# Patient Record
Sex: FEMALE | Race: WHITE | NOT HISPANIC OR LATINO | Employment: FULL TIME | URBAN - METROPOLITAN AREA
[De-identification: names, ages, dates, MRNs, and addresses within clinical notes are randomized per-mention and may not be internally consistent; named-entity substitution may affect disease eponyms.]

---

## 2019-04-26 ENCOUNTER — TELEPHONE (OUTPATIENT)
Dept: SCHEDULING | Facility: IMAGING CENTER | Age: 59
End: 2019-04-26

## 2019-05-15 ENCOUNTER — OFFICE VISIT (OUTPATIENT)
Dept: MEDICAL GROUP | Facility: PHYSICIAN GROUP | Age: 59
End: 2019-05-15
Payer: COMMERCIAL

## 2019-05-15 VITALS
OXYGEN SATURATION: 98 % | WEIGHT: 217 LBS | SYSTOLIC BLOOD PRESSURE: 130 MMHG | TEMPERATURE: 98.6 F | RESPIRATION RATE: 16 BRPM | DIASTOLIC BLOOD PRESSURE: 78 MMHG | HEART RATE: 78 BPM

## 2019-05-15 DIAGNOSIS — E55.9 VITAMIN D DEFICIENCY: ICD-10-CM

## 2019-05-15 DIAGNOSIS — I10 BENIGN ESSENTIAL HTN: ICD-10-CM

## 2019-05-15 DIAGNOSIS — R53.83 OTHER FATIGUE: ICD-10-CM

## 2019-05-15 DIAGNOSIS — L40.9 PSORIASIS: ICD-10-CM

## 2019-05-15 DIAGNOSIS — R23.2 HOT FLASHES: ICD-10-CM

## 2019-05-15 DIAGNOSIS — E89.0 HISTORY OF TOTAL THYROIDECTOMY: ICD-10-CM

## 2019-05-15 DIAGNOSIS — Z00.00 PREVENTATIVE HEALTH CARE: ICD-10-CM

## 2019-05-15 DIAGNOSIS — I25.2 HISTORY OF MI (MYOCARDIAL INFARCTION): ICD-10-CM

## 2019-05-15 DIAGNOSIS — E03.9 HYPOTHYROIDISM, UNSPECIFIED TYPE: ICD-10-CM

## 2019-05-15 DIAGNOSIS — Z12.31 SCREENING MAMMOGRAM, ENCOUNTER FOR: ICD-10-CM

## 2019-05-15 DIAGNOSIS — K21.9 GASTROESOPHAGEAL REFLUX DISEASE, ESOPHAGITIS PRESENCE NOT SPECIFIED: ICD-10-CM

## 2019-05-15 DIAGNOSIS — E78.00 HYPERCHOLESTEREMIA: ICD-10-CM

## 2019-05-15 PROBLEM — Z98.890 HISTORY OF TOTAL THYROIDECTOMY: Status: ACTIVE | Noted: 2019-05-15

## 2019-05-15 PROBLEM — Z90.89 HISTORY OF TOTAL THYROIDECTOMY: Status: ACTIVE | Noted: 2019-05-15

## 2019-05-15 PROCEDURE — 99204 OFFICE O/P NEW MOD 45 MIN: CPT | Performed by: FAMILY MEDICINE

## 2019-05-15 RX ORDER — LEVOTHYROXINE SODIUM 0.2 MG/1
200 TABLET ORAL
Qty: 90 TAB | Refills: 3 | Status: CANCELLED | OUTPATIENT
Start: 2019-05-15

## 2019-05-15 RX ORDER — AMLODIPINE BESYLATE 5 MG/1
5 TABLET ORAL DAILY
Qty: 90 TAB | Refills: 3 | Status: CANCELLED | OUTPATIENT
Start: 2019-05-15

## 2019-05-15 RX ORDER — PANTOPRAZOLE SODIUM 40 MG/1
40 TABLET, DELAYED RELEASE ORAL DAILY
COMMUNITY
End: 2019-05-16 | Stop reason: SDUPTHER

## 2019-05-15 RX ORDER — VITAMIN B COMPLEX
TABLET ORAL
COMMUNITY
End: 2020-06-30

## 2019-05-15 RX ORDER — PANTOPRAZOLE SODIUM 40 MG/1
40 TABLET, DELAYED RELEASE ORAL DAILY
Qty: 90 TAB | Refills: 3 | Status: CANCELLED | OUTPATIENT
Start: 2019-05-15

## 2019-05-15 RX ORDER — BIOTIN 1 MG
TABLET ORAL
COMMUNITY
End: 2020-06-30

## 2019-05-15 RX ORDER — ASPIRIN 81 MG/1
81 TABLET, CHEWABLE ORAL DAILY
COMMUNITY

## 2019-05-15 RX ORDER — ATORVASTATIN CALCIUM 80 MG/1
80 TABLET, FILM COATED ORAL EVERY EVENING
Qty: 90 TAB | Refills: 3 | Status: CANCELLED | OUTPATIENT
Start: 2019-05-15

## 2019-05-15 RX ORDER — FLUOXETINE 10 MG/1
10 CAPSULE ORAL DAILY
Qty: 90 CAP | Refills: 3 | Status: CANCELLED | OUTPATIENT
Start: 2019-05-15

## 2019-05-15 RX ORDER — AMLODIPINE BESYLATE 5 MG/1
5 TABLET ORAL DAILY
COMMUNITY
End: 2019-05-16 | Stop reason: SDUPTHER

## 2019-05-15 RX ORDER — CARVEDILOL 6.25 MG/1
6.25 TABLET ORAL 2 TIMES DAILY WITH MEALS
COMMUNITY
End: 2019-05-16 | Stop reason: SDUPTHER

## 2019-05-15 RX ORDER — ATORVASTATIN CALCIUM 80 MG/1
80 TABLET, FILM COATED ORAL NIGHTLY
COMMUNITY
End: 2019-05-16 | Stop reason: SDUPTHER

## 2019-05-15 RX ORDER — FLUOXETINE 10 MG/1
10 CAPSULE ORAL DAILY
COMMUNITY
End: 2019-05-16 | Stop reason: SDUPTHER

## 2019-05-15 RX ORDER — CARVEDILOL 6.25 MG/1
6.25 TABLET ORAL 2 TIMES DAILY WITH MEALS
Qty: 180 TAB | Refills: 3 | Status: CANCELLED | OUTPATIENT
Start: 2019-05-15

## 2019-05-15 RX ORDER — LEVOTHYROXINE SODIUM 0.2 MG/1
200 TABLET ORAL
COMMUNITY
End: 2019-05-16 | Stop reason: SDUPTHER

## 2019-05-15 ASSESSMENT — PATIENT HEALTH QUESTIONNAIRE - PHQ9: CLINICAL INTERPRETATION OF PHQ2 SCORE: 0

## 2019-05-15 NOTE — PROGRESS NOTES
"CC: Hypertension    HISTORY OF THE PRESENT ILLNESS: Patient is a 58 y.o. female. This pleasant patient is here today to establish care and discuss health problems below.    Hypertension: Chronic issue for the patient.  She takes carvedilol as well as amlodipine.  Typically her blood pressure is very well controlled in the 120 systolic.  She reports she is slightly high today.  No issues with chest pain, headache, blurry vision or shortness of breath.  She does need refills on the medication but would like to check what mail order pharmacy her insurance covers.    High cholesterol: Chronic issue for the patient.  She does take high intensity statin due to history of myocardial infarction as well.  No side effects from this medication.  She does need refills on the medication but would like to check what mail order pharmacy her insurance covers.    Hypothyroidism, history of thyroidectomy: Chronic issue for the patient.  She reports having a total thyroidectomy several years ago due to goiter.  She is never followed with endocrinology.  She is been maintained on 200 mcg daily of Synthroid since this time.  She does report fatigue and dry skin.    GERD: Patient takes pantoprazole 40 mg daily.  She has no current symptoms of GERD and this medication works well.    History of myocardial infarction.  Patient reports that starting in her 30s, she began to have heart attacks.  She reports having had 5 heart attacks but without any stent placement or surgery.  She states that these heart attacks were apparently due to \"stress\".  Her dad has a history of coronary artery disease, but this is later in life when he developed it.  She has no current cardiac symptoms.  She does not have a cardiologist here in [place].    Psoriasis: Chronic issue for the patient.  Her previous physician prescribed clobetasol cream and calcitriol cream.  Neither of these are working and her symptoms seem to be getting worse, particularly on the " extensor surfaces of her elbows and forearms as well as hands.  She is wondering if there is anything else that could be done.    Hot flashes: Chronic issue for the patient.  She takes the fluoxetine 10 mg daily for her hot flashes.  No side effects from the medication.  No desire to try to wean off the medication at this time.    Allergies: Patient has no allergy information on record.    Current Outpatient Prescriptions Ordered in [facility]   Medication Sig Dispense Refill   • FLUoxetine (PROZAC) 10 MG Cap Take 10 mg by mouth every day.     • pantoprazole (PROTONIX) 40 MG Tablet Delayed Response Take 40 mg by mouth every day.     • levothyroxine (SYNTHROID) 200 MCG Tab Take 200 mcg by mouth Every morning on an empty stomach.     • amLODIPine (NORVASC) 5 MG Tab Take 5 mg by mouth every day.     • carvedilol (COREG) 6.25 MG Tab Take 6.25 mg by mouth 2 times a day, with meals.     • atorvastatin (LIPITOR) 80 MG tablet Take 80 mg by mouth every evening.     • aspirin (ASA) 81 MG Chew Tab chewable tablet Take 81 mg by mouth every day.     • Cholecalciferol (VITAMIN D3) 5000 UNIT/0.5ML Liquid Take  by mouth.     • Biotin 1000 MCG Tab Take  by mouth.     • Coenzyme Q10 (COQ10) 100 MG Cap Take  by mouth.       No current Epic-ordered facility-administered medications on file.        Past Medical History:   Diagnosis Date   • GERD (gastroesophageal reflux disease)    • Heart attack (HCC)    • Hyperlipidemia    • Hypertension    • Thyroid disease        Past Surgical History:   Procedure Laterality Date   • CHOLECYSTECTOMY     • THYROIDECTOMY TOTAL         Social History   Substance Use Topics   • Smoking status: Never Smoker   • Smokeless tobacco: Never Used   • Alcohol use Yes      Comment: 1 drink weekly       Social History     Social History Narrative   • No narrative on file       Family History   Problem Relation Age of Onset   • Alzheimer's Disease Mother    • Heart Disease Father         CAD       ROS:     -  Constitutional: Positive for fatigue.  Negative for fever, chills, unexpected weight change, and generalized weakness.     - HEENT: Negative for headaches, vision changes, hearing changes, ear pain, ear discharge, rhinorrhea, sinus congestion, sore throat, and neck pain.      - Respiratory: Negative for cough, sputum production, chest congestion, dyspnea, wheezing, and crackles.      - Cardiovascular: Negative for chest pain, palpitations, orthopnea, PND, and bilateral lower extremity edema.     - Gastrointestinal: Negative for heartburn, nausea, vomiting, abdominal pain, hematochezia, melena, diarrhea, constipation, and greasy/foul-smelling stools.     - Genitourinary: Negative for dysuria, polyuria, hematuria, pyuria, urinary urgency, and urinary incontinence.     - Musculoskeletal: Negative for myalgias, back pain, and joint pain.     - Skin:See HPI.    - Neurological: Negative for dizziness, tingling, tremors, focal sensory deficit, focal weakness and headaches.     - Endo/Heme/Allergies: Does not bruise/bleed easily. No polyuria or polydipsia.    - Psychiatric/Behavioral: Negative for depression, suicidal/homicidal ideation and memory loss.      Exam: /78   Pulse 78   Temp 37 °C (98.6 °F)   Resp 16   Wt 98.4 kg (217 lb)   SpO2 98%  There is no height or weight on file to calculate BMI.    General: Well appearing, NAD  HEENT: Normocephalic. Conjunctiva clear, lids without ptosis, pupils equal and reactive to light accommodation, ears normal shape and contour,  oropharynx is without erythema, edema or exudates.   Neck: Supple without JVD. No thyromegaly or nodules  Pulmonary: Clear to ausculation.  Normal effort. No rales, ronchi, or wheezing.  Cardiovascular: Regular rate and rhythm without murmur, rubs or gallop.  No lower extremity edema.  Abdomen: Soft, nontender, nondistended. Normal bowel sounds. Liver and spleen are not palpable. No rebound or guarding  Neurologic: normal gait  Lymph: No  cervical, supraclavicular lymph nodes are palpable  Skin: Warm and dry.  Erythematous, scaly, silvery patches on extensor surface of elbows and forearms.  Musculoskeletal:  No extremity cyanosis, clubbing, or edema.  Psych: Normal mood and affect. Alert and oriented. Judgment and insight is normal.    Please note that this dictation was created using voice recognition software. I have made every reasonable attempt to correct obvious errors, but I expect that there are errors of grammar and possibly content that I did not discover before finalizing the note.      Assessment/Plan  Magda was seen today for establish care and medication refill.    Diagnoses and all orders for this visit:    Benign essential HTN  Chronic well-controlled problem for the patient, new problem to me.  Continue amlodipine and carvedilol.  Check CMP.  -     Comp Metabolic Panel; Future    Vitamin D deficiency  Chronic problem for the patient.  Unclear whether or not controlled.  Check vitamin D levels.  -     VITAMIN D,25 HYDROXY; Future    Hypercholesteremia  Chronic problem for the patient.  Unclear whether or not controlled.  Check lipid profile.  Continue high intensity statin, particularly given her history of 5 myocardial infarctions.  -     Lipid Profile; Future    History of total thyroidectomy  Hypothyroidism, unspecified type  Chronic problem, unclear whether or not controlled.  Check TSH.  Continue Synthroid at 200 mcg daily.  -     TSH WITH REFLEX TO FT4; Future    Gastroesophageal reflux disease, esophagitis presence not specified  Chronic problem for the patient.  Well-controlled on pantoprazole which she is okay to continue.    History of MI (myocardial infarction)  Patient reports history of 5 myocardial infarctions.  She does not currently have a cardiologist here in [place].  We will go ahead and refer.  -     REFERRAL TO CARDIOLOGY    Psoriasis  Chronic uncontrolled problem.  We will go ahead and refer to dermatology, possibly  for immunotherapy.  -     REFERRAL TO DERMATOLOGY    Hot flashes  Chronic well-controlled problem.  Continue fluoxetine 10 mg daily.    Other fatigue  Chronic issue noted on review of systems.  Checking all labs plan and will go ahead and add on CBC.  -     CBC WITH DIFFERENTIAL; Future    Screening mammogram, encounter for  -     MA-SCREENING MAMMO BILAT W/TOMOSYNTHESIS W/CAD; Future    Preventative health care  -     HEMOGLOBIN A1C; Future    Follow-up in about 4 months.    Jagruti Stewart DO  [facility]

## 2019-05-16 RX ORDER — LEVOTHYROXINE SODIUM 0.2 MG/1
200 TABLET ORAL
Qty: 90 TAB | Refills: 3 | Status: SHIPPED | OUTPATIENT
Start: 2019-05-16 | End: 2020-01-02 | Stop reason: SDUPTHER

## 2019-05-16 RX ORDER — ATORVASTATIN CALCIUM 80 MG/1
80 TABLET, FILM COATED ORAL EVERY EVENING
Qty: 90 TAB | Refills: 3 | Status: SHIPPED | OUTPATIENT
Start: 2019-05-16 | End: 2020-01-02 | Stop reason: SDUPTHER

## 2019-05-16 RX ORDER — PANTOPRAZOLE SODIUM 40 MG/1
40 TABLET, DELAYED RELEASE ORAL DAILY
Qty: 90 TAB | Refills: 3 | Status: SHIPPED | OUTPATIENT
Start: 2019-05-16 | End: 2019-09-25

## 2019-05-16 RX ORDER — AMLODIPINE BESYLATE 5 MG/1
5 TABLET ORAL DAILY
Qty: 90 TAB | Refills: 3 | Status: SHIPPED | OUTPATIENT
Start: 2019-05-16 | End: 2019-10-09

## 2019-05-16 RX ORDER — FLUOXETINE 10 MG/1
10 CAPSULE ORAL DAILY
Qty: 90 CAP | Refills: 3 | Status: SHIPPED | OUTPATIENT
Start: 2019-05-16 | End: 2020-01-02 | Stop reason: SDUPTHER

## 2019-05-16 RX ORDER — CARVEDILOL 6.25 MG/1
6.25 TABLET ORAL 2 TIMES DAILY WITH MEALS
Qty: 180 TAB | Refills: 3 | Status: SHIPPED | OUTPATIENT
Start: 2019-05-16 | End: 2020-01-02 | Stop reason: SDUPTHER

## 2019-05-17 NOTE — TELEPHONE ENCOUNTER
Dr Stewart- Insurance does not cover pantoprazole and is asking to switch to omeprazole. Please refill as you see fit.

## 2019-05-20 RX ORDER — OMEPRAZOLE 40 MG/1
40 CAPSULE, DELAYED RELEASE ORAL DAILY
Qty: 90 CAP | Refills: 3 | Status: SHIPPED | OUTPATIENT
Start: 2019-05-20 | End: 2020-01-02 | Stop reason: SDUPTHER

## 2019-05-23 ENCOUNTER — HOSPITAL ENCOUNTER (OUTPATIENT)
Dept: LAB | Facility: MEDICAL CENTER | Age: 59
End: 2019-05-23
Attending: FAMILY MEDICINE
Payer: COMMERCIAL

## 2019-05-23 DIAGNOSIS — R53.83 OTHER FATIGUE: ICD-10-CM

## 2019-05-23 DIAGNOSIS — E03.9 HYPOTHYROIDISM, UNSPECIFIED TYPE: ICD-10-CM

## 2019-05-23 DIAGNOSIS — E55.9 VITAMIN D DEFICIENCY: ICD-10-CM

## 2019-05-23 DIAGNOSIS — I10 BENIGN ESSENTIAL HTN: ICD-10-CM

## 2019-05-23 DIAGNOSIS — E78.00 HYPERCHOLESTEREMIA: ICD-10-CM

## 2019-05-23 DIAGNOSIS — Z00.00 PREVENTATIVE HEALTH CARE: ICD-10-CM

## 2019-05-23 LAB
25(OH)D3 SERPL-MCNC: 28 NG/ML (ref 30–100)
ALBUMIN SERPL BCP-MCNC: 4.3 G/DL (ref 3.2–4.9)
ALBUMIN/GLOB SERPL: 1.4 G/DL
ALP SERPL-CCNC: 134 U/L (ref 30–99)
ALT SERPL-CCNC: 32 U/L (ref 2–50)
ANION GAP SERPL CALC-SCNC: 8 MMOL/L (ref 0–11.9)
AST SERPL-CCNC: 24 U/L (ref 12–45)
BASOPHILS # BLD AUTO: 1.4 % (ref 0–1.8)
BASOPHILS # BLD: 0.12 K/UL (ref 0–0.12)
BILIRUB SERPL-MCNC: 0.6 MG/DL (ref 0.1–1.5)
BUN SERPL-MCNC: 13 MG/DL (ref 8–22)
CALCIUM SERPL-MCNC: 10.2 MG/DL (ref 8.5–10.5)
CHLORIDE SERPL-SCNC: 105 MMOL/L (ref 96–112)
CHOLEST SERPL-MCNC: 136 MG/DL (ref 100–199)
CO2 SERPL-SCNC: 27 MMOL/L (ref 20–33)
CREAT SERPL-MCNC: 0.94 MG/DL (ref 0.5–1.4)
EOSINOPHIL # BLD AUTO: 0.38 K/UL (ref 0–0.51)
EOSINOPHIL NFR BLD: 4.5 % (ref 0–6.9)
ERYTHROCYTE [DISTWIDTH] IN BLOOD BY AUTOMATED COUNT: 42.1 FL (ref 35.9–50)
EST. AVERAGE GLUCOSE BLD GHB EST-MCNC: 134 MG/DL
FASTING STATUS PATIENT QL REPORTED: NORMAL
GLOBULIN SER CALC-MCNC: 3 G/DL (ref 1.9–3.5)
GLUCOSE SERPL-MCNC: 106 MG/DL (ref 65–99)
HBA1C MFR BLD: 6.3 % (ref 0–5.6)
HCT VFR BLD AUTO: 47.2 % (ref 37–47)
HDLC SERPL-MCNC: 45 MG/DL
HGB BLD-MCNC: 15.4 G/DL (ref 12–16)
IMM GRANULOCYTES # BLD AUTO: 0.02 K/UL (ref 0–0.11)
IMM GRANULOCYTES NFR BLD AUTO: 0.2 % (ref 0–0.9)
LDLC SERPL CALC-MCNC: 56 MG/DL
LYMPHOCYTES # BLD AUTO: 3.33 K/UL (ref 1–4.8)
LYMPHOCYTES NFR BLD: 39.2 % (ref 22–41)
MCH RBC QN AUTO: 30.3 PG (ref 27–33)
MCHC RBC AUTO-ENTMCNC: 32.6 G/DL (ref 33.6–35)
MCV RBC AUTO: 92.7 FL (ref 81.4–97.8)
MONOCYTES # BLD AUTO: 0.74 K/UL (ref 0–0.85)
MONOCYTES NFR BLD AUTO: 8.7 % (ref 0–13.4)
NEUTROPHILS # BLD AUTO: 3.9 K/UL (ref 2–7.15)
NEUTROPHILS NFR BLD: 46 % (ref 44–72)
NRBC # BLD AUTO: 0.02 K/UL
NRBC BLD-RTO: 0.2 /100 WBC
PLATELET # BLD AUTO: 491 K/UL (ref 164–446)
PMV BLD AUTO: 9.8 FL (ref 9–12.9)
POTASSIUM SERPL-SCNC: 4 MMOL/L (ref 3.6–5.5)
PROT SERPL-MCNC: 7.3 G/DL (ref 6–8.2)
RBC # BLD AUTO: 5.09 M/UL (ref 4.2–5.4)
SODIUM SERPL-SCNC: 140 MMOL/L (ref 135–145)
TRIGL SERPL-MCNC: 176 MG/DL (ref 0–149)
TSH SERPL DL<=0.005 MIU/L-ACNC: 0.26 UIU/ML (ref 0.38–5.33)
WBC # BLD AUTO: 8.5 K/UL (ref 4.8–10.8)

## 2019-05-23 PROCEDURE — 80061 LIPID PANEL: CPT

## 2019-05-23 PROCEDURE — 83036 HEMOGLOBIN GLYCOSYLATED A1C: CPT

## 2019-05-23 PROCEDURE — 84439 ASSAY OF FREE THYROXINE: CPT

## 2019-05-23 PROCEDURE — 84443 ASSAY THYROID STIM HORMONE: CPT

## 2019-05-23 PROCEDURE — 36415 COLL VENOUS BLD VENIPUNCTURE: CPT

## 2019-05-23 PROCEDURE — 82306 VITAMIN D 25 HYDROXY: CPT

## 2019-05-23 PROCEDURE — 80053 COMPREHEN METABOLIC PANEL: CPT

## 2019-05-23 PROCEDURE — 85025 COMPLETE CBC W/AUTO DIFF WBC: CPT

## 2019-05-24 LAB — T4 FREE SERPL-MCNC: 1.29 NG/DL (ref 0.53–1.43)

## 2019-05-31 ENCOUNTER — HOSPITAL ENCOUNTER (OUTPATIENT)
Dept: RADIOLOGY | Facility: MEDICAL CENTER | Age: 59
End: 2019-05-31

## 2019-06-05 ENCOUNTER — HOSPITAL ENCOUNTER (OUTPATIENT)
Dept: RADIOLOGY | Facility: MEDICAL CENTER | Age: 59
End: 2019-06-05
Attending: FAMILY MEDICINE
Payer: COMMERCIAL

## 2019-06-05 DIAGNOSIS — Z12.31 SCREENING MAMMOGRAM, ENCOUNTER FOR: ICD-10-CM

## 2019-06-05 PROCEDURE — 77067 SCR MAMMO BI INCL CAD: CPT

## 2019-06-06 DIAGNOSIS — R92.8 ABNORMALITY OF LEFT BREAST ON SCREENING MAMMOGRAM: ICD-10-CM

## 2019-06-06 NOTE — PROGRESS NOTES
Placed ultrasound left breast for this patient who got an abnormal mammogram as requested for Dr. Stewart as I am covering for her at this time.  Please call the patient and let her know.  Thank you.  Vania Wilkerson M.D.

## 2019-06-07 ENCOUNTER — HOSPITAL ENCOUNTER (OUTPATIENT)
Dept: RADIOLOGY | Facility: MEDICAL CENTER | Age: 59
End: 2019-06-07
Attending: INTERNAL MEDICINE
Payer: COMMERCIAL

## 2019-06-07 ENCOUNTER — HOSPITAL ENCOUNTER (OUTPATIENT)
Dept: RADIOLOGY | Facility: MEDICAL CENTER | Age: 59
End: 2019-06-07
Attending: FAMILY MEDICINE
Payer: COMMERCIAL

## 2019-06-07 DIAGNOSIS — R92.8 ABNORMAL MAMMOGRAM: ICD-10-CM

## 2019-06-07 DIAGNOSIS — D24.2 FIBROADENOMA OF LEFT BREAST: ICD-10-CM

## 2019-06-07 PROCEDURE — 76642 ULTRASOUND BREAST LIMITED: CPT | Mod: LT

## 2019-06-07 PROCEDURE — G0279 TOMOSYNTHESIS, MAMMO: HCPCS | Mod: LT

## 2019-06-18 ENCOUNTER — OFFICE VISIT (OUTPATIENT)
Dept: MEDICAL GROUP | Facility: PHYSICIAN GROUP | Age: 59
End: 2019-06-18
Payer: COMMERCIAL

## 2019-06-18 VITALS
TEMPERATURE: 98.1 F | DIASTOLIC BLOOD PRESSURE: 78 MMHG | HEART RATE: 90 BPM | WEIGHT: 215 LBS | SYSTOLIC BLOOD PRESSURE: 126 MMHG | BODY MASS INDEX: 35.82 KG/M2 | HEIGHT: 65 IN | OXYGEN SATURATION: 95 %

## 2019-06-18 DIAGNOSIS — E03.9 HYPOTHYROIDISM, UNSPECIFIED TYPE: ICD-10-CM

## 2019-06-18 DIAGNOSIS — R74.8 ELEVATED ALKALINE PHOSPHATASE LEVEL: ICD-10-CM

## 2019-06-18 DIAGNOSIS — E55.9 VITAMIN D DEFICIENCY: ICD-10-CM

## 2019-06-18 DIAGNOSIS — H81.10 BENIGN PAROXYSMAL POSITIONAL VERTIGO, UNSPECIFIED LATERALITY: ICD-10-CM

## 2019-06-18 DIAGNOSIS — R73.03 PREDIABETES: ICD-10-CM

## 2019-06-18 PROCEDURE — 99214 OFFICE O/P EST MOD 30 MIN: CPT | Performed by: FAMILY MEDICINE

## 2019-06-18 NOTE — PATIENT INSTRUCTIONS
Epley Maneuver Self-Care  WHAT IS THE EPLEY MANEUVER?  The Epley maneuver is an exercise you can do to relieve symptoms of benign paroxysmal positional vertigo (BPPV). This condition is often just referred to as vertigo. BPPV is caused by the movement of tiny crystals (canaliths) inside your inner ear. The accumulation and movement of canaliths in your inner ear causes a sudden spinning sensation (vertigo) when you move your head to certain positions. Vertigo usually lasts about 30 seconds. BPPV usually occurs in just one ear. If you get vertigo when you lie on your left side, you probably have BPPV in your left ear. Your health care provider can tell you which ear is involved.   BPPV may be caused by a head injury. Many people older than 50 get BPPV for unknown reasons. If you have been diagnosed with BPPV, your health care provider may teach you how to do this maneuver. BPPV is not life threatening (benign) and usually goes away in time.   WHEN SHOULD I PERFORM THE EPLEY MANEUVER?  You can do this maneuver at home whenever you have symptoms of vertigo. You may do the Epley maneuver up to 3 times a day until your symptoms of vertigo go away.  HOW SHOULD I DO THE EPLEY MANEUVER?  1. Sit on the edge of a bed or table with your back straight. Your legs should be extended or hanging over the edge of the bed or table.    2. Turn your head correction toward the affected ear.    3. Lie backward quickly with your head turned until you are lying flat on your back. You may want to position a pillow under your shoulders.    4. Hold this position for 30 seconds. You may experience an attack of vertigo. This is normal. Hold this position until the vertigo stops.  5. Then turn your head to the opposite direction until your unaffected ear is facing the floor.    6. Hold this position for 30 seconds. You may experience an attack of vertigo. This is normal. Hold this position until the vertigo stops.  7. Now turn your whole body to  the same side as your head. Hold for another 30 seconds.    8. You can then sit back up.  ARE THERE RISKS TO THIS MANEUVER?  In some cases, you may have other symptoms (such as changes in your vision, weakness, or numbness). If you have these symptoms, stop doing the maneuver and call your health care provider. Even if doing these maneuvers relieves your vertigo, you may still have dizziness. Dizziness is the sensation of light-headedness but without the sensation of movement. Even though the Epley maneuver may relieve your vertigo, it is possible that your symptoms will return within 5 years.  WHAT SHOULD I DO AFTER THIS MANEUVER?  After doing the Epley maneuver, you can return to your normal activities. Ask your doctor if there is anything you should do at home to prevent vertigo. This may include:  · Sleeping with two or more pillows to keep your head elevated.  · Not sleeping on the side of your affected ear.  · Getting up slowly from bed.  · Avoiding sudden movements during the day.  · Avoiding extreme head movement, like looking up or bending over.  · Wearing a cervical collar to prevent sudden head movements.  WHAT SHOULD I DO IF MY SYMPTOMS GET WORSE?  Call your health care provider if your vertigo gets worse. Call your provider right way if you have other symptoms, including:   · Nausea.  · Vomiting.  · Headache.  · Weakness.  · Numbness.  · Vision changes.     This information is not intended to replace advice given to you by your health care provider. Make sure you discuss any questions you have with your health care provider.     Document Released: 12/23/2014 Document Reviewed: 12/23/2014  [employer] Interactive Patient Education ©2016 [employer]

## 2019-06-19 NOTE — PROGRESS NOTES
CC: Vertigo    HISTORY OF THE PRESENT ILLNESS: Patient is a 58 y.o. female. This pleasant patient is here today to discuss following health issues.    Vertigo: Patient has a history of BPPV.  Where she previously lived in [place], she was actually seen by an ear nose and throat specialist.  She underwent work-up and her vertigo was felt to be benign and positional nature.  She notes this to be her about her fourth episode of vertigo.  She states it started on Sunday after a car ride.  She noted significant nausea and vomiting on Sunday as well as severe spinning sensation, mostly with turning head to the right.  Since that time, her vertigo has improved somewhat.  She has leftover medications from previous PCP including promethazine as well as Ativan which were apparently prescribed her vertigo.  She does not have a prescription for meclizine but has tried it in the past.    Elevated alkaline phosphatase: Noted on recent labs.  Patient has no symptoms of liver disease at this time.  She does endorse eating a fairly unhealthy diet.    Vitamin D deficiency: Noted on recent labs.  She is on a vitamin D supplement and vitamin D level was 28, just below normal range.  She hopes to get out side more this summer and she will try to be more diligent about taking her vitamin D.    History of thyroidectomy, hypothyroidism: Chronic issues for the patient.  Thyroid labs were recently checked.  TSH was mildly low but T4 was normal.  Patient has been on her current dose of Synthroid for very long time.  She reports no current symptoms of hypo-or hyperthyroidism.    Prediabetes: New problem for the patient noted on recent labs.  Hemoglobin A1c 6.4.  Patient does endorse unhealthy diet.    Allergies: Inapsine [droperidol]    Current Outpatient Prescriptions Ordered in [facility]   Medication Sig Dispense Refill   • amLODIPine (NORVASC) 5 MG Tab Take 1 Tab by mouth every day. 90 Tab 3   • atorvastatin (LIPITOR) 80 MG tablet Take 1 Tab  by mouth every evening. 90 Tab 3   • carvedilol (COREG) 6.25 MG Tab Take 1 Tab by mouth 2 times a day, with meals. 180 Tab 3   • FLUoxetine (PROZAC) 10 MG Cap Take 1 Cap by mouth every day. 90 Cap 3   • levothyroxine (SYNTHROID) 200 MCG Tab Take 1 Tab by mouth Every morning on an empty stomach. 90 Tab 3   • aspirin (ASA) 81 MG Chew Tab chewable tablet Take 81 mg by mouth every day.     • Cholecalciferol (VITAMIN D3) 5000 UNIT/0.5ML Liquid Take  by mouth.     • Biotin 1000 MCG Tab Take  by mouth.     • Coenzyme Q10 (COQ10) 100 MG Cap Take  by mouth.     • omeprazole (PRILOSEC) 40 MG delayed-release capsule Take 1 Cap by mouth every day. 90 Cap 3   • pantoprazole (PROTONIX) 40 MG Tablet Delayed Response Take 1 Tab by mouth every day. 90 Tab 3     No current Epic-ordered facility-administered medications on file.        Past Medical History:   Diagnosis Date   • GERD (gastroesophageal reflux disease)    • Heart attack (HCC)    • Hyperlipidemia    • Hypertension    • Thyroid disease        Past Surgical History:   Procedure Laterality Date   • CHOLECYSTECTOMY     • THYROIDECTOMY TOTAL         Social History   Substance Use Topics   • Smoking status: Never Smoker   • Smokeless tobacco: Never Used   • Alcohol use Yes      Comment: 1 drink weekly       Social History     Social History Narrative   • No narrative on file       Family History   Problem Relation Age of Onset   • Alzheimer's Disease Mother    • Heart Disease Father         CAD       ROS:     - Constitutional: Negative for fever, chills, unexpected weight change, and fatigue/generalized weakness.     - Respiratory: Negative for cough, sputum production, chest congestion, dyspnea, wheezing, and crackles.      - Cardiovascular: Negative for chest pain, palpitations, orthopnea, PND, and bilateral lower extremity edema.       Labs: Labs reviewed from May 23, 2019 and questions answered with patient.    Exam: /78 (BP Location: Right arm, Patient Position:  "Sitting, BP Cuff Size: Large adult)   Pulse 90   Temp 36.7 °C (98.1 °F) (Temporal)   Ht 1.651 m (5' 5\")   Wt 97.5 kg (215 lb)   SpO2 95%  Body mass index is 35.78 kg/m².    General: Uncomfortable appearing, NAD  Pulmonary: Clear to ausculation.  Normal effort. No rales, ronchi, or wheezing.  Cardiovascular: Regular rate and rhythm without murmur, rubs or gallop.   Neurologic: Patient with significant sensation of vertigo upon Epley maneuver with head turning to the right.  No nystagmus noted.  Skin: Warm and dry.  No obvious lesions.  Musculoskeletal:  No extremity cyanosis, clubbing, or edema.  Psych: Normal mood and affect. Alert and oriented. Judgment and insight is normal.    Please note that this dictation was created using voice recognition software. I have made every reasonable attempt to correct obvious errors, but I expect that there are errors of grammar and possibly content that I did not discover before finalizing the note.      Assessment/Plan  Priscilla was seen today for vertigo and results.    Diagnoses and all orders for this visit:    Benign paroxysmal positional vertigo, unspecified laterality  Recurrent issue for the patient, with recent episode starting last Sunday.  Epley maneuver was performed twice with some improvement in symptoms.  I recommended that she continue promethazine and she can trial meclizine as well though not together.  I did discuss with her that Ativan is unlikely to help improve her symptoms of BPPV.  If continued issues, may need referral to physical therapy.  I did give her a handout for Epley maneuver to do at home for recurrence of her vertigo.  She has been worked up by ENT in the past and felt this vertigo to be peripheral in nature.    Elevated alkaline phosphatase level  Noted on recent labs and discussed with patient today.  We will recheck hepatic function panel in 6 to 8 weeks.  If continued elevation, will likely perform right upper quadrant ultrasound.  This " was discussed with patient today.  -     HEPATIC FUNCTION PANEL; Future    Vitamin D deficiency  Very mild noted on recent labs.  Patient plans to be more diligent about taking her vitamin D supplement.    Hypothyroidism, unspecified type  Chronic well-controlled problem for the patient.  Continue current dose of Synthroid.    Prediabetes  New problem noted on recent labs.  Lifestyle changes discussed with patient today.    Follow-up in about 3 months.    Jagruti Stewart DO  [facility]

## 2019-06-21 ENCOUNTER — OFFICE VISIT (OUTPATIENT)
Dept: MEDICAL GROUP | Facility: PHYSICIAN GROUP | Age: 59
End: 2019-06-21
Payer: COMMERCIAL

## 2019-06-21 ENCOUNTER — APPOINTMENT (OUTPATIENT)
Dept: RADIOLOGY | Facility: IMAGING CENTER | Age: 59
End: 2019-06-21
Attending: FAMILY MEDICINE
Payer: COMMERCIAL

## 2019-06-21 VITALS
OXYGEN SATURATION: 94 % | BODY MASS INDEX: 35.82 KG/M2 | DIASTOLIC BLOOD PRESSURE: 72 MMHG | WEIGHT: 215 LBS | HEIGHT: 65 IN | HEART RATE: 86 BPM | SYSTOLIC BLOOD PRESSURE: 120 MMHG | TEMPERATURE: 98.8 F

## 2019-06-21 DIAGNOSIS — M25.551 CHRONIC RIGHT HIP PAIN: ICD-10-CM

## 2019-06-21 DIAGNOSIS — G89.29 CHRONIC RIGHT HIP PAIN: ICD-10-CM

## 2019-06-21 PROCEDURE — 73501 X-RAY EXAM HIP UNI 1 VIEW: CPT | Mod: 26,RT | Performed by: FAMILY MEDICINE

## 2019-06-21 PROCEDURE — 99214 OFFICE O/P EST MOD 30 MIN: CPT | Performed by: FAMILY MEDICINE

## 2019-06-21 RX ORDER — NAPROXEN 500 MG/1
500 TABLET ORAL 2 TIMES DAILY WITH MEALS
Qty: 60 TAB | Refills: 0 | Status: SHIPPED | OUTPATIENT
Start: 2019-06-21 | End: 2019-09-25

## 2019-06-21 NOTE — NON-PROVIDER
CC:  Right hip pain    HISTORY OF THE PRESENT ILLNESS: Patient is pleasant a 58 year old female. This pleasant patient is here today 6/21/19 to discuss worsening hip pain that has been present for a year         Allergies: inapsine        PMH: HTN, vitamin D deficeincy, hypercholesteremia, hypothyroid, GERD, MI, psoriasis,     PSH: thyroidectomy       ROS:     - Constitutional: Negative for fever, chills, unexpected weight change, and fatigue/generalized weakness.     - HEENT: Negative for headaches, vision changes, hearing changes, ear pain, ear discharge, rhinorrhea, sinus congestion, sore throat, and neck pain.      - Respiratory:Negative for cough, sputum production, chest congestion, dyspnea, wheezing, and crackles.      - Cardiovascular: Negative for chest pain, palpitations, orthopnea, PND, and bilateral lower extremity edema.     - Gastrointestinal:Negative for heartburn, nausea, vomiting, abdominal pain, hematochezia, melena, diarrhea, constipation, and greasy/foul-smelling stools.     - Genitourinary:Negative for dysuria, polyuria, hematuria, pyuria, urinary urgency, and urinary incontinence.     - Musculoskeletal:  Pain present in right hip, otherwise negative for myalgias, back pain, and joint pain.     - Skin: Negative for rash, itching, cyanotic skin color change.     - Neurological: Negative for numbness, tingling, tremors, focal sensory deficit, focal weakness and headaches. She admits to experiencing recent dizziness secondary to a recent vertigo diagnosis; she states she feels 99% better since her office visit 3 days ago.     - Endo/Heme/Allergies:Does not bruise/bleed easily. No polyuria or polydipsia.    - Psychiatric/Behavioral:Negative for depression, suicidal/homicidal ideation and memory loss.        - NOTE: All other systems reviewed and are negative, except as in HPI.        Exam:    General: Well appearing, NAD.   Neck: Supple without JVD or bruit. No thyromegaly or nodules  Pulmonary:  Clear to ausculation.  Normal effort. No rales, ronchi, or wheezing.  Cardiovascular: Regular rate and rhythm without murmur, rubs or gallop. Carotid and radial pulses are intact and equal bilaterally.  Abdomen: Soft, nontender, nondistended. Normal bowel sounds. Liver and spleen are not palpable. No rebound or guarding  Skin: Warm and dry.  No obvious lesions.  Musculoskeletal:  No extremity cyanosis, clubbing, or edema. Limited ROM in right hip and pain with right leg adduction  Psych: Normal mood and affect. Alert and oriented x3. Judgment and insight is normal.        Assessment/Plan    Right hip pain  Order placed for x-ray of right hip. Additionally, we will consult orthopaedics for assistance with this patient's management and further work-up. Extra-strength Naproxen prescription provided for analgesia and inflammation control. Patient to continue supportive care, may continue to apply heat to area for pain relief. Differential diagnoses, reasons to seek additional medical attention including worsening or new onset of symptoms also discussed.

## 2019-06-21 NOTE — PROGRESS NOTES
CC: Right hip pain    HISTORY OF THE PRESENT ILLNESS: Patient is a 58 y.o. female. This pleasant patient is here today to discuss the following health issue.    Right hip pain: This is been an intermittent issue for the patient over the past year or so, coming and going.  Recently she had a long drive, and her hip pain flared up significantly.  She reports to have significant pain in the posterior portion of her hip around the piriformis area.  Pain is worse with knee flexion.  However, she has no sciatica or radicular symptoms.  She is having some trouble walking due to pain.  She is concerned with arthritis.  She is been trying very low doses of naproxen intermittently at home without any help.  Pain is never been this severe before current flare.    Allergies: Inapsine [droperidol]    Current Outpatient Prescriptions Ordered in [facility]   Medication Sig Dispense Refill   • naproxen (NAPROSYN) 500 MG Tab Take 1 Tab by mouth 2 times a day, with meals. 60 Tab 0   • omeprazole (PRILOSEC) 40 MG delayed-release capsule Take 1 Cap by mouth every day. 90 Cap 3   • amLODIPine (NORVASC) 5 MG Tab Take 1 Tab by mouth every day. 90 Tab 3   • atorvastatin (LIPITOR) 80 MG tablet Take 1 Tab by mouth every evening. 90 Tab 3   • carvedilol (COREG) 6.25 MG Tab Take 1 Tab by mouth 2 times a day, with meals. 180 Tab 3   • FLUoxetine (PROZAC) 10 MG Cap Take 1 Cap by mouth every day. 90 Cap 3   • levothyroxine (SYNTHROID) 200 MCG Tab Take 1 Tab by mouth Every morning on an empty stomach. 90 Tab 3   • pantoprazole (PROTONIX) 40 MG Tablet Delayed Response Take 1 Tab by mouth every day. 90 Tab 3   • aspirin (ASA) 81 MG Chew Tab chewable tablet Take 81 mg by mouth every day.     • Cholecalciferol (VITAMIN D3) 5000 UNIT/0.5ML Liquid Take  by mouth.     • Biotin 1000 MCG Tab Take  by mouth.     • Coenzyme Q10 (COQ10) 100 MG Cap Take  by mouth.       No current Epic-ordered facility-administered medications on file.        Past Medical History:  "  Diagnosis Date   • GERD (gastroesophageal reflux disease)    • Heart attack (HCC)    • Hyperlipidemia    • Hypertension    • Thyroid disease        Past Surgical History:   Procedure Laterality Date   • CHOLECYSTECTOMY     • THYROIDECTOMY TOTAL         Social History   Substance Use Topics   • Smoking status: Never Smoker   • Smokeless tobacco: Never Used   • Alcohol use Yes      Comment: 1 drink weekly       Social History     Social History Narrative   • No narrative on file       Family History   Problem Relation Age of Onset   • Alzheimer's Disease Mother    • Heart Disease Father         CAD       ROS:      - Constitutional: Negative for fever, chills, unexpected weight change, and fatigue/generalized weakness.     - HEENT positive for resolving vertigo.    - Respiratory: Negative for cough, sputum production, chest congestion, dyspnea, wheezing, and crackles.      - Cardiovascular: Negative for chest pain, palpitations, orthopnea, PND, and bilateral lower extremity edema.     Exam: /72   Pulse 86   Temp 37.1 °C (98.8 °F) (Temporal)   Ht 1.651 m (5' 5\")   Wt 97.5 kg (215 lb)   SpO2 94%  Body mass index is 35.78 kg/m².    General: Well appearing, NAD  Skin: Warm and dry.  No obvious lesions.  Musculoskeletal: Antalgic gait noted due to pain.  Patient has tenderness to palpation along the posterior buttock region when knee is in flexion.  No tenderness along this area when patient is lying prone.  She has significant pain when performing Cleo test.  Negative Fader.  Negative hip compression test.  No other tenderness to palpation along the lateral anterior aspect of the hip.  Psych: Normal mood and affect. Alert and oriented. Judgment and insight is normal.    Please note that this dictation was created using voice recognition software. I have made every reasonable attempt to correct obvious errors, but I expect that there are errors of grammar and possibly content that I did not discover before " finalizing the note.      Assessment/Plan  Priscilla was seen today for hip pain.    Diagnoses and all orders for this visit:    Chronic right hip pain  Acute on chronic uncontrolled issue for the patient.  Hip x-ray was unremarkable today.  Suspect possible piriformis syndrome, but she had fairly severe pain with external rotation of the hip as well.  We will go ahead and refer to orthopedics, as may need MRI or arthrogram.  Also will trial naproxen 500 mg twice daily for 2 weeks to see if this can calm down pain.  -     DX-HIP-UNILATERAL-WITH PELVIS-1 VIEW RIGHT; Future  -     REFERRAL TO ORTHOPEDICS  -     naproxen (NAPROSYN) 500 MG Tab; Take 1 Tab by mouth 2 times a day, with meals.    Follow-up in about 3 months or sooner if needed.    Jagruti Stewart,   [facility]

## 2019-07-01 DIAGNOSIS — R42 VERTIGO: ICD-10-CM

## 2019-07-26 DIAGNOSIS — H81.10 BENIGN PAROXYSMAL POSITIONAL VERTIGO, UNSPECIFIED LATERALITY: ICD-10-CM

## 2019-07-31 ENCOUNTER — OFFICE VISIT (OUTPATIENT)
Dept: CARDIOLOGY | Facility: MEDICAL CENTER | Age: 59
End: 2019-07-31
Payer: COMMERCIAL

## 2019-07-31 VITALS
WEIGHT: 214 LBS | HEART RATE: 84 BPM | OXYGEN SATURATION: 95 % | HEIGHT: 65 IN | SYSTOLIC BLOOD PRESSURE: 112 MMHG | DIASTOLIC BLOOD PRESSURE: 78 MMHG | BODY MASS INDEX: 35.65 KG/M2

## 2019-07-31 DIAGNOSIS — I25.10 CORONARY ARTERY DISEASE DUE TO CALCIFIED CORONARY LESION: ICD-10-CM

## 2019-07-31 DIAGNOSIS — I10 BENIGN ESSENTIAL HTN: ICD-10-CM

## 2019-07-31 DIAGNOSIS — I25.2 HISTORY OF MYOCARDIAL INFARCTION: ICD-10-CM

## 2019-07-31 DIAGNOSIS — E78.00 HYPERCHOLESTEREMIA: ICD-10-CM

## 2019-07-31 DIAGNOSIS — I25.84 CORONARY ARTERY DISEASE DUE TO CALCIFIED CORONARY LESION: ICD-10-CM

## 2019-07-31 DIAGNOSIS — R00.2 PALPITATIONS: ICD-10-CM

## 2019-07-31 PROBLEM — H81.11 BENIGN PAROXYSMAL POSITIONAL VERTIGO OF RIGHT EAR: Status: ACTIVE | Noted: 2019-07-31

## 2019-07-31 PROCEDURE — 99204 OFFICE O/P NEW MOD 45 MIN: CPT | Performed by: INTERNAL MEDICINE

## 2019-07-31 RX ORDER — ATORVASTATIN CALCIUM 80 MG/1
TABLET, FILM COATED ORAL
COMMUNITY
Start: 2017-09-22 | End: 2019-09-25

## 2019-07-31 RX ORDER — ASPIRIN 81 MG/1
TABLET, CHEWABLE ORAL
COMMUNITY
End: 2019-09-25

## 2019-07-31 RX ORDER — LEVOTHYROXINE SODIUM 0.2 MG/1
TABLET ORAL
COMMUNITY
Start: 2019-04-22 | End: 2019-09-25

## 2019-07-31 RX ORDER — CARVEDILOL 6.25 MG/1
TABLET ORAL
COMMUNITY
Start: 2018-08-13 | End: 2019-09-25

## 2019-07-31 RX ORDER — CLONAZEPAM 0.5 MG/1
0.5 TABLET ORAL PRN
COMMUNITY
Start: 2019-04-11 | End: 2020-11-23

## 2019-07-31 RX ORDER — FLUOXETINE 10 MG/1
CAPSULE ORAL
COMMUNITY
Start: 2019-03-20 | End: 2019-09-25

## 2019-07-31 RX ORDER — AMLODIPINE BESYLATE 5 MG/1
TABLET ORAL
COMMUNITY
Start: 2018-09-25 | End: 2019-09-25

## 2019-07-31 RX ORDER — PANTOPRAZOLE SODIUM 40 MG/1
TABLET, DELAYED RELEASE ORAL
COMMUNITY
Start: 2018-11-13 | End: 2019-09-25

## 2019-07-31 ASSESSMENT — ENCOUNTER SYMPTOMS
WHEEZING: 0
HEMOPTYSIS: 0
DIZZINESS: 1
BLURRED VISION: 0
CHILLS: 0
LOSS OF CONSCIOUSNESS: 0
DEPRESSION: 0
SPEECH CHANGE: 0
MYALGIAS: 0
NAUSEA: 0
TREMORS: 1
BRUISES/BLEEDS EASILY: 0
EYE PAIN: 0
FEVER: 0
EYE DISCHARGE: 0
COUGH: 1
ABDOMINAL PAIN: 0
NERVOUS/ANXIOUS: 0
PALPITATIONS: 1
VOMITING: 0

## 2019-07-31 NOTE — PROGRESS NOTES
Chief Complaint   Patient presents with   • Hyperlipidemia     NEW PATIENT       Subjective:   Priscilla Sky is a 58 y.o. female who presents today for new patient evaluation because of a history of multiple prior myocardial infarctions including non-STEMI's.  She has had multiple angiograms in the past.  Review of her records states that she did at least have a diagonal occlusion without other significant coronary disease.  However, she does have an apical wall motion abnormality by history and may have vasospasm.    She has not had any chest discomfort for several years.  She has dyspnea on exertion at about 2 flights of stairs.  On a flat walk she feels she can walk about a mile.  She has had no difficulty with PND orthopnea but is on CPAP therapy.  She rarely notes some dependent edema.    For many years she has had intermittent skipping of her heart.  This makes her cough.  It is not associated with any lightheadedness.  She feels is been getting somewhat worse over the last couple of months.    Past Medical History:   Diagnosis Date   • GERD (gastroesophageal reflux disease)    • Heart attack (HCC)    • Hyperlipidemia    • Hypertension    • Thyroid disease      Past Surgical History:   Procedure Laterality Date   • CHOLECYSTECTOMY     • THYROIDECTOMY TOTAL       Family History   Problem Relation Age of Onset   • Alzheimer's Disease Mother    • Heart Disease Father         CAD     Social History     Socioeconomic History   • Marital status:      Spouse name: Not on file   • Number of children: Not on file   • Years of education: Not on file   • Highest education level: Not on file   Occupational History   • Not on file   Social Needs   • Financial resource strain: Not on file   • Food insecurity:     Worry: Not on file     Inability: Not on file   • Transportation needs:     Medical: Not on file     Non-medical: Not on file   Tobacco Use   • Smoking status: Never Smoker   • Smokeless tobacco:  Never Used   Substance and Sexual Activity   • Alcohol use: Yes     Comment: 1 drink weekly   • Drug use: No   • Sexual activity: Not on file   Lifestyle   • Physical activity:     Days per week: Not on file     Minutes per session: Not on file   • Stress: Not on file   Relationships   • Social connections:     Talks on phone: Not on file     Gets together: Not on file     Attends Protestant service: Not on file     Active member of club or organization: Not on file     Attends meetings of clubs or organizations: Not on file     Relationship status: Not on file   • Intimate partner violence:     Fear of current or ex partner: Not on file     Emotionally abused: Not on file     Physically abused: Not on file     Forced sexual activity: Not on file   Other Topics Concern   • Not on file   Social History Narrative   • Not on file     Allergies   Allergen Reactions   • Inapsine [Droperidol]      Outpatient Encounter Medications as of 7/31/2019   Medication Sig Dispense Refill   • naproxen (NAPROSYN) 500 MG Tab Take 1 Tab by mouth 2 times a day, with meals. 60 Tab 0   • omeprazole (PRILOSEC) 40 MG delayed-release capsule Take 1 Cap by mouth every day. 90 Cap 3   • amLODIPine (NORVASC) 5 MG Tab Take 1 Tab by mouth every day. 90 Tab 3   • atorvastatin (LIPITOR) 80 MG tablet Take 1 Tab by mouth every evening. 90 Tab 3   • carvedilol (COREG) 6.25 MG Tab Take 1 Tab by mouth 2 times a day, with meals. 180 Tab 3   • FLUoxetine (PROZAC) 10 MG Cap Take 1 Cap by mouth every day. 90 Cap 3   • levothyroxine (SYNTHROID) 200 MCG Tab Take 1 Tab by mouth Every morning on an empty stomach. 90 Tab 3   • pantoprazole (PROTONIX) 40 MG Tablet Delayed Response Take 1 Tab by mouth every day. 90 Tab 3   • aspirin (ASA) 81 MG Chew Tab chewable tablet Take 81 mg by mouth every day.     • Cholecalciferol (VITAMIN D3) 5000 UNIT/0.5ML Liquid Take  by mouth.     • Biotin 1000 MCG Tab Take  by mouth.     • clonazePAM (KLONOPIN) 0.5 MG Tab Take  by  "mouth.     • aspirin (ASA) 81 MG Chew Tab chewable tablet Take  by mouth.     • FLUoxetine (PROZAC) 10 MG Cap Take 1 capsule by mouth daily     • atorvastatin (LIPITOR) 80 MG tablet Take  by mouth.     • carvedilol (COREG) 6.25 MG Tab Take 1 tablet by mouth 2 times a day     • amLODIPine (NORVASC) 5 MG Tab Take 1 tablet by mouth daily at bedtime     • levothyroxine (SYNTHROID) 200 MCG Tab Take 1 tablet by mouth daily     • pantoprazole (PROTONIX) 40 MG Tablet Delayed Response Take 1 tablet by mouth daily 30 minutes before breakfast     • Coenzyme Q10 (COQ10) 100 MG Cap Take  by mouth.       No facility-administered encounter medications on file as of 7/31/2019.      Review of Systems   Constitutional: Positive for malaise/fatigue. Negative for chills and fever.   HENT: Negative for congestion.    Eyes: Negative for blurred vision, pain and discharge.   Respiratory: Positive for cough. Negative for hemoptysis and wheezing.    Cardiovascular: Positive for palpitations. Negative for chest pain.   Gastrointestinal: Negative for abdominal pain, nausea and vomiting.   Musculoskeletal: Negative for joint pain and myalgias.   Skin: Positive for rash. Negative for itching.   Neurological: Positive for dizziness and tremors. Negative for speech change and loss of consciousness.   Endo/Heme/Allergies: Positive for environmental allergies. Does not bruise/bleed easily.   Psychiatric/Behavioral: Negative for depression. The patient is not nervous/anxious.    All other systems reviewed and are negative.       Objective:   /78 (BP Location: Left arm, Patient Position: Sitting, BP Cuff Size: Adult)   Pulse 84   Ht 1.651 m (5' 5\")   Wt 97.1 kg (214 lb)   SpO2 95%   BMI 35.61 kg/m²     Physical Exam   Constitutional: She is oriented to person, place, and time. She appears well-developed and well-nourished. No distress.   HENT:   Head: Normocephalic and atraumatic.   Mouth/Throat: Mucous membranes are normal.   Neck: No JVD " present. No thyromegaly present.   Cardiovascular: Normal rate, regular rhythm and intact distal pulses. Exam reveals no gallop.   No murmur heard.  Pulmonary/Chest: Effort normal and breath sounds normal. She has no rales.   Abdominal: Soft. There is no splenomegaly or hepatomegaly.   Musculoskeletal: Normal range of motion. She exhibits no edema.   Lymphadenopathy:     She has no cervical adenopathy.   Neurological: She is alert and oriented to person, place, and time. She has normal strength. She exhibits normal muscle tone.   Skin: Skin is warm and dry. No rash noted.   Psychiatric: She has a normal mood and affect. Her behavior is normal.     Lab Results   Component Value Date/Time    CHOLSTRLTOT 136 05/23/2019 07:24 AM    LDL 56 05/23/2019 07:24 AM    HDL 45 05/23/2019 07:24 AM    TRIGLYCERIDE 176 (H) 05/23/2019 07:24 AM       Lab Results   Component Value Date/Time    SODIUM 140 05/23/2019 07:24 AM    POTASSIUM 4.0 05/23/2019 07:24 AM    CHLORIDE 105 05/23/2019 07:24 AM    CO2 27 05/23/2019 07:24 AM    GLUCOSE 106 (H) 05/23/2019 07:24 AM    BUN 13 05/23/2019 07:24 AM    CREATININE 0.94 05/23/2019 07:24 AM     Lab Results   Component Value Date/Time    ALKPHOSPHAT 134 (H) 05/23/2019 07:24 AM    ASTSGOT 24 05/23/2019 07:24 AM    ALTSGPT 32 05/23/2019 07:24 AM    TBILIRUBIN 0.6 05/23/2019 07:24 AM      No results found for: BNPBTYPENAT       Assessment:     1. Coronary artery disease due to calcified coronary lesion: History of small diagonal occlusion without other significant disease.     2. History of myocardial infarction: On 5 occasions and with a segmental wall motion abnormality but possibly secondary to vasospasm     3. Hypercholesteremia     4. Benign essential HTN         Medical Decision Making:  Today's Assessment / Status / Plan:     Ms. Sky is clinically stable except for her increasing palpitations.  She will be reevaluated with an echocardiogram and will obtain a Holter monitor.  Her lipid  status and blood pressure appear to be under good control.  She will follow-up in a couple of months.    With respect to her prior myocardial infarctions, her first one was long enough ago that she could have had a stress-induced cardiomyopathy versus coronary artery spasm.  She does have evidence of some minimal coronary artery disease with a small diagonal occlusion reported on 1 of her cath reports.  However, the cardiology notes from [place] state that her vessels were otherwise free of obstructive disease.  She is doing well clinically on her current medical regime.

## 2019-08-16 ENCOUNTER — OFFICE VISIT (OUTPATIENT)
Dept: DERMATOLOGY | Facility: IMAGING CENTER | Age: 59
End: 2019-08-16
Payer: COMMERCIAL

## 2019-08-16 VITALS — WEIGHT: 210 LBS | BODY MASS INDEX: 34.99 KG/M2 | TEMPERATURE: 98.3 F | HEIGHT: 65 IN

## 2019-08-16 DIAGNOSIS — L40.9 PSORIASIS: ICD-10-CM

## 2019-08-16 DIAGNOSIS — L29.9 ITCHING: ICD-10-CM

## 2019-08-16 PROCEDURE — 99203 OFFICE O/P NEW LOW 30 MIN: CPT | Performed by: DERMATOLOGY

## 2019-08-16 RX ORDER — CLOBETASOL PROPIONATE 0.5 MG/G
1 OINTMENT TOPICAL 2 TIMES DAILY
Qty: 60 G | Refills: 1 | Status: SHIPPED | OUTPATIENT
Start: 2019-08-16 | End: 2021-04-26

## 2019-08-16 NOTE — PROGRESS NOTES
CC: Rash    Subjective: new patient here for psoriasis/eczema of hands.    HPI    Pt states she has had psoriasis most of her life - hands worse/elbows.  Had been knees in past - now resolved.  Last 8-9 years getting worse  Has never used biologics.  Currently using triamcinolone 0.1% cream, clobetasol 0.05% ointment, vectical.  Those meds given 2 years ago, do not help pt.  Sunlight on recent trip to [place], improved     ROS: no fevers/chills. +++ itch.  No joint pain - mild aches of joints  DermPMH: no skin cancer/melanoma  No problem-specific Assessment & Plan notes found for this encounter.    Relevant PMH:preDM, HTN, hypothyroidism, CAD  Social: never smoker    PE: Gen:WDWN female in NAD.  Skin: face/eyes/lips/neck - appearing without rashes  Hands/elbows with several joints showing overlying erythematous plaques and scale    A/P: PSO, mild, < 2% BSA   -reviewed dx/tx including topicals, orals, biologics, UV light  -reviewed comorbidities  -will continue on topical trx - to mix clobetasol oint + vectical 1:1 BID, can use occlusion therapy/wet wrap therapy reviewed  Consider outdoor sun exposure, avoid sunburns    Itching:   -discussed trx - otc antiallergy meds/benadryl at night; can double daytime dose  -sarna with menthol - cold  -benadryl cream OTC  -calamine / caladryl OCT    F/u 3 months    I have reviewed medications relevant to my specialty.

## 2019-09-03 ENCOUNTER — NON-PROVIDER VISIT (OUTPATIENT)
Dept: CARDIOLOGY | Facility: MEDICAL CENTER | Age: 59
End: 2019-09-03
Payer: COMMERCIAL

## 2019-09-03 DIAGNOSIS — I49.1 PREMATURE ATRIAL COMPLEXES: ICD-10-CM

## 2019-09-03 DIAGNOSIS — R00.2 PALPITATIONS: ICD-10-CM

## 2019-09-03 PROCEDURE — 93224 XTRNL ECG REC UP TO 48 HRS: CPT | Performed by: INTERNAL MEDICINE

## 2019-09-06 DIAGNOSIS — R00.2 PALPITATIONS: ICD-10-CM

## 2019-09-06 DIAGNOSIS — I10 BENIGN ESSENTIAL HTN: ICD-10-CM

## 2019-09-06 DIAGNOSIS — I25.2 HISTORY OF MYOCARDIAL INFARCTION: ICD-10-CM

## 2019-09-06 DIAGNOSIS — I25.84 CORONARY ARTERY DISEASE DUE TO CALCIFIED CORONARY LESION: ICD-10-CM

## 2019-09-06 DIAGNOSIS — I25.10 CORONARY ARTERY DISEASE DUE TO CALCIFIED CORONARY LESION: ICD-10-CM

## 2019-09-13 LAB — EKG IMPRESSION: NORMAL

## 2019-09-17 ENCOUNTER — HOSPITAL ENCOUNTER (OUTPATIENT)
Dept: CARDIOLOGY | Facility: MEDICAL CENTER | Age: 59
End: 2019-09-17
Attending: INTERNAL MEDICINE
Payer: COMMERCIAL

## 2019-09-17 DIAGNOSIS — I25.2 HISTORY OF MYOCARDIAL INFARCTION: ICD-10-CM

## 2019-09-17 DIAGNOSIS — I25.84 CORONARY ARTERY DISEASE DUE TO CALCIFIED CORONARY LESION: ICD-10-CM

## 2019-09-17 DIAGNOSIS — I10 BENIGN ESSENTIAL HTN: ICD-10-CM

## 2019-09-17 DIAGNOSIS — I25.10 CORONARY ARTERY DISEASE DUE TO CALCIFIED CORONARY LESION: ICD-10-CM

## 2019-09-17 PROCEDURE — 93308 TTE F-UP OR LMTD: CPT

## 2019-09-18 LAB
LV EJECT FRACT  99904: 45
LV EJECT FRACT MOD 2C 99903: 51.21
LV EJECT FRACT MOD 4C 99902: 46.01
LV EJECT FRACT MOD BP 99901: 49.83

## 2019-09-18 PROCEDURE — 93308 TTE F-UP OR LMTD: CPT | Mod: 26 | Performed by: INTERNAL MEDICINE

## 2019-09-25 ENCOUNTER — OFFICE VISIT (OUTPATIENT)
Dept: MEDICAL GROUP | Facility: PHYSICIAN GROUP | Age: 59
End: 2019-09-25
Payer: COMMERCIAL

## 2019-09-25 ENCOUNTER — APPOINTMENT (OUTPATIENT)
Dept: RADIOLOGY | Facility: IMAGING CENTER | Age: 59
End: 2019-09-25
Attending: FAMILY MEDICINE
Payer: COMMERCIAL

## 2019-09-25 VITALS
TEMPERATURE: 98.8 F | SYSTOLIC BLOOD PRESSURE: 122 MMHG | BODY MASS INDEX: 35.45 KG/M2 | DIASTOLIC BLOOD PRESSURE: 76 MMHG | OXYGEN SATURATION: 94 % | WEIGHT: 212.8 LBS | HEART RATE: 90 BPM | HEIGHT: 65 IN

## 2019-09-25 DIAGNOSIS — M25.572 ACUTE LEFT ANKLE PAIN: ICD-10-CM

## 2019-09-25 DIAGNOSIS — G47.33 OSA (OBSTRUCTIVE SLEEP APNEA): ICD-10-CM

## 2019-09-25 PROCEDURE — 73610 X-RAY EXAM OF ANKLE: CPT | Mod: TC,LT | Performed by: FAMILY MEDICINE

## 2019-09-25 PROCEDURE — 99214 OFFICE O/P EST MOD 30 MIN: CPT | Performed by: FAMILY MEDICINE

## 2019-09-25 NOTE — PROGRESS NOTES
CC: Ankle pain    HISTORY OF THE PRESENT ILLNESS: Patient is a 58 y.o. female. This pleasant patient is here today to discuss the following health issues.    Patient's primary concern today is ankle pain.  She reports that about a week ago she inverted her ankle on the left side and has since had pain posterior to the lateral malleolus.  She did initially have a little bit of trouble bearing weight.  She does note some mild swelling but no bruising.  She has been wearing a brace which does not really seem to be helping.    She is also wondering if she can get a referral for sleep studies today.  She has a CPAP machine for her obstructive sleep apnea which is no longer working.  It has been at least 6 years since her last sleep study.    Allergies: Inapsine [droperidol]    Current Outpatient Medications Ordered in Epic   Medication Sig Dispense Refill   • clobetasol (TEMOVATE) 0.05 % Ointment Apply 1 Application to affected area(s) 2 times a day. 60 g 1   • clonazePAM (KLONOPIN) 0.5 MG Tab Take  by mouth.     • omeprazole (PRILOSEC) 40 MG delayed-release capsule Take 1 Cap by mouth every day. 90 Cap 3   • amLODIPine (NORVASC) 5 MG Tab Take 1 Tab by mouth every day. 90 Tab 3   • atorvastatin (LIPITOR) 80 MG tablet Take 1 Tab by mouth every evening. 90 Tab 3   • carvedilol (COREG) 6.25 MG Tab Take 1 Tab by mouth 2 times a day, with meals. 180 Tab 3   • FLUoxetine (PROZAC) 10 MG Cap Take 1 Cap by mouth every day. 90 Cap 3   • levothyroxine (SYNTHROID) 200 MCG Tab Take 1 Tab by mouth Every morning on an empty stomach. 90 Tab 3   • aspirin (ASA) 81 MG Chew Tab chewable tablet Take 81 mg by mouth every day.     • Cholecalciferol (VITAMIN D3) 5000 UNIT/0.5ML Liquid Take  by mouth.     • Biotin 1000 MCG Tab Take  by mouth.     • Coenzyme Q10 (COQ10) 100 MG Cap Take  by mouth.       No current Epic-ordered facility-administered medications on file.        Past Medical History:   Diagnosis Date   • GERD (gastroesophageal  "reflux disease)    • Heart attack (HCC)    • History of myocardial infarction    • Hyperlipidemia    • Hypertension    • Thyroid disease        Past Surgical History:   Procedure Laterality Date   • CHOLECYSTECTOMY     • THYROIDECTOMY TOTAL         Social History     Tobacco Use   • Smoking status: Never Smoker   • Smokeless tobacco: Never Used   Substance Use Topics   • Alcohol use: Yes     Alcohol/week: 1.2 oz     Types: 2 Standard drinks or equivalent per week     Comment: 1 drink weekly   • Drug use: No       Social History     Social History Narrative   • Not on file       Family History   Problem Relation Age of Onset   • Alzheimer's Disease Mother    • Heart Disease Father 70        CAD       ROS:     - Constitutional: Negative for fever, chills, unexpected weight change, and fatigue/generalized weakness.     - Respiratory: Negative for cough, sputum production, chest congestion, dyspnea, wheezing, and crackles.      Exam: /76 (BP Location: Left arm, Patient Position: Sitting, BP Cuff Size: Large adult)   Pulse 90   Temp 37.1 °C (98.8 °F) (Temporal)   Ht 1.651 m (5' 5\")   Wt 96.5 kg (212 lb 12.8 oz)   SpO2 94%  Body mass index is 35.41 kg/m².    General: Well appearing, NAD  Musculoskeletal: Mild edema noted on left lateral ankle without ecchymosis. Mild tenderness to palpation in the area.  There is some tenderness to palpation on the posterior aspect of the lateral malleoli at the fibular head.  Psych: Normal mood and affect. Alert and oriented. Judgment and insight is normal.    Please note that this dictation was created using voice recognition software. I have made every reasonable attempt to correct obvious errors, but I expect that there are errors of grammar and possibly content that I did not discover before finalizing the note.      Assessment/Plan  Priscilla was seen today for ankle pain.    Diagnoses and all orders for this visit:    Acute left ankle pain  New uncontrolled problem for the " patient.  X-ray was negative today for fracture.  Discussed conservative therapy for ankle sprain including early mobilization) she was given a handout for ankle exercises), rest, ice, NSAIDs and elevation.  Discussed timeframe of 4 to 6 weeks for healing.  -     DX-ANKLE 3+ VIEWS LEFT; Future    LIDYA (obstructive sleep apnea)  Chronic issue for patient.  Referred to sleep studies for new evaluation.  -     REFERRAL TO SLEEP STUDIES    Follow-up in 6 months or sooner if needed.    Jagruti Stewart DO  [facility]

## 2019-10-09 ENCOUNTER — OFFICE VISIT (OUTPATIENT)
Dept: CARDIOLOGY | Facility: MEDICAL CENTER | Age: 59
End: 2019-10-09
Payer: COMMERCIAL

## 2019-10-09 VITALS
DIASTOLIC BLOOD PRESSURE: 66 MMHG | SYSTOLIC BLOOD PRESSURE: 108 MMHG | HEART RATE: 82 BPM | OXYGEN SATURATION: 95 % | HEIGHT: 65 IN | WEIGHT: 215 LBS | BODY MASS INDEX: 35.82 KG/M2

## 2019-10-09 DIAGNOSIS — I25.2 HISTORY OF MYOCARDIAL INFARCTION: ICD-10-CM

## 2019-10-09 DIAGNOSIS — E78.00 HYPERCHOLESTEREMIA: ICD-10-CM

## 2019-10-09 DIAGNOSIS — I25.10 CORONARY ARTERY DISEASE DUE TO CALCIFIED CORONARY LESION: ICD-10-CM

## 2019-10-09 DIAGNOSIS — I25.84 CORONARY ARTERY DISEASE DUE TO CALCIFIED CORONARY LESION: ICD-10-CM

## 2019-10-09 DIAGNOSIS — R00.2 PALPITATIONS: ICD-10-CM

## 2019-10-09 DIAGNOSIS — I10 BENIGN ESSENTIAL HTN: ICD-10-CM

## 2019-10-09 PROCEDURE — 99214 OFFICE O/P EST MOD 30 MIN: CPT | Performed by: INTERNAL MEDICINE

## 2019-10-09 RX ORDER — DILTIAZEM HYDROCHLORIDE 180 MG/1
180 CAPSULE, EXTENDED RELEASE ORAL DAILY
Qty: 90 CAP | Refills: 3 | Status: SHIPPED | OUTPATIENT
Start: 2019-10-09 | End: 2020-06-30 | Stop reason: SDUPTHER

## 2019-10-09 RX ORDER — NITROGLYCERIN 0.3 MG/1
0.3 TABLET SUBLINGUAL PRN
Qty: 25 TAB | Refills: 3 | Status: SHIPPED | OUTPATIENT
Start: 2019-10-09 | End: 2024-03-07

## 2019-10-09 NOTE — PROGRESS NOTES
Chief Complaint   Patient presents with   • Coronary Artery Disease     F/v: 3 MO       Subjective:   Priscilla Childress is a 58 y.o. female who presents today because of a history of multiple prior myocardial infarctions including non-STEMI's.  She has had multiple angiograms in the past.  Review of her records states that she did at least have a diagonal occlusion without other significant coronary disease.  However, she does have an apical wall motion abnormality by history and may have vasospasm.    At the time of her last visit, she was having increasing palpitations.  However these seem to have improved and she notes symptomatic ectopics a couple times a week.  She has undergone evaluation with an echocardiogram and event monitor.    The last few days she has noted some increasing chest discomfort.  This is a substernal aching or tightness which lasts 1-2 minutes.  She rates this discomfort as 6-7 over 10 she has had it several times a day.  It does not radiate.  It is associated with some shortness of breath but no nausea, vomiting or diaphoresis.  She notes no aggravating or alleviating factors.  It is similar to her prior chest discomforts though not as severe.    She can walk up to about a mile but is dyspneic at about a half a mile.  She has been starting to walk more.  She really did not walk much over the summer and just started again in September.  She does note some intermittent palpitations which are occasionally are rapid.  These episodes last less than a minute.  She has had no significant lightheadedness.  She denies any PND, orthopnea or edema.        Past Medical History:   Diagnosis Date   • GERD (gastroesophageal reflux disease)    • Heart attack (HCC)    • History of myocardial infarction    • Hyperlipidemia    • Hypertension    • Thyroid disease      Past Surgical History:   Procedure Laterality Date   • CHOLECYSTECTOMY     • THYROIDECTOMY TOTAL       Family History   Problem Relation Age of  Onset   • Alzheimer's Disease Mother    • Heart Disease Father 70        CAD     Social History     Socioeconomic History   • Marital status:      Spouse name: Not on file   • Number of children: Not on file   • Years of education: Not on file   • Highest education level: Not on file   Occupational History   • Not on file   Social Needs   • Financial resource strain: Not on file   • Food insecurity:     Worry: Not on file     Inability: Not on file   • Transportation needs:     Medical: Not on file     Non-medical: Not on file   Tobacco Use   • Smoking status: Never Smoker   • Smokeless tobacco: Never Used   Substance and Sexual Activity   • Alcohol use: Yes     Alcohol/week: 1.2 oz     Types: 2 Standard drinks or equivalent per week     Comment: 1 drink weekly   • Drug use: No   • Sexual activity: Not on file   Lifestyle   • Physical activity:     Days per week: Not on file     Minutes per session: Not on file   • Stress: Not on file   Relationships   • Social connections:     Talks on phone: Not on file     Gets together: Not on file     Attends [employer] service: Not on file     Active member of club or organization: Not on file     Attends meetings of clubs or organizations: Not on file     Relationship status: Not on file   • Intimate partner violence:     Fear of current or ex partner: Not on file     Emotionally abused: Not on file     Physically abused: Not on file     Forced sexual activity: Not on file   Other Topics Concern   • Not on file   Social History Narrative   • Not on file     Allergies   Allergen Reactions   • Inapsine [Droperidol]      Outpatient Encounter Medications as of 10/9/2019   Medication Sig Dispense Refill   • clobetasol (TEMOVATE) 0.05 % Ointment Apply 1 Application to affected area(s) 2 times a day. 60 g 1   • clonazePAM (KLONOPIN) 0.5 MG Tab Take 0.5 mg by mouth as needed.     • omeprazole (PRILOSEC) 40 MG delayed-release capsule Take 1 Cap by mouth every day. 90 Cap 3   •  "amLODIPine (NORVASC) 5 MG Tab Take 1 Tab by mouth every day. 90 Tab 3   • atorvastatin (LIPITOR) 80 MG tablet Take 1 Tab by mouth every evening. 90 Tab 3   • carvedilol (COREG) 6.25 MG Tab Take 1 Tab by mouth 2 times a day, with meals. 180 Tab 3   • FLUoxetine (PROZAC) 10 MG Cap Take 1 Cap by mouth every day. 90 Cap 3   • levothyroxine (SYNTHROID) 200 MCG Tab Take 1 Tab by mouth Every morning on an empty stomach. 90 Tab 3   • aspirin (ASA) 81 MG Chew Tab chewable tablet Take 81 mg by mouth every day.     • Biotin 1000 MCG Tab Take  by mouth.     • Cholecalciferol (VITAMIN D3) 5000 UNIT/0.5ML Liquid Take  by mouth.     • Coenzyme Q10 (COQ10) 100 MG Cap Take  by mouth.       No facility-administered encounter medications on file as of 10/9/2019.      ROS     Objective:   /66 (BP Location: Left arm, Patient Position: Sitting, BP Cuff Size: Adult)   Pulse 82   Ht 1.651 m (5' 5\")   Wt 97.5 kg (215 lb)   SpO2 95%   BMI 35.78 kg/m²     Physical Exam   Constitutional: She appears well-developed and well-nourished.   Neck: No JVD present.   Cardiovascular: Normal rate and regular rhythm.   No murmur heard.  Pulmonary/Chest: Effort normal and breath sounds normal. She has no rales.   Abdominal: Soft. There is no tenderness.   Musculoskeletal: She exhibits no edema.     Lab Results   Component Value Date/Time    CHOLSTRLTOT 136 05/23/2019 07:24 AM    LDL 56 05/23/2019 07:24 AM    HDL 45 05/23/2019 07:24 AM    TRIGLYCERIDE 176 (H) 05/23/2019 07:24 AM       Lab Results   Component Value Date/Time    SODIUM 140 05/23/2019 07:24 AM    POTASSIUM 4.0 05/23/2019 07:24 AM    CHLORIDE 105 05/23/2019 07:24 AM    CO2 27 05/23/2019 07:24 AM    GLUCOSE 106 (H) 05/23/2019 07:24 AM    BUN 13 05/23/2019 07:24 AM    CREATININE 0.94 05/23/2019 07:24 AM     Lab Results   Component Value Date/Time    ALKPHOSPHAT 134 (H) 05/23/2019 07:24 AM    ASTSGOT 24 05/23/2019 07:24 AM    ALTSGPT 32 05/23/2019 07:24 AM    TBILIRUBIN 0.6 05/23/2019 " 07:24 AM      No results found for: BNPBTYPENAT     Echocardiography Laboratory    CONCLUSIONS  No prior study is available for comparison.   Left ventricular ejection fraction is visually estimated to be 45%.  Apical and anterio-lateral  hypokinesis.    EDYTA BOWEN  Exam Date:         09/17/2019     Holter monitor report from September 2019:    *   Monitoring started at 11:25 AM and continued for 48 hours. Overall rhythm   was Sinus. The average heart rate was 82 BPM.   The minimum heart rate was 56 BPM, occurring at 5:24:22 AM D1. The maximum   heart rate was 125 BPM, occurring at 6:50:34   AM D2. The longest R-R interval was 1.4 seconds occurring at 5:47:52 AM D1.   *   Ventricular ectopic activity consisted of 14 couplets, 2005 single   multifocal PVCs, 132 single endiastolic beats, and 4 in   trigeminy.   *   The patient's rhythm included 21 hours 51 minutes 33 seconds of   bradycardia. The slowest single episode of bradycardia   occurred at 5:20:53 AM D1, lasting 24 minutes 29 seconds, with minimum heart   rate of 56 BPM.   *   Supraventricular ectopic activity consisted of 4 runs, 3 atrial couplets,   and 31 single PACs. The longest and fastest   supraventricular run occurred at 1:17:21 PM D2 consisting of 4 beats, with   maximum heart rate of 152 BPM.   *   Diary Entries- No symptoms listed in diary.       Assessment:     1. History of myocardial infarction: On 5 occasions and with a segmental wall motion abnormality but possibly secondary to vasospasm     2. Coronary artery disease due to calcified coronary lesion: History of small diagonal occlusion without other significant disease.     3. Benign essential HTN     4. Palpitations     5. Hypercholesteremia         Medical Decision Making:  Today's Assessment / Status / Plan:     Ms. Bowen is having some difficulty with increasing chest discomfort.  In addition, she has symptomatic PVCs and some brief PSVT.  At this time, we will start her on  diltiazem  mg daily and have her discontinue the amlodipine.  We will also give her sublingual nitroglycerin as needed.  Her lipid status is been under good control.  We will have her follow-up in about a month.

## 2019-11-14 ENCOUNTER — PATIENT MESSAGE (OUTPATIENT)
Dept: MEDICAL GROUP | Facility: PHYSICIAN GROUP | Age: 59
End: 2019-11-14

## 2019-11-14 DIAGNOSIS — G47.33 OSA (OBSTRUCTIVE SLEEP APNEA): ICD-10-CM

## 2019-11-14 DIAGNOSIS — I25.10 CORONARY ARTERY DISEASE INVOLVING NATIVE HEART WITHOUT ANGINA PECTORIS, UNSPECIFIED VESSEL OR LESION TYPE: ICD-10-CM

## 2019-11-21 ENCOUNTER — OFFICE VISIT (OUTPATIENT)
Dept: DERMATOLOGY | Facility: IMAGING CENTER | Age: 59
End: 2019-11-21
Payer: COMMERCIAL

## 2019-11-21 DIAGNOSIS — L40.9 PSORIASIS: ICD-10-CM

## 2019-11-21 DIAGNOSIS — L29.9 ITCHING: ICD-10-CM

## 2019-11-21 PROCEDURE — 99213 OFFICE O/P EST LOW 20 MIN: CPT | Performed by: DERMATOLOGY

## 2019-11-21 NOTE — PROGRESS NOTES
"CC: spot check and fv for psoriasis     Subjective: RETURN patient here for psoriasis/eczema of hands.    Reports sites on hands still very itchy and thick.  Does not like red coloration.  Is uses small amount of clobetasol at sites with improvement but no resolution.     Takes Claritin daily    New spot on right upper arm red and couple brown spots, itching.  HPI/location: rt forearm   Time present: 5 mths   Painful lesion: No  Itching lesion: Yes  Enlarging lesion: No  Anything make it better or worse?    From prior note:    \"Pt states she has had psoriasis most of her life - hands worse/elbows.  Had been knees in past - now resolved.  Last 8-9 years getting worse  Has never used biologics.  Currently using triamcinolone 0.1% cream, clobetasol 0.05% ointment, vectical.  Those meds given 2 years ago, do not help pt.  Sunlight on recent trip to [place], improved \"    ROS: no fevers/chills. +++ itch.  Denies fatigue, early satiety, abd pain, changes in stool.    DermPMH: no skin cancer/melanoma  No problem-specific Assessment & Plan notes found for this encounter.    Relevant PMH:preDM, HTN, hypothyroidism, CAD  Social: never smoker    PE: Gen:WDWN female in NAD.  Skin: face/eyes/lips/neck - appearing without rashes  Hands/elbows with several joints showing overlying erythematous plaques and scale    A/P: PSO, mild, < 1% BSA   -reviewed dx/tx including topicals, orals, biologics, UV light. Does not want to use aggressive / risky trx.  -reviewed comorbidities  -will continue on topical trx - clobetasol oint BID - up to 4X/day, can use occlusion therapy/wet wrap therapy reviewed  Consider outdoor sun exposure, avoid sunburns    Itching:   -discussed trx - otc antiallergy meds/benadryl at night; can double daytime dose = claritin BID  Pre reviewed alt trx:  -sarna with menthol - cold  -benadryl cream OTC  -calamine / caladryl OCT    F/u PRN worsening/changes/new sx    I have reviewed medications relevant to my " specialty.

## 2020-01-02 DIAGNOSIS — E03.9 HYPOTHYROIDISM, UNSPECIFIED TYPE: ICD-10-CM

## 2020-01-02 DIAGNOSIS — E78.00 HYPERCHOLESTEREMIA: ICD-10-CM

## 2020-01-10 RX ORDER — LEVOTHYROXINE SODIUM 0.2 MG/1
200 TABLET ORAL
Qty: 90 TAB | Refills: 0 | Status: SHIPPED | OUTPATIENT
Start: 2020-01-10 | End: 2020-01-24 | Stop reason: SDUPTHER

## 2020-01-10 RX ORDER — OMEPRAZOLE 40 MG/1
40 CAPSULE, DELAYED RELEASE ORAL DAILY
Qty: 90 CAP | Refills: 1 | Status: SHIPPED | OUTPATIENT
Start: 2020-01-10 | End: 2020-06-30 | Stop reason: SDUPTHER

## 2020-01-10 RX ORDER — CARVEDILOL 6.25 MG/1
6.25 TABLET ORAL 2 TIMES DAILY WITH MEALS
Qty: 180 TAB | Refills: 1 | Status: SHIPPED | OUTPATIENT
Start: 2020-01-10 | End: 2020-06-30 | Stop reason: SDUPTHER

## 2020-01-10 RX ORDER — ATORVASTATIN CALCIUM 80 MG/1
80 TABLET, FILM COATED ORAL EVERY EVENING
Qty: 90 TAB | Refills: 1 | Status: SHIPPED | OUTPATIENT
Start: 2020-01-10 | End: 2020-06-30 | Stop reason: SDUPTHER

## 2020-01-10 RX ORDER — FLUOXETINE 10 MG/1
10 CAPSULE ORAL DAILY
Qty: 90 CAP | Refills: 0 | Status: SHIPPED | OUTPATIENT
Start: 2020-01-10 | End: 2020-06-30 | Stop reason: SDUPTHER

## 2020-01-10 NOTE — TELEPHONE ENCOUNTER
Was the patient seen in the last year in this department? Yes    Does patient have an active prescription for medications requested? No     Received Request Via: Pharmacy      Pt met protocol?: Yes    OV 9/19     Lab Results   Component Value Date/Time    CHOLSTRLTOT 136 05/23/2019 0724    TRIGLYCERIDE 176 (H) 05/23/2019 0724    HDL 45 05/23/2019 0724    LDL 56 05/23/2019 0724     TSH 5/19

## 2020-01-16 ENCOUNTER — OFFICE VISIT (OUTPATIENT)
Dept: MEDICAL GROUP | Facility: PHYSICIAN GROUP | Age: 60
End: 2020-01-16
Payer: COMMERCIAL

## 2020-01-16 ENCOUNTER — APPOINTMENT (OUTPATIENT)
Dept: RADIOLOGY | Facility: IMAGING CENTER | Age: 60
End: 2020-01-16
Attending: NURSE PRACTITIONER
Payer: COMMERCIAL

## 2020-01-16 VITALS
WEIGHT: 215 LBS | DIASTOLIC BLOOD PRESSURE: 76 MMHG | BODY MASS INDEX: 35.82 KG/M2 | SYSTOLIC BLOOD PRESSURE: 110 MMHG | OXYGEN SATURATION: 96 % | RESPIRATION RATE: 16 BRPM | HEIGHT: 65 IN | TEMPERATURE: 98.6 F | HEART RATE: 96 BPM

## 2020-01-16 DIAGNOSIS — B96.89 BACTERIAL SINUSITIS: ICD-10-CM

## 2020-01-16 DIAGNOSIS — J32.9 BACTERIAL SINUSITIS: ICD-10-CM

## 2020-01-16 DIAGNOSIS — R07.9 CHEST PAIN, UNSPECIFIED TYPE: ICD-10-CM

## 2020-01-16 DIAGNOSIS — R06.02 SOB (SHORTNESS OF BREATH): ICD-10-CM

## 2020-01-16 DIAGNOSIS — J01.10 ACUTE FRONTAL SINUSITIS, RECURRENCE NOT SPECIFIED: ICD-10-CM

## 2020-01-16 PROCEDURE — 99214 OFFICE O/P EST MOD 30 MIN: CPT | Performed by: NURSE PRACTITIONER

## 2020-01-16 PROCEDURE — 71046 X-RAY EXAM CHEST 2 VIEWS: CPT | Mod: TC | Performed by: NURSE PRACTITIONER

## 2020-01-16 PROCEDURE — 93000 ELECTROCARDIOGRAM COMPLETE: CPT | Performed by: NURSE PRACTITIONER

## 2020-01-16 RX ORDER — AMOXICILLIN AND CLAVULANATE POTASSIUM 875; 125 MG/1; MG/1
1 TABLET, FILM COATED ORAL 2 TIMES DAILY
Qty: 14 TAB | Refills: 0 | Status: SHIPPED | OUTPATIENT
Start: 2020-01-16 | End: 2020-06-30

## 2020-01-16 RX ORDER — CODEINE PHOSPHATE/GUAIFENESIN 10-100MG/5
5 LIQUID (ML) ORAL 3 TIMES DAILY PRN
Qty: 120 ML | Refills: 0 | Status: SHIPPED | OUTPATIENT
Start: 2020-01-16 | End: 2020-01-23

## 2020-01-16 NOTE — PROGRESS NOTES
Chief Complaint   Patient presents with   • Cough     chest congestion, tightness, X 2 weeks        HPI: Patient is a 59 y.o. female complaining of *** days of illness including: {URIsx:70967}.   Mucus is: {Mucous/ sputum desc:62095}.  Similarly ill exposures: {yes no:263339}.  Treatments tried: ***  She  reports that she has never smoked. She has never used smokeless tobacco..     ROS:  No fever, cough, nausea, changes in bowel movements or skin rash. ***     I reviewed the patient's medications, allergies and medical history:  Current Outpatient Medications   Medication Sig Dispense Refill   • guaifenesin-codeine (TUSSI-ORGANIDIN NR) 100-10 MG/5ML syrup Take 5 mL by mouth 3 times a day as needed for Cough for up to 7 days. 120 mL 0   • amoxicillin-clavulanate (AUGMENTIN) 875-125 MG Tab Take 1 Tab by mouth 2 times a day. 14 Tab 0   • atorvastatin (LIPITOR) 80 MG tablet Take 1 Tab by mouth every evening. 90 Tab 1   • carvedilol (COREG) 6.25 MG Tab Take 1 Tab by mouth 2 times a day, with meals. 180 Tab 1   • FLUoxetine (PROZAC) 10 MG Cap Take 1 Cap by mouth every day. 90 Cap 0   • levothyroxine (SYNTHROID) 200 MCG Tab Take 1 Tab by mouth Every morning on an empty stomach. 90 Tab 0   • omeprazole (PRILOSEC) 40 MG delayed-release capsule Take 1 Cap by mouth every day. 90 Cap 1   • diltiazem (DILACOR XR) 180 MG XR capsule Take 1 Cap by mouth every day. 90 Cap 3   • nitroGLYCERIN (NITROSTAT) 0.3 MG SL tablet Place 1 Tab under tongue as needed for Chest Pain. 25 Tab 3   • clobetasol (TEMOVATE) 0.05 % Ointment Apply 1 Application to affected area(s) 2 times a day. 60 g 1   • clonazePAM (KLONOPIN) 0.5 MG Tab Take 0.5 mg by mouth as needed.     • aspirin (ASA) 81 MG Chew Tab chewable tablet Take 81 mg by mouth every day.     • Cholecalciferol (VITAMIN D3) 5000 UNIT/0.5ML Liquid Take  by mouth.     • Biotin 1000 MCG Tab Take  by mouth.     • Coenzyme Q10 (COQ10) 100 MG Cap Take  by mouth.       No current  "facility-administered medications for this visit.      Inapsine [droperidol]  Past Medical History:   Diagnosis Date   • GERD (gastroesophageal reflux disease)    • Heart attack (HCC)    • History of myocardial infarction    • Hyperlipidemia    • Hypertension    • Thyroid disease         EXAM:  /76   Pulse 96   Temp 37 °C (98.6 °F)   Resp 16   Ht 1.651 m (5' 5\")   Wt 97.5 kg (215 lb)   SpO2 96%   General: Alert, no conversational dyspnea or audible wheeze, non-toxic appearance.  Eyes: PERRL, conjunctiva slightly injected, no eye discharge.  Ears: Normal pinnae,TM's { TMS:26460} bilaterally.  Nares: Patent with {Mucous/ sputum desc:47244} mucus.  Sinuses: {TENDER/NONTENDER:66243} over maxillary / frontal sinuses.  Throat: Erythematous injection without exudate.   Neck: Supple, with {LYMPH NODES 0-18 YEARS:19578}.  Lungs: Clear to auscultation bilaterally, no wheeze, crackles or rhonchi.   Heart: Regular rate without murmur.  Skin: Warm and dry without rash.     ASSESSMENT:   1. SOB (shortness of breath)    2. Chest pain, unspecified type    3. Bacterial sinusitis    4. Acute frontal sinusitis, recurrence not specified     ***     PLAN:  1. Educated patient that majority of upper respiratory infections are viral and do not need antibiotics. ***As symptoms have been worsening over the last week, will treat with antibiotics.  2. Twice daily use of nasal saline rinse or Neti-Pot.  3. OTC anti-pyretics and decongestants as needed.  4. Follow-up in office or urgent care for worsening symptoms, difficulty breathing, lack of expected recovery, or should new symptoms or problems arise.  "

## 2020-01-17 NOTE — PROGRESS NOTES
Chief Complaint   Patient presents with   • Cough     chest congestion, tightness, X 2 weeks         Subjective:     Priscilla Childress is a 59 y.o. female presenting with upper respiratory congestion.    Patient has had a progressively worsening upper respiratory infection over the past 2 weeks.  Experiencing headache, facial pressure, nasal congestion with green discharge, wet cough without sputum production, chest pain, shortness of breath with coughing fits.  She has tried over-the-counter Benadryl and Sudafed to mild relief.  She has been resting and hydrating, however the cold has progressed regardless.    Does have a history of multiple heart attacks, has been concerned for her heart health as she is experience chest pain and shortness of breath.  Shortness of breath resolved with rest.  Chest pain is primarily associated with coughing fits, however it is very painful and patient is worried.      Review of systems:      Denies difficulty swallowing, dizziness, altered cognition, changes in bowel or bladder habits, decreased sensation, decreased strength, numbness or tingling, intolerable depression or anxiety, rash or skin concerns, changes in vision,  painful or swollen lymph nodes.       Current Outpatient Medications:   •  guaifenesin-codeine (TUSSI-ORGANIDIN NR) 100-10 MG/5ML syrup, Take 5 mL by mouth 3 times a day as needed for Cough for up to 7 days., Disp: 120 mL, Rfl: 0  •  amoxicillin-clavulanate (AUGMENTIN) 875-125 MG Tab, Take 1 Tab by mouth 2 times a day., Disp: 14 Tab, Rfl: 0  •  atorvastatin (LIPITOR) 80 MG tablet, Take 1 Tab by mouth every evening., Disp: 90 Tab, Rfl: 1  •  carvedilol (COREG) 6.25 MG Tab, Take 1 Tab by mouth 2 times a day, with meals., Disp: 180 Tab, Rfl: 1  •  FLUoxetine (PROZAC) 10 MG Cap, Take 1 Cap by mouth every day., Disp: 90 Cap, Rfl: 0  •  levothyroxine (SYNTHROID) 200 MCG Tab, Take 1 Tab by mouth Every morning on an empty stomach., Disp: 90 Tab, Rfl: 0  •  omeprazole  "(PRILOSEC) 40 MG delayed-release capsule, Take 1 Cap by mouth every day., Disp: 90 Cap, Rfl: 1  •  diltiazem (DILACOR XR) 180 MG XR capsule, Take 1 Cap by mouth every day., Disp: 90 Cap, Rfl: 3  •  nitroGLYCERIN (NITROSTAT) 0.3 MG SL tablet, Place 1 Tab under tongue as needed for Chest Pain., Disp: 25 Tab, Rfl: 3  •  clobetasol (TEMOVATE) 0.05 % Ointment, Apply 1 Application to affected area(s) 2 times a day., Disp: 60 g, Rfl: 1  •  clonazePAM (KLONOPIN) 0.5 MG Tab, Take 0.5 mg by mouth as needed., Disp: , Rfl:   •  aspirin (ASA) 81 MG Chew Tab chewable tablet, Take 81 mg by mouth every day., Disp: , Rfl:   •  Cholecalciferol (VITAMIN D3) 5000 UNIT/0.5ML Liquid, Take  by mouth., Disp: , Rfl:   •  Biotin 1000 MCG Tab, Take  by mouth., Disp: , Rfl:   •  Coenzyme Q10 (COQ10) 100 MG Cap, Take  by mouth., Disp: , Rfl:     Allergies, past medical history, past surgical history, family history, social history reviewed and updated    Objective:     Vitals: /76   Pulse 96   Temp 37 °C (98.6 °F)   Resp 16   Ht 1.651 m (5' 5\")   Wt 97.5 kg (215 lb)   SpO2 96%   BMI 35.78 kg/m²   General: Alert, cooperative, dressed appropriately for weather / situation  Eyes:Normocephalic.  EOMI, no icterus or pallor.  Conjunctivae clear without erythema / irritation.  ENT:  External ears developed; Bilat TMs visualized; appear pearly without bulging, effusion, or erythema; crisp light reflex.  Bilateral nasal turbinates notably erythematous and edematous..  Oropharynx non-erythematous, mucous membranes moist; Tonsils grade 2.  Left frontal sinus tender to palpation and percussion.  Lymph: Neck supple, absent of cervical or supraclavicular lymphadenopathy.  Thyroid palpated, free of masses or goiter.   Heart: Regular rate and rhythm.  S1 and S2 normal.  No murmurs auscultated; no murmurs / bruits heard over bilateral carotids.  Bilateral radial pulses strong and equal.  Bilateral posterior tibial pulses strong and " equal.  Respiratory: Mildly prolonged expiratory phase.  Expiratory wheezing auscultated throughout posterior lung fields.  Improves with cough.   AP ratio 1:2   Abdomen: Non-distended;   Skin: Visible skin intact, dry, without rash.  Musculoskeletal: Gait is normal.    Neuro:  AAOx3 Visual tracking intact, no nystagmus;   Psych:  Affect/mood is normal, judgement is good, memory is intact, grooming is appropriate.    Assessment/Plan:     Priscilla was seen today for cough.    Diagnoses and all orders for this visit:    SOB (shortness of breath): EKG and chest x-ray done in clinic and personally interpreted.  EKG is in sinus rhythm, evidence of prior infarcts present, but consistent with medical history and past EKGs.  No evidence of current myocardial ischemia.  Chest x-ray revealed no abnormalities including pulmonary infiltrates.    Shortness of breath, cough appears to be associated with the gathering of sinus drainage in her bronchioles.  We did discuss that at times inhalers are used to open up the airways, however with her cardiac history is not indicated.  Advised to hydrate drink roughly 2 L plus of water per day to thin secretions and encouraged to expectorate sputum when able.    Rest indicated.  Cough syrup with codeine provided.  Opioid dependency risk very low, patient does have prescription for clonazepam but has not used this in over a month, uses it very infrequently.  Discussed the importance of withholding this medication and any other sedating medications when using sedating cough syrup.  As the patient is very narcotic naïve, low-dose recommended.  Advised patient to avoid driving or doing any activities that require focus on on this medication  -     DX-CHEST-2 VIEWS; Future  -     EKG  -     guaifenesin-codeine (TUSSI-ORGANIDIN NR) 100-10 MG/5ML syrup; Take 5 mL by mouth 3 times a day as needed for Cough for up to 7 days.    Opioid Risk Score: 0      Chest pain, unspecified type  -     DX-CHEST-2  VIEWS; Future  -     EKG    Bacterial sinusitis: Discussed etiology of viral upper respiratory infections that can transform into bacterial.  As the patient is notably fatigued, has sharp unilateral sinus pain, and has thick purulent nasal discharge, antibiotics are indicated at this time.  She has tried conservative treatment for 2 weeks with no improvement.  Advised patient to take antibiotics with food to reduce risk of stomach upset.  Complete antibiotics as ordered.  -     amoxicillin-clavulanate (AUGMENTIN) 875-125 MG Tab; Take 1 Tab by mouth 2 times a day.          Return if symptoms worsen or fail to improve.    Patient verbalized understanding and agreed to plan of care.  Encouraged to contact me with needs via [employer] or by phone if needed.      I have placed the above orders and discussed them with an approved delegating provider.  The MA is performing the below orders under the direction of Dr Julien    Please note that this dictation was created using voice recognition software. I have made every reasonable attempt to correct obvious errors, but I expect that there are errors of grammar and possibly content that I did not discover before finalizing the note.

## 2020-01-23 ENCOUNTER — PATIENT MESSAGE (OUTPATIENT)
Dept: MEDICAL GROUP | Facility: PHYSICIAN GROUP | Age: 60
End: 2020-01-23

## 2020-01-24 RX ORDER — LEVOTHYROXINE SODIUM 0.2 MG/1
200 TABLET ORAL
Qty: 30 TAB | Refills: 0 | Status: SHIPPED | OUTPATIENT
Start: 2020-01-24 | End: 2020-08-07

## 2020-02-06 ENCOUNTER — OFFICE VISIT (OUTPATIENT)
Dept: URGENT CARE | Facility: PHYSICIAN GROUP | Age: 60
End: 2020-02-06
Payer: COMMERCIAL

## 2020-02-06 VITALS
SYSTOLIC BLOOD PRESSURE: 130 MMHG | TEMPERATURE: 99.1 F | BODY MASS INDEX: 35.32 KG/M2 | HEIGHT: 65 IN | OXYGEN SATURATION: 92 % | DIASTOLIC BLOOD PRESSURE: 80 MMHG | HEART RATE: 80 BPM | WEIGHT: 212 LBS | RESPIRATION RATE: 16 BRPM

## 2020-02-06 DIAGNOSIS — R06.02 SHORTNESS OF BREATH: ICD-10-CM

## 2020-02-06 DIAGNOSIS — J40 BRONCHITIS: ICD-10-CM

## 2020-02-06 DIAGNOSIS — J06.9 UPPER RESPIRATORY TRACT INFECTION, UNSPECIFIED TYPE: ICD-10-CM

## 2020-02-06 PROCEDURE — 99214 OFFICE O/P EST MOD 30 MIN: CPT | Mod: 25 | Performed by: NURSE PRACTITIONER

## 2020-02-06 PROCEDURE — 94640 AIRWAY INHALATION TREATMENT: CPT | Performed by: NURSE PRACTITIONER

## 2020-02-06 PROCEDURE — 94760 N-INVAS EAR/PLS OXIMETRY 1: CPT | Performed by: NURSE PRACTITIONER

## 2020-02-06 RX ORDER — ALBUTEROL SULFATE 90 UG/1
2 AEROSOL, METERED RESPIRATORY (INHALATION) EVERY 4 HOURS PRN
Qty: 1 INHALER | Refills: 0 | Status: SHIPPED | OUTPATIENT
Start: 2020-02-06 | End: 2020-02-20

## 2020-02-06 RX ORDER — PREDNISONE 20 MG/1
40 TABLET ORAL DAILY
Qty: 10 TAB | Refills: 0 | Status: SHIPPED | OUTPATIENT
Start: 2020-02-06 | End: 2020-02-11

## 2020-02-06 RX ORDER — DEXTROMETHORPHAN HYDROBROMIDE AND PROMETHAZINE HYDROCHLORIDE 15; 6.25 MG/5ML; MG/5ML
5 SYRUP ORAL EVERY 4 HOURS PRN
Qty: 100 ML | Refills: 0 | Status: SHIPPED | OUTPATIENT
Start: 2020-02-06 | End: 2020-02-13

## 2020-02-06 RX ORDER — DOXYCYCLINE HYCLATE 100 MG
100 TABLET ORAL 2 TIMES DAILY
Qty: 14 TAB | Refills: 0 | Status: SHIPPED | OUTPATIENT
Start: 2020-02-06 | End: 2020-02-13

## 2020-02-06 RX ORDER — IPRATROPIUM BROMIDE AND ALBUTEROL SULFATE 2.5; .5 MG/3ML; MG/3ML
3 SOLUTION RESPIRATORY (INHALATION) ONCE
Status: COMPLETED | OUTPATIENT
Start: 2020-02-06 | End: 2020-02-06

## 2020-02-06 RX ADMIN — IPRATROPIUM BROMIDE AND ALBUTEROL SULFATE 3 ML: 2.5; .5 SOLUTION RESPIRATORY (INHALATION) at 15:57

## 2020-02-06 NOTE — PROGRESS NOTES
Subjective:     Priscilla Childress is a 59 y.o. female who presents for Cough (pt went to doctor on 1/16 and was given an antibiotic but stilll not feeling well, cough x 1 month, nasal and chest congestion, headache, middle back pain, sinus pressure)      HPI  Pt presents for evaluation of a new problem. She was treated with antibiotics (Augmentin) on 1/24 and states she is still feeling ill. Her symptoms are worsening and states she was not better even when on the antibiotics. She reports SOB, wheezing, non productive cough, sinus pain and congestion and headache. She denies fever, N/V, or body aches.     Review of Systems   Constitutional: Positive for malaise/fatigue. Negative for fever.   HENT: Positive for congestion, sinus pain and sore throat. Negative for ear pain.    Eyes: Negative for blurred vision and double vision.   Respiratory: Positive for cough, shortness of breath and wheezing. Negative for hemoptysis and sputum production.    Cardiovascular: Negative for chest pain and palpitations.   Gastrointestinal: Negative for abdominal pain, diarrhea, nausea and vomiting.   Genitourinary: Negative for dysuria and urgency.   Musculoskeletal: Positive for back pain. Negative for myalgias and neck pain.   Neurological: Positive for headaches.   All other systems reviewed and are negative.      PMH:   Past Medical History:   Diagnosis Date   • GERD (gastroesophageal reflux disease)    • Heart attack (HCC)    • History of myocardial infarction    • Hyperlipidemia    • Hypertension    • Thyroid disease      ALLERGIES:   Allergies   Allergen Reactions   • Inapsine [Droperidol]      SURGHX:   Past Surgical History:   Procedure Laterality Date   • CHOLECYSTECTOMY     • THYROIDECTOMY TOTAL       SOCHX:   Social History     Socioeconomic History   • Marital status:      Spouse name: Not on file   • Number of children: Not on file   • Years of education: Not on file   • Highest education level: Not on file  "  Occupational History   • Not on file   Social Needs   • Financial resource strain: Not on file   • Food insecurity:     Worry: Not on file     Inability: Not on file   • Transportation needs:     Medical: Not on file     Non-medical: Not on file   Tobacco Use   • Smoking status: Never Smoker   • Smokeless tobacco: Never Used   Substance and Sexual Activity   • Alcohol use: Yes     Alcohol/week: 1.2 oz     Types: 2 Standard drinks or equivalent per week     Comment: 1 drink weekly   • Drug use: No   • Sexual activity: Not on file   Lifestyle   • Physical activity:     Days per week: Not on file     Minutes per session: Not on file   • Stress: Not on file   Relationships   • Social connections:     Talks on phone: Not on file     Gets together: Not on file     Attends Gnosticist service: Not on file     Active member of club or organization: Not on file     Attends meetings of clubs or organizations: Not on file     Relationship status: Not on file   • Intimate partner violence:     Fear of current or ex partner: Not on file     Emotionally abused: Not on file     Physically abused: Not on file     Forced sexual activity: Not on file   Other Topics Concern   • Not on file   Social History Narrative   • Not on file     FH:   Family History   Problem Relation Age of Onset   • Alzheimer's Disease Mother    • Heart Disease Father 70        CAD         Objective:   /80 (BP Location: Right arm, Patient Position: Sitting, BP Cuff Size: Adult)   Pulse 80   Temp 37.3 °C (99.1 °F) (Temporal)   Resp 16   Ht 1.651 m (5' 5\")   Wt 96.2 kg (212 lb)   SpO2 92%   BMI 35.28 kg/m²     Physical Exam  Vitals signs and nursing note reviewed.   Constitutional:       General: She is not in acute distress.     Appearance: She is ill-appearing.   HENT:      Head: Normocephalic and atraumatic.      Right Ear: Tympanic membrane normal.      Left Ear: Tympanic membrane normal.      Nose: Congestion and rhinorrhea present.      " Mouth/Throat:      Mouth: Mucous membranes are moist.      Pharynx: Posterior oropharyngeal erythema present. No oropharyngeal exudate.   Eyes:      Extraocular Movements: Extraocular movements intact.      Conjunctiva/sclera: Conjunctivae normal.      Pupils: Pupils are equal, round, and reactive to light.   Neck:      Musculoskeletal: No neck rigidity or muscular tenderness.   Cardiovascular:      Rate and Rhythm: Normal rate and regular rhythm.      Heart sounds: Normal heart sounds.   Pulmonary:      Breath sounds: Decreased air movement present. Examination of the right-upper field reveals wheezing and rhonchi. Examination of the left-upper field reveals wheezing and rhonchi. Examination of the right-middle field reveals wheezing and rhonchi. Examination of the left-middle field reveals wheezing and rhonchi. Examination of the right-lower field reveals wheezing and rhonchi. Examination of the left-lower field reveals wheezing and rhonchi. Wheezing and rhonchi present.      Comments: Lungs clear following duoneb in office. O2 sat increased to 97% RA.   Chest:      Chest wall: Tenderness present.   Abdominal:      General: Abdomen is flat. There is no distension.      Palpations: Abdomen is soft.      Tenderness: There is no tenderness.   Musculoskeletal: Normal range of motion.   Lymphadenopathy:      Cervical: Cervical adenopathy present.   Skin:     General: Skin is warm and dry.      Capillary Refill: Capillary refill takes less than 2 seconds.   Neurological:      General: No focal deficit present.      Mental Status: She is alert and oriented to person, place, and time. Mental status is at baseline.   Psychiatric:         Mood and Affect: Mood normal.         Behavior: Behavior normal.         Judgment: Judgment normal.         Assessment/Plan:   Assessment    1. Shortness of breath  ipratropium-albuterol (DUONEB) nebulizer solution    predniSONE (DELTASONE) 20 MG Tab    albuterol 108 (90 Base) MCG/ACT Aero  Soln inhalation aerosol    albuterol 108 (90 Base) MCG/ACT Aero Soln inhalation aerosol   2. Bronchitis  predniSONE (DELTASONE) 20 MG Tab    promethazine-dextromethorphan (PROMETHAZINE-DM) 6.25-15 MG/5ML syrup   3. Upper respiratory tract infection, unspecified type  doxycycline (VIBRAMYCIN) 100 MG Tab       She states breathing better after Duoneb. We discussed supportive measures including humidifier, warm salt water gargles, over-the-counter Cepacol throat lozenges, rest  and increased fluids.  She was encouraged to seek treatment back in the ER or urgent care for worsening symptoms,  fever greater than 100.5, wheezes or shortness of breath.    AVS handout given and reviewed with patient. Pt educated on red flags and when to seek treatment back in ER or UC.

## 2020-02-07 NOTE — PATIENT INSTRUCTIONS
Bronchitis  Bronchitis is a problem of the air tubes leading to your lungs. This problem makes it hard for air to get in and out of the lungs. You may cough a lot because your air tubes are narrow. Going without care can cause lasting (chronic) bronchitis.  HOME CARE   · Drink enough fluids to keep your pee (urine) clear or pale yellow.  · Use a cool mist humidifier.  · Quit smoking if you smoke. If you keep smoking, the bronchitis might not get better.  · Only take medicine as told by your doctor.  GET HELP RIGHT AWAY IF:   · Coughing keeps you awake.  · You start to wheeze.  · You become more sick or weak.  · You have a hard time breathing or get short of breath.  · You cough up blood.  · Coughing lasts more than 2 weeks.  · You have a fever.  · Your baby is older than 3 months with a rectal temperature of 102° F (38.9° C) or higher.  · Your baby is 3 months old or younger with a rectal temperature of 100.4° F (38° C) or higher.  MAKE SURE YOU:  · Understand these instructions.  · Will watch your condition.  · Will get help right away if you are not doing well or get worse.  Document Released: 06/05/2009 Document Revised: 03/11/2013 Document Reviewed: 11/19/2010  [employer]® Patient Information ©2014 [employer].

## 2020-02-08 ASSESSMENT — ENCOUNTER SYMPTOMS
NAUSEA: 0
PALPITATIONS: 0
VOMITING: 0
HEADACHES: 1
COUGH: 1
DIARRHEA: 0
WHEEZING: 1
ABDOMINAL PAIN: 0
MYALGIAS: 0
BACK PAIN: 1
DOUBLE VISION: 0
SPUTUM PRODUCTION: 0
SORE THROAT: 1
SHORTNESS OF BREATH: 1
BLURRED VISION: 0
NECK PAIN: 0
HEMOPTYSIS: 0
SINUS PAIN: 1
FEVER: 0

## 2020-06-30 ENCOUNTER — OFFICE VISIT (OUTPATIENT)
Dept: MEDICAL GROUP | Facility: PHYSICIAN GROUP | Age: 60
End: 2020-06-30
Payer: COMMERCIAL

## 2020-06-30 VITALS
OXYGEN SATURATION: 94 % | HEART RATE: 84 BPM | SYSTOLIC BLOOD PRESSURE: 122 MMHG | DIASTOLIC BLOOD PRESSURE: 68 MMHG | TEMPERATURE: 98.7 F | BODY MASS INDEX: 34.99 KG/M2 | WEIGHT: 210 LBS | HEIGHT: 65 IN

## 2020-06-30 DIAGNOSIS — K21.9 GASTROESOPHAGEAL REFLUX DISEASE, ESOPHAGITIS PRESENCE NOT SPECIFIED: ICD-10-CM

## 2020-06-30 DIAGNOSIS — E03.9 HYPOTHYROIDISM, UNSPECIFIED TYPE: ICD-10-CM

## 2020-06-30 DIAGNOSIS — Z11.59 NEED FOR HEPATITIS C SCREENING TEST: ICD-10-CM

## 2020-06-30 DIAGNOSIS — Z00.00 PREVENTATIVE HEALTH CARE: ICD-10-CM

## 2020-06-30 DIAGNOSIS — E78.00 HYPERCHOLESTEREMIA: ICD-10-CM

## 2020-06-30 DIAGNOSIS — Z12.31 ENCOUNTER FOR SCREENING MAMMOGRAM FOR BREAST CANCER: ICD-10-CM

## 2020-06-30 DIAGNOSIS — E55.9 VITAMIN D DEFICIENCY: ICD-10-CM

## 2020-06-30 DIAGNOSIS — Z87.09 HISTORY OF RESPIRATORY TRACT INFECTION: ICD-10-CM

## 2020-06-30 DIAGNOSIS — R73.03 PREDIABETES: ICD-10-CM

## 2020-06-30 DIAGNOSIS — R23.2 HOT FLASHES: ICD-10-CM

## 2020-06-30 DIAGNOSIS — I25.2 HISTORY OF MYOCARDIAL INFARCTION: ICD-10-CM

## 2020-06-30 DIAGNOSIS — Z23 NEED FOR VACCINATION: ICD-10-CM

## 2020-06-30 DIAGNOSIS — R74.8 ELEVATED ALKALINE PHOSPHATASE LEVEL: ICD-10-CM

## 2020-06-30 PROCEDURE — 90471 IMMUNIZATION ADMIN: CPT | Performed by: FAMILY MEDICINE

## 2020-06-30 PROCEDURE — 99214 OFFICE O/P EST MOD 30 MIN: CPT | Mod: 25 | Performed by: FAMILY MEDICINE

## 2020-06-30 PROCEDURE — 90750 HZV VACC RECOMBINANT IM: CPT | Performed by: FAMILY MEDICINE

## 2020-06-30 RX ORDER — OMEPRAZOLE 40 MG/1
40 CAPSULE, DELAYED RELEASE ORAL DAILY
Qty: 90 CAP | Refills: 3 | Status: ON HOLD | OUTPATIENT
Start: 2020-06-30 | End: 2020-11-30

## 2020-06-30 RX ORDER — CARVEDILOL 6.25 MG/1
6.25 TABLET ORAL 2 TIMES DAILY WITH MEALS
Qty: 180 TAB | Refills: 3 | Status: SHIPPED | OUTPATIENT
Start: 2020-06-30 | End: 2020-07-30 | Stop reason: SDUPTHER

## 2020-06-30 RX ORDER — DILTIAZEM HYDROCHLORIDE 180 MG/1
180 CAPSULE, EXTENDED RELEASE ORAL DAILY
Qty: 90 CAP | Refills: 3 | Status: SHIPPED | OUTPATIENT
Start: 2020-06-30 | End: 2020-07-30 | Stop reason: SDUPTHER

## 2020-06-30 RX ORDER — ATORVASTATIN CALCIUM 80 MG/1
80 TABLET, FILM COATED ORAL EVERY EVENING
Qty: 90 TAB | Refills: 3 | Status: SHIPPED | OUTPATIENT
Start: 2020-06-30 | End: 2020-07-30 | Stop reason: SDUPTHER

## 2020-06-30 RX ORDER — FLUOXETINE 10 MG/1
10 CAPSULE ORAL DAILY
Qty: 90 CAP | Refills: 3 | Status: SHIPPED | OUTPATIENT
Start: 2020-06-30 | End: 2021-09-07

## 2020-06-30 ASSESSMENT — FIBROSIS 4 INDEX: FIB4 SCORE: 0.5

## 2020-06-30 ASSESSMENT — PATIENT HEALTH QUESTIONNAIRE - PHQ9: CLINICAL INTERPRETATION OF PHQ2 SCORE: 0

## 2020-06-30 NOTE — PROGRESS NOTES
CC: COVID antibody testing    HISTORY OF THE PRESENT ILLNESS: Patient is a 59 y.o. female. This pleasant patient is here today to discuss the following issues.    Patient is primarily here to request COVID-19 antibody testing.  She developed a fairly severe and prolonged respiratory infection in early February which lasted at least a month after she came contact with relatives from [place] who were sick.  She would like to get antibody testing for COVID-19.    She is also due for lab work.  She reports doing well on all of her medications at this time but does need refills.  Denies symptoms of hypothyroidism, depression, hot flashes.  She denies chest pain or shortness of breath.  She does note that her cardiologist recently retired and she would like to get established with a new one.  She is due for her mammogram.    Allergies: Inapsine [droperidol]    Current Outpatient Medications Ordered in Epic   Medication Sig Dispense Refill   • carvedilol (COREG) 6.25 MG Tab Take 1 Tab by mouth 2 times a day, with meals. 180 Tab 3   • atorvastatin (LIPITOR) 80 MG tablet Take 1 Tab by mouth every evening. 90 Tab 3   • FLUoxetine (PROZAC) 10 MG Cap Take 1 Cap by mouth every day. 90 Cap 3   • omeprazole (PRILOSEC) 40 MG delayed-release capsule Take 1 Cap by mouth every day. 90 Cap 3   • diltiazem (DILACOR XR) 180 MG XR capsule Take 1 Cap by mouth every day. 90 Cap 3   • levothyroxine (SYNTHROID) 200 MCG Tab Take 1 Tab by mouth Every morning on an empty stomach. 30 Tab 0   • nitroGLYCERIN (NITROSTAT) 0.3 MG SL tablet Place 1 Tab under tongue as needed for Chest Pain. 25 Tab 3   • clobetasol (TEMOVATE) 0.05 % Ointment Apply 1 Application to affected area(s) 2 times a day. 60 g 1   • clonazePAM (KLONOPIN) 0.5 MG Tab Take 0.5 mg by mouth as needed.     • aspirin (ASA) 81 MG Chew Tab chewable tablet Take 81 mg by mouth every day.       No current [facility]-ordered facility-administered medications on file.        Past Medical  "History:   Diagnosis Date   • GERD (gastroesophageal reflux disease)    • Heart attack (HCC)    • History of myocardial infarction    • Hyperlipidemia    • Hypertension    • Thyroid disease        Past Surgical History:   Procedure Laterality Date   • CHOLECYSTECTOMY     • THYROIDECTOMY TOTAL         Social History     Tobacco Use   • Smoking status: Never Smoker   • Smokeless tobacco: Never Used   Substance Use Topics   • Alcohol use: Yes     Alcohol/week: 1.2 oz     Types: 2 Standard drinks or equivalent per week     Comment: 1 drink weekly   • Drug use: No       Social History     Social History Narrative   • Not on file       Family History   Problem Relation Age of Onset   • Alzheimer's Disease Mother    • Heart Disease Father 70        CAD       ROS:     - Constitutional: Negative for fever, chills, unexpected weight change, and fatigue/generalized weakness.     - Respiratory: Negative for cough, sputum production, chest congestion, dyspnea, wheezing, and crackles.      - Cardiovascular: Negative for chest pain, palpitations, orthopnea, PND, and bilateral lower extremity edema.     Exam: /68 (BP Location: Left arm, Patient Position: Sitting, BP Cuff Size: Large adult)   Pulse 84   Temp 37.1 °C (98.7 °F) (Temporal)   Ht 1.651 m (5' 5\")   Wt 95.3 kg (210 lb)   SpO2 94%  Body mass index is 34.95 kg/m².    General: Well appearing, NAD  HEENT: Normocephalic. Conjunctiva clear, lids without ptosis, pupils equal and reactive to light accommodation, ears normal shape and contour, canals are clear bilaterally, tympanic membranes without bulging or erythema and normal cone of light, oropharynx is without erythema, edema or exudates.  Given Rosmery  Neck: Supple without JVD. No thyromegaly or nodules  Pulmonary: Clear to ausculation.  Normal effort. No rales, ronchi, or wheezing.  Cardiovascular: Regular rate and rhythm without murmur, rubs or gallop.   Abdomen: Soft, nontender, nondistended. Normal bowel " sounds. Liver and spleen are not palpable. No rebound or guarding  Neurologic: normal gait  Lymph: No cervical, supraclavicular lymph nodes are palpable  Skin: Warm and dry.  No obvious lesions.  Musculoskeletal:  No extremity cyanosis, clubbing, or edema.  Psych: Normal mood and affect. Alert and oriented. Judgment and insight is normal.    Please note that this dictation was created using voice recognition software. I have made every reasonable attempt to correct obvious errors, but I expect that there are errors of grammar and possibly content that I did not discover before finalizing the note.      Assessment/Plan  Priscilla was seen today for medication refill and requesting labs.    Diagnoses and all orders for this visit:    History of respiratory tract infection  Given significant respiratory infection in February with contacts from [place].  COVID-19 antibody testing ordered.  -     COVID-19 IgG, Qual; Future    Need for hepatitis C screening test  -     HCV Scrn ( 4961-2030 1xLife); Future    Prediabetes  Chronic issue, due for A1c.  -     HEMOGLOBIN A1C; Future    Elevated alkaline phosphatase level  Noted on last lab work.  Checking CMP.  May recommend liver ultrasound.    Hypothyroidism, unspecified type  Chronic, well controlled on levothyroxine.  Due for lab work.  -     TSH+FREE T4    Hypercholesteremia  Chronic, well controlled on atorvastatin.  Due for lipid profile.  -     atorvastatin (LIPITOR) 80 MG tablet; Take 1 Tab by mouth every evening.    Vitamin D deficiency  Chronic issue, checking vitamin D levels as below.    Encounter for screening mammogram for breast cancer  -     MA-SCREENING MAMMO BILAT W/TOMOSYNTHESIS W/CAD; Future    Need for vaccination  -     Shingles Vaccine (SHINGRIX)    History of myocardial infarction: On 5 occasions and with a segmental wall motion abnormality but possibly secondary to vasospasm  Previous issue, patient has no current concerns or cardiac complaints.   Medications refilled today.  I have also given her referral to cardiology so that she can reestablish his previous cardiologist retired.  -     carvedilol (COREG) 6.25 MG Tab; Take 1 Tab by mouth 2 times a day, with meals.  -     diltiazem (DILACOR XR) 180 MG XR capsule; Take 1 Cap by mouth every day.  -     REFERRAL TO CARDIOLOGY    Preventative health care  -     CBC WITH DIFFERENTIAL; Future  -     Lipid Profile; Future  -     VITAMIN D,25 HYDROXY; Future    Gastroesophageal reflux disease, esophagitis presence not specified  Chronic, well controlled.  Omeprazole refilled.  -     omeprazole (PRILOSEC) 40 MG delayed-release capsule; Take 1 Cap by mouth every day.    Hot flashes  Chronic, well controlled.  Fluoxetine refilled.  -     FLUoxetine (PROZAC) 10 MG Cap; Take 1 Cap by mouth every day.    Follow-up in 6 months or sooner if needed.    Jagruti Stewart,   [facility]

## 2020-07-01 ENCOUNTER — HOSPITAL ENCOUNTER (OUTPATIENT)
Dept: LAB | Facility: MEDICAL CENTER | Age: 60
End: 2020-07-01
Attending: FAMILY MEDICINE
Payer: COMMERCIAL

## 2020-07-01 DIAGNOSIS — R73.03 PREDIABETES: ICD-10-CM

## 2020-07-01 DIAGNOSIS — Z87.09 HISTORY OF RESPIRATORY TRACT INFECTION: ICD-10-CM

## 2020-07-01 DIAGNOSIS — Z00.00 PREVENTATIVE HEALTH CARE: ICD-10-CM

## 2020-07-01 DIAGNOSIS — Z11.59 NEED FOR HEPATITIS C SCREENING TEST: ICD-10-CM

## 2020-07-01 LAB
25(OH)D3 SERPL-MCNC: 40 NG/ML (ref 30–100)
ALBUMIN SERPL BCP-MCNC: 4.2 G/DL (ref 3.2–4.9)
ALBUMIN/GLOB SERPL: 1.4 G/DL
ALP SERPL-CCNC: 156 U/L (ref 30–99)
ALT SERPL-CCNC: 19 U/L (ref 2–50)
ANION GAP SERPL CALC-SCNC: 10 MMOL/L (ref 7–16)
AST SERPL-CCNC: 14 U/L (ref 12–45)
BASOPHILS # BLD AUTO: 0.9 % (ref 0–1.8)
BASOPHILS # BLD: 0.09 K/UL (ref 0–0.12)
BILIRUB SERPL-MCNC: 0.4 MG/DL (ref 0.1–1.5)
BUN SERPL-MCNC: 15 MG/DL (ref 8–22)
CALCIUM SERPL-MCNC: 9.5 MG/DL (ref 8.5–10.5)
CHLORIDE SERPL-SCNC: 104 MMOL/L (ref 96–112)
CHOLEST SERPL-MCNC: 129 MG/DL (ref 100–199)
CO2 SERPL-SCNC: 27 MMOL/L (ref 20–33)
CREAT SERPL-MCNC: 0.99 MG/DL (ref 0.5–1.4)
EOSINOPHIL # BLD AUTO: 0.57 K/UL (ref 0–0.51)
EOSINOPHIL NFR BLD: 5.6 % (ref 0–6.9)
ERYTHROCYTE [DISTWIDTH] IN BLOOD BY AUTOMATED COUNT: 42.5 FL (ref 35.9–50)
EST. AVERAGE GLUCOSE BLD GHB EST-MCNC: 134 MG/DL
FASTING STATUS PATIENT QL REPORTED: NORMAL
GLOBULIN SER CALC-MCNC: 3.1 G/DL (ref 1.9–3.5)
GLUCOSE SERPL-MCNC: 112 MG/DL (ref 65–99)
HBA1C MFR BLD: 6.3 % (ref 0–5.6)
HCT VFR BLD AUTO: 48.4 % (ref 37–47)
HCV AB SER QL: NORMAL
HDLC SERPL-MCNC: 39 MG/DL
HGB BLD-MCNC: 15.5 G/DL (ref 12–16)
IMM GRANULOCYTES # BLD AUTO: 0.03 K/UL (ref 0–0.11)
IMM GRANULOCYTES NFR BLD AUTO: 0.3 % (ref 0–0.9)
LDLC SERPL CALC-MCNC: 57 MG/DL
LYMPHOCYTES # BLD AUTO: 3.02 K/UL (ref 1–4.8)
LYMPHOCYTES NFR BLD: 29.8 % (ref 22–41)
MCH RBC QN AUTO: 30.5 PG (ref 27–33)
MCHC RBC AUTO-ENTMCNC: 32 G/DL (ref 33.6–35)
MCV RBC AUTO: 95.3 FL (ref 81.4–97.8)
MONOCYTES # BLD AUTO: 0.84 K/UL (ref 0–0.85)
MONOCYTES NFR BLD AUTO: 8.3 % (ref 0–13.4)
NEUTROPHILS # BLD AUTO: 5.57 K/UL (ref 2–7.15)
NEUTROPHILS NFR BLD: 55.1 % (ref 44–72)
NRBC # BLD AUTO: 0 K/UL
NRBC BLD-RTO: 0 /100 WBC
PLATELET # BLD AUTO: 432 K/UL (ref 164–446)
PMV BLD AUTO: 10.1 FL (ref 9–12.9)
POTASSIUM SERPL-SCNC: 4.2 MMOL/L (ref 3.6–5.5)
PROT SERPL-MCNC: 7.3 G/DL (ref 6–8.2)
RBC # BLD AUTO: 5.08 M/UL (ref 4.2–5.4)
SARS-COV-2 AB SERPL QL IA: NORMAL
SODIUM SERPL-SCNC: 141 MMOL/L (ref 135–145)
T4 FREE SERPL-MCNC: 1.32 NG/DL (ref 0.93–1.7)
TRIGL SERPL-MCNC: 167 MG/DL (ref 0–149)
TSH SERPL DL<=0.005 MIU/L-ACNC: 0.68 UIU/ML (ref 0.38–5.33)
WBC # BLD AUTO: 10.1 K/UL (ref 4.8–10.8)

## 2020-07-01 PROCEDURE — 36415 COLL VENOUS BLD VENIPUNCTURE: CPT

## 2020-07-01 PROCEDURE — 85025 COMPLETE CBC W/AUTO DIFF WBC: CPT

## 2020-07-01 PROCEDURE — G0472 HEP C SCREEN HIGH RISK/OTHER: HCPCS

## 2020-07-01 PROCEDURE — 83036 HEMOGLOBIN GLYCOSYLATED A1C: CPT

## 2020-07-01 PROCEDURE — 80061 LIPID PANEL: CPT

## 2020-07-01 PROCEDURE — 86769 SARS-COV-2 COVID-19 ANTIBODY: CPT

## 2020-07-01 PROCEDURE — 80053 COMPREHEN METABOLIC PANEL: CPT

## 2020-07-01 PROCEDURE — 84443 ASSAY THYROID STIM HORMONE: CPT

## 2020-07-01 PROCEDURE — 84439 ASSAY OF FREE THYROXINE: CPT

## 2020-07-01 PROCEDURE — 82306 VITAMIN D 25 HYDROXY: CPT

## 2020-07-02 DIAGNOSIS — R74.8 ELEVATED ALKALINE PHOSPHATASE LEVEL: ICD-10-CM

## 2020-07-21 ENCOUNTER — HOSPITAL ENCOUNTER (OUTPATIENT)
Dept: RADIOLOGY | Facility: MEDICAL CENTER | Age: 60
End: 2020-07-21
Attending: FAMILY MEDICINE
Payer: COMMERCIAL

## 2020-07-21 DIAGNOSIS — R92.8 ABNORMAL MAMMOGRAM: ICD-10-CM

## 2020-07-21 PROCEDURE — G0279 TOMOSYNTHESIS, MAMMO: HCPCS

## 2020-07-21 PROCEDURE — 76642 ULTRASOUND BREAST LIMITED: CPT | Mod: LT

## 2020-07-23 ENCOUNTER — HOSPITAL ENCOUNTER (OUTPATIENT)
Dept: RADIOLOGY | Facility: MEDICAL CENTER | Age: 60
End: 2020-07-23
Attending: FAMILY MEDICINE
Payer: COMMERCIAL

## 2020-07-23 DIAGNOSIS — R74.8 ELEVATED ALKALINE PHOSPHATASE LEVEL: ICD-10-CM

## 2020-07-23 PROCEDURE — 76705 ECHO EXAM OF ABDOMEN: CPT

## 2020-07-30 ENCOUNTER — OFFICE VISIT (OUTPATIENT)
Dept: CARDIOLOGY | Facility: MEDICAL CENTER | Age: 60
End: 2020-07-30
Payer: COMMERCIAL

## 2020-07-30 VITALS
WEIGHT: 217 LBS | DIASTOLIC BLOOD PRESSURE: 66 MMHG | SYSTOLIC BLOOD PRESSURE: 118 MMHG | OXYGEN SATURATION: 96 % | BODY MASS INDEX: 37.05 KG/M2 | HEART RATE: 80 BPM | HEIGHT: 64 IN

## 2020-07-30 DIAGNOSIS — I25.2 HISTORY OF MYOCARDIAL INFARCTION: ICD-10-CM

## 2020-07-30 DIAGNOSIS — E78.00 HYPERCHOLESTEREMIA: ICD-10-CM

## 2020-07-30 DIAGNOSIS — R00.2 PALPITATIONS: ICD-10-CM

## 2020-07-30 DIAGNOSIS — I49.1 PREMATURE ATRIAL COMPLEXES: ICD-10-CM

## 2020-07-30 PROCEDURE — 99214 OFFICE O/P EST MOD 30 MIN: CPT | Performed by: INTERNAL MEDICINE

## 2020-07-30 RX ORDER — CARVEDILOL 6.25 MG/1
6.25 TABLET ORAL 2 TIMES DAILY WITH MEALS
Qty: 180 TAB | Refills: 3 | Status: SHIPPED | OUTPATIENT
Start: 2020-07-30 | End: 2021-08-20

## 2020-07-30 RX ORDER — DILTIAZEM HYDROCHLORIDE 180 MG/1
180 CAPSULE, EXTENDED RELEASE ORAL DAILY
Qty: 90 CAP | Refills: 3 | Status: SHIPPED | OUTPATIENT
Start: 2020-07-30 | End: 2021-08-20

## 2020-07-30 RX ORDER — ATORVASTATIN CALCIUM 80 MG/1
80 TABLET, FILM COATED ORAL EVERY EVENING
Qty: 90 TAB | Refills: 3 | Status: SHIPPED | OUTPATIENT
Start: 2020-07-30 | End: 2021-08-20

## 2020-07-30 ASSESSMENT — ENCOUNTER SYMPTOMS
CHILLS: 0
WEIGHT LOSS: 0
DIZZINESS: 0
EYE DISCHARGE: 0
SHORTNESS OF BREATH: 0
ORTHOPNEA: 0
FALLS: 0
COUGH: 0
CLAUDICATION: 0
MYALGIAS: 0
PND: 0
HALLUCINATIONS: 0
BLURRED VISION: 0
ABDOMINAL PAIN: 0
SPEECH CHANGE: 0
PALPITATIONS: 0
LOSS OF CONSCIOUSNESS: 0
BRUISES/BLEEDS EASILY: 0
FEVER: 0
BLOOD IN STOOL: 0
EYE PAIN: 0
DEPRESSION: 0
SENSORY CHANGE: 0
DOUBLE VISION: 0
NAUSEA: 0
HEADACHES: 0
VOMITING: 0

## 2020-07-30 ASSESSMENT — FIBROSIS 4 INDEX: FIB4 SCORE: 0.43

## 2020-07-30 NOTE — PROGRESS NOTES
Chief Complaint   Patient presents with   • Coronary Artery Disease     follow up       Subjective:   Magda Childress is a 59 y.o. female who presents today for cardiac care and management for prior history of stress induced cardiomyopathy and vasospasm. She has had multiple angiograms in the past but no PCI or stent placement. Last angiogram was done about 5 years in [place]. She was working for [employer] department.    I have independently interpreted and reviewed blood tests results with patient in clinic which shows normal LDL level, renal function.    In the interim, patient has been doing well without having any symptoms. Patient denies having chest pain, dyspnea, palpitation, presyncope, syncope episodes. Able to climb up at least 2 flights of stairs.      Past Medical History:   Diagnosis Date   • GERD (gastroesophageal reflux disease)    • Heart attack (HCC)    • History of myocardial infarction    • Hyperlipidemia    • Hypertension    • Thyroid disease      Past Surgical History:   Procedure Laterality Date   • CHOLECYSTECTOMY     • THYROIDECTOMY TOTAL       Family History   Problem Relation Age of Onset   • Alzheimer's Disease Mother    • Heart Disease Father 70        CAD     Social History     Socioeconomic History   • Marital status:      Spouse name: Not on file   • Number of children: Not on file   • Years of education: Not on file   • Highest education level: Not on file   Occupational History   • Not on file   Social Needs   • Financial resource strain: Not on file   • Food insecurity     Worry: Not on file     Inability: Not on file   • Transportation needs     Medical: Not on file     Non-medical: Not on file   Tobacco Use   • Smoking status: Never Smoker   • Smokeless tobacco: Never Used   Substance and Sexual Activity   • Alcohol use: Yes     Alcohol/week: 1.2 oz     Types: 2 Standard drinks or equivalent per week     Comment: 1 drink weekly   • Drug use: No   • Sexual activity:  Not on file   Lifestyle   • Physical activity     Days per week: Not on file     Minutes per session: Not on file   • Stress: Not on file   Relationships   • Social connections     Talks on phone: Not on file     Gets together: Not on file     Attends Mormon service: Not on file     Active member of club or organization: Not on file     Attends meetings of clubs or organizations: Not on file     Relationship status: Not on file   • Intimate partner violence     Fear of current or ex partner: Not on file     Emotionally abused: Not on file     Physically abused: Not on file     Forced sexual activity: Not on file   Other Topics Concern   • Not on file   Social History Narrative   • Not on file     Allergies   Allergen Reactions   • Inapsine [Droperidol]      Outpatient Encounter Medications as of 7/30/2020   Medication Sig Dispense Refill   • diltiazem (DILACOR XR) 180 MG XR capsule Take 1 Cap by mouth every day. 90 Cap 3   • carvedilol (COREG) 6.25 MG Tab Take 1 Tab by mouth 2 times a day, with meals. 180 Tab 3   • atorvastatin (LIPITOR) 80 MG tablet Take 1 Tab by mouth every evening. 90 Tab 3   • FLUoxetine (PROZAC) 10 MG Cap Take 1 Cap by mouth every day. 90 Cap 3   • omeprazole (PRILOSEC) 40 MG delayed-release capsule Take 1 Cap by mouth every day. 90 Cap 3   • levothyroxine (SYNTHROID) 200 MCG Tab Take 1 Tab by mouth Every morning on an empty stomach. 30 Tab 0   • nitroGLYCERIN (NITROSTAT) 0.3 MG SL tablet Place 1 Tab under tongue as needed for Chest Pain. 25 Tab 3   • clobetasol (TEMOVATE) 0.05 % Ointment Apply 1 Application to affected area(s) 2 times a day. 60 g 1   • clonazePAM (KLONOPIN) 0.5 MG Tab Take 0.5 mg by mouth as needed.     • aspirin (ASA) 81 MG Chew Tab chewable tablet Take 81 mg by mouth every day.     • [DISCONTINUED] carvedilol (COREG) 6.25 MG Tab Take 1 Tab by mouth 2 times a day, with meals. 180 Tab 3   • [DISCONTINUED] atorvastatin (LIPITOR) 80 MG tablet Take 1 Tab by mouth every  "evening. 90 Tab 3   • [DISCONTINUED] diltiazem (DILACOR XR) 180 MG XR capsule Take 1 Cap by mouth every day. 90 Cap 3     No facility-administered encounter medications on file as of 7/30/2020.      Review of Systems   Constitutional: Negative for chills, fever, malaise/fatigue and weight loss.   HENT: Negative for ear discharge, ear pain, hearing loss and nosebleeds.    Eyes: Negative for blurred vision, double vision, pain and discharge.   Respiratory: Negative for cough and shortness of breath.    Cardiovascular: Negative for chest pain, palpitations, orthopnea, claudication, leg swelling and PND.   Gastrointestinal: Negative for abdominal pain, blood in stool, melena, nausea and vomiting.   Genitourinary: Negative for dysuria and hematuria.   Musculoskeletal: Negative for falls, joint pain and myalgias.   Skin: Negative for itching and rash.   Neurological: Negative for dizziness, sensory change, speech change, loss of consciousness and headaches.   Endo/Heme/Allergies: Negative for environmental allergies. Does not bruise/bleed easily.   Psychiatric/Behavioral: Negative for depression, hallucinations and suicidal ideas.        Objective:   /66 (BP Location: Left arm, Patient Position: Sitting)   Pulse 80   Ht 1.626 m (5' 4\")   Wt 98.4 kg (217 lb)   SpO2 96%   BMI 37.25 kg/m²     Physical Exam   Constitutional: She is oriented to person, place, and time. No distress.   HENT:   Head: Normocephalic and atraumatic.   Right Ear: External ear normal.   Left Ear: External ear normal.   Eyes: Right eye exhibits no discharge. Left eye exhibits no discharge.   Neck: No JVD present. No thyromegaly present.   Cardiovascular: Normal rate, regular rhythm, normal heart sounds and intact distal pulses. Exam reveals no gallop and no friction rub.   No murmur heard.  Pulmonary/Chest: Breath sounds normal. No respiratory distress.   Abdominal: Bowel sounds are normal. She exhibits no distension. There is no abdominal " tenderness.   Musculoskeletal:         General: No tenderness or edema.   Neurological: She is alert and oriented to person, place, and time. No cranial nerve deficit.   Skin: Skin is warm and dry. She is not diaphoretic.   Psychiatric: She has a normal mood and affect. Her behavior is normal.   Nursing note and vitals reviewed.      Assessment:     1. History of myocardial infarction: On 5 occasions and with a segmental wall motion abnormality but possibly secondary to vasospasm  diltiazem (DILACOR XR) 180 MG XR capsule    carvedilol (COREG) 6.25 MG Tab   2. Hypercholesteremia  atorvastatin (LIPITOR) 80 MG tablet   3. Palpitations     4. Premature atrial complexes         Medical Decision Making:  Today's Assessment / Status / Plan:   Today, based on physical examination findings, patient is euvolemic. No JVD, lungs are clear to auscultation, no pitting edema in bilateral lower extremities, no ascites.    Dry weight is 217 lbs.    At this time patient is clinically stable in terms of her cardiac standpoint.    Blood pressure is well controlled.    Continue current medications at current dose as above. Refills were prescribed today and patient was instructed with plan of care.

## 2020-08-07 RX ORDER — LEVOTHYROXINE SODIUM 200 MCG
TABLET ORAL
Qty: 90 TAB | Refills: 3 | Status: SHIPPED | OUTPATIENT
Start: 2020-08-07 | End: 2021-09-07

## 2020-08-27 ENCOUNTER — APPOINTMENT (OUTPATIENT)
Dept: RADIOLOGY | Facility: IMAGING CENTER | Age: 60
End: 2020-08-27
Attending: INTERNAL MEDICINE
Payer: COMMERCIAL

## 2020-08-27 ENCOUNTER — OFFICE VISIT (OUTPATIENT)
Dept: MEDICAL GROUP | Facility: PHYSICIAN GROUP | Age: 60
End: 2020-08-27
Payer: COMMERCIAL

## 2020-08-27 VITALS
BODY MASS INDEX: 36.69 KG/M2 | SYSTOLIC BLOOD PRESSURE: 118 MMHG | HEART RATE: 82 BPM | TEMPERATURE: 98.3 F | DIASTOLIC BLOOD PRESSURE: 72 MMHG | WEIGHT: 214.9 LBS | HEIGHT: 64 IN | OXYGEN SATURATION: 94 %

## 2020-08-27 DIAGNOSIS — J32.9 CHRONIC SINUSITIS, UNSPECIFIED LOCATION: ICD-10-CM

## 2020-08-27 DIAGNOSIS — M77.01 MEDIAL EPICONDYLITIS OF ELBOW, RIGHT: ICD-10-CM

## 2020-08-27 PROCEDURE — 73080 X-RAY EXAM OF ELBOW: CPT | Mod: TC,RT | Performed by: INTERNAL MEDICINE

## 2020-08-27 PROCEDURE — 99214 OFFICE O/P EST MOD 30 MIN: CPT | Performed by: INTERNAL MEDICINE

## 2020-08-27 RX ORDER — TRIAMCINOLONE ACETONIDE 40 MG/ML
40 INJECTION, SUSPENSION INTRA-ARTICULAR; INTRAMUSCULAR ONCE
Status: COMPLETED | OUTPATIENT
Start: 2020-08-27 | End: 2020-08-27

## 2020-08-27 RX ADMIN — TRIAMCINOLONE ACETONIDE 40 MG: 40 INJECTION, SUSPENSION INTRA-ARTICULAR; INTRAMUSCULAR at 17:57

## 2020-08-27 ASSESSMENT — FIBROSIS 4 INDEX: FIB4 SCORE: 0.43

## 2020-08-28 NOTE — PROGRESS NOTES
Established Patient    Chief Complaint   Patient presents with   • Follow-Up     right elbow pain        Subjective:     HPI:   Priscilla presents today with the following.    1. 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 Medial epicondylitis of elbow, right  -Over a month, worsening, especially with activities that are related to resistant twisting to the elbows especially internal rotation including turning on the car, holding the baby up, other activities related to using right upper extremity  -Associated with tenderness of the medial epicondyles, as well as tingling and numbness of the thenar aspect of the right palm especially at night and when she wakes up, mention she had a history of carpal tunnel syndrome 10 years ago when she was pregnant  - denied having trauma or injury, mention associated with    Patient Active Problem List    Diagnosis Date Noted   • Chronic rhinosinusitis 08/27/2020   • Medial epicondylitis of elbow, right 08/27/2020   • LIDYA (obstructive sleep apnea) 09/25/2019   • Coronary artery disease due to calcified coronary lesion: History of small diagonal occlusion without other significant disease. 07/31/2019   • Benign paroxysmal positional vertigo of right ear 07/31/2019   • Elevated alkaline phosphatase level 06/18/2019   • Prediabetes 06/18/2019   • Benign essential HTN 05/15/2019   • Vitamin D deficiency 05/15/2019   • Hypercholesteremia 05/15/2019   • Hypothyroid 05/15/2019   • GERD (gastroesophageal reflux disease) 05/15/2019   • History of myocardial infarction: On 5 occasions and with a segmental wall motion abnormality but possibly secondary to vasospasm  "05/15/2019   • Psoriasis 05/15/2019   • Hot flashes 05/15/2019   • History of total thyroidectomy 05/15/2019       Current Outpatient Medications on File Prior to Visit   Medication Sig Dispense Refill   • SYNTHROID 200 MCG Tab TAKE 1 TABLET EVERY MORNINGON AN EMPTY STOMACH 90 Tab 3   • diltiazem (DILACOR XR) 180 MG XR capsule Take 1 Cap by mouth every day. 90 Cap 3   • carvedilol (COREG) 6.25 MG Tab Take 1 Tab by mouth 2 times a day, with meals. 180 Tab 3   • atorvastatin (LIPITOR) 80 MG tablet Take 1 Tab by mouth every evening. 90 Tab 3   • FLUoxetine (PROZAC) 10 MG Cap Take 1 Cap by mouth every day. 90 Cap 3   • omeprazole (PRILOSEC) 40 MG delayed-release capsule Take 1 Cap by mouth every day. 90 Cap 3   • nitroGLYCERIN (NITROSTAT) 0.3 MG SL tablet Place 1 Tab under tongue as needed for Chest Pain. 25 Tab 3   • clobetasol (TEMOVATE) 0.05 % Ointment Apply 1 Application to affected area(s) 2 times a day. 60 g 1   • clonazePAM (KLONOPIN) 0.5 MG Tab Take 0.5 mg by mouth as needed.     • aspirin (ASA) 81 MG Chew Tab chewable tablet Take 81 mg by mouth every day.       No current facility-administered medications on file prior to visit.        Allergies, past medical history, past surgical history, family history, social history reviewed and updated    ROS:  All other systems reviewed and are negative except as stated in the HPI     Physical Exam:     /72 (BP Location: Left arm, Patient Position: Sitting, BP Cuff Size: Adult)   Pulse 82   Temp 36.8 °C (98.3 °F) (Temporal)   Ht 1.626 m (5' 4\")   Wt 97.5 kg (214 lb 14.4 oz)   SpO2 94%   BMI 36.89 kg/m²   General: Normal appearing. No distress.  ENT: oropharynx without exudates.    Eyes: conjunctiva clear lids without ptosis  Pulmonary: Clear to ausculation.  Normal effort.   Cardiovascular: Regular rate and rhythm  Abdomen: Soft, nontender,  Lymph: No cervical or supraclavicular palpable lymph nodes  Psych: Normal mood and affect.   Extremities: Left upper " extremity with no tenderness or limitation of range of movement  - Right elbow, medial epicondyles aspect, tenderness to palpation, tenderness to resistant movement especially internal rotation    I have reviewed pertinent labs and diagnostic tests associated with this visit with specific comments listed under the assessment and plan below      Assessment and Plan:     59 y.o. female with the following issues.    1. Chronic sinusitis, unspecified location  - triamcinolone acetonide (KENALOG-40) injection 40 mg    2. Medial epicondylitis of elbow, right  - REFERRAL TO PHYSICAL THERAPY Reason for Therapy: Eval/Treat/Report  - DX-ELBOW-COMPLETE 3+ RIGHT; Future>> follow-up with any bony or pathological lesion/pressure on the ulnar nerve at the level of the elbow joint  - Advised other conservative management including application of cold packs, massage, wrist brace use for potential carpal tunnel  -Advised to follow-up if worsening of symptoms    Follow Up:      Return in about 3 months (around 11/27/2020) for follow up.  With Dr. Stewart    Please note that this dictation was created using voice recognition software. I have made every reasonable attempt to correct obvious errors, but I expect that there are errors of grammar and possibly content that I did not discover before finalizing the note.    Signed by: Shonna Kilpatrick M.D.

## 2020-09-03 ENCOUNTER — HOSPITAL ENCOUNTER (OUTPATIENT)
Dept: LAB | Facility: MEDICAL CENTER | Age: 60
End: 2020-09-03
Attending: INTERNAL MEDICINE
Payer: COMMERCIAL

## 2020-09-03 DIAGNOSIS — E78.00 HYPERCHOLESTEREMIA: ICD-10-CM

## 2020-09-03 LAB
ALBUMIN SERPL BCP-MCNC: 4.5 G/DL (ref 3.2–4.9)
ALBUMIN/GLOB SERPL: 1.6 G/DL
ALP SERPL-CCNC: 160 U/L (ref 30–99)
ALT SERPL-CCNC: 18 U/L (ref 2–50)
ANION GAP SERPL CALC-SCNC: 12 MMOL/L (ref 7–16)
AST SERPL-CCNC: 13 U/L (ref 12–45)
BILIRUB SERPL-MCNC: 0.7 MG/DL (ref 0.1–1.5)
BUN SERPL-MCNC: 17 MG/DL (ref 8–22)
CALCIUM SERPL-MCNC: 9.5 MG/DL (ref 8.5–10.5)
CHLORIDE SERPL-SCNC: 103 MMOL/L (ref 96–112)
CHOLEST SERPL-MCNC: 140 MG/DL (ref 100–199)
CO2 SERPL-SCNC: 27 MMOL/L (ref 20–33)
CREAT SERPL-MCNC: 1.02 MG/DL (ref 0.5–1.4)
FASTING STATUS PATIENT QL REPORTED: NORMAL
GLOBULIN SER CALC-MCNC: 2.9 G/DL (ref 1.9–3.5)
GLUCOSE SERPL-MCNC: 90 MG/DL (ref 65–99)
HDLC SERPL-MCNC: 45 MG/DL
LDLC SERPL CALC-MCNC: 72 MG/DL
POTASSIUM SERPL-SCNC: 4.1 MMOL/L (ref 3.6–5.5)
PROT SERPL-MCNC: 7.4 G/DL (ref 6–8.2)
SODIUM SERPL-SCNC: 142 MMOL/L (ref 135–145)
TRIGL SERPL-MCNC: 115 MG/DL (ref 0–149)

## 2020-09-03 PROCEDURE — 36415 COLL VENOUS BLD VENIPUNCTURE: CPT

## 2020-09-03 PROCEDURE — 80061 LIPID PANEL: CPT

## 2020-09-03 PROCEDURE — 80053 COMPREHEN METABOLIC PANEL: CPT

## 2020-09-10 ENCOUNTER — APPOINTMENT (OUTPATIENT)
Dept: PHYSICAL THERAPY | Facility: REHABILITATION | Age: 60
End: 2020-09-10
Attending: INTERNAL MEDICINE
Payer: COMMERCIAL

## 2020-10-14 ENCOUNTER — TELEPHONE (OUTPATIENT)
Dept: MEDICAL GROUP | Facility: PHYSICIAN GROUP | Age: 60
End: 2020-10-14

## 2020-10-14 DIAGNOSIS — Z20.822 EXPOSURE TO COVID-19 VIRUS: ICD-10-CM

## 2020-10-14 NOTE — TELEPHONE ENCOUNTER
Patient may have been exposed to covid during a dental appointment. She had an appointment with you Friday but I rescheduled her to Tuesday. Can you order a covid test for her to get before coming in? Thank you.

## 2020-10-15 ENCOUNTER — HOSPITAL ENCOUNTER (OUTPATIENT)
Dept: LAB | Facility: MEDICAL CENTER | Age: 60
End: 2020-10-15
Attending: FAMILY MEDICINE
Payer: COMMERCIAL

## 2020-10-15 DIAGNOSIS — Z20.822 EXPOSURE TO COVID-19 VIRUS: ICD-10-CM

## 2020-10-15 LAB — COVID ORDER STATUS COVID19: NORMAL

## 2020-10-15 PROCEDURE — C9803 HOPD COVID-19 SPEC COLLECT: HCPCS

## 2020-10-15 PROCEDURE — U0003 INFECTIOUS AGENT DETECTION BY NUCLEIC ACID (DNA OR RNA); SEVERE ACUTE RESPIRATORY SYNDROME CORONAVIRUS 2 (SARS-COV-2) (CORONAVIRUS DISEASE [COVID-19]), AMPLIFIED PROBE TECHNIQUE, MAKING USE OF HIGH THROUGHPUT TECHNOLOGIES AS DESCRIBED BY CMS-2020-01-R: HCPCS

## 2020-10-16 LAB
SARS-COV-2 RNA RESP QL NAA+PROBE: NOTDETECTED
SPECIMEN SOURCE: NORMAL

## 2020-10-20 ENCOUNTER — OFFICE VISIT (OUTPATIENT)
Dept: MEDICAL GROUP | Facility: PHYSICIAN GROUP | Age: 60
End: 2020-10-20
Payer: COMMERCIAL

## 2020-10-20 VITALS
HEART RATE: 69 BPM | BODY MASS INDEX: 35.68 KG/M2 | TEMPERATURE: 98.9 F | SYSTOLIC BLOOD PRESSURE: 122 MMHG | DIASTOLIC BLOOD PRESSURE: 60 MMHG | HEIGHT: 64 IN | OXYGEN SATURATION: 96 % | WEIGHT: 209 LBS

## 2020-10-20 DIAGNOSIS — K76.0 FATTY LIVER DISEASE, NONALCOHOLIC: ICD-10-CM

## 2020-10-20 DIAGNOSIS — Z23 NEED FOR VACCINATION: ICD-10-CM

## 2020-10-20 DIAGNOSIS — R74.8 ELEVATED ALKALINE PHOSPHATASE LEVEL: ICD-10-CM

## 2020-10-20 PROCEDURE — 90750 HZV VACC RECOMBINANT IM: CPT | Performed by: FAMILY MEDICINE

## 2020-10-20 PROCEDURE — 90686 IIV4 VACC NO PRSV 0.5 ML IM: CPT | Performed by: FAMILY MEDICINE

## 2020-10-20 PROCEDURE — 90471 IMMUNIZATION ADMIN: CPT | Performed by: FAMILY MEDICINE

## 2020-10-20 PROCEDURE — 90472 IMMUNIZATION ADMIN EACH ADD: CPT | Performed by: FAMILY MEDICINE

## 2020-10-20 PROCEDURE — 99214 OFFICE O/P EST MOD 30 MIN: CPT | Mod: 25 | Performed by: FAMILY MEDICINE

## 2020-10-20 ASSESSMENT — FIBROSIS 4 INDEX: FIB4 SCORE: 0.41

## 2020-10-20 NOTE — PATIENT INSTRUCTIONS
Nonalcoholic Fatty Liver Disease Diet, Adult  Nonalcoholic fatty liver disease is a condition that causes fat to build up in and around the liver. The disease makes it harder for the liver to work the way that it should. Following a healthy diet can help to keep nonalcoholic fatty liver disease under control. It can also help to prevent or improve conditions that are associated with the disease, such as heart disease, diabetes, high blood pressure, and abnormal cholesterol levels.  Along with regular exercise, this diet:  · Promotes weight loss.  · Helps to control blood sugar levels.  · Helps to improve the way that the body uses insulin.  What are tips for following this plan?  Reading food labels  Always check food labels for:  · The amount of saturated fat in a food. You should limit your intake of saturated fat. Saturated fat is found in foods that come from animals, including meat and dairy products such as butter, cheese, and whole milk.  · The amount of fiber in a food. You should choose high-fiber foods such as fruits, vegetables, and whole grains. Try to get 25-30 grams (g) of fiber a day.    Cooking  · When cooking, use heart-healthy oils that are high in monounsaturated fats. These include olive oil, canola oil, and avocado oil.  · Limit frying or deep-frying foods. Cook foods using healthy methods such as baking, boiling, steaming, and grilling instead.  Meal planning  · You may want to keep track of how many calories you take in. Eating the right amount of calories will help you achieve a healthy weight. Meeting with a registered dietitian can help you get started.  · Limit how often you eat takeout and fast food. These foods are usually very high in fat, salt, and sugar.  · Use the glycemic index (GI) to plan your meals. The index tells you how quickly a food will raise your blood sugar. Choose low-GI foods (GI less than 55). These foods take a longer time to raise blood sugar. A registered dietitian  can help you identify foods lower on the GI scale.  Lifestyle  · You may want to follow a Mediterranean diet. This diet includes a lot of vegetables, lean meats or fish, whole grains, fruits, and healthy oils and fats.  What foods can I eat?    Fruits  Bananas. Apples. Oranges. Grapes. Papaya. [place]. Pomegranate. Kiwi. Grapefruit. Cherries.  Vegetables  Lettuce. Spinach. Peas. Beets. Cauliflower. Cabbage. Broccoli. Carrots. Tomatoes. Squash. Eggplant. Herbs. Peppers. Onions. Cucumbers. [place] sprouts. Yams and sweet potatoes. Beans. Lentils.  Grains  Whole wheat or whole-grain foods, including breads, crackers, cereals, and pasta. Stone-ground whole wheat. Unsweetened oatmeal. Bulgur. Barley. Quinoa. Brown or wild rice. Corn or whole wheat flour tortillas.  Meats and other proteins  Lean meats. Poultry. Tofu. Seafood and shellfish.  Dairy  Low-fat or fat-free dairy products, such as yogurt, cottage cheese, or cheese.  Beverages  Water. Sugar-free drinks. Tea. Coffee. Low-fat or skim milk. Milk alternatives, such as soy or almond milk. Real fruit juice.  Fats and oils  Avocado. Canola or olive oil. Nuts and nut butters. Seeds.  Seasonings and condiments  Mustard. Relish. Low-fat, low-sugar ketchup and barbecue sauce. Low-fat or fat-free mayonnaise.  Sweets and desserts  Sugar-free sweets.  The items listed above may not be a complete list of foods and beverages you can eat. Contact a dietitian for more information.  What foods should I limit or avoid?  Meats and other proteins  Limit red meat to 1-2 times a week.  Dairy  Full-fat dairy.  Fats and oils  Palm oil and coconut oil. Fried foods.  Other foods  Processed foods. Foods that contain a lot of salt or sodium.  Sweets and desserts  Sweets that contain sugar.  Beverages  Sweetened drinks, such as sweet tea, milkshakes, iced sweet drinks, and sodas. Alcohol.  The items listed above may not be a complete list of foods and beverages you should avoid. Contact a  dietitian for more information.  Where to find more information  The National [place] of Diabetes and Digestive and Kidney Diseases: niddk.nih.gov  Summary  · Nonalcoholic fatty liver disease is a condition that causes fat to build up in and around the liver.  · Following a healthy diet can help to keep nonalcoholic fatty liver disease under control. Your diet should be rich in fruits, vegetables, whole grains, and lean proteins.  · Limit your intake of saturated fat. Saturated fat is found in foods that come from animals, including meat and dairy products such as butter, cheese, and whole milk.  · This diet promotes weight loss, helps to control blood sugar levels, and helps to improve the way that the body uses insulin.  This information is not intended to replace advice given to you by your health care provider. Make sure you discuss any questions you have with your health care provider.  Document Released: 05/03/2016 Document Revised: 04/10/2020 Document Reviewed: 01/09/2020  Elsevier Patient Education © 2020 [employer]

## 2020-10-20 NOTE — PROGRESS NOTES
CC: Fatty liver disease    HISTORY OF THE PRESENT ILLNESS: Patient is a 59 y.o. female. This pleasant patient is here today to discuss the following issue.    Patient is here today to discuss her elevated alkaline phosphatase and recent diagnosis of fatty liver disease per ultrasound.  Patient would like to discuss this diagnosis, she is unsure what this means and what the next steps are.    Patient has had steadily rising alkaline phosphatase over the past several years.  Liver ultrasound this past summer in July consistent with fatty liver disease.  Patient is a nondrinker.  She does endorse very low amounts of physical activity and diet high in sugar and saturated fats.  She is certainly open to changing her diet.  She denies any issues such as right upper quadrant pain, nausea, jaundice.    Allergies: Inapsine [droperidol]    Current Outpatient Medications Ordered in Epic   Medication Sig Dispense Refill   • SYNTHROID 200 MCG Tab TAKE 1 TABLET EVERY MORNINGON AN EMPTY STOMACH 90 Tab 3   • diltiazem (DILACOR XR) 180 MG XR capsule Take 1 Cap by mouth every day. 90 Cap 3   • carvedilol (COREG) 6.25 MG Tab Take 1 Tab by mouth 2 times a day, with meals. 180 Tab 3   • atorvastatin (LIPITOR) 80 MG tablet Take 1 Tab by mouth every evening. 90 Tab 3   • FLUoxetine (PROZAC) 10 MG Cap Take 1 Cap by mouth every day. 90 Cap 3   • omeprazole (PRILOSEC) 40 MG delayed-release capsule Take 1 Cap by mouth every day. 90 Cap 3   • nitroGLYCERIN (NITROSTAT) 0.3 MG SL tablet Place 1 Tab under tongue as needed for Chest Pain. 25 Tab 3   • clobetasol (TEMOVATE) 0.05 % Ointment Apply 1 Application to affected area(s) 2 times a day. 60 g 1   • clonazePAM (KLONOPIN) 0.5 MG Tab Take 0.5 mg by mouth as needed.     • aspirin (ASA) 81 MG Chew Tab chewable tablet Take 81 mg by mouth every day.       No current [facility]-ordered facility-administered medications on file.        Past Medical History:   Diagnosis Date   • GERD (gastroesophageal reflux  "disease)    • Heart attack (HCC)    • History of myocardial infarction    • Hyperlipidemia    • Hypertension    • Thyroid disease        Past Surgical History:   Procedure Laterality Date   • CHOLECYSTECTOMY     • THYROIDECTOMY TOTAL         Social History     Tobacco Use   • Smoking status: Never Smoker   • Smokeless tobacco: Never Used   Substance Use Topics   • Alcohol use: Yes     Alcohol/week: 1.2 oz     Types: 2 Standard drinks or equivalent per week     Comment: 1 drink weekly   • Drug use: No       Social History     Social History Narrative   • Not on file       Family History   Problem Relation Age of Onset   • Alzheimer's Disease Mother    • Heart Disease Father 70        CAD       ROS:     - Constitutional: Negative for fever, chills, unexpected weight change, and fatigue/generalized weakness.     - Respiratory: Negative for cough, sputum production, chest congestion, dyspnea, wheezing, and crackles.      - Cardiovascular: Negative for chest pain, palpitations, orthopnea, PND, and bilateral lower extremity edema.       Labs: Labs reviewed from September 3, 2020.    Exam: /60 (BP Location: Left arm, Patient Position: Sitting, BP Cuff Size: Large adult)   Pulse 69   Temp 37.2 °C (98.9 °F) (Temporal)   Ht 1.626 m (5' 4\")   Wt 94.8 kg (209 lb)   SpO2 96%  Body mass index is 35.87 kg/m².    General: Well appearing, NAD  Pulmonary: Clear to ausculation.  Normal effort. No rales, ronchi, or wheezing.  Cardiovascular: Regular rate and rhythm without murmur, rubs or gallop.   Skin: Warm and dry.  No obvious lesions.  Musculoskeletal:  No extremity cyanosis, clubbing, or edema.  Psych: Normal mood and affect. Alert and oriented. Judgment and insight is normal.    Please note that this dictation was created using voice recognition software. I have made every reasonable attempt to correct obvious errors, but I expect that there are errors of grammar and possibly content that I did not discover before " finalizing the note.      Assessment/Plan  Priscilla was seen today for lab results.    Diagnoses and all orders for this visit:    Need for vaccination  -     Influenza Vaccine Quad Injection (PF)  -     Shingles Vaccine (SHINGRIX)    Elevated alkaline phosphatase level  Fatty liver disease, nonalcoholic  These are newly diagnosed  problems for the patient.  We had a very lengthy discussion today about appropriate lifestyle changes for fatty liver disease. We will continue to monitor her liver enzymes.    Follow-up in about 6 months or sooner if needed.    Jagruti Stewart DO  [facility]

## 2020-11-23 ENCOUNTER — APPOINTMENT (OUTPATIENT)
Dept: RADIOLOGY | Facility: MEDICAL CENTER | Age: 60
End: 2020-11-23
Attending: EMERGENCY MEDICINE
Payer: COMMERCIAL

## 2020-11-23 ENCOUNTER — HOSPITAL ENCOUNTER (EMERGENCY)
Facility: MEDICAL CENTER | Age: 60
End: 2020-11-23
Attending: EMERGENCY MEDICINE
Payer: COMMERCIAL

## 2020-11-23 ENCOUNTER — OFFICE VISIT (OUTPATIENT)
Dept: URGENT CARE | Facility: PHYSICIAN GROUP | Age: 60
End: 2020-11-23
Payer: COMMERCIAL

## 2020-11-23 VITALS
HEART RATE: 123 BPM | WEIGHT: 210 LBS | OXYGEN SATURATION: 92 % | DIASTOLIC BLOOD PRESSURE: 82 MMHG | HEIGHT: 65 IN | TEMPERATURE: 100.5 F | BODY MASS INDEX: 34.99 KG/M2 | SYSTOLIC BLOOD PRESSURE: 124 MMHG

## 2020-11-23 VITALS
HEIGHT: 65 IN | RESPIRATION RATE: 24 BRPM | BODY MASS INDEX: 34.99 KG/M2 | SYSTOLIC BLOOD PRESSURE: 118 MMHG | OXYGEN SATURATION: 97 % | HEART RATE: 93 BPM | WEIGHT: 210 LBS | DIASTOLIC BLOOD PRESSURE: 57 MMHG | TEMPERATURE: 99.7 F

## 2020-11-23 DIAGNOSIS — J02.0 STREP PHARYNGITIS: ICD-10-CM

## 2020-11-23 DIAGNOSIS — B34.2 CORONAVIRUS INFECTION: ICD-10-CM

## 2020-11-23 DIAGNOSIS — R09.02 HYPOXIA: ICD-10-CM

## 2020-11-23 DIAGNOSIS — R53.83 MALAISE AND FATIGUE: ICD-10-CM

## 2020-11-23 DIAGNOSIS — R56.9 WITNESSED SEIZURE-LIKE ACTIVITY (HCC): Primary | ICD-10-CM

## 2020-11-23 DIAGNOSIS — R05.9 COUGH: ICD-10-CM

## 2020-11-23 DIAGNOSIS — Z20.822 SUSPECTED COVID-19 VIRUS INFECTION: ICD-10-CM

## 2020-11-23 DIAGNOSIS — R52 GENERALIZED BODY ACHES: ICD-10-CM

## 2020-11-23 DIAGNOSIS — R53.81 MALAISE AND FATIGUE: ICD-10-CM

## 2020-11-23 DIAGNOSIS — R55 SYNCOPE, UNSPECIFIED SYNCOPE TYPE: ICD-10-CM

## 2020-11-23 LAB
ALBUMIN SERPL BCP-MCNC: 3.8 G/DL (ref 3.2–4.9)
ALBUMIN/GLOB SERPL: 1.1 G/DL
ALP SERPL-CCNC: 139 U/L (ref 30–99)
ALT SERPL-CCNC: 20 U/L (ref 2–50)
ANION GAP SERPL CALC-SCNC: 12 MMOL/L (ref 7–16)
AST SERPL-CCNC: 17 U/L (ref 12–45)
BASOPHILS # BLD AUTO: 0.4 % (ref 0–1.8)
BASOPHILS # BLD: 0.04 K/UL (ref 0–0.12)
BILIRUB SERPL-MCNC: 0.4 MG/DL (ref 0.1–1.5)
BUN SERPL-MCNC: 17 MG/DL (ref 8–22)
CALCIUM SERPL-MCNC: 9 MG/DL (ref 8.5–10.5)
CHLORIDE SERPL-SCNC: 100 MMOL/L (ref 96–112)
CO2 SERPL-SCNC: 21 MMOL/L (ref 20–33)
COVID ORDER STATUS COVID19: NORMAL
CREAT SERPL-MCNC: 1.03 MG/DL (ref 0.5–1.4)
EKG IMPRESSION: NORMAL
EOSINOPHIL # BLD AUTO: 0 K/UL (ref 0–0.51)
EOSINOPHIL NFR BLD: 0 % (ref 0–6.9)
ERYTHROCYTE [DISTWIDTH] IN BLOOD BY AUTOMATED COUNT: 43.4 FL (ref 35.9–50)
FLUAV RNA SPEC QL NAA+PROBE: NEGATIVE
FLUBV RNA SPEC QL NAA+PROBE: NEGATIVE
GLOBULIN SER CALC-MCNC: 3.4 G/DL (ref 1.9–3.5)
GLUCOSE SERPL-MCNC: 118 MG/DL (ref 65–99)
HCT VFR BLD AUTO: 47.4 % (ref 37–47)
HGB BLD-MCNC: 15.5 G/DL (ref 12–16)
IMM GRANULOCYTES # BLD AUTO: 0.04 K/UL (ref 0–0.11)
IMM GRANULOCYTES NFR BLD AUTO: 0.4 % (ref 0–0.9)
INT CON NEG: NEGATIVE
INT CON POS: POSITIVE
LIPASE SERPL-CCNC: 48 U/L (ref 11–82)
LYMPHOCYTES # BLD AUTO: 2.75 K/UL (ref 1–4.8)
LYMPHOCYTES NFR BLD: 26.2 % (ref 22–41)
MCH RBC QN AUTO: 30.5 PG (ref 27–33)
MCHC RBC AUTO-ENTMCNC: 32.7 G/DL (ref 33.6–35)
MCV RBC AUTO: 93.1 FL (ref 81.4–97.8)
MONOCYTES # BLD AUTO: 0.81 K/UL (ref 0–0.85)
MONOCYTES NFR BLD AUTO: 7.7 % (ref 0–13.4)
NEUTROPHILS # BLD AUTO: 6.84 K/UL (ref 2–7.15)
NEUTROPHILS NFR BLD: 65.3 % (ref 44–72)
NRBC # BLD AUTO: 0 K/UL
NRBC BLD-RTO: 0 /100 WBC
PLATELET # BLD AUTO: 293 K/UL (ref 164–446)
PMV BLD AUTO: 10.2 FL (ref 9–12.9)
POTASSIUM SERPL-SCNC: 3.6 MMOL/L (ref 3.6–5.5)
PROT SERPL-MCNC: 7.2 G/DL (ref 6–8.2)
RBC # BLD AUTO: 5.09 M/UL (ref 4.2–5.4)
RSV RNA SPEC QL NAA+PROBE: NEGATIVE
S PYO AG THROAT QL: POSITIVE
SARS-COV-2 RNA RESP QL NAA+PROBE: DETECTED
SODIUM SERPL-SCNC: 133 MMOL/L (ref 135–145)
SPECIMEN SOURCE: ABNORMAL
TROPONIN T SERPL-MCNC: 7 NG/L (ref 6–19)
WBC # BLD AUTO: 10.5 K/UL (ref 4.8–10.8)

## 2020-11-23 PROCEDURE — 84484 ASSAY OF TROPONIN QUANT: CPT

## 2020-11-23 PROCEDURE — 71045 X-RAY EXAM CHEST 1 VIEW: CPT

## 2020-11-23 PROCEDURE — 99285 EMERGENCY DEPT VISIT HI MDM: CPT

## 2020-11-23 PROCEDURE — 80053 COMPREHEN METABOLIC PANEL: CPT

## 2020-11-23 PROCEDURE — 70450 CT HEAD/BRAIN W/O DYE: CPT

## 2020-11-23 PROCEDURE — 93005 ELECTROCARDIOGRAM TRACING: CPT | Performed by: EMERGENCY MEDICINE

## 2020-11-23 PROCEDURE — 85025 COMPLETE CBC W/AUTO DIFF WBC: CPT

## 2020-11-23 PROCEDURE — 0241U HCHG SARS-COV-2 COVID-19 NFCT DS RESP RNA 4 TRGT MIC: CPT

## 2020-11-23 PROCEDURE — 99214 OFFICE O/P EST MOD 30 MIN: CPT | Mod: CS | Performed by: NURSE PRACTITIONER

## 2020-11-23 PROCEDURE — 83690 ASSAY OF LIPASE: CPT

## 2020-11-23 PROCEDURE — 87880 STREP A ASSAY W/OPTIC: CPT | Performed by: NURSE PRACTITIONER

## 2020-11-23 RX ORDER — PANTOPRAZOLE SODIUM 40 MG/1
40 TABLET, DELAYED RELEASE ORAL EVERY MORNING
COMMUNITY
End: 2021-04-26

## 2020-11-23 RX ORDER — AMLODIPINE BESYLATE 5 MG/1
5 TABLET ORAL
Status: ON HOLD | COMMUNITY
End: 2020-11-30

## 2020-11-23 ASSESSMENT — ENCOUNTER SYMPTOMS
SENSORY CHANGE: 0
CLAUDICATION: 0
PALPITATIONS: 0
BACK PAIN: 0
HEADACHES: 0
EYE PAIN: 0
NAUSEA: 0
NERVOUS/ANXIOUS: 0
CONSTIPATION: 0
FEVER: 1
SHORTNESS OF BREATH: 1
COUGH: 1
DIZZINESS: 0
ABDOMINAL PAIN: 0
FOCAL WEAKNESS: 0
WHEEZING: 0
DEPRESSION: 0
VOMITING: 0
NECK PAIN: 0
DIAPHORESIS: 0
CHILLS: 1
MYALGIAS: 1
DIARRHEA: 0
SORE THROAT: 1
HEMOPTYSIS: 0

## 2020-11-23 ASSESSMENT — FIBROSIS 4 INDEX
FIB4 SCORE: 0.41
FIB4 SCORE: 0.41

## 2020-11-24 NOTE — ED TRIAGE NOTES
Pt comes in via ems from urgent care. Pt was being tested for flu/covid/strep when she had a sudden onset of nausea. Per ems, health care providers stated she had a witnessed seizure. Pt does not recall what had happened. Pt denies hx of seizures.

## 2020-11-24 NOTE — PROGRESS NOTES
"Subjective:      Magda Childress is a 59 y.o. female who presents with Cough (sx started happening friday night, body aches, cough, fatigue, lost of appetite, SOB,  headache  )        Patient came to  with complaint of sore throat, body aches, SOB, headaches x 3 days. Upon initial examination patient quickly became disoriented and had tonic-clonic seizure like activity witnessed by myself. +loss of urine. + emesis. Episode lasted approximately 1 minute. Oxygen dropped to 91 %.  Patient placed on side and 2 liters o2 provided. Patient appeared diaphoretic and pale. Patient came too and denies hx of seizures. She was easily reoriented after episode but reporting abdominal pain/cramping.         Review of Systems   Constitutional: Positive for chills, fever and malaise/fatigue. Negative for diaphoresis.   HENT: Positive for sore throat. Negative for congestion and ear pain.    Eyes: Negative for pain.   Respiratory: Positive for cough and shortness of breath. Negative for hemoptysis and wheezing.    Cardiovascular: Negative for chest pain, palpitations, claudication and leg swelling.   Gastrointestinal: Negative for abdominal pain, constipation, diarrhea, nausea and vomiting.   Genitourinary: Negative for dysuria.   Musculoskeletal: Positive for myalgias. Negative for back pain, joint pain and neck pain.   Skin: Negative for rash.   Neurological: Negative for dizziness, sensory change, focal weakness and headaches.   Psychiatric/Behavioral: Negative for depression. The patient is not nervous/anxious.           Objective:     /82 (BP Location: Left arm, Patient Position: Sitting, BP Cuff Size: Adult)   Pulse (!) 123   Temp (!) 38.1 °C (100.5 °F) (Temporal)   Ht 1.651 m (5' 5\")   Wt 95.3 kg (210 lb)   SpO2 92%   BMI 34.95 kg/m²      Physical Exam  Constitutional:       General: She is not in acute distress.     Appearance: She is ill-appearing, toxic-appearing and diaphoretic.      Comments: Patient is " not speaking in full sentences   HENT:      Mouth/Throat:      Pharynx: Posterior oropharyngeal erythema present.   Eyes:      Extraocular Movements: Extraocular movements intact.      Conjunctiva/sclera: Conjunctivae normal.      Pupils: Pupils are equal, round, and reactive to light.   Neck:      Musculoskeletal: No neck rigidity or muscular tenderness.   Cardiovascular:      Rate and Rhythm: Tachycardia present.   Pulmonary:      Breath sounds: Normal breath sounds. No wheezing, rhonchi or rales.      Comments: Increased WOB  Abdominal:      Tenderness: There is abdominal tenderness.   Lymphadenopathy:      Cervical: No cervical adenopathy.   Skin:     Capillary Refill: Capillary refill takes less than 2 seconds.      Coloration: Skin is pale.                 Assessment/Plan:          1. Witnessed seizure-like activity (HCC)     2. Cough  CANCELED: COVID/SARS COV-2 PCR   3. Generalized body aches  CANCELED: COVID/SARS COV-2 PCR   4. Malaise and fatigue  CANCELED: COVID/SARS COV-2 PCR   5. Suspected COVID-19 virus infection  CANCELED: COVID/SARS COV-2 PCR   6. Strep pharyngitis  POCT Rapid Strep A    CANCELED: COVID/SARS COV-2 PCR   7. Hypoxia         Positive Rapid strep obtained prior to provider examination.       At this time, I feel the patient requires a higher level of care including closer monitoring, stat lab work and/or imaging for further evaluation for provider witnessed seizure like activity with no prior hx of seizures and hypoxia with high suspicion for Covid 19. Patient was in agreement with this plan.  Patient was placed on 2l oxygen and transported via [facility] to ED in stable condition.

## 2020-11-24 NOTE — ED NOTES
Pt provided with written and verbal discharge instructions. Instructed to quarantine and closely monitor symptoms. Pt endorses improvement. VSS. Resps even and unlabored. Ambulated with a steady gait and exited the ED without incident.

## 2020-11-24 NOTE — ED PROVIDER NOTES
ED Provider  Scribed for Vipul Neff D.O. by Marylu Fierro. 11/23/2020  6:32 PM    Means of arrival:EMS  History obtained from:Patient  History limited by: None    CHIEF COMPLAINT  Chief Complaint   Patient presents with   • Seizure       HPI  Priscilla Childress is a 59 y.o. female who presents via EMS following a syncopal episode prior to arrival. Patient was at Urgent Care and receiving a COVID test before the syncopal event. Patient reports nausea, vomiting, chest tightness. She reports losing control of her bladder secondary to vomiting. Patient denies any headache, weakness, tingling, fever, cough, sore throat, or biting her tongue. She has a history of prior MI.     REVIEW OF SYSTEMS  See HPI for further details. All other systems are negative.     PAST MEDICAL HISTORY   has a past medical history of GERD (gastroesophageal reflux disease), Heart attack (HCC), History of myocardial infarction, Hyperlipidemia, Hypertension, and Thyroid disease.    SOCIAL HISTORY  Social History     Tobacco Use   • Smoking status: Never Smoker   • Smokeless tobacco: Never Used   Substance and Sexual Activity   • Alcohol use: Not Currently     Alcohol/week: 1.2 oz     Types: 2 Standard drinks or equivalent per week     Comment: 1 drink weekly   • Drug use: No   • Sexual activity: Not reported       SURGICAL HISTORY   has a past surgical history that includes thyroidectomy total and cholecystectomy.    CURRENT MEDICATIONS  Current Outpatient Medications:   •  SYNTHROID 200 MCG Tab, TAKE 1 TABLET EVERY MORNINGON AN EMPTY STOMACH, Disp: 90 Tab, Rfl: 3  •  diltiazem (DILACOR XR) 180 MG XR capsule, Take 1 Cap by mouth every day., Disp: 90 Cap, Rfl: 3  •  carvedilol (COREG) 6.25 MG Tab, Take 1 Tab by mouth 2 times a day, with meals., Disp: 180 Tab, Rfl: 3  •  atorvastatin (LIPITOR) 80 MG tablet, Take 1 Tab by mouth every evening., Disp: 90 Tab, Rfl: 3  •  FLUoxetine (PROZAC) 10 MG Cap, Take 1 Cap by mouth every day., Disp: 90 Cap,  "Rfl: 3  •  omeprazole (PRILOSEC) 40 MG delayed-release capsule, Take 1 Cap by mouth every day., Disp: 90 Cap, Rfl: 3  •  nitroGLYCERIN (NITROSTAT) 0.3 MG SL tablet, Place 1 Tab under tongue as needed for Chest Pain., Disp: 25 Tab, Rfl: 3  •  clobetasol (TEMOVATE) 0.05 % Ointment, Apply 1 Application to affected area(s) 2 times a day., Disp: 60 g, Rfl: 1  •  clonazePAM (KLONOPIN) 0.5 MG Tab, Take 0.5 mg by mouth as needed., Disp: , Rfl:   •  aspirin (ASA) 81 MG Chew Tab chewable tablet, Take 81 mg by mouth every day., Disp: , Rfl:      ALLERGIES  Allergies   Allergen Reactions   • Inapsine [Droperidol] Anaphylaxis       PHYSICAL EXAM  VITAL SIGNS: /67   Pulse (!) 101   Temp 37.1 °C (98.7 °F) (Temporal)   Resp 18   Ht 1.651 m (5' 5\")   Wt 95.3 kg (210 lb)   SpO2 93%   BMI 34.95 kg/m²   Constitutional: Alert in no apparent distress.  HENT: No signs of trauma, mucous membranes are moist  Eyes: Conjunctiva normal, Non-icteric.   Neck: Normal range of motion, No tenderness, Supple.  Lymphatic: No lymphadenopathy noted.   Cardiovascular: Regular rate and rhythm, no murmurs.   Thorax & Lungs: Normal breath sounds, No respiratory distress, No wheezing, No chest tenderness.   Abdomen: Bowel sounds normal, Soft, No tenderness, No masses, No pulsatile masses. No peritoneal signs.  Skin: Warm, Dry, normal color.   Back: No bony tenderness, No CVA tenderness.   Extremities: No edema, No tenderness, No cyanosis  Musculoskeletal: Good range of motion in all major joints. No tenderness to palpation or major deformities noted.   Neurologic: Alert and oriented x4, Normal motor function, Normal sensory function, No focal deficits noted.   Psychiatric: Affect normal, Judgment normal, Mood normal.         DIAGNOSTIC STUDIES / PROCEDURES    EKG  12 Lead EKG interpreted by me shown below.      LABS  Results for orders placed or performed during the hospital encounter of 11/23/20   CBC w/ Differential   Result Value Ref Range "    WBC 10.5 4.8 - 10.8 K/uL    RBC 5.09 4.20 - 5.40 M/uL    Hemoglobin 15.5 12.0 - 16.0 g/dL    Hematocrit 47.4 (H) 37.0 - 47.0 %    MCV 93.1 81.4 - 97.8 fL    MCH 30.5 27.0 - 33.0 pg    MCHC 32.7 (L) 33.6 - 35.0 g/dL    RDW 43.4 35.9 - 50.0 fL    Platelet Count 293 164 - 446 K/uL    MPV 10.2 9.0 - 12.9 fL    Neutrophils-Polys 65.30 44.00 - 72.00 %    Lymphocytes 26.20 22.00 - 41.00 %    Monocytes 7.70 0.00 - 13.40 %    Eosinophils 0.00 0.00 - 6.90 %    Basophils 0.40 0.00 - 1.80 %    Immature Granulocytes 0.40 0.00 - 0.90 %    Nucleated RBC 0.00 /100 WBC    Neutrophils (Absolute) 6.84 2.00 - 7.15 K/uL    Lymphs (Absolute) 2.75 1.00 - 4.80 K/uL    Monos (Absolute) 0.81 0.00 - 0.85 K/uL    Eos (Absolute) 0.00 0.00 - 0.51 K/uL    Baso (Absolute) 0.04 0.00 - 0.12 K/uL    Immature Granulocytes (abs) 0.04 0.00 - 0.11 K/uL    NRBC (Absolute) 0.00 K/uL   Complete Metabolic Panel (CMP)   Result Value Ref Range    Sodium 133 (L) 135 - 145 mmol/L    Potassium 3.6 3.6 - 5.5 mmol/L    Chloride 100 96 - 112 mmol/L    Co2 21 20 - 33 mmol/L    Anion Gap 12.0 7.0 - 16.0    Glucose 118 (H) 65 - 99 mg/dL    Bun 17 8 - 22 mg/dL    Creatinine 1.03 0.50 - 1.40 mg/dL    Calcium 9.0 8.5 - 10.5 mg/dL    AST(SGOT) 17 12 - 45 U/L    ALT(SGPT) 20 2 - 50 U/L    Alkaline Phosphatase 139 (H) 30 - 99 U/L    Total Bilirubin 0.4 0.1 - 1.5 mg/dL    Albumin 3.8 3.2 - 4.9 g/dL    Total Protein 7.2 6.0 - 8.2 g/dL    Globulin 3.4 1.9 - 3.5 g/dL    A-G Ratio 1.1 g/dL   Troponin STAT   Result Value Ref Range    Troponin T 7 6 - 19 ng/L   Lipase   Result Value Ref Range    Lipase 48 11 - 82 U/L   COVID/SARS CoV-2 PCR    Specimen: Nasopharyngeal; Respirate   Result Value Ref Range    COVID Order Status Received    CoV-2, Flu A/B, And RSV by PCR   Result Value Ref Range    Influenza virus A RNA Negative Negative    Influenza virus B, PCR Negative Negative    RSV, PCR Negative Negative    SARS-CoV-2 by PCR DETECTED (AA)     SARS-CoV-2 Source NP Swab     ESTIMATED GFR   Result Value Ref Range    GFR If African American >60 >60 mL/min/1.73 m 2    GFR If Non African American 55 (A) >60 mL/min/1.73 m 2      All labs reviewed by me.    RADIOLOGY  CT-HEAD W/O   Final Result      No acute intracranial abnormality is identified.      6 mm dense lesion at the level of the foramen of Avilez likely represents a colloid cyst.      Paranasal sinus disease bilaterally.         DX-CHEST-PORTABLE (1 VIEW)   Final Result      Nodular opacity projecting over the left lung base could be infectious/inflammatory but a mass is not excluded. Further evaluation with CT chest is recommended.        The radiologist's interpretations of all radiological studies have been reviewed by me.    Films have been independently by me      COURSE  Pertinent Labs & Imaging studies reviewed. (See chart for details)      6:32 PM - Patient seen and examined at bedside. Discussed plan of care. Ordered for DX-chest, CT-head, influenza A/B, COVID, CMP, troponin, and lipase to evaluate her symptoms.    9:09 PM - Patient reevaluated at bedside was explained coronavirus results and plan for discharge     MEDICAL DECISION MAKING  This is a 59 y.o. female who presents with complaints of syncopal episode.  She was at the urgent care for URI symptoms, when they did the swab in the nose it caused her to vomit and vomiting she went unconscious.  Questionable seizure activity, she did not have urinary incontinence, or biting of the tongue.    This is a she had a gag reflex stimulation the cause of vomiting which are caused a vagal reaction and syncope.  There was no reported postictal period and she has no history of seizures.  CT of the head shows no bleed and no tumor.  Her coronavirus test is positive at this time she is experiencing hypoxemia, she is not working hard to breathe her lungs are clear to auscultation she is stable for outpatient follow-up.      The patient will return for new or worsening symptoms  and is stable at the time of discharge.    The patient is referred to a primary physician for blood pressure management, diabetic screening, and for all other preventative health concerns.      DISPOSITION:  Patient will be discharged home in stable condition.    FOLLOW UP:  Jagruti Stewart D.O.  [address]  [facility] [id number] [phone]    In 1 week        FINAL IMPRESSION  1. Syncope, unspecified syncope type    2. Coronavirus infection         IMarylu (Vincent), am scribing for, and in the presence of, Vipul Neff D.O..    Electronically signed by: Marylu Fierro (Vincent), 11/23/2020    IVipul D.O. personally performed the services described in this documentation, as scribed by Marylu Fierro in my presence, and it is both accurate and complete. C    The note accurately reflects work and decisions made by me.  Vipul Neff D.O.  11/23/2020  10:21 PM

## 2020-11-24 NOTE — ED NOTES
Med rec completed per Pt at bedside and med list provided by Pt. Med list reviewed and returned.  Allergies reviewed with Pt.  No oral antibiotics in last 14 days.    Pt takes ASPIRIN.

## 2020-11-24 NOTE — DISCHARGE INSTRUCTIONS
You are positive for Covid.  You need to self isolate until all your symptoms are completely resolved.    Use Motrin and Tylenol for aches pains or fevers, use over-the-counter decongestants for any other cough or symptoms.    Return to the emergency room if you develop difficulty breathing

## 2020-11-27 ENCOUNTER — APPOINTMENT (OUTPATIENT)
Dept: RADIOLOGY | Facility: MEDICAL CENTER | Age: 60
DRG: 177 | End: 2020-11-27
Payer: COMMERCIAL

## 2020-11-27 ENCOUNTER — HOSPITAL ENCOUNTER (INPATIENT)
Facility: MEDICAL CENTER | Age: 60
LOS: 2 days | DRG: 177 | End: 2020-11-30
Attending: EMERGENCY MEDICINE | Admitting: INTERNAL MEDICINE
Payer: COMMERCIAL

## 2020-11-27 DIAGNOSIS — J96.01 ACUTE HYPOXEMIC RESPIRATORY FAILURE DUE TO COVID-19 (HCC): ICD-10-CM

## 2020-11-27 DIAGNOSIS — U07.1 ACUTE HYPOXEMIC RESPIRATORY FAILURE DUE TO COVID-19 (HCC): ICD-10-CM

## 2020-11-27 PROBLEM — J12.82 PNEUMONIA DUE TO COVID-19 VIRUS: Status: ACTIVE | Noted: 2020-11-27

## 2020-11-27 PROBLEM — K52.9 GASTROENTERITIS: Status: ACTIVE | Noted: 2020-11-27

## 2020-11-27 PROBLEM — R07.9 PAIN IN THE CHEST: Status: ACTIVE | Noted: 2020-11-27

## 2020-11-27 LAB
ALBUMIN SERPL BCP-MCNC: 3.7 G/DL (ref 3.2–4.9)
ALBUMIN/GLOB SERPL: 1.1 G/DL
ALP SERPL-CCNC: 123 U/L (ref 30–99)
ALT SERPL-CCNC: 20 U/L (ref 2–50)
ANION GAP SERPL CALC-SCNC: 8 MMOL/L (ref 7–16)
APPEARANCE UR: ABNORMAL
AST SERPL-CCNC: 22 U/L (ref 12–45)
BACTERIA #/AREA URNS HPF: ABNORMAL /HPF
BASOPHILS # BLD AUTO: 0.2 % (ref 0–1.8)
BASOPHILS # BLD: 0.02 K/UL (ref 0–0.12)
BILIRUB SERPL-MCNC: 0.3 MG/DL (ref 0.1–1.5)
BILIRUB UR QL STRIP.AUTO: NEGATIVE
BUN SERPL-MCNC: 16 MG/DL (ref 8–22)
CALCIUM SERPL-MCNC: 8.9 MG/DL (ref 8.5–10.5)
CHLORIDE SERPL-SCNC: 101 MMOL/L (ref 96–112)
CK SERPL-CCNC: 125 U/L (ref 0–154)
CO2 SERPL-SCNC: 25 MMOL/L (ref 20–33)
COLOR UR: ABNORMAL
CREAT SERPL-MCNC: 0.97 MG/DL (ref 0.5–1.4)
CRP SERPL HS-MCNC: 6.72 MG/DL (ref 0–0.75)
EKG IMPRESSION: NORMAL
EOSINOPHIL # BLD AUTO: 0 K/UL (ref 0–0.51)
EOSINOPHIL NFR BLD: 0 % (ref 0–6.9)
EPI CELLS #/AREA URNS HPF: ABNORMAL /HPF
ERYTHROCYTE [DISTWIDTH] IN BLOOD BY AUTOMATED COUNT: 44.8 FL (ref 35.9–50)
GLOBULIN SER CALC-MCNC: 3.4 G/DL (ref 1.9–3.5)
GLUCOSE SERPL-MCNC: 135 MG/DL (ref 65–99)
GLUCOSE UR STRIP.AUTO-MCNC: NEGATIVE MG/DL
HCT VFR BLD AUTO: 45.7 % (ref 37–47)
HGB BLD-MCNC: 15.1 G/DL (ref 12–16)
HYALINE CASTS #/AREA URNS LPF: ABNORMAL /LPF
IMM GRANULOCYTES # BLD AUTO: 0.04 K/UL (ref 0–0.11)
IMM GRANULOCYTES NFR BLD AUTO: 0.4 % (ref 0–0.9)
KETONES UR STRIP.AUTO-MCNC: 15 MG/DL
LACTATE BLD-SCNC: 1.4 MMOL/L (ref 0.5–2)
LEUKOCYTE ESTERASE UR QL STRIP.AUTO: NEGATIVE
LYMPHOCYTES # BLD AUTO: 1.84 K/UL (ref 1–4.8)
LYMPHOCYTES NFR BLD: 18.1 % (ref 22–41)
MCH RBC QN AUTO: 31.3 PG (ref 27–33)
MCHC RBC AUTO-ENTMCNC: 33 G/DL (ref 33.6–35)
MCV RBC AUTO: 94.6 FL (ref 81.4–97.8)
MICRO URNS: ABNORMAL
MONOCYTES # BLD AUTO: 0.45 K/UL (ref 0–0.85)
MONOCYTES NFR BLD AUTO: 4.4 % (ref 0–13.4)
NEUTROPHILS # BLD AUTO: 7.84 K/UL (ref 2–7.15)
NEUTROPHILS NFR BLD: 76.9 % (ref 44–72)
NITRITE UR QL STRIP.AUTO: NEGATIVE
NRBC # BLD AUTO: 0 K/UL
NRBC BLD-RTO: 0 /100 WBC
NT-PROBNP SERPL IA-MCNC: 216 PG/ML (ref 0–125)
PH UR STRIP.AUTO: 5 [PH] (ref 5–8)
PLATELET # BLD AUTO: 342 K/UL (ref 164–446)
PMV BLD AUTO: 9.7 FL (ref 9–12.9)
POTASSIUM SERPL-SCNC: 3.9 MMOL/L (ref 3.6–5.5)
PROCALCITONIN SERPL-MCNC: <0.05 NG/ML
PROT SERPL-MCNC: 7.1 G/DL (ref 6–8.2)
PROT UR QL STRIP: 100 MG/DL
RBC # BLD AUTO: 4.83 M/UL (ref 4.2–5.4)
RBC # URNS HPF: ABNORMAL /HPF
RBC UR QL AUTO: ABNORMAL
SODIUM SERPL-SCNC: 134 MMOL/L (ref 135–145)
SP GR UR STRIP.AUTO: 1.03
TROPONIN T SERPL-MCNC: 17 NG/L (ref 6–19)
UROBILINOGEN UR STRIP.AUTO-MCNC: 0.2 MG/DL
WBC # BLD AUTO: 10.2 K/UL (ref 4.8–10.8)
WBC #/AREA URNS HPF: ABNORMAL /HPF

## 2020-11-27 PROCEDURE — 71045 X-RAY EXAM CHEST 1 VIEW: CPT

## 2020-11-27 PROCEDURE — 87086 URINE CULTURE/COLONY COUNT: CPT

## 2020-11-27 PROCEDURE — 85025 COMPLETE CBC W/AUTO DIFF WBC: CPT

## 2020-11-27 PROCEDURE — 84145 PROCALCITONIN (PCT): CPT

## 2020-11-27 PROCEDURE — 700105 HCHG RX REV CODE 258: Performed by: INTERNAL MEDICINE

## 2020-11-27 PROCEDURE — 83605 ASSAY OF LACTIC ACID: CPT

## 2020-11-27 PROCEDURE — 93005 ELECTROCARDIOGRAM TRACING: CPT

## 2020-11-27 PROCEDURE — 83880 ASSAY OF NATRIURETIC PEPTIDE: CPT

## 2020-11-27 PROCEDURE — A9270 NON-COVERED ITEM OR SERVICE: HCPCS | Performed by: INTERNAL MEDICINE

## 2020-11-27 PROCEDURE — G0378 HOSPITAL OBSERVATION PER HR: HCPCS

## 2020-11-27 PROCEDURE — 99220 PR INITIAL OBSERVATION CARE,LEVL III: CPT | Performed by: INTERNAL MEDICINE

## 2020-11-27 PROCEDURE — 36415 COLL VENOUS BLD VENIPUNCTURE: CPT

## 2020-11-27 PROCEDURE — 82550 ASSAY OF CK (CPK): CPT

## 2020-11-27 PROCEDURE — 80053 COMPREHEN METABOLIC PANEL: CPT

## 2020-11-27 PROCEDURE — 93005 ELECTROCARDIOGRAM TRACING: CPT | Performed by: INTERNAL MEDICINE

## 2020-11-27 PROCEDURE — 86140 C-REACTIVE PROTEIN: CPT

## 2020-11-27 PROCEDURE — 81001 URINALYSIS AUTO W/SCOPE: CPT

## 2020-11-27 PROCEDURE — 84484 ASSAY OF TROPONIN QUANT: CPT

## 2020-11-27 PROCEDURE — 93005 ELECTROCARDIOGRAM TRACING: CPT | Performed by: EMERGENCY MEDICINE

## 2020-11-27 PROCEDURE — 94760 N-INVAS EAR/PLS OXIMETRY 1: CPT

## 2020-11-27 PROCEDURE — 700102 HCHG RX REV CODE 250 W/ 637 OVERRIDE(OP): Performed by: INTERNAL MEDICINE

## 2020-11-27 PROCEDURE — 87040 BLOOD CULTURE FOR BACTERIA: CPT

## 2020-11-27 PROCEDURE — 99285 EMERGENCY DEPT VISIT HI MDM: CPT

## 2020-11-27 RX ORDER — ASPIRIN 300 MG/1
300 SUPPOSITORY RECTAL DAILY
Status: DISCONTINUED | OUTPATIENT
Start: 2020-11-28 | End: 2020-11-28

## 2020-11-27 RX ORDER — SODIUM CHLORIDE 9 MG/ML
INJECTION, SOLUTION INTRAVENOUS CONTINUOUS
Status: DISCONTINUED | OUTPATIENT
Start: 2020-11-27 | End: 2020-11-28

## 2020-11-27 RX ORDER — ASPIRIN 81 MG/1
324 TABLET, CHEWABLE ORAL DAILY
Status: DISCONTINUED | OUTPATIENT
Start: 2020-11-28 | End: 2020-11-27

## 2020-11-27 RX ORDER — ASPIRIN 325 MG
325 TABLET ORAL DAILY
Status: DISCONTINUED | OUTPATIENT
Start: 2020-11-28 | End: 2020-11-27

## 2020-11-27 RX ORDER — OXYCODONE HYDROCHLORIDE 10 MG/1
10 TABLET ORAL
Status: DISCONTINUED | OUTPATIENT
Start: 2020-11-27 | End: 2020-11-28

## 2020-11-27 RX ORDER — ONDANSETRON 2 MG/ML
4 INJECTION INTRAMUSCULAR; INTRAVENOUS EVERY 4 HOURS PRN
Status: DISCONTINUED | OUTPATIENT
Start: 2020-11-27 | End: 2020-11-30 | Stop reason: HOSPADM

## 2020-11-27 RX ORDER — LEVOTHYROXINE SODIUM 0.2 MG/1
200 TABLET ORAL
Status: DISCONTINUED | OUTPATIENT
Start: 2020-11-28 | End: 2020-11-30 | Stop reason: HOSPADM

## 2020-11-27 RX ORDER — ACETAMINOPHEN 325 MG/1
650 TABLET ORAL EVERY 6 HOURS PRN
Status: DISCONTINUED | OUTPATIENT
Start: 2020-11-27 | End: 2020-11-30 | Stop reason: HOSPADM

## 2020-11-27 RX ORDER — DEXAMETHASONE SODIUM PHOSPHATE 4 MG/ML
6 INJECTION, SOLUTION INTRA-ARTICULAR; INTRALESIONAL; INTRAMUSCULAR; INTRAVENOUS; SOFT TISSUE DAILY
Status: DISCONTINUED | OUTPATIENT
Start: 2020-11-28 | End: 2020-11-29

## 2020-11-27 RX ORDER — ONDANSETRON 4 MG/1
4 TABLET, ORALLY DISINTEGRATING ORAL EVERY 4 HOURS PRN
Status: DISCONTINUED | OUTPATIENT
Start: 2020-11-27 | End: 2020-11-30 | Stop reason: HOSPADM

## 2020-11-27 RX ORDER — PROMETHAZINE HYDROCHLORIDE 25 MG/1
12.5-25 SUPPOSITORY RECTAL EVERY 4 HOURS PRN
Status: DISCONTINUED | OUTPATIENT
Start: 2020-11-27 | End: 2020-11-28

## 2020-11-27 RX ORDER — ACETAMINOPHEN 500 MG
1000 TABLET ORAL EVERY 6 HOURS PRN
COMMUNITY
End: 2021-04-26

## 2020-11-27 RX ORDER — POLYETHYLENE GLYCOL 3350 17 G/17G
1 POWDER, FOR SOLUTION ORAL
Status: DISCONTINUED | OUTPATIENT
Start: 2020-11-27 | End: 2020-11-30

## 2020-11-27 RX ORDER — ATORVASTATIN CALCIUM 80 MG/1
80 TABLET, FILM COATED ORAL EVERY EVENING
Status: DISCONTINUED | OUTPATIENT
Start: 2020-11-27 | End: 2020-11-28

## 2020-11-27 RX ORDER — SUCRALFATE 1 G/1
1 TABLET ORAL EVERY 6 HOURS
Status: COMPLETED | OUTPATIENT
Start: 2020-11-27 | End: 2020-11-28

## 2020-11-27 RX ORDER — PROMETHAZINE HYDROCHLORIDE 25 MG/1
12.5-25 TABLET ORAL EVERY 4 HOURS PRN
Status: DISCONTINUED | OUTPATIENT
Start: 2020-11-27 | End: 2020-11-28

## 2020-11-27 RX ORDER — PROCHLORPERAZINE EDISYLATE 5 MG/ML
5-10 INJECTION INTRAMUSCULAR; INTRAVENOUS EVERY 4 HOURS PRN
Status: DISCONTINUED | OUTPATIENT
Start: 2020-11-27 | End: 2020-11-28

## 2020-11-27 RX ORDER — CARVEDILOL 6.25 MG/1
6.25 TABLET ORAL 2 TIMES DAILY WITH MEALS
Status: DISCONTINUED | OUTPATIENT
Start: 2020-11-27 | End: 2020-11-30 | Stop reason: HOSPADM

## 2020-11-27 RX ORDER — BISACODYL 10 MG
10 SUPPOSITORY, RECTAL RECTAL
Status: DISCONTINUED | OUTPATIENT
Start: 2020-11-27 | End: 2020-11-28

## 2020-11-27 RX ORDER — ASPIRIN 81 MG/1
81 TABLET, CHEWABLE ORAL EVERY MORNING
Status: DISCONTINUED | OUTPATIENT
Start: 2020-11-28 | End: 2020-11-27

## 2020-11-27 RX ORDER — AMOXICILLIN 250 MG
2 CAPSULE ORAL 2 TIMES DAILY
Status: DISCONTINUED | OUTPATIENT
Start: 2020-11-28 | End: 2020-11-28

## 2020-11-27 RX ORDER — MORPHINE SULFATE 4 MG/ML
4 INJECTION, SOLUTION INTRAMUSCULAR; INTRAVENOUS
Status: DISCONTINUED | OUTPATIENT
Start: 2020-11-27 | End: 2020-11-28

## 2020-11-27 RX ORDER — IBUPROFEN 200 MG
400 TABLET ORAL EVERY 6 HOURS PRN
COMMUNITY
End: 2021-04-26

## 2020-11-27 RX ORDER — OXYCODONE HYDROCHLORIDE 5 MG/1
5 TABLET ORAL
Status: DISCONTINUED | OUTPATIENT
Start: 2020-11-27 | End: 2020-11-30 | Stop reason: HOSPADM

## 2020-11-27 RX ADMIN — SUCRALFATE 1 G: 1 TABLET ORAL at 20:47

## 2020-11-27 RX ADMIN — CARVEDILOL 6.25 MG: 6.25 TABLET, FILM COATED ORAL at 21:42

## 2020-11-27 RX ADMIN — NITROGLYCERIN 0.5 INCH: 20 OINTMENT TOPICAL at 21:43

## 2020-11-27 RX ADMIN — SODIUM CHLORIDE: 9 INJECTION, SOLUTION INTRAVENOUS at 20:47

## 2020-11-27 RX ADMIN — ACETAMINOPHEN 650 MG: 325 TABLET, FILM COATED ORAL at 20:47

## 2020-11-27 RX ADMIN — ATORVASTATIN CALCIUM 80 MG: 80 TABLET, FILM COATED ORAL at 20:47

## 2020-11-27 RX ADMIN — OXYCODONE HYDROCHLORIDE 5 MG: 5 TABLET ORAL at 21:42

## 2020-11-27 ASSESSMENT — ENCOUNTER SYMPTOMS
SHORTNESS OF BREATH: 1
WEIGHT LOSS: 0
PALPITATIONS: 0
BACK PAIN: 1
BLOOD IN STOOL: 0
FLANK PAIN: 0
TREMORS: 0
HALLUCINATIONS: 0
NAUSEA: 1
FEVER: 0
FOCAL WEAKNESS: 0
ORTHOPNEA: 0
PHOTOPHOBIA: 0
POLYDIPSIA: 0
NERVOUS/ANXIOUS: 0
NECK PAIN: 0
HEARTBURN: 0
COUGH: 1
HEMOPTYSIS: 0
DIARRHEA: 1
SPEECH CHANGE: 0
VOMITING: 1
BRUISES/BLEEDS EASILY: 0
BLURRED VISION: 0
CHILLS: 1
MYALGIAS: 1
DOUBLE VISION: 0
HEADACHES: 0
SPUTUM PRODUCTION: 1

## 2020-11-27 ASSESSMENT — LIFESTYLE VARIABLES: SUBSTANCE_ABUSE: 0

## 2020-11-27 ASSESSMENT — FIBROSIS 4 INDEX
FIB4 SCORE: 0.75
FIB4 SCORE: 0.83

## 2020-11-27 ASSESSMENT — PAIN DESCRIPTION - PAIN TYPE: TYPE: ACUTE PAIN

## 2020-11-28 LAB
ALBUMIN SERPL BCP-MCNC: 3.1 G/DL (ref 3.2–4.9)
ALBUMIN/GLOB SERPL: 1 G/DL
ALP SERPL-CCNC: 105 U/L (ref 30–99)
ALT SERPL-CCNC: 14 U/L (ref 2–50)
ANION GAP SERPL CALC-SCNC: 8 MMOL/L (ref 7–16)
AST SERPL-CCNC: 15 U/L (ref 12–45)
BASOPHILS # BLD AUTO: 0.3 % (ref 0–1.8)
BASOPHILS # BLD: 0.03 K/UL (ref 0–0.12)
BILIRUB SERPL-MCNC: 0.4 MG/DL (ref 0.1–1.5)
BUN SERPL-MCNC: 18 MG/DL (ref 8–22)
CALCIUM SERPL-MCNC: 8.4 MG/DL (ref 8.5–10.5)
CHLORIDE SERPL-SCNC: 102 MMOL/L (ref 96–112)
CO2 SERPL-SCNC: 24 MMOL/L (ref 20–33)
CREAT SERPL-MCNC: 0.78 MG/DL (ref 0.5–1.4)
EKG IMPRESSION: NORMAL
EOSINOPHIL # BLD AUTO: 0.01 K/UL (ref 0–0.51)
EOSINOPHIL NFR BLD: 0.1 % (ref 0–6.9)
ERYTHROCYTE [DISTWIDTH] IN BLOOD BY AUTOMATED COUNT: 45.1 FL (ref 35.9–50)
GLOBULIN SER CALC-MCNC: 3 G/DL (ref 1.9–3.5)
GLUCOSE SERPL-MCNC: 101 MG/DL (ref 65–99)
HCT VFR BLD AUTO: 40.9 % (ref 37–47)
HGB BLD-MCNC: 12.9 G/DL (ref 12–16)
IMM GRANULOCYTES # BLD AUTO: 0.05 K/UL (ref 0–0.11)
IMM GRANULOCYTES NFR BLD AUTO: 0.5 % (ref 0–0.9)
LYMPHOCYTES # BLD AUTO: 2.23 K/UL (ref 1–4.8)
LYMPHOCYTES NFR BLD: 21.6 % (ref 22–41)
MCH RBC QN AUTO: 29.9 PG (ref 27–33)
MCHC RBC AUTO-ENTMCNC: 31.5 G/DL (ref 33.6–35)
MCV RBC AUTO: 94.9 FL (ref 81.4–97.8)
MONOCYTES # BLD AUTO: 0.53 K/UL (ref 0–0.85)
MONOCYTES NFR BLD AUTO: 5.1 % (ref 0–13.4)
NEUTROPHILS # BLD AUTO: 7.47 K/UL (ref 2–7.15)
NEUTROPHILS NFR BLD: 72.4 % (ref 44–72)
NRBC # BLD AUTO: 0 K/UL
NRBC BLD-RTO: 0 /100 WBC
PLATELET # BLD AUTO: 351 K/UL (ref 164–446)
PMV BLD AUTO: 10.1 FL (ref 9–12.9)
POTASSIUM SERPL-SCNC: 4.1 MMOL/L (ref 3.6–5.5)
PROT SERPL-MCNC: 6.1 G/DL (ref 6–8.2)
RBC # BLD AUTO: 4.31 M/UL (ref 4.2–5.4)
SODIUM SERPL-SCNC: 134 MMOL/L (ref 135–145)
TROPONIN T SERPL-MCNC: 9 NG/L (ref 6–19)
WBC # BLD AUTO: 10.3 K/UL (ref 4.8–10.8)

## 2020-11-28 PROCEDURE — 700102 HCHG RX REV CODE 250 W/ 637 OVERRIDE(OP): Performed by: HOSPITALIST

## 2020-11-28 PROCEDURE — 93010 ELECTROCARDIOGRAM REPORT: CPT | Performed by: INTERNAL MEDICINE

## 2020-11-28 PROCEDURE — 700105 HCHG RX REV CODE 258: Performed by: INTERNAL MEDICINE

## 2020-11-28 PROCEDURE — A9270 NON-COVERED ITEM OR SERVICE: HCPCS | Performed by: HOSPITALIST

## 2020-11-28 PROCEDURE — A9270 NON-COVERED ITEM OR SERVICE: HCPCS | Performed by: INTERNAL MEDICINE

## 2020-11-28 PROCEDURE — 80053 COMPREHEN METABOLIC PANEL: CPT

## 2020-11-28 PROCEDURE — 700102 HCHG RX REV CODE 250 W/ 637 OVERRIDE(OP): Performed by: INTERNAL MEDICINE

## 2020-11-28 PROCEDURE — 85025 COMPLETE CBC W/AUTO DIFF WBC: CPT

## 2020-11-28 PROCEDURE — 99233 SBSQ HOSP IP/OBS HIGH 50: CPT | Performed by: INTERNAL MEDICINE

## 2020-11-28 PROCEDURE — 36415 COLL VENOUS BLD VENIPUNCTURE: CPT

## 2020-11-28 PROCEDURE — 96374 THER/PROPH/DIAG INJ IV PUSH: CPT

## 2020-11-28 PROCEDURE — 96375 TX/PRO/DX INJ NEW DRUG ADDON: CPT

## 2020-11-28 PROCEDURE — 770014 HCHG ROOM/CARE - WARD (150)

## 2020-11-28 PROCEDURE — 96372 THER/PROPH/DIAG INJ SC/IM: CPT

## 2020-11-28 PROCEDURE — 700111 HCHG RX REV CODE 636 W/ 250 OVERRIDE (IP): Performed by: INTERNAL MEDICINE

## 2020-11-28 RX ORDER — ASPIRIN 81 MG/1
81 TABLET, CHEWABLE ORAL DAILY
Status: DISCONTINUED | OUTPATIENT
Start: 2020-11-28 | End: 2020-11-30 | Stop reason: HOSPADM

## 2020-11-28 RX ADMIN — SUCRALFATE 1 G: 1 TABLET ORAL at 05:39

## 2020-11-28 RX ADMIN — ONDANSETRON 4 MG: 2 INJECTION INTRAMUSCULAR; INTRAVENOUS at 00:46

## 2020-11-28 RX ADMIN — CARVEDILOL 6.25 MG: 6.25 TABLET, FILM COATED ORAL at 08:32

## 2020-11-28 RX ADMIN — DEXAMETHASONE SODIUM PHOSPHATE 6 MG: 4 INJECTION, SOLUTION INTRA-ARTICULAR; INTRALESIONAL; INTRAMUSCULAR; INTRAVENOUS; SOFT TISSUE at 05:40

## 2020-11-28 RX ADMIN — SODIUM CHLORIDE: 9 INJECTION, SOLUTION INTRAVENOUS at 10:00

## 2020-11-28 RX ADMIN — LEVOTHYROXINE SODIUM 200 MCG: 0.2 TABLET ORAL at 05:39

## 2020-11-28 RX ADMIN — FAMOTIDINE 20 MG: 10 INJECTION, SOLUTION INTRAVENOUS at 17:48

## 2020-11-28 RX ADMIN — ASPIRIN 81 MG CHEWABLE TABLET 81 MG: 81 TABLET CHEWABLE at 05:39

## 2020-11-28 RX ADMIN — SUCRALFATE 1 G: 1 TABLET ORAL at 17:49

## 2020-11-28 RX ADMIN — ATORVASTATIN CALCIUM 80 MG: 80 TABLET, FILM COATED ORAL at 17:48

## 2020-11-28 RX ADMIN — SUCRALFATE 1 G: 1 TABLET ORAL at 11:55

## 2020-11-28 RX ADMIN — CARVEDILOL 6.25 MG: 6.25 TABLET, FILM COATED ORAL at 17:49

## 2020-11-28 RX ADMIN — NITROGLYCERIN 0.5 INCH: 20 OINTMENT TOPICAL at 05:39

## 2020-11-28 RX ADMIN — FAMOTIDINE 20 MG: 10 INJECTION, SOLUTION INTRAVENOUS at 05:43

## 2020-11-28 RX ADMIN — ENOXAPARIN SODIUM 40 MG: 40 INJECTION SUBCUTANEOUS at 05:50

## 2020-11-28 RX ADMIN — ACETAMINOPHEN 650 MG: 325 TABLET, FILM COATED ORAL at 13:29

## 2020-11-28 RX ADMIN — OXYCODONE HYDROCHLORIDE 5 MG: 5 TABLET ORAL at 05:50

## 2020-11-28 ASSESSMENT — ENCOUNTER SYMPTOMS
PSYCHIATRIC NEGATIVE: 1
RESPIRATORY NEGATIVE: 1
MUSCULOSKELETAL NEGATIVE: 1
NEUROLOGICAL NEGATIVE: 1
GASTROINTESTINAL NEGATIVE: 1

## 2020-11-28 ASSESSMENT — PAIN DESCRIPTION - PAIN TYPE
TYPE: ACUTE PAIN
TYPE: ACUTE PAIN

## 2020-11-28 ASSESSMENT — COPD QUESTIONNAIRES
HAVE YOU SMOKED AT LEAST 100 CIGARETTES IN YOUR ENTIRE LIFE: NO/DON'T KNOW
DURING THE PAST 4 WEEKS HOW MUCH DID YOU FEEL SHORT OF BREATH: SOME OF THE TIME
DO YOU EVER COUGH UP ANY MUCUS OR PHLEGM?: NO/ONLY WITH OCCASIONAL COLDS OR INFECTIONS
COPD SCREENING SCORE: 3

## 2020-11-28 ASSESSMENT — FIBROSIS 4 INDEX: FIB4 SCORE: 0.66

## 2020-11-28 NOTE — ASSESSMENT & PLAN NOTE
Chest x-ray showed findings consistent with COVID-19 pneumonia.  Procalcitonin was not elevated.  Complete 10-day course of Decadron.  On contact/droplet/eye isolation.  Continue supportive care

## 2020-11-28 NOTE — ED TRIAGE NOTES
Chief Complaint   Patient presents with   • Shortness of Breath     x2 days   • Chest Pain     x2 days   • Cough     x8 days; pt tested POSITIVE FOR COVID on 11/23   • Nausea/Vomiting/Diarrhea     x3 days     Pt ambulatory to triage for above complaint. Pt SpO2 on room air was 88%, now up to 95% on 2L O2 via nasal cannula. Pt appears very uncomfortable and lethargic in triage.      Pt is alert/oriented and follows commands. Pt speaking in full sentences and responds appropriately to questions. No acute distress noted in triage and respirations are even and unlabored.     Pt placed in Senior Lounge and educated on triage process. Pt encouraged to alert staff for any changes in condition.

## 2020-11-28 NOTE — PROGRESS NOTES
Report received from Kade Álvarez and Juan Manuel. Assumed pt care. Pt is on 4 L 02 sat 97%. Pt A&O x 4. Fall precautions in place, call light and belongings within reach, bed in lowest position. No signs of distress.

## 2020-11-28 NOTE — H&P
[facility] Medicine History & Physical Note    Date of Service  11/27/2020    Primary Care Physician  Jagruti Stewart D.O.    Consultants  none    Code Status  Full Code    Chief Complaint  Chief Complaint   Patient presents with   • Shortness of Breath     x2 days   • Chest Pain     x2 days   • Cough     x8 days; pt tested POSITIVE FOR COVID on 11/23   • Nausea/Vomiting/Diarrhea     x3 days       History of Presenting Illness  59 y.o. female with history of vasospastic angina, CAD, myocardial infarction, dyslipidemia, hypertension, thyroid disease, who presented 11/27/2020 with multiple complaints, including shortness of breath, cough with clear sputum, nausea vomiting abdominal pain and diarrhea and finally, chest pain.  Patient states her symptoms started about 8 days ago.  She reports her  [place] recently and apparently brought COVID-19 infection to her.  She presented to the emergency department on 11/23 with complaints as above, and additional syncopal episode, when she was tested positive for COVID-19 infection.  After she left the emergency department, symptoms have not been getting worse, therefore she came back..  She desaturated on room air to 86% and was placed on 2 L of oxygen nasal cannula with SPO2 95%.  She is slightly tachycardic with heart rate 122 and looks dehydrated.  Her procalcitonin is less than 0.05.  Her chest pain is pressure-like, in the middle of the chest, intermittent, without alleviating aggravating factors.  She feels the pain is similar to the one when she had her heart attack.  EKG showed age indeterminant inferior and lateral MI, sinus tachycardia.  Troponin is currently pending.  She complains of generalized body aches, not relieved by Tylenol.  Review of Systems  Review of Systems   Constitutional: Positive for chills and malaise/fatigue. Negative for fever and weight loss.   HENT: Positive for congestion and ear pain. Negative for hearing loss and  tinnitus.    Eyes: Negative for blurred vision, double vision and photophobia.   Respiratory: Positive for cough, sputum production and shortness of breath. Negative for hemoptysis.    Cardiovascular: Positive for chest pain. Negative for palpitations, orthopnea and leg swelling.   Gastrointestinal: Positive for diarrhea, nausea and vomiting. Negative for blood in stool, heartburn and melena.   Genitourinary: Negative for dysuria, flank pain, frequency and hematuria.   Musculoskeletal: Positive for back pain, joint pain and myalgias. Negative for neck pain.   Skin: Negative for itching and rash.   Neurological: Negative for tremors, speech change, focal weakness and headaches.   Endo/Heme/Allergies: Negative for environmental allergies and polydipsia. Does not bruise/bleed easily.   Psychiatric/Behavioral: Negative for hallucinations and substance abuse. The patient is not nervous/anxious.        Past Medical History   has a past medical history of History of myocardial infarction, Hyperlipidemia, Hypertension, and Thyroid disease.    Surgical History   has a past surgical history that includes thyroidectomy total and cholecystectomy.     Family History  family history includes Alzheimer's Disease in her mother; Heart Disease (age of onset: 70) in her father.     Social History   reports that she has never smoked. She has never used smokeless tobacco. She reports previous alcohol use. She reports that she does not use drugs.    Allergies  Allergies   Allergen Reactions   • Inapsine [Droperidol] Anaphylaxis       Medications  Prior to Admission Medications   Prescriptions Last Dose Informant Patient Reported? Taking?   FLUoxetine (PROZAC) 10 MG Cap 11/27/2020 at 0900 Patient No No   Sig: Take 1 Cap by mouth every day.   Patient taking differently: Take 10 mg by mouth every morning.   SYNTHROID 200 MCG Tab 11/27/2020 at 0900 Patient No No   Sig: TAKE 1 TABLET EVERY MORNINGON AN EMPTY STOMACH   Patient taking  differently: Take 200 mcg by mouth Every morning on an empty stomach.   acetaminophen (TYLENOL) 500 MG Tab 11/27/2020 at 0300 Patient Yes Yes   Sig: Take 1,000 mg by mouth every 6 hours as needed for Mild Pain or Moderate Pain.   amLODIPine (NORVASC) 5 MG Tab 11/25/2020 at PM Patient Yes No   Sig: Take 5 mg by mouth every bedtime.   aspirin (ASA) 81 MG Chew Tab chewable tablet 11/27/2020 at 0900 Patient Yes No   Sig: Chew 81 mg every morning.   atorvastatin (LIPITOR) 80 MG tablet 11/25/2020 at PM Patient No No   Sig: Take 1 Tab by mouth every evening.   carvedilol (COREG) 6.25 MG Tab 11/27/2020 at 0900 Patient No No   Sig: Take 1 Tab by mouth 2 times a day, with meals.   clobetasol (TEMOVATE) 0.05 % Ointment Not Taking at Not Taking Patient No No   Sig: Apply 1 Application to affected area(s) 2 times a day.   Patient not taking: Reported on 11/27/2020   diltiazem (DILACOR XR) 180 MG XR capsule Pt unsure at Unknown time Patient No No   Sig: Take 1 Cap by mouth every day.   ibuprofen (MOTRIN) 200 MG Tab 11/25/2020 at unknown Patient Yes Yes   Sig: Take 400 mg by mouth every 6 hours as needed for Mild Pain.   multivitamin (THERAGRAN) Tab 11/27/2020 at 0900 Patient Yes No   Sig: Take 1 Tab by mouth every morning.   nitroGLYCERIN (NITROSTAT) 0.3 MG SL tablet PRN at PRN Patient No No   Sig: Place 1 Tab under tongue as needed for Chest Pain.   omeprazole (PRILOSEC) 40 MG delayed-release capsule Not Taking at Not Taking Patient No No   Sig: Take 1 Cap by mouth every day.   Patient not taking: Reported on 11/27/2020   pantoprazole (PROTONIX) 40 MG Tablet Delayed Response 11/27/2020 at 0900 Patient Yes No   Sig: Take 40 mg by mouth every morning.      Facility-Administered Medications: None       Physical Exam  Temp:  [36.8 °C (98.2 °F)] 36.8 °C (98.2 °F)  Pulse:  [] 101  Resp:  [16-18] 18  BP: (117-130)/(57-91) 117/59  SpO2:  [86 %-96 %] 96 %    Physical Exam  Vitals signs and nursing note reviewed.   Constitutional:        General: She is not in acute distress.     Appearance: She is ill-appearing.   HENT:      Head: Normocephalic and atraumatic.      Nose: Nose normal.      Mouth/Throat:      Mouth: Mucous membranes are dry.   Eyes:      Extraocular Movements: Extraocular movements intact.      Pupils: Pupils are equal, round, and reactive to light.   Neck:      Musculoskeletal: Normal range of motion and neck supple.   Cardiovascular:      Rate and Rhythm: Regular rhythm. Tachycardia present.   Pulmonary:      Effort: Pulmonary effort is normal.      Breath sounds: Rhonchi present.   Abdominal:      General: Abdomen is flat. There is no distension.      Tenderness: There is no abdominal tenderness.   Musculoskeletal: Normal range of motion.         General: No swelling or deformity.   Skin:     General: Skin is warm and dry.   Neurological:      General: No focal deficit present.      Mental Status: She is alert and oriented to person, place, and time.   Psychiatric:         Mood and Affect: Mood normal.         Behavior: Behavior normal.         Laboratory:  Recent Labs     11/27/20  1707   WBC 10.2   RBC 4.83   HEMOGLOBIN 15.1   HEMATOCRIT 45.7   MCV 94.6   MCH 31.3   MCHC 33.0*   RDW 44.8   PLATELETCT 342   MPV 9.7     Recent Labs     11/27/20  1707   SODIUM 134*   POTASSIUM 3.9   CHLORIDE 101   CO2 25   GLUCOSE 135*   BUN 16   CREATININE 0.97   CALCIUM 8.9     Recent Labs     11/27/20  1707   ALTSGPT 20   ASTSGOT 22   ALKPHOSPHAT 123*   TBILIRUBIN 0.3   GLUCOSE 135*         No results for input(s): NTPROBNP in the last 72 hours.      No results for input(s): TROPONINT in the last 72 hours.    Imaging:  DX-CHEST-PORTABLE (1 VIEW)   Final Result         Diffuse interstitial prominence and patchy opacities throughout both lungs, in keeping with Covid 19 pneumonia.            Assessment/Plan:  I anticipate this patient is appropriate for observation status at this time.    Pneumonia due to COVID-19 virus  Assessment &  Plan  CXR: Diffuse interstitial prominence and patchy opacities throughout both lungs, in keeping with Covid 19 pneumonia.  Procalcitonin is not elevated, therefore antibacterial medications not indicated  We will start on IV Decadron ,10 days course  Pulse oximetry, supplemental oxygen  Incentive spirometry  Contact, droplet, eye protection    Pain in the chest  Assessment & Plan  EKG showed prior inferior and lateral wall MI, no definite acute MI  Troponin is pending  Aspirin, metoprolol  Nitroglycerin  Monitor on telemetry  Repeat EKG and troponin    Gastroenteritis  Assessment & Plan  Probably part of COVID-19 infection  Gentle IV hydration and symptomatic supportive care will be ordered  Pepcid, sucralfate, Zofran as needed    Hypothyroid- (present on admission)  Assessment & Plan  Continue levothyroxine

## 2020-11-28 NOTE — ED PROVIDER NOTES
ED Provider Note    CHIEF COMPLAINT  Chief Complaint   Patient presents with   • Shortness of Breath     x2 days   • Chest Pain     x2 days   • Cough     x8 days; pt tested POSITIVE FOR COVID on 11/23   • Nausea/Vomiting/Diarrhea     x3 days       HPI  Priscilla Childress is a 59 y.o. female who presents diffuse body aches, chest pain, shortness of breath, dry cough, nausea, vomiting, diarrhea.  She states that she first had symptoms similar to this on Friday.  Her boyfriend has similar symptoms and is also sick at home.  She states that she is unable to rest due to cough and discomfort.  Denies sore throat but does have a slight headache.  She was tested for Covid earlier this week on the 23rd and was positive.  Since then she has had worsening symptoms each day.  She has been taking acetaminophen and ibuprofen at home without improvement.  She states that she cannot get any rest due to cough and body aches.    REVIEW OF SYSTEMS  See HPI for further details. All other systems are negative.     PAST MEDICAL HISTORY   has a past medical history of History of myocardial infarction, Hyperlipidemia, Hypertension, and Thyroid disease.    SOCIAL HISTORY  Social History     Tobacco Use   • Smoking status: Never Smoker   • Smokeless tobacco: Never Used   Substance and Sexual Activity   • Alcohol use: Not Currently   • Drug use: No   • Sexual activity: Not on file       SURGICAL HISTORY   has a past surgical history that includes thyroidectomy total and cholecystectomy.    CURRENT MEDICATIONS  Home Medications     Reviewed by Yosef Baltazar (Pharmacy Tech) on 11/27/20 at 1841  Med List Status: Complete   Medication Last Dose Status   acetaminophen (TYLENOL) 500 MG Tab 11/27/2020 Active   amLODIPine (NORVASC) 5 MG Tab 11/25/2020 Active   aspirin (ASA) 81 MG Chew Tab chewable tablet 11/27/2020 Active   atorvastatin (LIPITOR) 80 MG tablet 11/25/2020 Active   carvedilol (COREG) 6.25 MG Tab 11/27/2020 Active   clobetasol  "(TEMOVATE) 0.05 % Ointment Not Taking Active   diltiazem (DILACOR XR) 180 MG XR capsule unsure Active   FLUoxetine (PROZAC) 10 MG Cap 11/27/2020 Active   ibuprofen (MOTRIN) 200 MG Tab 11/25/2020 Active   multivitamin (THERAGRAN) Tab 11/27/2020 Active   nitroGLYCERIN (NITROSTAT) 0.3 MG SL tablet unknown Active   omeprazole (PRILOSEC) 40 MG delayed-release capsule Not Taking Active   pantoprazole (PROTONIX) 40 MG Tablet Delayed Response 11/27/2020 Active   SYNTHROID 200 MCG Tab 11/27/2020 Active                ALLERGIES  Allergies   Allergen Reactions   • Inapsine [Droperidol] Anaphylaxis       PHYSICAL EXAM  VITAL SIGNS: /57   Pulse (!) 102   Temp 36.8 °C (98.2 °F) (Temporal)   Resp 17   Ht 1.651 m (5' 5\")   Wt 93 kg (205 lb)   SpO2 96%   BMI 34.11 kg/m²   Pulse ox interpretation: I interpret this pulse ox as normal on nasal cannula oxygen however hypoxic on room air to 86%.  Constitutional: Alert, ill in appearance  HENT: No signs of trauma, Bilateral external ears normal, Nose normal.   Eyes: Pupils are equal and reactive, Conjunctiva normal, Non-icteric.   Neck: Normal range of motion, No tenderness, Supple, No stridor.   Cardiovascular: Regular rate and rhythm.   Thorax & Lungs: Normal breath sounds, No respiratory distress, No wheezing, No chest tenderness.   Abdomen: Bowel sounds normal, Soft, No tenderness, No masses, No pulsatile masses. No peritoneal signs.  Skin: Warm, Dry, No erythema, No rash.   Back: No bony tenderness, No CVA tenderness.   Extremities: Intact distal pulses, No edema, No tenderness, No cyanosis  Neurologic: Alert, No focal deficits noted.       DIAGNOSTIC STUDIES / PROCEDURES    EKG - Physician interpretation  Results for orders placed or performed during the hospital encounter of 11/27/20   EKG (NOW)   Result Value Ref Range    Report       [facility]    Test Date:  2020-11-27  Pt Name:    EDYTA BOWEN               Department: ER  MRN: " "       [id number]                      Room:  Gender:     Female                       Technician: [id number]  :        1960                   Requested By:ER TRIAGE PROTOCOL  Order #:    [id number]                    Reading MD: KADEN GREGORY MD    Measurements  Intervals                                Axis  Rate:       109                          P:          89  IL:         156                          QRS:        -8  QRSD:       82                           T:          89  QT:         316  QTc:        426    Interpretive Statements  SINUS TACHYCARDIA  PROBABLE LEFT ATRIAL ABNORMALITY  INFERIOR INFARCT, AGE INDETERMINATE  LATERAL INFARCT, AGE INDETERMINATE  CONSIDER ANTERIOR INFARCT  Compared to ECG 2020 18:44:08  Sinus rhythm no longer present  Myocardial infarct finding still present  Electronically Signed On 2020 18:08:59 PST by KADEN GREGORY MD           LABS  Labs Reviewed   CBC WITH DIFFERENTIAL - Abnormal; Notable for the following components:       Result Value    MCHC 33.0 (*)     Neutrophils-Polys 76.90 (*)     Lymphocytes 18.10 (*)     Neutrophils (Absolute) 7.84 (*)     All other components within normal limits   COMP METABOLIC PANEL - Abnormal; Notable for the following components:    Sodium 134 (*)     Glucose 135 (*)     Alkaline Phosphatase 123 (*)     All other components within normal limits   ESTIMATED GFR - Abnormal; Notable for the following components:    GFR If Non  59 (*)     All other components within normal limits   LACTIC ACID   BLOOD CULTURE    Narrative:     Per Hospital Policy: Only change Specimen Src: to \"Line\" if  specified by physician order.   LACTIC ACID   LACTIC ACID   URINALYSIS   URINE CULTURE(NEW)   BLOOD CULTURE   TROPONIN   PROBRAIN NATRIURETIC PEPTIDE, NT   PROCALCITONIN         RADIOLOGY  DX-CHEST-PORTABLE (1 VIEW)   Final Result         Diffuse interstitial prominence and patchy opacities throughout both lungs, in keeping with Covid 19 " pneumonia.            COURSE & MEDICAL DECISION MAKING    Medications   senna-docusate (PERICOLACE or SENOKOT S) 8.6-50 MG per tablet 2 Tab (0 Tabs Oral Held 11/28/20 0600)     And   polyethylene glycol/lytes (MIRALAX) PACKET 1 Packet (has no administration in time range)     And   magnesium hydroxide (MILK OF MAGNESIA) suspension 30 mL (has no administration in time range)     And   bisacodyl (DULCOLAX) suppository 10 mg (has no administration in time range)   Respiratory Therapy Consult (has no administration in time range)   NS infusion ( Intravenous New Bag 11/27/20 2047)   enoxaparin (LOVENOX) inj 40 mg (40 mg Subcutaneous Given 11/28/20 0550)   acetaminophen (Tylenol) tablet 650 mg (650 mg Oral Given 11/27/20 2047)   Pharmacy Consult Request ...Pain Management Review 1 Each (has no administration in time range)     And   oxyCODONE immediate-release (ROXICODONE) tablet 5 mg (5 mg Oral Given 11/28/20 0550)     And   oxyCODONE immediate-release (ROXICODONE) tablet 10 mg (has no administration in time range)     And   morphine (pf) 4 mg/mL injection 4 mg (has no administration in time range)   ondansetron (ZOFRAN) syringe/vial injection 4 mg (4 mg Intravenous Given 11/28/20 0046)   ondansetron (ZOFRAN ODT) dispertab 4 mg (has no administration in time range)   promethazine (PHENERGAN) tablet 12.5-25 mg (has no administration in time range)   promethazine (PHENERGAN) suppository 12.5-25 mg (has no administration in time range)   prochlorperazine (COMPAZINE) injection 5-10 mg (has no administration in time range)   dexamethasone (DECADRON) injection 6 mg (6 mg Intravenous Given 11/28/20 0540)   famotidine (PEPCID) injection 20 mg (20 mg Intravenous Given 11/28/20 0543)   sucralfate (CARAFATE) tablet 1 g (1 g Oral Given 11/28/20 0539)   atorvastatin (LIPITOR) tablet 80 mg (80 mg Oral Given 11/27/20 2047)   carvedilol (COREG) tablet 6.25 mg (6.25 mg Oral Given 11/28/20 5187)   levothyroxine (SYNTHROID) tablet 200 mcg  "(200 mcg Oral Given 11/28/20 0539)   nitroglycerin (NITRO-BID) 2 % ointment 0.5 Inch (0.5 Inches Topical Given 11/28/20 0539)   aspirin (ASA) chewable tab 81 mg (81 mg Oral Given 11/28/20 0539)       Pertinent Labs & Imaging studies reviewed. (See chart for details)  59 y.o. female with known history of COVID-19 diagnosis presenting with worsening symptoms over the past week since she was first diagnosed.  She notes that her boyfriend also was diagnosed with COVID-19 though he is still at home.  She notes that the chief concern is diffuse body aches and cough that are keeping her from sleeping.  Also notes some vomiting diarrhea.  Has a benign abdominal examination.  Laboratory studies are largely unremarkable.  Chest x-ray is consistent with Covid pneumonia.  She is requiring supplemental nasal cannula oxygen.  Oxygen saturation of 86% on room air.  Recommending hospitalization for further management.  Suspect that the patient's symptoms are a result of COVID-19 infection.  Will hospitalize for supplemental oxygen and continued management of coronavirus.  Spoke with the hospitalist who is agreeable to the hospitalization.    No obvious signs of bacterial infection requiring antibiotics at this time.  The suspicion is that the patient is suffering primarily from a viral illness, COVID-19.      /67   Pulse 72   Temp 36.4 °C (97.5 °F) (Temporal)   Resp 18   Ht 1.651 m (5' 5\")   Wt 95.2 kg (209 lb 14.1 oz)   SpO2 95%   BMI 34.93 kg/m²       FINAL IMPRESSION  COVID-19 pneumonia      Electronically signed by: Cory Newsome M.D., 11/27/2020 6:09 PM    "

## 2020-11-28 NOTE — ED NOTES
Med rec completed per Pt at bedside and historical from 11/23/2020. Pt reports no new medications. Pt reports not taking PM medications since Wednesday 11/25/2020 but took her morning medications this AM 11/27/2020. Pt unsure whether she takes diltiazem in AM or PM.  Allergies reviewed with Pt.  No oral antibiotics in last 14 days.    Pt takes ASPIRIN.

## 2020-11-28 NOTE — ED NOTES
Updated pt daughter per patient request. Pt given purse with cell phone. Pt denies other needs at this time. Call light in reach.

## 2020-11-28 NOTE — PROGRESS NOTES
[facility] Medicine Daily Progress Note    Date of Service  11/28/2020    Chief Complaint  59 y.o. female admitted 11/27/2020 with multiple complaints including shortness of breath, cough, nausea, vomiting, abdominal pain, diarrhea and chest pain.  Symptoms reportedly started 8 days prior to admission.  She was seen in the ER on 11/23/20 with the same complaints, and was tested positive for COVID-19.    She has a history of MI, vasospastic angina, coronary artery disease, dyslipidemia, essential hypertension, hypothyroidism.    Hospital Course  On admission she was saturating 86% on room air.  Heart rate was 122.  Procalcitonin was less than 0.05.  Troponin was negative x2.  EKG showed evidence of prior MI, with no clear signs of acute ischemia.  Chest x-ray showed diffuse interstitial prominence and patchy opacities in both lungs consistent with COVID-19 pneumonia.  She was started on Decadron.    Interval Problem Update  Saturating 97% on 4 LPM nasal cannula. HR and BP are stable.  GI symptoms have stabilized.    Consultants/Specialty  None    Code Status  Full Code    Disposition  Consider transfer to [facility]    Review of Systems  Review of Systems   Constitutional: Positive for malaise/fatigue.   HENT: Negative.    Respiratory: Negative.         Improved shortness of breath    Cardiovascular:        Improved chest discomfort   Gastrointestinal: Negative.    Genitourinary: Negative.    Musculoskeletal: Negative.    Skin: Negative.    Neurological: Negative.    Endo/Heme/Allergies: Negative.    Psychiatric/Behavioral: Negative.         Physical Exam  Temp:  [36.4 °C (97.5 °F)-37.3 °C (99.2 °F)] 36.4 °C (97.5 °F)  Pulse:  [] 72  Resp:  [16-18] 18  BP: (102-130)/(57-91) 106/67  SpO2:  [86 %-97 %] 95 %    Physical Exam  Constitutional:       Appearance: She is obese.   HENT:      Head: Normocephalic.      Nose: Nose normal.      Mouth/Throat:      Mouth: Mucous membranes are moist.   Eyes:      Pupils: Pupils are  equal, round, and reactive to light.   Neck:      Musculoskeletal: Normal range of motion.   Cardiovascular:      Rate and Rhythm: Normal rate.   Pulmonary:      Effort: Pulmonary effort is normal.   Abdominal:      Palpations: Abdomen is soft.   Musculoskeletal:      Right lower leg: No edema.      Left lower leg: No edema.   Skin:     General: Skin is warm.   Neurological:      General: No focal deficit present.      Mental Status: She is alert.   Psychiatric:         Mood and Affect: Mood normal.         Behavior: Behavior normal.         Fluids    Intake/Output Summary (Last 24 hours) at 11/28/2020 1117  Last data filed at 11/27/2020 2200  Gross per 24 hour   Intake --   Output 200 ml   Net -200 ml       Laboratory  Recent Labs     11/27/20  1707 11/28/20  0516   WBC 10.2 10.3   RBC 4.83 4.31   HEMOGLOBIN 15.1 12.9   HEMATOCRIT 45.7 40.9   MCV 94.6 94.9   MCH 31.3 29.9   MCHC 33.0* 31.5*   RDW 44.8 45.1   PLATELETCT 342 351   MPV 9.7 10.1     Recent Labs     11/27/20  1707 11/28/20  0516   SODIUM 134* 134*   POTASSIUM 3.9 4.1   CHLORIDE 101 102   CO2 25 24   GLUCOSE 135* 101*   BUN 16 18   CREATININE 0.97 0.78   CALCIUM 8.9 8.4*                   Imaging  DX-CHEST-PORTABLE (1 VIEW)   Final Result         Diffuse interstitial prominence and patchy opacities throughout both lungs, in keeping with Covid 19 pneumonia.           Assessment/Plan  Pneumonia due to COVID-19 virus  Assessment & Plan  Chest x-ray showed findings consistent with COVID-19 pneumonia.  Procalcitonin was not elevated.  Complete 10-day course of Decadron.  On contact/droplet/eye isolation.  Continue supportive care    Pain in the chest  Assessment & Plan  EKG showed evidence of previous inferior and lateral wall MI.  There were no definite signs of acute MI.  Troponin was negative x2.  Continue aspirin and metoprolol.    Gastroenteritis  Assessment & Plan  Suspected to be due to COVID-19.  Continue Pepcid, Zofran, and Sucralfate as  needed.    Hypothyroid- (present on admission)  Assessment & Plan  Continue home levothyroxine       VTE prophylaxis: Lovenox

## 2020-11-28 NOTE — ASSESSMENT & PLAN NOTE
EKG showed evidence of previous inferior and lateral wall MI.  There were no definite signs of acute MI.  Troponin was negative x2.  Continue aspirin and metoprolol.

## 2020-11-28 NOTE — ED NOTES
Pt brought to room by wheelchair from triage. Pt in gown & on monitor. Chart up for ERP. Call light in reach.

## 2020-11-28 NOTE — PROGRESS NOTES
Bedside report received, pt care assumed, tele box on.  Pt is asleep in bed, does not appear to be in distress, breathing is even and unlabored. Bed in lowest position, bed alarm on, and call light within reach.    Pt stated chest pain has relieved but headache is still present.

## 2020-11-29 PROBLEM — E66.811 OBESITY (BMI 30.0-34.9): Status: ACTIVE | Noted: 2020-11-29

## 2020-11-29 PROBLEM — J96.01 ACUTE HYPOXEMIC RESPIRATORY FAILURE DUE TO COVID-19 (HCC): Status: ACTIVE | Noted: 2020-11-27

## 2020-11-29 PROBLEM — E66.9 OBESITY (BMI 30.0-34.9): Status: ACTIVE | Noted: 2020-11-29

## 2020-11-29 PROBLEM — D72.829 LEUKOCYTOSIS: Status: ACTIVE | Noted: 2020-11-29

## 2020-11-29 PROBLEM — R07.9 PAIN IN THE CHEST: Status: RESOLVED | Noted: 2020-11-27 | Resolved: 2020-11-29

## 2020-11-29 LAB
ANION GAP SERPL CALC-SCNC: 7 MMOL/L (ref 7–16)
BACTERIA UR CULT: NORMAL
BASOPHILS # BLD AUTO: 0.1 % (ref 0–1.8)
BASOPHILS # BLD: 0.02 K/UL (ref 0–0.12)
BUN SERPL-MCNC: 14 MG/DL (ref 8–22)
CALCIUM SERPL-MCNC: 9 MG/DL (ref 8.5–10.5)
CHLORIDE SERPL-SCNC: 104 MMOL/L (ref 96–112)
CO2 SERPL-SCNC: 26 MMOL/L (ref 20–33)
CREAT SERPL-MCNC: 0.79 MG/DL (ref 0.5–1.4)
EOSINOPHIL # BLD AUTO: 0 K/UL (ref 0–0.51)
EOSINOPHIL NFR BLD: 0 % (ref 0–6.9)
ERYTHROCYTE [DISTWIDTH] IN BLOOD BY AUTOMATED COUNT: 45.8 FL (ref 35.9–50)
GLUCOSE SERPL-MCNC: 143 MG/DL (ref 65–99)
HCT VFR BLD AUTO: 41.5 % (ref 37–47)
HGB BLD-MCNC: 13.5 G/DL (ref 12–16)
IMM GRANULOCYTES # BLD AUTO: 0.12 K/UL (ref 0–0.11)
IMM GRANULOCYTES NFR BLD AUTO: 0.6 % (ref 0–0.9)
LYMPHOCYTES # BLD AUTO: 1.57 K/UL (ref 1–4.8)
LYMPHOCYTES NFR BLD: 8.4 % (ref 22–41)
MCH RBC QN AUTO: 31.2 PG (ref 27–33)
MCHC RBC AUTO-ENTMCNC: 32.5 G/DL (ref 33.6–35)
MCV RBC AUTO: 95.8 FL (ref 81.4–97.8)
MONOCYTES # BLD AUTO: 0.56 K/UL (ref 0–0.85)
MONOCYTES NFR BLD AUTO: 3 % (ref 0–13.4)
NEUTROPHILS # BLD AUTO: 16.53 K/UL (ref 2–7.15)
NEUTROPHILS NFR BLD: 87.9 % (ref 44–72)
NRBC # BLD AUTO: 0 K/UL
NRBC BLD-RTO: 0 /100 WBC
PLATELET # BLD AUTO: 426 K/UL (ref 164–446)
PMV BLD AUTO: 10.3 FL (ref 9–12.9)
POTASSIUM SERPL-SCNC: 4 MMOL/L (ref 3.6–5.5)
PROCALCITONIN SERPL-MCNC: 0.33 NG/ML
RBC # BLD AUTO: 4.33 M/UL (ref 4.2–5.4)
SIGNIFICANT IND 70042: NORMAL
SITE SITE: NORMAL
SODIUM SERPL-SCNC: 137 MMOL/L (ref 135–145)
SOURCE SOURCE: NORMAL
WBC # BLD AUTO: 18.8 K/UL (ref 4.8–10.8)

## 2020-11-29 PROCEDURE — 770014 HCHG ROOM/CARE - WARD (150)

## 2020-11-29 PROCEDURE — 700102 HCHG RX REV CODE 250 W/ 637 OVERRIDE(OP): Performed by: INTERNAL MEDICINE

## 2020-11-29 PROCEDURE — 36415 COLL VENOUS BLD VENIPUNCTURE: CPT

## 2020-11-29 PROCEDURE — 84145 PROCALCITONIN (PCT): CPT

## 2020-11-29 PROCEDURE — 700102 HCHG RX REV CODE 250 W/ 637 OVERRIDE(OP): Performed by: HOSPITALIST

## 2020-11-29 PROCEDURE — A9270 NON-COVERED ITEM OR SERVICE: HCPCS | Performed by: INTERNAL MEDICINE

## 2020-11-29 PROCEDURE — 85025 COMPLETE CBC W/AUTO DIFF WBC: CPT

## 2020-11-29 PROCEDURE — A9270 NON-COVERED ITEM OR SERVICE: HCPCS | Performed by: HOSPITALIST

## 2020-11-29 PROCEDURE — 99232 SBSQ HOSP IP/OBS MODERATE 35: CPT | Performed by: INTERNAL MEDICINE

## 2020-11-29 PROCEDURE — 700111 HCHG RX REV CODE 636 W/ 250 OVERRIDE (IP): Performed by: INTERNAL MEDICINE

## 2020-11-29 PROCEDURE — 80048 BASIC METABOLIC PNL TOTAL CA: CPT

## 2020-11-29 RX ORDER — DEXAMETHASONE 4 MG/1
6 TABLET ORAL DAILY
Status: DISCONTINUED | OUTPATIENT
Start: 2020-11-30 | End: 2020-11-30 | Stop reason: HOSPADM

## 2020-11-29 RX ORDER — FAMOTIDINE 20 MG/1
20 TABLET, FILM COATED ORAL 2 TIMES DAILY
Status: DISCONTINUED | OUTPATIENT
Start: 2020-11-29 | End: 2020-11-29

## 2020-11-29 RX ADMIN — ENOXAPARIN SODIUM 40 MG: 40 INJECTION SUBCUTANEOUS at 04:39

## 2020-11-29 RX ADMIN — ASPIRIN 81 MG CHEWABLE TABLET 81 MG: 81 TABLET CHEWABLE at 08:31

## 2020-11-29 RX ADMIN — DEXAMETHASONE SODIUM PHOSPHATE 6 MG: 4 INJECTION, SOLUTION INTRA-ARTICULAR; INTRALESIONAL; INTRAMUSCULAR; INTRAVENOUS; SOFT TISSUE at 04:39

## 2020-11-29 RX ADMIN — FAMOTIDINE 20 MG: 10 INJECTION, SOLUTION INTRAVENOUS at 04:39

## 2020-11-29 RX ADMIN — ACETAMINOPHEN 650 MG: 325 TABLET, FILM COATED ORAL at 04:43

## 2020-11-29 RX ADMIN — CARVEDILOL 6.25 MG: 6.25 TABLET, FILM COATED ORAL at 17:07

## 2020-11-29 RX ADMIN — OXYCODONE HYDROCHLORIDE 5 MG: 5 TABLET ORAL at 14:52

## 2020-11-29 RX ADMIN — ACETAMINOPHEN 650 MG: 325 TABLET, FILM COATED ORAL at 23:07

## 2020-11-29 RX ADMIN — LEVOTHYROXINE SODIUM 200 MCG: 0.2 TABLET ORAL at 08:31

## 2020-11-29 RX ADMIN — CARVEDILOL 6.25 MG: 6.25 TABLET, FILM COATED ORAL at 08:31

## 2020-11-29 ASSESSMENT — PAIN DESCRIPTION - PAIN TYPE
TYPE: ACUTE PAIN
TYPE: ACUTE PAIN

## 2020-11-29 ASSESSMENT — ENCOUNTER SYMPTOMS
NEUROLOGICAL NEGATIVE: 1
DOUBLE VISION: 0
FOCAL WEAKNESS: 0
VOMITING: 0
PSYCHIATRIC NEGATIVE: 1
CHILLS: 0
DIZZINESS: 0
NAUSEA: 0
HEADACHES: 0
BLURRED VISION: 0
MUSCULOSKELETAL NEGATIVE: 1
SHORTNESS OF BREATH: 1
DEPRESSION: 0
GASTROINTESTINAL NEGATIVE: 1
ABDOMINAL PAIN: 0
PHOTOPHOBIA: 0
COUGH: 1
HEMOPTYSIS: 0
FEVER: 0
WEAKNESS: 0
SPUTUM PRODUCTION: 0
PALPITATIONS: 0
HEARTBURN: 0

## 2020-11-29 ASSESSMENT — LIFESTYLE VARIABLES: SUBSTANCE_ABUSE: 0

## 2020-11-29 NOTE — ASSESSMENT & PLAN NOTE
As per chart review the patient has a hx of CAD  Continue home meds of coreg and aspirin  The patient was initially admitted due to chest pain, however she is not complaining of any chest pain at the moment and ekg an troponins were negative for acute MI

## 2020-11-29 NOTE — ASSESSMENT & PLAN NOTE
This is most likely due to decadron  There are no other signs of bacterial infection, there is no need to start antibiotic treatment at this time.  We have ordered procalcitonin, we are awaiting results

## 2020-11-29 NOTE — PROGRESS NOTES
Patient is a very agreeable 59-year-old female was transferred from telemetry floor.  Patient symptoms started on 11/19/2020.  Patient patient be noted does have a significant history of recurrent coronary vasospasm she follows closely with cardiology.  She is alert and oriented x4 no apparent distress on supplemental oxygen incentive spirometer at bedside.  1.  Decadron for total of 10 days  2.  Incentive spirometer use  3.  Self proning  4.  Up out of bed to chair for every meal  5.  Ambulation as tolerated.

## 2020-11-29 NOTE — PROGRESS NOTES
[facility] Medicine Daily Progress Note    Date of Service  11/29/2020    Chief Complaint  59 y.o. female admitted 11/27/2020 with multiple complaints including shortness of breath, cough, nausea, vomiting, abdominal pain, diarrhea and chest pain.  Symptoms reportedly started 8 days prior to admission.  She was seen in the ER on 11/23/20 with the same complaints, and was tested positive for COVID-19.    She has a history of MI, vasospastic angina, coronary artery disease, dyslipidemia, essential hypertension, hypothyroidism.    Hospital Course  On admission she was saturating 86% on room air.  Heart rate was 122.  Procalcitonin was less than 0.05.  Troponin was negative x2.  EKG showed evidence of prior MI, with no clear signs of acute ischemia.  Chest x-ray showed diffuse interstitial prominence and patchy opacities in both lungs consistent with COVID-19 pneumonia.  She was started on Decadron.    The patient was transferred from telemetry to the ACS yesterday.    Interval Problem Update  11/29: The patient was seen at bedside this morning, afebrile not complaining of any symptom. The patient mentions she feels better. She is now using 2L of NC, if she continues with <4 L NC, we could probably discharge within the next 24-48 hours.    Consultants/Specialty  None    Code Status  Full Code    Disposition  Continue monitoring in the ACS, most likely discharge within the next 24-48 hours. It is unlikely this patient will need home health at discharge.    Review of Systems  Review of Systems   Constitutional: Positive for malaise/fatigue. Negative for chills and fever.   HENT: Negative.  Negative for ear pain, hearing loss and tinnitus.    Eyes: Negative for blurred vision, double vision and photophobia.   Respiratory: Positive for cough and shortness of breath. Negative for hemoptysis and sputum production.         Improved shortness of breath    Cardiovascular: Negative for chest pain, palpitations and leg swelling.         Improved chest discomfort   Gastrointestinal: Negative.  Negative for abdominal pain, heartburn, nausea and vomiting.   Genitourinary: Negative.  Negative for dysuria, frequency and urgency.   Musculoskeletal: Negative.    Skin: Negative.    Neurological: Negative.  Negative for dizziness, focal weakness, weakness and headaches.   Endo/Heme/Allergies: Negative.    Psychiatric/Behavioral: Negative.  Negative for depression, substance abuse and suicidal ideas.        Physical Exam  Temp:  [36.1 °C (96.9 °F)-36.7 °C (98 °F)] 36.2 °C (97.2 °F)  Pulse:  [56-93] 59  Resp:  [18-20] 18  BP: ()/(56-65) 112/63  SpO2:  [91 %-94 %] 94 %    Physical Exam  Constitutional:       Appearance: She is obese.   HENT:      Head: Normocephalic.      Nose: Nose normal.      Mouth/Throat:      Mouth: Mucous membranes are moist.   Eyes:      Pupils: Pupils are equal, round, and reactive to light.   Neck:      Musculoskeletal: Normal range of motion.   Cardiovascular:      Rate and Rhythm: Normal rate.   Pulmonary:      Effort: Pulmonary effort is normal.   Abdominal:      Palpations: Abdomen is soft.   Musculoskeletal:      Right lower leg: No edema.      Left lower leg: No edema.   Skin:     General: Skin is warm.   Neurological:      General: No focal deficit present.      Mental Status: She is alert.   Psychiatric:         Mood and Affect: Mood normal.         Behavior: Behavior normal.         Fluids    Intake/Output Summary (Last 24 hours) at 11/29/2020 1200  Last data filed at 11/28/2020 1700  Gross per 24 hour   Intake 480 ml   Output --   Net 480 ml       Laboratory  Recent Labs     11/27/20  1707 11/28/20  0516 11/29/20  0827   WBC 10.2 10.3 18.8*   RBC 4.83 4.31 4.33   HEMOGLOBIN 15.1 12.9 13.5   HEMATOCRIT 45.7 40.9 41.5   MCV 94.6 94.9 95.8   MCH 31.3 29.9 31.2   MCHC 33.0* 31.5* 32.5*   RDW 44.8 45.1 45.8   PLATELETCT 342 351 426   MPV 9.7 10.1 10.3     Recent Labs     11/27/20  1707 11/28/20  0516 11/29/20  0827    SODIUM 134* 134* 137   POTASSIUM 3.9 4.1 4.0   CHLORIDE 101 102 104   CO2 25 24 26   GLUCOSE 135* 101* 143*   BUN 16 18 14   CREATININE 0.97 0.78 0.79   CALCIUM 8.9 8.4* 9.0                   Imaging  DX-CHEST-PORTABLE (1 VIEW)   Final Result         Diffuse interstitial prominence and patchy opacities throughout both lungs, in keeping with Covid 19 pneumonia.           Assessment/Plan  Acute hypoxemic respiratory failure due to COVID-19 (HCC)  Assessment & Plan  Chest x-ray showed findings consistent with COVID-19 pneumonia.  Procalcitonin was not elevated.  Complete 10-day course of Decadron.  On contact/droplet/eye isolation.  Continue supportive care    Leukocytosis  Assessment & Plan  This is most likely due to decadron  There are no other signs of bacterial infection, there is no need to start antibiotic treatment at this time.  We have ordered procalcitonin, we are awaiting results    Obesity (BMI 30.0-34.9)  Assessment & Plan  Patient will need close follow up as outpatient.  Patient was counseled on the importance of diet and exercise.    Gastroenteritis  Assessment & Plan  Suspected to be due to COVID-19.  Continue Pepcid, Zofran, and Sucralfate as needed.    Coronary artery disease due to calcified coronary lesion: History of small diagonal occlusion without other significant disease.- (present on admission)  Assessment & Plan  As per chart review the patient has a hx of CAD  Continue home meds of coreg and aspirin  The patient was initially admitted due to chest pain, however she is not complaining of any chest pain at the moment and ekg an troponins were negative for acute MI    Hypothyroid- (present on admission)  Assessment & Plan  Continue home levothyroxine       VTE prophylaxis: Lovenox

## 2020-11-29 NOTE — ASSESSMENT & PLAN NOTE
Patient will need close follow up as outpatient.  Patient was counseled on the importance of diet and exercise.

## 2020-11-29 NOTE — PROGRESS NOTES
Patient transferred to the unit from the 7th floor. Patient A&O x 4, in NAD, VS stable. Patient educated about the unit, use of IS, ambulation and self proning, patient verbalized understanding. Will continue to monitor.

## 2020-11-30 ENCOUNTER — APPOINTMENT (OUTPATIENT)
Dept: RADIOLOGY | Facility: MEDICAL CENTER | Age: 60
DRG: 177 | End: 2020-11-30
Attending: INTERNAL MEDICINE
Payer: COMMERCIAL

## 2020-11-30 VITALS
DIASTOLIC BLOOD PRESSURE: 51 MMHG | HEART RATE: 67 BPM | TEMPERATURE: 97.5 F | RESPIRATION RATE: 18 BRPM | SYSTOLIC BLOOD PRESSURE: 102 MMHG | HEIGHT: 65 IN | WEIGHT: 209.6 LBS | BODY MASS INDEX: 34.92 KG/M2 | OXYGEN SATURATION: 89 %

## 2020-11-30 LAB
ALBUMIN SERPL BCP-MCNC: 3.3 G/DL (ref 3.2–4.9)
ALBUMIN/GLOB SERPL: 1 G/DL
ALP SERPL-CCNC: 118 U/L (ref 30–99)
ALT SERPL-CCNC: 16 U/L (ref 2–50)
ANION GAP SERPL CALC-SCNC: 7 MMOL/L (ref 7–16)
AST SERPL-CCNC: 11 U/L (ref 12–45)
BASOPHILS # BLD AUTO: 0.1 % (ref 0–1.8)
BASOPHILS # BLD: 0.01 K/UL (ref 0–0.12)
BILIRUB SERPL-MCNC: 0.3 MG/DL (ref 0.1–1.5)
BUN SERPL-MCNC: 18 MG/DL (ref 8–22)
CALCIUM SERPL-MCNC: 9.3 MG/DL (ref 8.5–10.5)
CHLORIDE SERPL-SCNC: 104 MMOL/L (ref 96–112)
CO2 SERPL-SCNC: 29 MMOL/L (ref 20–33)
CREAT SERPL-MCNC: 0.77 MG/DL (ref 0.5–1.4)
EOSINOPHIL # BLD AUTO: 0 K/UL (ref 0–0.51)
EOSINOPHIL NFR BLD: 0 % (ref 0–6.9)
ERYTHROCYTE [DISTWIDTH] IN BLOOD BY AUTOMATED COUNT: 46.9 FL (ref 35.9–50)
GLOBULIN SER CALC-MCNC: 3.2 G/DL (ref 1.9–3.5)
GLUCOSE SERPL-MCNC: 124 MG/DL (ref 65–99)
HCT VFR BLD AUTO: 43.7 % (ref 37–47)
HGB BLD-MCNC: 14.1 G/DL (ref 12–16)
IMM GRANULOCYTES # BLD AUTO: 0.15 K/UL (ref 0–0.11)
IMM GRANULOCYTES NFR BLD AUTO: 1.1 % (ref 0–0.9)
LYMPHOCYTES # BLD AUTO: 1.34 K/UL (ref 1–4.8)
LYMPHOCYTES NFR BLD: 9.6 % (ref 22–41)
MCH RBC QN AUTO: 30.8 PG (ref 27–33)
MCHC RBC AUTO-ENTMCNC: 32.3 G/DL (ref 33.6–35)
MCV RBC AUTO: 95.4 FL (ref 81.4–97.8)
MONOCYTES # BLD AUTO: 0.53 K/UL (ref 0–0.85)
MONOCYTES NFR BLD AUTO: 3.8 % (ref 0–13.4)
NEUTROPHILS # BLD AUTO: 11.98 K/UL (ref 2–7.15)
NEUTROPHILS NFR BLD: 85.4 % (ref 44–72)
NRBC # BLD AUTO: 0 K/UL
NRBC BLD-RTO: 0 /100 WBC
PLATELET # BLD AUTO: 502 K/UL (ref 164–446)
PMV BLD AUTO: 10.2 FL (ref 9–12.9)
POTASSIUM SERPL-SCNC: 4.2 MMOL/L (ref 3.6–5.5)
PROT SERPL-MCNC: 6.5 G/DL (ref 6–8.2)
RBC # BLD AUTO: 4.58 M/UL (ref 4.2–5.4)
SODIUM SERPL-SCNC: 140 MMOL/L (ref 135–145)
WBC # BLD AUTO: 14 K/UL (ref 4.8–10.8)

## 2020-11-30 PROCEDURE — A9270 NON-COVERED ITEM OR SERVICE: HCPCS | Performed by: INTERNAL MEDICINE

## 2020-11-30 PROCEDURE — 700102 HCHG RX REV CODE 250 W/ 637 OVERRIDE(OP): Performed by: HOSPITALIST

## 2020-11-30 PROCEDURE — 700111 HCHG RX REV CODE 636 W/ 250 OVERRIDE (IP): Performed by: INTERNAL MEDICINE

## 2020-11-30 PROCEDURE — A9270 NON-COVERED ITEM OR SERVICE: HCPCS | Performed by: HOSPITALIST

## 2020-11-30 PROCEDURE — 36415 COLL VENOUS BLD VENIPUNCTURE: CPT

## 2020-11-30 PROCEDURE — 80053 COMPREHEN METABOLIC PANEL: CPT

## 2020-11-30 PROCEDURE — 85025 COMPLETE CBC W/AUTO DIFF WBC: CPT

## 2020-11-30 PROCEDURE — 700102 HCHG RX REV CODE 250 W/ 637 OVERRIDE(OP): Performed by: INTERNAL MEDICINE

## 2020-11-30 PROCEDURE — 94760 N-INVAS EAR/PLS OXIMETRY 1: CPT

## 2020-11-30 PROCEDURE — 99239 HOSP IP/OBS DSCHRG MGMT >30: CPT | Performed by: INTERNAL MEDICINE

## 2020-11-30 PROCEDURE — 71045 X-RAY EXAM CHEST 1 VIEW: CPT

## 2020-11-30 RX ORDER — DEXAMETHASONE 6 MG/1
6 TABLET ORAL DAILY
Qty: 6 TAB | Refills: 0 | Status: SHIPPED | OUTPATIENT
Start: 2020-12-01 | End: 2020-12-07

## 2020-11-30 RX ADMIN — CARVEDILOL 6.25 MG: 6.25 TABLET, FILM COATED ORAL at 05:46

## 2020-11-30 RX ADMIN — ASPIRIN 81 MG CHEWABLE TABLET 81 MG: 81 TABLET CHEWABLE at 05:45

## 2020-11-30 RX ADMIN — ENOXAPARIN SODIUM 40 MG: 40 INJECTION SUBCUTANEOUS at 05:46

## 2020-11-30 RX ADMIN — DEXAMETHASONE 6 MG: 4 TABLET ORAL at 05:46

## 2020-11-30 RX ADMIN — LEVOTHYROXINE SODIUM 200 MCG: 0.2 TABLET ORAL at 05:47

## 2020-11-30 NOTE — DISCHARGE SUMMARY
Discharge Summary    CHIEF COMPLAINT ON ADMISSION  Chief Complaint   Patient presents with   • Shortness of Breath     x2 days   • Chest Pain     x2 days   • Cough     x8 days; pt tested POSITIVE FOR COVID on 11/23   • Nausea/Vomiting/Diarrhea     x3 days       Reason for Admission  Cough; SOB; Chest Pain      Admission Date  11/27/2020    CODE STATUS  Full Code    HPI & HOSPITAL COURSE  59 y.o. female admitted 11/27/2020 with multiple complaints including shortness of breath, cough, nausea, vomiting, abdominal pain, diarrhea and chest pain.  Symptoms reportedly started 8 days prior to admission.  She was seen in the ER on 11/23/20 with the same complaints, and was tested positive for COVID-19.     She has a history of MI, vasospastic angina, coronary artery disease, dyslipidemia, essential hypertension, hypothyroidism.    On admission she was saturating 86% on room air.  Heart rate was 122.  Procalcitonin was less than 0.05.  Troponin was negative x2.  EKG showed evidence of prior MI, with no clear signs of acute ischemia.  Chest x-ray showed diffuse interstitial prominence and patchy opacities in both lungs consistent with COVID-19 pneumonia.  She was started on Decadron.    Patient was initially admitted to telemetry however after negative troponins and chest pain resolution, patient was transferred to ACS for further monitoring. The patient was started on decadron to complete a 10 day course of treatment. The patient will be discharged today with home oxygen as she has consistently needed less than 4L on NC. The patient will also be discharged with home monitoring. There is leukocytosis trending down, most likely due to decadron treatment. There was a minimally elevated procalcitonin, we ordered Chest X-ray today before discharge and it actually showed improvement from prior exam.She will follow up with PCP in 2 weeks.       Therefore, she is discharged in fair and stable condition to home with close outpatient  follow-up.    The patient met 2-midnight criteria for an inpatient stay at the time of discharge.    Discharge Date  11/30/2020    FOLLOW UP ITEMS POST DISCHARGE  The patient will be discharged today with home oxygen and home monitoring. She will finish a 10 day course of decadron and follow up PCP in 2 weeks.    DISCHARGE DIAGNOSES  Active Problems:    Acute hypoxemic respiratory failure due to COVID-19 (HCC) POA: Unknown    Hypothyroid POA: Yes    Coronary artery disease due to calcified coronary lesion: History of small diagonal occlusion without other significant disease. POA: Yes    Gastroenteritis POA: Unknown    Obesity (BMI 30.0-34.9) POA: Unknown    Leukocytosis POA: Unknown  Resolved Problems:    Pain in the chest POA: Unknown      FOLLOW UP  Future Appointments   Date Time Provider Department Center   12/1/2020 11:30 AM [facility]   12/2/2020 11:30 AM [facility]   12/3/2020 11:30 AM [facility]   12/4/2020 11:30 AM [facility]   12/5/2020 11:30 AM [facility]   12/6/2020 11:30 AM [facility]   12/7/2020 11:30 AM [facility]   12/8/2020 11:30 AM [facility]   12/9/2020 11:30 AM [facility]   12/10/2020 11:30 AM [facility]     Jagruti Stewart D.O.  [facility] [address] [address]  [phone]    In 2 weeks        MEDICATIONS ON DISCHARGE     Medication List      START taking these medications      Instructions   dexamethasone 6 MG Tabs  Start taking on: December 1, 2020  Commonly known as: DECADRON   Take 1 Tab by mouth every day for 6 days.  Dose: 6 mg        CHANGE how you take these medications      Instructions   FLUoxetine 10 MG Caps  What changed: when to take this  Commonly known as: PROZAC   Take 1 Cap by mouth every day.  Dose: 10 mg     Synthroid 200 MCG Tabs  What changed: See the new  instructions.  Generic drug: levothyroxine   TAKE 1 TABLET EVERY MORNINGON AN EMPTY STOMACH        CONTINUE taking these medications      Instructions   acetaminophen 500 MG Tabs  Commonly known as: TYLENOL   Take 1,000 mg by mouth every 6 hours as needed for Mild Pain or Moderate Pain.  Dose: 1,000 mg     aspirin 81 MG Chew chewable tablet  Commonly known as: ASA   Chew 81 mg every morning.  Dose: 81 mg     atorvastatin 80 MG tablet  Commonly known as: LIPITOR   Take 1 Tab by mouth every evening.  Dose: 80 mg     carvedilol 6.25 MG Tabs  Commonly known as: COREG   Take 1 Tab by mouth 2 times a day, with meals.  Dose: 6.25 mg     clobetasol 0.05 % Oint  Commonly known as: TEMOVATE   Apply 1 Application to affected area(s) 2 times a day.  Dose: 1 Application     diltiazem 180 MG XR capsule  Commonly known as: DILACOR XR   Take 1 Cap by mouth every day.  Dose: 180 mg     ibuprofen 200 MG Tabs  Commonly known as: MOTRIN   Take 400 mg by mouth every 6 hours as needed for Mild Pain.  Dose: 400 mg     multivitamin Tabs   Take 1 Tab by mouth every morning.  Dose: 1 Tab     nitroGLYCERIN 0.3 MG SL tablet  Commonly known as: NITROSTAT   Place 1 Tab under tongue as needed for Chest Pain.  Dose: 0.3 mg     pantoprazole 40 MG Tbec  Commonly known as: PROTONIX   Take 40 mg by mouth every morning.  Dose: 40 mg        STOP taking these medications    amLODIPine 5 MG Tabs  Commonly known as: NORVASC     omeprazole 40 MG delayed-release capsule  Commonly known as: PRILOSEC            Allergies  Allergies   Allergen Reactions   • Inapsine [Droperidol] Anaphylaxis       DIET  Orders Placed This Encounter   Procedures   • Diet Order Diet: Regular     Standing Status:   Standing     Number of Occurrences:   1     Order Specific Question:   Diet:     Answer:   Regular [1]       ACTIVITY  As tolerated.  Weight bearing as tolerated    CONSULTATIONS  None    PROCEDURES  None    LABORATORY  Lab Results   Component Value Date    SODIUM 140  11/30/2020    POTASSIUM 4.2 11/30/2020    CHLORIDE 104 11/30/2020    CO2 29 11/30/2020    GLUCOSE 124 (H) 11/30/2020    BUN 18 11/30/2020    CREATININE 0.77 11/30/2020        Lab Results   Component Value Date    WBC 14.0 (H) 11/30/2020    HEMOGLOBIN 14.1 11/30/2020    HEMATOCRIT 43.7 11/30/2020    PLATELETCT 502 (H) 11/30/2020        Total time of the discharge process exceeds 35 minutes.

## 2020-11-30 NOTE — DISCHARGE PLANNING
Spoke with patient at bedside regarding home O2 monitoring. Patient agreeable to monitoring. Patient has smart phone, My chart and Zoom. Larisa COPD educator updated.

## 2020-11-30 NOTE — DISCHARGE PLANNING
@8590  Agency/Facility Name: [facility]  Spoke To: Intake  Outcome: Accepted.    Received Choice form at 1350  Agency/Facility Name: [facility]  Referral sent per Choice form @ 7438

## 2020-11-30 NOTE — RESPIRATORY CARE
REMOTE MONITORING PROGRAM by COPD CLINICAL EDUCATOR  11/30/2020 at 1:46 PM by Larisa Llamas, RRT     Patient interviewed by COPD Team. Patient agrees to Remote Monitoring program. Device instruction performed with welcome packet, consent signed and placed at bedside chart. Patient instructed on how to use MyChart and Zoom. No questions at this time.

## 2020-11-30 NOTE — FACE TO FACE
"Face to Face Note  -  Durable Medical Equipment    Alfred Bull M.D. - NPI: [id number]  I certify that this patient is under my care and that they had a durable medical equipment(DME)face to face encounter by myself that meets the physician DME face-to-face encounter requirements with this patient on:    Date of encounter:   Patient:                    MRN:                       YOB: 2019  Priscilla Childress  [id number]  1960     The encounter with the patient was in whole, or in part, for the following medical condition, which is the primary reason for durable medical equipment:  Other - COVID-19 infection    I certify that, based on my findings, the following durable medical equipment is medically necessary:  Oxygen.    HOME O2 Saturation Measurements:(Values must be present for Home Oxygen orders)  Room air sat at rest: 88  Room air sat with amb: 86  With liters of O2: .5, O2 sat at rest with O2: .5  With Liters of O2: 2, O2 sat with amb with O2 : 90  Is the patient mobile?: Yes    My Clinical findings support the need for the above equipment due to:  Hypoxia    Supporting Symptoms: The patient requires supplemental oxygen, as the following interventions have been tried with limited or no improvement: \"Ambulation with oximetry    If patient feels more short of breath, they can go up to 6 liters per minute and contact healthcare provider.  "

## 2020-11-30 NOTE — DISCHARGE PLANNING
Received request for home O2. Spoke with patient at bedside. Choice form filled out with verbal consent. Choice form faxed to [facility].

## 2020-12-01 ENCOUNTER — TELEMEDICINE (OUTPATIENT)
Dept: URGENT CARE | Facility: CLINIC | Age: 60
End: 2020-12-01

## 2020-12-01 VITALS — TEMPERATURE: 97.8 F | OXYGEN SATURATION: 94 % | RESPIRATION RATE: 23 BRPM | HEART RATE: 67 BPM

## 2020-12-01 DIAGNOSIS — U07.1 PNEUMONIA DUE TO COVID-19 VIRUS: ICD-10-CM

## 2020-12-01 DIAGNOSIS — J12.82 PNEUMONIA DUE TO COVID-19 VIRUS: ICD-10-CM

## 2020-12-01 PROCEDURE — 99202 OFFICE O/P NEW SF 15 MIN: CPT | Performed by: EMERGENCY MEDICINE

## 2020-12-01 ASSESSMENT — ENCOUNTER SYMPTOMS: COUGH: 1

## 2020-12-01 NOTE — PROGRESS NOTES
Subjective:      Magda Childress is a 59 y.o. female who presents with Pneumonia    This evaluation was conducted via Zoom using secure and encrypted videoconferencing technology. The patient was in a private location in the state [facility].    The patient's identity was confirmed and verbal consent was obtained for this virtual visit.    Home Monitoring Patient Triage  COVID-19 Monitoring Level: Home with basic monitoring       [facility] Flowsheet   1. Record COVID-19 Severity Index (qCSI):  1  2.Confirm appropriate patient and chart with two identifiers: Yes - continue to question #2   3. Does patient have another adult at home to help take care of you: Yes - continue to question #3  4. Confirm O2 supply is working and there are no cannula problems: N/A  5. Is your breathing today better or worse? Better - continue to question #5  6. Are you able to tolerate fluids? Yes - Continue to question #6  7. Any vomiting or diarrhea in the last 24 hours? No - continue to question #7  8. Are you able to control your fevers? Yes - continue to question #8  9. Are you able to control your cough? Yes - continue to question #9  10. Review ER precautions: present to ED or call 911 if experiencing severe shortness of breath:    11. Confirm next VV: Appointment scheduled  12. Final disposition: Stable at home  13. Time Spent: 20          Pneumonia  She complains of cough. Episode onset: 11/23/20. The problem has been gradually improving.   Notes did not start O2; tolerated without significant desaturations.  Did not start Rx dexamethasone.    Review of Systems   Respiratory: Positive for cough.      Past Medical History:   Diagnosis Date   • History of myocardial infarction     x5; cardiac caths only, no stents placed   • Hyperlipidemia    • Hypertension    • Thyroid disease      Past Surgical History:   Procedure Laterality Date   • CHOLECYSTECTOMY     • THYROIDECTOMY TOTAL        Allergy:  Inapsine [droperidol]      Current Outpatient Medications:   •  acetaminophen, 1,000 mg, Oral, Q6HRS PRN, PRN  •  ibuprofen, 400 mg, Oral, Q6HRS PRN, PRN  •  Synthroid, TAKE 1 TABLET EVERY MORNINGON AN EMPTY STOMACH, Taking  •  diltiazem, 180 mg, Oral, DAILY, Taking  •  carvedilol, 6.25 mg, Oral, BID WITH MEALS, Taking  •  atorvastatin, 80 mg, Oral, Q EVENING, Taking  •  FLUoxetine, 10 mg, Oral, DAILY (Patient taking differently: 10 mg, Oral, EVERY MORNING), Taking  •  nitroGLYCERIN, 0.3 mg, Sublingual, PRN, PRN  •  clobetasol, 1 Application, Topical, BID, PRN  •  aspirin, 81 mg, Oral, QAM, Taking  •  dexamethasone, 6 mg, Oral, DAILY (Patient not taking: Reported on 12/1/2020), Not Taking  •  pantoprazole, 40 mg, Oral, QAM, Not Taking  •  multivitamin, 1 Tab, Oral, QAM, Not Taking   family history includes Alzheimer's Disease in her mother; Heart Disease (age of onset: 70) in her father.   Social History     Tobacco Use   • Smoking status: Never Smoker   • Smokeless tobacco: Never Used   Substance Use Topics   • Alcohol use: Not Currently   • Drug use: No         Objective:     Pulse 67   Temp 36.6 °C (97.8 °F)   Resp (!) 23   SpO2 94%      Physical Exam    Constitutional:       General: Awake.      Appearance: Not toxic-appearing.   HENT:      Nose: No signs of injury.      Mouth/Throat: Voice clear.     Lips: Pink.   Eyes:      General: Lids are normal.      Conjunctiva/sclera: Conjunctivae normal.   Neck:      Trachea: Phonation normal.   Pulmonary:      Effort: Pulmonary effort is normal.   Skin:     Coloration: Skin is not cyanotic or pale.   Neurological:      Mental Status: Alert and oriented to person, place, and time.   Psychiatric:         Mood and Affect: Mood and affect normal.         Speech: Speech normal.         Behavior: Behavior is cooperative.             Assessment/Plan:        1. Pneumonia due to COVID-19 virus  Continue RPM  Will continue off O2 today as tolerated

## 2020-12-01 NOTE — PROGRESS NOTES
Discharging patient home. Verbalized understanding of discharge instructions, follow up appointments, and home care. All questions answered.  Belongings with patient at time of discharge.  Vitals signs WNL. Pt given discharge information. Discharge assessment completed. Pt received home oxygen

## 2020-12-01 NOTE — DISCHARGE INSTRUCTIONS
- Discharge Instructions to Pt:  · Follow up with your Primary Care Physician  In 2 weeks  · Be compliant with your follow up appointments.  · Please be compliant with your medications, including new ones.  · A new medication has been given please take as advised.  · Keep blood pressure controlled and be compliant to keep systolic blood pressure <140.  · Please adhere to a healthy diet, that consist of low fat, low salt, low calorie.  · Weight loss and physical activity as tolerated is encouraged.   · Ambulate with assistance.  ·  If there any signs or symptoms of worsening or symptoms recurring, please call your Primary Care Physician or return to Emergency Department for further assessment.  · Avoid sudden changes in position, like standing up quickly.  · Do not drive until cleared by PCP.  · Refrain from drinking alcohol and smoking.     Recommendation to PCP:  · Your pt will need close monitoring.  · If  failure to respond to therapy, we suggest having patient return for further care, or if cannot be treated as an outpatient, return to ED if worsening of symptoms.  · Please make certain your pt follows up with specialist appointments  · Ensure patient adheres to diet and lifestyle modifications and reconcile.  · Please note the change to medication and reconcile.  · Patient without progression of symptoms. Prognosis and risk factors discussed in detail with patient and she understands.        Acute Respiratory Failure, Adult    Acute respiratory failure occurs when there is not enough oxygen passing from your lungs to your body. When this happens, your lungs have trouble removing carbon dioxide from the blood. This causes your blood oxygen level to drop too low as carbon dioxide builds up.  Acute respiratory failure is a medical emergency. It can develop quickly, but it is temporary if treated promptly. Your lung capacity, or how much air your lungs can hold, may improve with time, exercise, and  treatment.  What are the causes?  There are many possible causes of acute respiratory failure, including:  · Lung injury.  · Chest injury or damage to the ribs or tissues near the lungs.  · Lung conditions that affect the flow of air and blood into and out of the lungs, such as pneumonia, acute respiratory distress syndrome, and cystic fibrosis.  · Medical conditions, such as strokes or spinal cord injuries, that affect the muscles and nerves that control breathing.  · Blood infection (sepsis).  · Inflammation of the pancreas (pancreatitis).  · A blood clot in the lungs (pulmonary embolism).  · A large-volume blood transfusion.  · Burns.  · Near-drowning.  · Seizure.  · Smoke inhalation.  · Reaction to medicines.  · Alcohol or drug overdose.  What increases the risk?  This condition is more likely to develop in people who have:  · A blocked airway.  · Asthma.  · A condition or disease that damages or weakens the muscles, nerves, bones, or tissues that are involved in breathing.  · A serious infection.  · A health problem that blocks the unconscious reflex that is involved in breathing, such as hypothyroidism or sleep apnea.  · A lung injury or trauma.  What are the signs or symptoms?  Trouble breathing is the main symptom of acute respiratory failure. Symptoms may also include:  · Rapid breathing.  · Restlessness or anxiety.  · Skin, lips, or fingernails that appear blue (cyanosis).  · Rapid heart rate.  · Abnormal heart rhythms (arrhythmias).  · Confusion or changes in behavior.  · Tiredness or loss of energy.  · Feeling sleepy or having a loss of consciousness.  How is this diagnosed?  Your health care provider can diagnose acute respiratory failure with a medical history and physical exam. During the exam, your health care provider will listen to your heart and check for crackling or wheezing sounds in your lungs. Your may also have tests to confirm the diagnosis and determine what is causing respiratory failure.  These tests may include:  · Measuring the amount of oxygen in your blood (pulse oximetry). The measurement comes from a small device that is placed on your finger, earlobe, or toe.  · Other blood tests to measure blood gases and to look for signs of infection.  · Sampling your cerebral spinal fluid or tracheal fluid to check for infections.  · Chest X-ray to look for fluid in spaces that should be filled with air.  · Electrocardiogram (ECG) to look at the heart's electrical activity.  How is this treated?  Treatment for this condition usually takes places in a hospital intensive care unit (ICU). Treatment depends on what is causing the condition. It may include one or more treatments until your symptoms improve. Treatment may include:  · Supplemental oxygen. Extra oxygen is given through a tube in the nose, a face mask, or a virk.  · A device such as a continuous positive airway pressure (CPAP) or bi-level positive airway pressure (BiPAP or BPAP) machine. This treatment uses mild air pressure to keep the airways open. A mask or other device will be placed over your nose or mouth. A tube that is connected to a motor will deliver oxygen through the mask.  · Ventilator. This treatment helps move air into and out of the lungs. This may be done with a bag and mask or a machine. For this treatment, a tube is placed in your windpipe (trachea) so air and oxygen can flow to the lungs.  · Extracorporeal membrane oxygenation (ECMO). This treatment temporarily takes over the function of the heart and lungs, supplying oxygen and removing carbon dioxide. ECMO gives the lungs a chance to recover. It may be used if a ventilator is not effective.  · Tracheostomy. This is a procedure that creates a hole in the neck to insert a breathing tube.  · Receiving fluids and medicines.  · Rocking the bed to help breathing.  Follow these instructions at home:  · Take over-the-counter and prescription medicines only as told by your health care  provider.  · Return to normal activities as told by your health care provider. Ask your health care provider what activities are safe for you.  · Keep all follow-up visits as told by your health care provider. This is important.  How is this prevented?  Treating infections and medical conditions that may lead to acute respiratory failure can help prevent the condition from developing.  Contact a health care provider if:  · You have a fever.  · Your symptoms do not improve or they get worse.  Get help right away if:  · You are having trouble breathing.  · You lose consciousness.  · Your have cyanosis or turn blue.  · You develop a rapid heart rate.  · You are confused.  These symptoms may represent a serious problem that is an emergency. Do not wait to see if the symptoms will go away. Get medical help right away. Call your local emergency services (911 in the [place] Do not drive yourself to the hospital.  This information is not intended to replace advice given to you by your health care provider. Make sure you discuss any questions you have with your health care provider.  Document Released: 12/23/2014 Document Revised: 11/30/2018 Document Reviewed: 07/05/2017  [employer] Patient Education © 2020 [employer]      COVID-19 Frequently Asked Questions  COVID-19 (coronavirus disease) is an infection that is caused by a large family of viruses. Some viruses cause illness in people and others cause illness in animals like camels, cats, and bats. In some cases, the viruses that cause illness in animals can spread to humans.  Where did the coronavirus come from?  In December 2019, [place] told the World Health Organization (WHO) of several cases of lung disease (human respiratory illness). These cases were linked to an open seafood and livestock market in the [facility]. The link to the seafood and livestock market suggests that the virus may have spread from animals to humans. However, since that first outbreak in  December, the virus has also been shown to spread from person to person.  What is the name of the disease and the virus?  Disease name  Early on, this disease was called novel coronavirus. This is because scientists determined that the disease was caused by a new (novel) respiratory virus. The World Health Organization (WHO) has now named the disease COVID-19, or coronavirus disease.  Virus name  The virus that causes the disease is called severe acute respiratory syndrome coronavirus 2 (SARS-CoV-2).  More information on disease and virus naming  World Health Organization (WHO): www.who.int/emergencies/diseases/novel-coronavirus-2019/technical-guidance/naming-the-coronavirus-disease-(covid-2019)-and-the-virus-that-causes-it  Who is at risk for complications from coronavirus disease?  Some people may be at higher risk for complications from coronavirus disease. This includes older adults and people who have chronic diseases, such as heart disease, diabetes, and lung disease.  If you are at higher risk for complications, take these extra precautions:  · Avoid close contact with people who are sick or have a fever or cough. Stay at least 3-6 ft (1-2 m) away from them, if possible.  · Wash your hands often with soap and water for at least 20 seconds.  · Avoid touching your face, mouth, nose, or eyes.  · Keep supplies on hand at home, such as food, medicine, and cleaning supplies.  · Stay home as much as possible.  · Avoid social gatherings and travel.  How does coronavirus disease spread?  The virus that causes coronavirus disease spreads easily from person to person (is contagious). There are also cases of community-spread disease. This means the disease has spread to:  · People who have no known contact with other infected people.  · People who have not traveled to areas where there are known cases.  It appears to spread from one person to another through droplets from coughing or sneezing.  Can I get the virus from  touching surfaces or objects?  There is still a lot that we do not know about the virus that causes coronavirus disease. Scientists are basing a lot of information on what they know about similar viruses, such as:  · Viruses cannot generally survive on surfaces for long. They need a human body (host) to survive.  · It is more likely that the virus is spread by close contact with people who are sick (direct contact), such as through:  ? Shaking hands or hugging.  ? Breathing in respiratory droplets that travel through the air. This can happen when an infected person coughs or sneezes on or near other people.  · It is less likely that the virus is spread when a person touches a surface or object that has the virus on it (indirect contact). The virus may be able to enter the body if the person touches a surface or object and then touches his or her face, eyes, nose, or mouth.  Can a person spread the virus without having symptoms of the disease?  It may be possible for the virus to spread before a person has symptoms of the disease, but this is most likely not the main way the virus is spreading. It is more likely for the virus to spread by being in close contact with people who are sick and breathing in the respiratory droplets of a sick person's cough or sneeze.  What are the symptoms of coronavirus disease?  Symptoms vary from person to person and can range from mild to severe. Symptoms may include:  · Fever.  · Cough.  · Tiredness, weakness, or fatigue.  · Fast breathing or feeling short of breath.  These symptoms can appear anywhere from 2 to 14 days after you have been exposed to the virus. If you develop symptoms, call your health care provider. People with severe symptoms may need hospital care.  If I am exposed to the virus, how long does it take before symptoms start?  Symptoms of coronavirus disease may appear anywhere from 2 to 14 days after a person has been exposed to the virus. If you develop symptoms,  call your health care provider.  Should I be tested for this virus?  Your health care provider will decide whether to test you based on your symptoms, history of exposure, and your risk factors.  How does a health care provider test for this virus?  Health care providers will collect samples to send for testing. Samples may include:  · Taking a swab of fluid from the nose.  · Taking fluid from the lungs by having you cough up mucus (sputum) into a sterile cup.  · Taking a blood sample.  · Taking a stool or urine sample.  Is there a treatment or vaccine for this virus?  Currently, there is no vaccine to prevent coronavirus disease. Also, there are no medicines like antibiotics or antivirals to treat the virus. A person who becomes sick is given supportive care, which means rest and fluids. A person may also relieve his or her symptoms by using over-the-counter medicines that treat sneezing, coughing, and runny nose. These are the same medicines that a person takes for the common cold.  If you develop symptoms, call your health care provider. People with severe symptoms may need hospital care.  What can I do to protect myself and my family from this virus?         You can protect yourself and your family by taking the same actions that you would take to prevent the spread of other viruses. Take the following actions:  · Wash your hands often with soap and water for at least 20 seconds. If soap and water are not available, use alcohol-based hand .  · Avoid touching your face, mouth, nose, or eyes.  · Cough or sneeze into a tissue, sleeve, or elbow. Do not cough or sneeze into your hand or the air.  ? If you cough or sneeze into a tissue, throw it away immediately and wash your hands.  · Disinfect objects and surfaces that you frequently touch every day.  · Avoid close contact with people who are sick or have a fever or cough. Stay at least 3-6 ft (1-2 m) away from them, if possible.  · Stay home if you are  sick, except to get medical care. Call your health care provider before you get medical care.  · Make sure your vaccines are up to date. Ask your health care provider what vaccines you need.  What should I do if I need to travel?  Follow travel recommendations from your local health authority, the CDC, and WHO.  Travel information and advice  · Centers for Disease Control and Prevention (CDC): www.cdc.gov/coronavirus/2019-ncov/travelers/index.html  · World Health Organization (WHO): www.who.int/emergencies/diseases/novel-coronavirus-2019/travel-advice  Know the risks and take action to protect your health  · You are at higher risk of getting coronavirus disease if you are traveling to areas with an outbreak or if you are exposed to travelers from areas with an outbreak.  · Wash your hands often and practice good hygiene to lower the risk of catching or spreading the virus.  What should I do if I am sick?  General instructions to stop the spread of infection  · Wash your hands often with soap and water for at least 20 seconds. If soap and water are not available, use alcohol-based hand .  · Cough or sneeze into a tissue, sleeve, or elbow. Do not cough or sneeze into your hand or the air.  · If you cough or sneeze into a tissue, throw it away immediately and wash your hands.  · Stay home unless you must get medical care. Call your health care provider or local health authority before you get medical care.  · Avoid public areas. Do not take public transportation, if possible.  · If you can, wear a mask if you must go out of the house or if you are in close contact with someone who is not sick.  Keep your home clean  · Disinfect objects and surfaces that are frequently touched every day. This may include:  ? Counters and tables.  ? Doorknobs and light switches.  ? Sinks and faucets.  ? Electronics such as phones, remote controls, keyboards, computers, and tablets.  · Wash dishes in hot, soapy water or use a  . Air-dry your dishes.  · Wash laundry in hot water.  Prevent infecting other household members  · Let healthy household members care for children and pets, if possible. If you have to care for children or pets, wash your hands often and wear a mask.  · Sleep in a different bedroom or bed, if possible.  · Do not share personal items, such as razors, toothbrushes, deodorant, sol, brushes, towels, and washcloths.  Where to find more information  Centers for Disease Control and Prevention (CDC)  · Information and news updates: www.cdc.gov/coronavirus/2019-ncov  World Health Organization (WHO)  · Information and news updates: www.who.int/emergencies/diseases/novel-coronavirus-2019  · Coronavirus health topic: www.who.int/health-topics/coronavirus  · Questions and answers on COVID-19: www.who.int/news-room/q-a-detail/r-a-vkpzpvakelnac  · Global tracker: [employer]  American Academy of Pediatrics (AAP)  · Information for families: www.healthychildren.org/English/health-issues/conditions/chest-lungs/Pages/2019-Novel-Coronavirus.aspx  The coronavirus situation is changing rapidly. Check your local health authority website or the CDC and WHO websites for updates and news.  When should I contact a health care provider?  · Contact your health care provider if you have symptoms of an infection, such as fever or cough, and you:  ? Have been near anyone who is known to have coronavirus disease.  ? Have come into contact with a person who is suspected to have coronavirus disease.  ? Have traveled outside of the country.  When should I get emergency medical care?  · Get help right away by calling your local emergency services (911 in the [place] if you have:  ? Trouble breathing.  ? Pain or pressure in your chest.  ? Confusion.  ? Blue-tinged lips and fingernails.  ? Difficulty waking from sleep.  ? Symptoms that get worse.  Let the emergency medical personnel know if you think you have coronavirus  disease.  Summary  · A new respiratory virus is spreading from person to person and causing COVID-19 (coronavirus disease).  · The virus that causes COVID-19 appears to spread easily. It spreads from one person to another through droplets from coughing or sneezing.  · Older adults and those with chronic diseases are at higher risk of disease. If you are at higher risk for complications, take extra precautions.  · There is currently no vaccine to prevent coronavirus disease. There are no medicines, such as antibiotics or antivirals, to treat the virus.  · You can protect yourself and your family by washing your hands often, avoiding touching your face, and covering your coughs and sneezes.  This information is not intended to replace advice given to you by your health care provider. Make sure you discuss any questions you have with your health care provider.  Document Released: 04/14/2020 Document Revised: 04/14/2020 Document Reviewed: 04/14/2020  [employer] Patient Education © 2020 [employer]      COVID-19  COVID-19 is a respiratory infection that is caused by a virus called severe acute respiratory syndrome coronavirus 2 (SARS-CoV-2). The disease is also known as coronavirus disease or novel coronavirus. In some people, the virus may not cause any symptoms. In others, it may cause a serious infection. The infection can get worse quickly and can lead to complications, such as:  · Pneumonia, or infection of the lungs.  · Acute respiratory distress syndrome or ARDS. This is fluid build-up in the lungs.  · Acute respiratory failure. This is a condition in which there is not enough oxygen passing from the lungs to the body.  · Sepsis or septic shock. This is a serious bodily reaction to an infection.  · Blood clotting problems.  · Secondary infections due to bacteria or fungus.  The virus that causes COVID-19 is contagious. This means that it can spread from person to person through droplets from coughs and sneezes  (respiratory secretions).  What are the causes?  This illness is caused by a virus. You may catch the virus by:  · Breathing in droplets from an infected person's cough or sneeze.  · Touching something, like a table or a doorknob, that was exposed to the virus (contaminated) and then touching your mouth, nose, or eyes.  What increases the risk?  Risk for infection  You are more likely to be infected with this virus if you:  · Live in or travel to an area with a COVID-19 outbreak.  · Come in contact with a sick person who recently traveled to an area with a COVID-19 outbreak.  · Provide care for or live with a person who is infected with COVID-19.  Risk for serious illness  You are more likely to become seriously ill from the virus if you:  · Are 65 years of age or older.  · Have a long-term disease that lowers your body's ability to fight infection (immunocompromised).  · Live in a nursing home or long-term care facility.  · Have a long-term (chronic) disease such as:  ? Chronic lung disease, including chronic obstructive pulmonary disease or asthma  ? Heart disease.  ? Diabetes.  ? Chronic kidney disease.  ? Liver disease.  · Are obese.  What are the signs or symptoms?  Symptoms of this condition can range from mild to severe. Symptoms may appear any time from 2 to 14 days after being exposed to the virus. They include:  · A fever.  · A cough.  · Difficulty breathing.  · Chills.  · Muscle pains.  · A sore throat.  · Loss of taste or smell.  Some people may also have stomach problems, such as nausea, vomiting, or diarrhea.  Other people may not have any symptoms of COVID-19.  How is this diagnosed?  This condition may be diagnosed based on:  · Your signs and symptoms, especially if:  ? You live in an area with a COVID-19 outbreak.  ? You recently traveled to or from an area where the virus is common.  ? You provide care for or live with a person who was diagnosed with COVID-19.  · A physical exam.  · Lab tests,  which may include:  ? A nasal swab to take a sample of fluid from your nose.  ? A throat swab to take a sample of fluid from your throat.  ? A sample of mucus from your lungs (sputum).  ? Blood tests.  · Imaging tests, which may include, X-rays, CT scan, or ultrasound.  How is this treated?  At present, there is no medicine to treat COVID-19. Medicines that treat other diseases are being used on a trial basis to see if they are effective against COVID-19.  Your health care provider will talk with you about ways to treat your symptoms. For most people, the infection is mild and can be managed at home with rest, fluids, and over-the-counter medicines.  Treatment for a serious infection usually takes places in a hospital intensive care unit (ICU). It may include one or more of the following treatments. These treatments are given until your symptoms improve.  · Receiving fluids and medicines through an IV.  · Supplemental oxygen. Extra oxygen is given through a tube in the nose, a face mask, or a virk.  · Positioning you to lie on your stomach (prone position). This makes it easier for oxygen to get into the lungs.  · Continuous positive airway pressure (CPAP) or bi-level positive airway pressure (BPAP) machine. This treatment uses mild air pressure to keep the airways open. A tube that is connected to a motor delivers oxygen to the body.  · Ventilator. This treatment moves air into and out of the lungs by using a tube that is placed in your windpipe.  · Tracheostomy. This is a procedure to create a hole in the neck so that a breathing tube can be inserted.  · Extracorporeal membrane oxygenation (ECMO). This procedure gives the lungs a chance to recover by taking over the functions of the heart and lungs. It supplies oxygen to the body and removes carbon dioxide.  Follow these instructions at home:  Lifestyle  · If you are sick, stay home except to get medical care. Your health care provider will tell you how long to  stay home. Call your health care provider before you go for medical care.  · Rest at home as told by your health care provider.  · Do not use any products that contain nicotine or tobacco, such as cigarettes, e-cigarettes, and chewing tobacco. If you need help quitting, ask your health care provider.  · Return to your normal activities as told by your health care provider. Ask your health care provider what activities are safe for you.  General instructions  · Take over-the-counter and prescription medicines only as told by your health care provider.  · Drink enough fluid to keep your urine pale yellow.  · Keep all follow-up visits as told by your health care provider. This is important.  How is this prevented?    There is no vaccine to help prevent COVID-19 infection. However, there are steps you can take to protect yourself and others from this virus.  To protect yourself:   · Do not travel to areas where COVID-19 is a risk. The areas where COVID-19 is reported change often. To identify high-risk areas and travel restrictions, check the CDC travel website: wwwnc.cdc.gov/travel/notices  · If you live in, or must travel to, an area where COVID-19 is a risk, take precautions to avoid infection.  ? Stay away from people who are sick.  ? Wash your hands often with soap and water for 20 seconds. If soap and water are not available, use an alcohol-based hand .  ? Avoid touching your mouth, face, eyes, or nose.  ? Avoid going out in public, follow guidance from your state and local health authorities.  ? If you must go out in public, wear a cloth face covering or face mask.  ? Disinfect objects and surfaces that are frequently touched every day. This may include:  § Counters and tables.  § Doorknobs and light switches.  § Sinks and faucets.  § Electronics, such as phones, remote controls, keyboards, computers, and tablets.  To protect others:  If you have symptoms of COVID-19, take steps to prevent the virus from  spreading to others.  · If you think you have a COVID-19 infection, contact your health care provider right away. Tell your health care team that you think you may have a COVID-19 infection.  · Stay home. Leave your house only to seek medical care. Do not use public transport.  · Do not travel while you are sick.  · Wash your hands often with soap and water for 20 seconds. If soap and water are not available, use alcohol-based hand .  · Stay away from other members of your household. Let healthy household members care for children and pets, if possible. If you have to care for children or pets, wash your hands often and wear a mask. If possible, stay in your own room, separate from others. Use a different bathroom.  · Make sure that all people in your household wash their hands well and often.  · Cough or sneeze into a tissue or your sleeve or elbow. Do not cough or sneeze into your hand or into the air.  · Wear a cloth face covering or face mask.  Where to find more information  · Centers for Disease Control and Prevention: www.cdc.gov/coronavirus/2019-ncov/index.html  · World Health Organization: www.who.int/health-topics/coronavirus  Contact a health care provider if:  · You live in or have traveled to an area where COVID-19 is a risk and you have symptoms of the infection.  · You have had contact with someone who has COVID-19 and you have symptoms of the infection.  Get help right away if:  · You have trouble breathing.  · You have pain or pressure in your chest.  · You have confusion.  · You have bluish lips and fingernails.  · You have difficulty waking from sleep.  · You have symptoms that get worse.  These symptoms may represent a serious problem that is an emergency. Do not wait to see if the symptoms will go away. Get medical help right away. Call your local emergency services (911 in the [place] Do not drive yourself to the hospital. Let the emergency medical personnel know if you think you have  COVID-19.  Summary  · COVID-19 is a respiratory infection that is caused by a virus. It is also known as coronavirus disease or novel coronavirus. It can cause serious infections, such as pneumonia, acute respiratory distress syndrome, acute respiratory failure, or sepsis.  · The virus that causes COVID-19 is contagious. This means that it can spread from person to person through droplets from coughs and sneezes.  · You are more likely to develop a serious illness if you are 65 years of age or older, have a weak immunity, live in a nursing home, or have chronic disease.  · There is no medicine to treat COVID-19. Your health care provider will talk with you about ways to treat your symptoms.  · Take steps to protect yourself and others from infection. Wash your hands often and disinfect objects and surfaces that are frequently touched every day. Stay away from people who are sick and wear a mask if you are sick.  This information is not intended to replace advice given to you by your health care provider. Make sure you discuss any questions you have with your health care provider.  Document Released: 01/23/2020 Document Revised: 05/14/2020 Document Reviewed: 01/23/2020  [employer] Patient Education © 2020 [employer]    COVID-19: How to Protect Yourself and Others  Know how it spreads  · There is currently no vaccine to prevent coronavirus disease 2019 (COVID-19).  · The best way to prevent illness is to avoid being exposed to this virus.  · The virus is thought to spread mainly from person-to-person.  ? Between people who are in close contact with one another (within about 6 feet).  ? Through respiratory droplets produced when an infected person coughs, sneezes or talks.  ? These droplets can land in the mouths or noses of people who are nearby or possibly be inhaled into the lungs.  ? Some recent studies have suggested that COVID-19 may be spread by people who are not showing symptoms.  Everyone should  Clean your  hands often  · Wash your hands often with soap and water for at least 20 seconds especially after you have been in a public place, or after blowing your nose, coughing, or sneezing.  · If soap and water are not readily available, use a hand  that contains at least 60% alcohol. Cover all surfaces of your hands and rub them together until they feel dry.  · Avoid touching your eyes, nose, and mouth with unwashed hands.  Avoid close contact  · Stay home if you are sick.  · Avoid close contact with people who are sick.  · Put distance between yourself and other people.  ? Remember that some people without symptoms may be able to spread virus.  ? This is especially important for people who are at higher risk of getting very sick.www.cdc.gov/coronavirus/2019-ncov/need-extra-precautions/people-at-higher-risk.html  Cover your mouth and nose with a cloth face cover when around others  · You could spread COVID-19 to others even if you do not feel sick.  · Everyone should wear a cloth face cover when they have to go out in public, for example to the grocery store or to  other necessities.  ? Cloth face coverings should not be placed on young children under age 2, anyone who has trouble breathing, or is unconscious, incapacitated or otherwise unable to remove the mask without assistance.  · The cloth face cover is meant to protect other people in case you are infected.  · Do NOT use a facemask meant for a healthcare worker.  · Continue to keep about 6 feet between yourself and others. The cloth face cover is not a substitute for social distancing.  Cover coughs and sneezes  · If you are in a private setting and do not have on your cloth face covering, remember to always cover your mouth and nose with a tissue when you cough or sneeze or use the inside of your elbow.  · Throw used tissues in the trash.  · Immediately wash your hands with soap and water for at least 20 seconds. If soap and water are not readily  available, clean your hands with a hand  that contains at least 60% alcohol.  Clean and disinfect  · Clean AND disinfect frequently touched surfaces daily. This includes tables, doorknobs, light switches, countertops, handles, desks, phones, keyboards, toilets, faucets, and sinks. www.cdc.gov/coronavirus/2019-ncov/prevent-getting-sick/disinfecting-your-home.html  · If surfaces are dirty, clean them: Use detergent or soap and water prior to disinfection.  · Then, use a household disinfectant. You can see a list of EPA-registered household disinfectants here.  cdc.gov/coronavirus  05/05/2020  This information is not intended to replace advice given to you by your health care provider. Make sure you discuss any questions you have with your health care provider.  Document Released: 04/14/2020 Document Revised: 05/13/2020 Document Reviewed: 04/14/2020  [employer] Patient Education © 2020 [employer]    Discharge Instructions    Discharged to home by car with relative. Discharged via walking, hospital escort: Yes.  Special equipment needed: Oxygen    Be sure to schedule a follow-up appointment with your primary care doctor or any specialists as instructed.     Discharge Plan:   Diet Plan: Discussed  Activity Level: Discussed  Confirmed Follow up Appointment: Patient to Call and Schedule Appointment  Confirmed Symptoms Management: Discussed  Medication Reconciliation Updated: Yes  Influenza Vaccine Indication: Not indicated: Previously immunized this influenza season and > 8 years of age    I understand that a diet low in cholesterol, fat, and sodium is recommended for good health. Unless I have been given specific instructions below for another diet, I accept this instruction as my diet prescription.   Other diet: heart healthy     Special Instructions: None    · Is patient discharged on Warfarin / Coumadin?   No     Depression / Suicide Risk    As you are discharged from this [facility] facility, it is  important to learn how to keep safe from harming yourself.    Recognize the warning signs:  · Abrupt changes in personality, positive or negative- including increase in energy   · Giving away possessions  · Change in eating patterns- significant weight changes-  positive or negative  · Change in sleeping patterns- unable to sleep or sleeping all the time   · Unwillingness or inability to communicate  · Depression  · Unusual sadness, discouragement and loneliness  · Talk of wanting to die  · Neglect of personal appearance   · Rebelliousness- reckless behavior  · Withdrawal from people/activities they love  · Confusion- inability to concentrate     If you or a loved one observes any of these behaviors or has concerns about self-harm, here's what you can do:  · Talk about it- your feelings and reasons for harming yourself  · Remove any means that you might use to hurt yourself (examples: pills, rope, extension cords, firearm)  · Get professional help from the community (Mental Health, Substance Abuse, psychological counseling)  · Do not be alone:Call your Safe Contact- someone whom you trust who will be there for you.  · Call your local CRISIS HOTLINE 284-6375 or 128-684-5294  · Call your local Children's Mobile Crisis Response Team [place] (724) 550-5215 or www.[employer]  · Call the toll free National Suicide Prevention Hotlines   · National Suicide Prevention Lifeline 575-140-QFDK (5431)  · National Hope Line Network 800-SUICIDE (500-6923)

## 2020-12-02 ENCOUNTER — TELEMEDICINE (OUTPATIENT)
Dept: URGENT CARE | Facility: CLINIC | Age: 60
End: 2020-12-02

## 2020-12-02 VITALS — HEART RATE: 52 BPM | RESPIRATION RATE: 20 BRPM | OXYGEN SATURATION: 91 %

## 2020-12-02 DIAGNOSIS — I25.2 HISTORY OF MYOCARDIAL INFARCTION: ICD-10-CM

## 2020-12-02 DIAGNOSIS — U07.1 PNEUMONIA DUE TO COVID-19 VIRUS: ICD-10-CM

## 2020-12-02 DIAGNOSIS — J12.82 PNEUMONIA DUE TO COVID-19 VIRUS: ICD-10-CM

## 2020-12-02 LAB
BACTERIA BLD CULT: NORMAL
BACTERIA BLD CULT: NORMAL
SIGNIFICANT IND 70042: NORMAL
SIGNIFICANT IND 70042: NORMAL
SITE SITE: NORMAL
SITE SITE: NORMAL
SOURCE SOURCE: NORMAL
SOURCE SOURCE: NORMAL

## 2020-12-02 PROCEDURE — 99213 OFFICE O/P EST LOW 20 MIN: CPT | Performed by: EMERGENCY MEDICINE

## 2020-12-02 ASSESSMENT — ENCOUNTER SYMPTOMS
DIARRHEA: 0
PALPITATIONS: 0
VOMITING: 0
SHORTNESS OF BREATH: 0
NAUSEA: 0
COUGH: 1
FEVER: 0

## 2020-12-02 NOTE — PROGRESS NOTES
Subjective:      Magda Childress is a 59 y.o. female who presents with Pneumonia    This evaluation was conducted via Zoom using secure and encrypted videoconferencing technology. The patient was in a private location in the state [facility].    The patient's identity was confirmed and verbal consent was obtained for this virtual visit.    Home Monitoring Patient Triage  COVID-19 Monitoring Level: Home with basic monitoring       [facility] Flowsheet   1. Record COVID-19 Severity Index (qCSI):  0  2.Confirm appropriate patient and chart with two identifiers: Yes - continue to question #2   3. Does patient have another adult at home to help take care of you: Yes - continue to question #3  4. Confirm O2 supply is working and there are no cannula problems: N/A  5. Is your breathing today better or worse? Better - continue to question #5  6. Are you able to tolerate fluids? Yes - Continue to question #6  7. Any vomiting or diarrhea in the last 24 hours? No - continue to question #7  8. Are you able to control your fevers? Yes - continue to question #8  9. Are you able to control your cough? Yes - continue to question #9  10. Review ER precautions: present to ED or call 911 if experiencing severe shortness of breath:    11. Confirm next VV: Appointment scheduled  12. Final disposition: Stable at home  13. Time Spent: 10          Pneumonia  She complains of cough. There is no shortness of breath. Pertinent negatives include no chest pain or fever.   Feels better today.    Review of Systems   Constitutional: Negative for fever.   Respiratory: Positive for cough. Negative for shortness of breath.    Cardiovascular: Negative for chest pain and palpitations.   Gastrointestinal: Negative for diarrhea, nausea and vomiting.     Past Medical History:   Diagnosis Date   • History of myocardial infarction     x5; cardiac caths only, no stents placed   • Hyperlipidemia    • Hypertension    • Thyroid disease      Past  Surgical History:   Procedure Laterality Date   • CHOLECYSTECTOMY     • THYROIDECTOMY TOTAL        Allergy:  Inapsine [droperidol]     Current Outpatient Medications:   •  dexamethasone, 6 mg, Oral, DAILY (Patient not taking: Reported on 12/1/2020)  •  acetaminophen, 1,000 mg, Oral, Q6HRS PRN  •  ibuprofen, 400 mg, Oral, Q6HRS PRN  •  pantoprazole, 40 mg, Oral, QAM  •  multivitamin, 1 Tab, Oral, QAM  •  Synthroid, TAKE 1 TABLET EVERY MORNINGON AN EMPTY STOMACH  •  diltiazem, 180 mg, Oral, DAILY  •  carvedilol, 6.25 mg, Oral, BID WITH MEALS  •  atorvastatin, 80 mg, Oral, Q EVENING  •  FLUoxetine, 10 mg, Oral, DAILY (Patient taking differently: 10 mg, Oral, EVERY MORNING)  •  nitroGLYCERIN, 0.3 mg, Sublingual, PRN  •  clobetasol, 1 Application, Topical, BID  •  aspirin, 81 mg, Oral, QAM   family history includes Alzheimer's Disease in her mother; Heart Disease (age of onset: 70) in her father.   Social History     Tobacco Use   • Smoking status: Never Smoker   • Smokeless tobacco: Never Used   Substance Use Topics   • Alcohol use: Not Currently   • Drug use: No         Objective:     Pulse (!) 52   Resp 20   SpO2 91%      Physical Exam    Constitutional:       General: Awake.      Appearance: Not toxic-appearing.   HENT:      Nose: No signs of injury.      Mouth/Throat: Voice clear.     Lips: Pink.   Eyes:      General: Lids are normal.      Conjunctiva/sclera: Conjunctivae normal.   Neck:      Trachea: Phonation normal.   Pulmonary:      Effort: Pulmonary effort is normal.   Skin:     Coloration: Skin is not cyanotic or pale.   Neurological:      Mental Status: Alert and oriented to person, place, and time.   Psychiatric:         Mood and Affect: Mood and affect normal.         Speech: Speech normal.         Behavior: Behavior is cooperative.             Assessment/Plan:        1. Pneumonia due to COVID-19 virus  RPM, off dexamethasone    2. History of myocardial infarction: On 5 occasions and with a segmental wall  motion abnormality but possibly secondary to vasospasm

## 2020-12-03 ENCOUNTER — TELEMEDICINE (OUTPATIENT)
Dept: URGENT CARE | Facility: CLINIC | Age: 60
End: 2020-12-03

## 2020-12-03 DIAGNOSIS — U07.1 PNEUMONIA DUE TO COVID-19 VIRUS: ICD-10-CM

## 2020-12-03 DIAGNOSIS — J12.82 PNEUMONIA DUE TO COVID-19 VIRUS: ICD-10-CM

## 2020-12-03 PROCEDURE — 99213 OFFICE O/P EST LOW 20 MIN: CPT | Performed by: PHYSICIAN ASSISTANT

## 2020-12-03 ASSESSMENT — ENCOUNTER SYMPTOMS
SORE THROAT: 0
COUGH: 0
CHILLS: 0
BACK PAIN: 1
DIARRHEA: 0
SHORTNESS OF BREATH: 0
NAUSEA: 0
VOMITING: 0
FEVER: 0
ABDOMINAL PAIN: 0
DIZZINESS: 0

## 2020-12-03 NOTE — PROGRESS NOTES
Subjective:      Magda Childress is a 59 y.o. female who presents with Covid-19.         This evaluation was conducted via Zoom using secure and encrypted videoconferencing technology. The patient was in a private location in the state [facility].    The patient's identity was confirmed and verbal consent was obtained for this virtual visit.      HPI    Covid-19 Home Monitoring Program:    Date of Discharge: 11/30/20  Current O2 flow rate: N/A  Symptom improvement: Yes      Home Monitoring Patient Triage  COVID-19 Monitoring Level:     Home with basic monitoring     [facility] Flowsheet   1. Record COVID-19 Severity Index (qCSI):  2  2.Confirm appropriate patient and chart with two identifiers: Yes - continue to question #3   3. Days Since Onset of Symptoms: 14  4. Does patient have another adult at home to help take care of you: Yes - continue to question #5  5. Confirm O2 supply is working and there are no cannula problems: N/A  6. Is your breathing today better or worse? Better - continue to question #7  7. Are you able to tolerate fluids? Yes - Continue to question #8  8. Any vomiting or diarrhea in the last 24 hours? Yes - encourage trial of fluids, if concerns for dehydration contact [facility] for direct admit  9. Are you able to control your fevers? N/A  10. Are you able to control your cough? Yes - continue to question #11  11. Current O2 Flow Rate: 0  12. Review ER precautions: present to ED or call 911 if experiencing severe shortness of breath: Yes  13. Confirm next VV: Appointment scheduled  14. Final disposition: Stable at home  15. Time Spent: 10      Review of Systems   Constitutional: Negative for chills and fever.   HENT: Negative for congestion, ear pain and sore throat.    Respiratory: Negative for cough and shortness of breath.    Cardiovascular: Negative for chest pain.   Gastrointestinal: Negative for abdominal pain, diarrhea, nausea and vomiting.   Genitourinary: Negative.     Musculoskeletal: Positive for back pain.   Skin: Negative for rash.   Neurological: Negative for dizziness.        Objective:     Pulse 81    Temp N/A    Resp 16    SpO2 92%      Physical Exam  Vitals signs and nursing note reviewed.   Constitutional:       General: She is not in acute distress.     Appearance: Normal appearance. She is well-developed. She is not diaphoretic.   HENT:      Head: Normocephalic and atraumatic.      Right Ear: External ear normal.      Left Ear: External ear normal.      Mouth/Throat:      Mouth: Mucous membranes are moist.   Eyes:      Conjunctiva/sclera: Conjunctivae normal.   Cardiovascular:      Rate and Rhythm: Normal rate.   Pulmonary:      Effort: Pulmonary effort is normal.   Musculoskeletal: Normal range of motion.   Skin:     General: Skin is warm and dry.   Neurological:      Mental Status: She is alert and oriented to person, place, and time.   Psychiatric:         Behavior: Behavior normal.          PMH:  has a past medical history of History of myocardial infarction, Hyperlipidemia, Hypertension, and Thyroid disease.  MEDS:   Current Outpatient Medications:   •  dexamethasone (DECADRON) 6 MG Tab, Take 1 Tab by mouth every day for 6 days. (Patient not taking: Reported on 12/1/2020), Disp: 6 Tab, Rfl: 0  •  acetaminophen (TYLENOL) 500 MG Tab, Take 1,000 mg by mouth every 6 hours as needed for Mild Pain or Moderate Pain., Disp: , Rfl:   •  ibuprofen (MOTRIN) 200 MG Tab, Take 400 mg by mouth every 6 hours as needed for Mild Pain., Disp: , Rfl:   •  pantoprazole (PROTONIX) 40 MG Tablet Delayed Response, Take 40 mg by mouth every morning., Disp: , Rfl:   •  multivitamin (THERAGRAN) Tab, Take 1 Tab by mouth every morning., Disp: , Rfl:   •  SYNTHROID 200 MCG Tab, TAKE 1 TABLET EVERY MORNINGON AN EMPTY STOMACH (Patient taking differently: Take 200 mcg by mouth Every morning on an empty stomach.), Disp: 90 Tab, Rfl: 3  •  diltiazem (DILACOR XR) 180 MG XR capsule, Take 1 Cap by  mouth every day., Disp: 90 Cap, Rfl: 3  •  carvedilol (COREG) 6.25 MG Tab, Take 1 Tab by mouth 2 times a day, with meals., Disp: 180 Tab, Rfl: 3  •  atorvastatin (LIPITOR) 80 MG tablet, Take 1 Tab by mouth every evening., Disp: 90 Tab, Rfl: 3  •  FLUoxetine (PROZAC) 10 MG Cap, Take 1 Cap by mouth every day. (Patient taking differently: Take 10 mg by mouth every morning.), Disp: 90 Cap, Rfl: 3  •  nitroGLYCERIN (NITROSTAT) 0.3 MG SL tablet, Place 1 Tab under tongue as needed for Chest Pain., Disp: 25 Tab, Rfl: 3  •  clobetasol (TEMOVATE) 0.05 % Ointment, Apply 1 Application to affected area(s) 2 times a day., Disp: 60 g, Rfl: 1  •  aspirin (ASA) 81 MG Chew Tab chewable tablet, Chew 81 mg every morning., Disp: , Rfl:   ALLERGIES:   Allergies   Allergen Reactions   • Inapsine [Droperidol] Anaphylaxis     SURGHX:   Past Surgical History:   Procedure Laterality Date   • CHOLECYSTECTOMY     • THYROIDECTOMY TOTAL       SOCHX:  reports that she has never smoked. She has never used smokeless tobacco. She reports previous alcohol use. She reports that she does not use drugs.  FH: family history includes Alzheimer's Disease in her mother; Heart Disease (age of onset: 70) in her father.       Assessment/Plan:        1. Pneumonia due to COVID-19 virus    - Continue RPM  - Patient has not required any supplemental oxygen since discharge on 11/30  - Continue breathing exercises and continue follow up, expect discharge from [facility] in 2-3 days

## 2020-12-04 ENCOUNTER — TELEMEDICINE (OUTPATIENT)
Dept: URGENT CARE | Facility: CLINIC | Age: 60
End: 2020-12-04

## 2020-12-04 DIAGNOSIS — U07.1 COVID-19: ICD-10-CM

## 2020-12-04 PROCEDURE — 99213 OFFICE O/P EST LOW 20 MIN: CPT | Performed by: PHYSICIAN ASSISTANT

## 2020-12-04 ASSESSMENT — ENCOUNTER SYMPTOMS
MUSCULOSKELETAL NEGATIVE: 1
DIARRHEA: 0
DIZZINESS: 0
COUGH: 1
VOMITING: 0
FEVER: 0
CHILLS: 0
NAUSEA: 0
SHORTNESS OF BREATH: 0
ABDOMINAL PAIN: 0

## 2020-12-04 NOTE — PROGRESS NOTES
Subjective:      Magda Childress is a 59 y.o. female who presents with No chief complaint on file.        This evaluation was conducted via Zoom using secure and encrypted videoconferencing technology. The patient was in a private location in the [facility].    The patient's identity was confirmed and verbal consent was obtained for this virtual visit.    HPI    Covid-19 Home Monitoring Program:  Date of Discharge: 11/30/20   Current O2 flow rate: None   Symptom improvement: Yes - significant   On Dexamethasone:  No       Home Monitoring Patient Triage  COVID-19 Monitoring Level: Monitoring no longer needed Home or Self Care     [facility] Oxygen Flowsheet   1. Record COVID-19 Severity Index (qCSI):  2  2.Confirm appropriate patient and chart with two identifiers: Yes - continue to question #3   3. Days Since Onset of Symptoms: 15  4. Does patient have another adult at home to help take care of you: Yes - continue to question #5  5. Confirm O2 supply is working and there are no cannula problems: No - troubleshoot oxygen valve and tubing, if not able to correct contact the [facility] for direct admit  6. Is your breathing today better or worse? Better - continue to question #7  7. Are you able to tolerate fluids? Yes - Continue to question #8  8. Any vomiting or diarrhea in the last 24 hours? No - continue to question #9  9. Are you able to control your fevers? N/A  10. Are you able to control your cough?    11. Current O2 Flow Rate: 0  12. Review ER precautions: present to ED or call 911 if experiencing severe shortness of breath: Yes  13. Confirm next VV: N/A  14. Final disposition: Stable at home  15. Time Spent: 5      Review of Systems   Constitutional: Negative for chills, fever and malaise/fatigue.   HENT: Negative for congestion.    Respiratory: Positive for cough. Negative for shortness of breath.    Cardiovascular: Negative for chest pain.   Gastrointestinal: Negative for abdominal pain,  diarrhea, nausea and vomiting.   Genitourinary: Negative.    Musculoskeletal: Negative.    Neurological: Negative for dizziness.          Objective:     There were no vitals taken for this visit.     Physical Exam  Vitals signs and nursing note reviewed.   Constitutional:       General: She is not in acute distress.     Appearance: Normal appearance. She is well-developed. She is not diaphoretic.   HENT:      Head: Normocephalic and atraumatic.   Eyes:      Conjunctiva/sclera: Conjunctivae normal.   Neck:      Musculoskeletal: Normal range of motion.   Pulmonary:      Effort: Pulmonary effort is normal.   Musculoskeletal: Normal range of motion.   Skin:     General: Skin is warm and dry.   Neurological:      Mental Status: She is alert and oriented to person, place, and time.   Psychiatric:         Behavior: Behavior normal.          PMH:  has a past medical history of History of myocardial infarction, Hyperlipidemia, Hypertension, and Thyroid disease.  MEDS:   Current Outpatient Medications:   •  dexamethasone (DECADRON) 6 MG Tab, Take 1 Tab by mouth every day for 6 days. (Patient not taking: Reported on 12/1/2020), Disp: 6 Tab, Rfl: 0  •  acetaminophen (TYLENOL) 500 MG Tab, Take 1,000 mg by mouth every 6 hours as needed for Mild Pain or Moderate Pain., Disp: , Rfl:   •  ibuprofen (MOTRIN) 200 MG Tab, Take 400 mg by mouth every 6 hours as needed for Mild Pain., Disp: , Rfl:   •  pantoprazole (PROTONIX) 40 MG Tablet Delayed Response, Take 40 mg by mouth every morning., Disp: , Rfl:   •  multivitamin (THERAGRAN) Tab, Take 1 Tab by mouth every morning., Disp: , Rfl:   •  SYNTHROID 200 MCG Tab, TAKE 1 TABLET EVERY MORNINGON AN EMPTY STOMACH (Patient taking differently: Take 200 mcg by mouth Every morning on an empty stomach.), Disp: 90 Tab, Rfl: 3  •  diltiazem (DILACOR XR) 180 MG XR capsule, Take 1 Cap by mouth every day., Disp: 90 Cap, Rfl: 3  •  carvedilol (COREG) 6.25 MG Tab, Take 1 Tab by mouth 2 times a day, with  "meals., Disp: 180 Tab, Rfl: 3  •  atorvastatin (LIPITOR) 80 MG tablet, Take 1 Tab by mouth every evening., Disp: 90 Tab, Rfl: 3  •  FLUoxetine (PROZAC) 10 MG Cap, Take 1 Cap by mouth every day. (Patient taking differently: Take 10 mg by mouth every morning.), Disp: 90 Cap, Rfl: 3  •  nitroGLYCERIN (NITROSTAT) 0.3 MG SL tablet, Place 1 Tab under tongue as needed for Chest Pain., Disp: 25 Tab, Rfl: 3  •  clobetasol (TEMOVATE) 0.05 % Ointment, Apply 1 Application to affected area(s) 2 times a day., Disp: 60 g, Rfl: 1  •  aspirin (ASA) 81 MG Chew Tab chewable tablet, Chew 81 mg every morning., Disp: , Rfl:   ALLERGIES:   Allergies   Allergen Reactions   • Inapsine [Droperidol] Anaphylaxis     SURGHX:   Past Surgical History:   Procedure Laterality Date   • CHOLECYSTECTOMY     • THYROIDECTOMY TOTAL       SOCHX:  reports that she has never smoked. She has never used smokeless tobacco. She reports previous alcohol use. She reports that she does not use drugs.  FH: family history includes Alzheimer's Disease in her mother; Heart Disease (age of onset: 70) in her father.       Assessment/Plan:        1. COVID-19    No at home monitoring data since about 5 pm last night, patient reports the stickers fell off and she can't get them to stay on. She reports significant improvement in symptoms today and states she feels \"great\" compared to the past few days. She has still been checking her oxygen saturation and states it hasn't dropped below 92% on RA. Discharged from [facility] at today's visit.     "

## 2021-02-26 DIAGNOSIS — K21.9 GASTROESOPHAGEAL REFLUX DISEASE WITHOUT ESOPHAGITIS: ICD-10-CM

## 2021-02-26 RX ORDER — OMEPRAZOLE 40 MG/1
40 CAPSULE, DELAYED RELEASE ORAL DAILY
Qty: 90 CAPSULE | Refills: 3 | Status: SHIPPED | OUTPATIENT
Start: 2021-02-26 | End: 2024-03-07

## 2021-02-26 NOTE — TELEPHONE ENCOUNTER
----- Message from Priscilla Childress sent at 2/25/2021  5:06 PM PST -----  Regarding: RE: Prescription Question  Contact: [phone]  I'll take whichever you think I should. Preferably the cheapest one. I really don't recall when it changed. Thank you!     ----- Message -----  From: Medical Assistant Rakan RM  Sent: 2/25/21, 5:02 PM  To: Priscilla Childress  Subject: RE: Prescription Question    Good afternoon Magda. I just wanted to double check. We have pantoprazole 40 mg as what you are taking for stomach acid, is that correct or is it the omeprazole? Please let me know and we will get that taken care of for you. Thank you.      ----- Message -----       From:Priscilla Childress       Sent:2/25/2021  4:12 PM PST         To:Physician Jagruti Stewart    Subject:Prescription Question    Hi Dr. Stewart,    My prescription for Omeprazole Cap 40MG has run out.  I have requested it twice through [employer] and they say they have reached out to you for a renewal.  Should I stop taking it?    It is prescription #[id number].    Thank you,    Magda Sky

## 2021-03-15 DIAGNOSIS — Z23 NEED FOR VACCINATION: ICD-10-CM

## 2021-03-17 ENCOUNTER — IMMUNIZATION (OUTPATIENT)
Dept: FAMILY PLANNING/WOMEN'S HEALTH CLINIC | Facility: IMMUNIZATION CENTER | Age: 61
End: 2021-03-17
Attending: INTERNAL MEDICINE
Payer: COMMERCIAL

## 2021-03-17 DIAGNOSIS — Z23 ENCOUNTER FOR VACCINATION: Primary | ICD-10-CM

## 2021-03-17 DIAGNOSIS — Z23 NEED FOR VACCINATION: ICD-10-CM

## 2021-03-17 PROCEDURE — 0001A PFIZER SARS-COV-2 VACCINE: CPT

## 2021-03-17 PROCEDURE — 91300 PFIZER SARS-COV-2 VACCINE: CPT

## 2021-04-08 ENCOUNTER — IMMUNIZATION (OUTPATIENT)
Dept: FAMILY PLANNING/WOMEN'S HEALTH CLINIC | Facility: IMMUNIZATION CENTER | Age: 61
End: 2021-04-08
Attending: INTERNAL MEDICINE
Payer: COMMERCIAL

## 2021-04-08 DIAGNOSIS — Z23 ENCOUNTER FOR VACCINATION: Primary | ICD-10-CM

## 2021-04-08 PROCEDURE — 0002A PFIZER SARS-COV-2 VACCINE: CPT

## 2021-04-08 PROCEDURE — 91300 PFIZER SARS-COV-2 VACCINE: CPT

## 2021-04-26 ENCOUNTER — HOSPITAL ENCOUNTER (OUTPATIENT)
Facility: MEDICAL CENTER | Age: 61
End: 2021-04-26
Attending: FAMILY MEDICINE
Payer: COMMERCIAL

## 2021-04-26 ENCOUNTER — OFFICE VISIT (OUTPATIENT)
Dept: MEDICAL GROUP | Facility: PHYSICIAN GROUP | Age: 61
End: 2021-04-26
Payer: COMMERCIAL

## 2021-04-26 VITALS
SYSTOLIC BLOOD PRESSURE: 112 MMHG | BODY MASS INDEX: 35.82 KG/M2 | HEART RATE: 100 BPM | TEMPERATURE: 97.9 F | HEIGHT: 65 IN | WEIGHT: 215 LBS | DIASTOLIC BLOOD PRESSURE: 74 MMHG | OXYGEN SATURATION: 96 %

## 2021-04-26 DIAGNOSIS — E03.9 HYPOTHYROIDISM, UNSPECIFIED TYPE: ICD-10-CM

## 2021-04-26 DIAGNOSIS — J30.2 SEASONAL ALLERGIES: ICD-10-CM

## 2021-04-26 DIAGNOSIS — N39.3 STRESS INCONTINENCE: ICD-10-CM

## 2021-04-26 DIAGNOSIS — R35.1 NOCTURIA: ICD-10-CM

## 2021-04-26 DIAGNOSIS — Z00.00 ENCOUNTER FOR PREVENTIVE CARE: ICD-10-CM

## 2021-04-26 DIAGNOSIS — R73.03 PREDIABETES: ICD-10-CM

## 2021-04-26 DIAGNOSIS — I10 BENIGN ESSENTIAL HTN: ICD-10-CM

## 2021-04-26 DIAGNOSIS — E55.9 VITAMIN D DEFICIENCY: ICD-10-CM

## 2021-04-26 DIAGNOSIS — E78.00 HYPERCHOLESTEREMIA: ICD-10-CM

## 2021-04-26 PROBLEM — J96.01 ACUTE HYPOXEMIC RESPIRATORY FAILURE DUE TO COVID-19 (HCC): Status: RESOLVED | Noted: 2020-11-27 | Resolved: 2021-04-26

## 2021-04-26 PROBLEM — K52.9 GASTROENTERITIS: Status: RESOLVED | Noted: 2020-11-27 | Resolved: 2021-04-26

## 2021-04-26 PROBLEM — U07.1 ACUTE HYPOXEMIC RESPIRATORY FAILURE DUE TO COVID-19 (HCC): Status: RESOLVED | Noted: 2020-11-27 | Resolved: 2021-04-26

## 2021-04-26 PROBLEM — D72.829 LEUKOCYTOSIS: Status: RESOLVED | Noted: 2020-11-29 | Resolved: 2021-04-26

## 2021-04-26 LAB
APPEARANCE UR: CLEAR
BACTERIA #/AREA URNS HPF: NEGATIVE /HPF
BILIRUB UR QL STRIP.AUTO: NEGATIVE
COLOR UR: YELLOW
EPI CELLS #/AREA URNS HPF: NEGATIVE /HPF
GLUCOSE UR STRIP.AUTO-MCNC: NEGATIVE MG/DL
KETONES UR STRIP.AUTO-MCNC: NEGATIVE MG/DL
LEUKOCYTE ESTERASE UR QL STRIP.AUTO: NEGATIVE
MICRO URNS: ABNORMAL
NITRITE UR QL STRIP.AUTO: NEGATIVE
PH UR STRIP.AUTO: 5 [PH] (ref 5–8)
PROT UR QL STRIP: NEGATIVE MG/DL
RBC # URNS HPF: NORMAL /HPF
RBC UR QL AUTO: ABNORMAL
SP GR UR STRIP.AUTO: 1.02
UROBILINOGEN UR STRIP.AUTO-MCNC: 0.2 MG/DL
WBC #/AREA URNS HPF: NORMAL /HPF

## 2021-04-26 PROCEDURE — 99214 OFFICE O/P EST MOD 30 MIN: CPT | Performed by: FAMILY MEDICINE

## 2021-04-26 PROCEDURE — 87086 URINE CULTURE/COLONY COUNT: CPT

## 2021-04-26 PROCEDURE — 81001 URINALYSIS AUTO W/SCOPE: CPT

## 2021-04-26 RX ORDER — TRIAMCINOLONE ACETONIDE 40 MG/ML
40 INJECTION, SUSPENSION INTRA-ARTICULAR; INTRAMUSCULAR ONCE
Status: COMPLETED | OUTPATIENT
Start: 2021-04-26 | End: 2021-04-26

## 2021-04-26 RX ADMIN — TRIAMCINOLONE ACETONIDE 40 MG: 40 INJECTION, SUSPENSION INTRA-ARTICULAR; INTRAMUSCULAR at 17:04

## 2021-04-26 ASSESSMENT — PATIENT HEALTH QUESTIONNAIRE - PHQ9: CLINICAL INTERPRETATION OF PHQ2 SCORE: 0

## 2021-04-26 ASSESSMENT — FIBROSIS 4 INDEX: FIB4 SCORE: 0.32

## 2021-04-26 NOTE — PATIENT INSTRUCTIONS
Overactive Bladder, Adult    Overactive bladder refers to a condition in which a person has a sudden need to pass urine. The person may leak urine if he or she cannot get to the bathroom fast enough (urinary incontinence). A person with this condition may also wake up several times in the night to go to the bathroom.  Overactive bladder is associated with poor nerve signals between your bladder and your brain. Your bladder may get the signal to empty before it is full. You may also have very sensitive muscles that make your bladder squeeze too soon. These symptoms might interfere with daily work or social activities.  What are the causes?  This condition may be associated with or caused by:  · Urinary tract infection.  · Infection of nearby tissues, such as the prostate.  · Prostate enlargement.  · Surgery on the uterus or urethra.  · Bladder stones, inflammation, or tumors.  · Drinking too much caffeine or alcohol.  · Certain medicines, especially medicines that get rid of extra fluid in the body (diuretics).  · Muscle or nerve weakness, especially from:  ? A spinal cord injury.  ? Stroke.  ? Multiple sclerosis.  ? Parkinson's disease.  · Diabetes.  · Constipation.  What increases the risk?  You may be at greater risk for overactive bladder if you:  · Are an older adult.  · Smoke.  · Are going through menopause.  · Have prostate problems.  · Have a neurological disease, such as stroke, dementia, Parkinson's disease, or multiple sclerosis (MS).  · Eat or drink things that irritate the bladder. These include alcohol, spicy food, and caffeine.  · Are overweight or obese.  What are the signs or symptoms?  Symptoms of this condition include:  · Sudden, strong urge to urinate.  · Leaking urine.  · Urinating 8 or more times a day.  · Waking up to urinate 2 or more times a night.  How is this diagnosed?  Your health care provider may suspect overactive bladder based on your symptoms. He or she will diagnose this condition  by:  · A physical exam and medical history.  · Blood or urine tests. You might need bladder or urine tests to help determine what is causing your overactive bladder.  You might also need to see a health care provider who specializes in urinary tract problems (urologist).  How is this treated?  Treatment for overactive bladder depends on the cause of your condition and whether it is mild or severe. You can also make lifestyle changes at home. Options include:  · Bladder training. This may include:  ? Learning to control the urge to urinate by following a schedule that directs you to urinate at regular intervals (timed voiding).  ? Doing Kegel exercises to strengthen your pelvic floor muscles, which support your bladder. Toning these muscles can help you control urination, even if your bladder muscles are overactive.  · Special devices. This may include:  ? Biofeedback, which uses sensors to help you become aware of your body's signals.  ? Electrical stimulation, which uses electrodes placed inside the body (implanted) or outside the body. These electrodes send gentle pulses of electricity to strengthen the nerves or muscles that control the bladder.  ? Women may use a plastic device that fits into the vagina and supports the bladder (pessary).  · Medicines.  ? Antibiotics to treat bladder infection.  ? Antispasmodics to stop the bladder from releasing urine at the wrong time.  ? Tricyclic antidepressants to relax bladder muscles.  ? Injections of botulinum toxin type A directly into the bladder tissue to relax bladder muscles.  · Lifestyle changes. This may include:  ? Weight loss. Talk to your health care provider about weight loss methods that would work best for you.  ? Diet changes. This may include reducing how much alcohol and caffeine you consume, or drinking fluids at different times of the day.  ? Not smoking. Do not use any products that contain nicotine or tobacco, such as cigarettes and e-cigarettes. If  you need help quitting, ask your health care provider.  · Surgery.  ? A device may be implanted to help manage the nerve signals that control urination.  ? An electrode may be implanted to stimulate electrical signals in the bladder.  ? A procedure may be done to change the shape of the bladder. This is done only in very severe cases.  Follow these instructions at home:  Lifestyle  · Make any diet or lifestyle changes that are recommended by your health care provider. These may include:  ? Drinking less fluid or drinking fluids at different times of the day.  ? Cutting down on caffeine or alcohol.  ? Doing Kegel exercises.  ? Losing weight if needed.  ? Eating a healthy and balanced diet to prevent constipation. This may include:  § Eating foods that are high in fiber, such as fresh fruits and vegetables, whole grains, and beans.  § Limiting foods that are high in fat and processed sugars, such as fried and sweet foods.  General instructions  · Take over-the-counter and prescription medicines only as told by your health care provider.  · If you were prescribed an antibiotic medicine, take it as told by your health care provider. Do not stop taking the antibiotic even if you start to feel better.  · Use any implants or pessary as told by your health care provider.  · If needed, wear pads to absorb urine leakage.  · Keep a journal or log to track how much and when you drink and when you feel the need to urinate. This will help your health care provider monitor your condition.  · Keep all follow-up visits as told by your health care provider. This is important.  Contact a health care provider if:  · You have a fever.  · Your symptoms do not get better with treatment.  · Your pain and discomfort get worse.  · You have more frequent urges to urinate.  Get help right away if:  · You are not able to control your bladder.  Summary  · Overactive bladder refers to a condition in which a person has a sudden need to pass  urine.  · Several conditions may lead to an overactive bladder.  · Treatment for overactive bladder depends on the cause and severity of your condition.  · Follow your health care provider's instructions about lifestyle changes, doing Kegel exercises, keeping a journal, and taking medicines.  This information is not intended to replace advice given to you by your health care provider. Make sure you discuss any questions you have with your health care provider.  Document Released: 10/14/2010 Document Revised: 04/09/2020 Document Reviewed: 01/03/2019  Elsevier Patient Education © 2020 [employer]

## 2021-04-26 NOTE — PROGRESS NOTES
CC: Seasonal allergies    HISTORY OF THE PRESENT ILLNESS: Patient is a 60 y.o. female.     Patient is here today with a few primary concerns.  First of all, she has known seasonal allergies.  She has responded well to Kenalog injection in the past which typically lasts her about 6 months allergy free without symptoms.  She is currently experiencing significant nasal congestion, sneezing, eye watering which is all worse in the morning.  She has tried antihistamines at this point but they are not helping as much as she would like them to.  No previous side effects to allergy shots or Kenalog injections.    She is also concerned about nighttime urination.  States that she has tried to limit fluid intake prior to bedtime, but she still wakes up every 2 hours almost like clockwork to urinate.  She also reports stress urinary incontinence so she will occasionally have urinary incontinence if she coughs, sneezes, etc. She does not have any other urinary symptoms such as burning with urination or blood in the urine.    Allergies: Inapsine [droperidol]    Current Outpatient Medications Ordered in Epic   Medication Sig Dispense Refill   • omeprazole (PRILOSEC) 40 MG delayed-release capsule Take 1 capsule by mouth every day. 90 capsule 3   • SYNTHROID 200 MCG Tab TAKE 1 TABLET EVERY MORNINGON AN EMPTY STOMACH (Patient taking differently: Take 200 mcg by mouth Every morning on an empty stomach.) 90 Tab 3   • diltiazem (DILACOR XR) 180 MG XR capsule Take 1 Cap by mouth every day. 90 Cap 3   • carvedilol (COREG) 6.25 MG Tab Take 1 Tab by mouth 2 times a day, with meals. 180 Tab 3   • atorvastatin (LIPITOR) 80 MG tablet Take 1 Tab by mouth every evening. 90 Tab 3   • FLUoxetine (PROZAC) 10 MG Cap Take 1 Cap by mouth every day. (Patient taking differently: Take 10 mg by mouth every morning.) 90 Cap 3   • nitroGLYCERIN (NITROSTAT) 0.3 MG SL tablet Place 1 Tab under tongue as needed for Chest Pain. 25 Tab 3   • aspirin (ASA) 81 MG  "Chew Tab chewable tablet Chew 81 mg every morning.       No current [facility]-ordered facility-administered medications on file.       Past Medical History:   Diagnosis Date   • History of myocardial infarction     x5; cardiac caths only, no stents placed   • Hyperlipidemia    • Hypertension    • Thyroid disease        Past Surgical History:   Procedure Laterality Date   • CHOLECYSTECTOMY     • THYROIDECTOMY TOTAL         Social History     Tobacco Use   • Smoking status: Never Smoker   • Smokeless tobacco: Never Used   Substance Use Topics   • Alcohol use: Not Currently   • Drug use: No       Social History     Social History Narrative   • Not on file       Family History   Problem Relation Age of Onset   • Alzheimer's Disease Mother    • Heart Disease Father 70        CAD       ROS:   Denies fever, chest pain, shortness of breath    Exam: /74 (BP Location: Left arm, Patient Position: Sitting, BP Cuff Size: Large adult)   Pulse 100   Temp 36.6 °C (97.9 °F) (Temporal)   Ht 1.651 m (5' 5\")   Wt 97.5 kg (215 lb)   SpO2 96%  Body mass index is 35.78 kg/m².    General: Well appearing, NAD  HEENT: Normocephalic. Conjunctiva clear, lids without ptosis, pupils equal and reactive to light accommodation, ears normal shape and contour, canals are clear bilaterally, tympanic membranes without bulging or erythema and normal cone of light  Neck: Supple without JVD. No thyromegaly or nodules  Pulmonary: Clear to ausculation.  Normal effort. No rales, ronchi, or wheezing.  Cardiovascular: Regular rate and rhythm without murmur, rubs or gallop.   Abdomen: Soft, nontender, nondistended. Normal bowel sounds. Liver and spleen are not palpable. No rebound or guarding  Neurologic: normal gait  Lymph: No cervical, supraclavicular lymph nodes are palpable  Skin: Warm and dry.  No obvious lesions.  Musculoskeletal:  No extremity cyanosis, clubbing, or edema.  Psych: Normal mood and affect. Alert and oriented. Judgment and insight is " normal.    Please note that this dictation was created using voice recognition software. I have made every reasonable attempt to correct obvious errors, but I expect that there are errors of grammar and possibly content that I did not discover before finalizing the note.      Assessment/Plan  Priscilla was seen today for seasonal allergies and enuresis.    Diagnoses and all orders for this visit:    Vitamin D deficiency  Chronic, due for lab work at this time.  -     VITAMIN D,25 HYDROXY; Future    Benign essential HTN  Chronic, well controlled on current medications.  -     Comp Metabolic Panel; Future    Hypercholesteremia  Chronic, well controlled on atorvastatin.  -     Lipid Profile; Future    Hypothyroidism, unspecified type  Chronic, well controlled on levothyroxine, due for lab work.  -     TSH; Future  -     FREE THYROXINE; Future    Prediabetes  Noted on previous labs, due for A1c.  -     HEMOGLOBIN A1C; Future    Encounter for preventive care  -     CBC WITH DIFFERENTIAL; Future  -     Comp Metabolic Panel; Future  -     VITAMIN D,25 HYDROXY; Future  -     TSH; Future  -     FREE THYROXINE; Future  -     Lipid Profile; Future  -     HEMOGLOBIN A1C; Future    Seasonal allergies  Acute issue today.  She has responded well to Kenalog injection in the past so we will repeat today given refractory allergy symptoms.  -     triamcinolone acetonide (KENALOG-40) injection 40 mg    Nocturia  Stress incontinence  Chronic issue.  We will go ahead and check urinalysis and culture today.  We discussed various dietary triggers which may worsen urinary incontinence such as caffeine, sugars, citrus, chocolate.  Given continued symptoms though, may recommend referral to urology.  She may benefit from urodynamic studies to see if she has any urinary retention.  She is planning to wait until she gets established at [facility], she is moving to [place] soon.  -     URINALYSIS; Future  -     URINE CULTURE(NEW);  Future      Follow-up as needed, as patient is moving to [place] soon.    Jagruti Stewart DO  [facility]

## 2021-04-29 LAB
BACTERIA UR CULT: NORMAL
SIGNIFICANT IND 70042: NORMAL
SITE SITE: NORMAL
SOURCE SOURCE: NORMAL

## 2021-07-23 ENCOUNTER — APPOINTMENT (OUTPATIENT)
Dept: RADIOLOGY | Facility: IMAGING CENTER | Age: 61
End: 2021-07-23
Attending: FAMILY MEDICINE
Payer: COMMERCIAL

## 2021-07-23 ENCOUNTER — OFFICE VISIT (OUTPATIENT)
Dept: MEDICAL GROUP | Facility: PHYSICIAN GROUP | Age: 61
End: 2021-07-23
Payer: COMMERCIAL

## 2021-07-23 VITALS
BODY MASS INDEX: 36.65 KG/M2 | DIASTOLIC BLOOD PRESSURE: 72 MMHG | SYSTOLIC BLOOD PRESSURE: 118 MMHG | WEIGHT: 220 LBS | OXYGEN SATURATION: 94 % | HEART RATE: 82 BPM | HEIGHT: 65 IN | TEMPERATURE: 98.9 F

## 2021-07-23 DIAGNOSIS — Z12.31 ENCOUNTER FOR SCREENING MAMMOGRAM FOR BREAST CANCER: ICD-10-CM

## 2021-07-23 DIAGNOSIS — M79.645 PAIN OF FINGER OF LEFT HAND: ICD-10-CM

## 2021-07-23 PROCEDURE — 99214 OFFICE O/P EST MOD 30 MIN: CPT | Performed by: FAMILY MEDICINE

## 2021-07-23 PROCEDURE — 73140 X-RAY EXAM OF FINGER(S): CPT | Mod: TC,LT | Performed by: FAMILY MEDICINE

## 2021-07-23 RX ORDER — METHYLPREDNISOLONE 4 MG/1
TABLET ORAL
Qty: 21 TABLET | Refills: 0 | Status: SHIPPED | OUTPATIENT
Start: 2021-07-23 | End: 2024-03-07

## 2021-07-23 ASSESSMENT — FIBROSIS 4 INDEX: FIB4 SCORE: 0.32

## 2021-07-23 NOTE — PROGRESS NOTES
CC: finger pain    HISTORY OF THE PRESENT ILLNESS: Patient is a 60 y.o. female.     Patient is here today with concern for pain of her left third finger.  Has been ongoing for about 3 months now.  She has to use her fingers a lot for work.  Pain is somewhat better in the morning and worsens throughout the day.  She does not have any known injury.  Notes that mom has a history of severe hand arthritis as well prior to passing.  Joints are not necessarily hot or red.    Allergies: Inapsine [droperidol]    Current Outpatient Medications Ordered in Epic   Medication Sig Dispense Refill   • methylPREDNISolone (MEDROL DOSEPAK) 4 MG Tablet Therapy Pack As directed on the packaging label. 21 tablet 0   • diclofenac sodium 1 % Gel Apply to affected area daily prn. 100 g 3   • omeprazole (PRILOSEC) 40 MG delayed-release capsule Take 1 capsule by mouth every day. 90 capsule 3   • SYNTHROID 200 MCG Tab TAKE 1 TABLET EVERY MORNINGON AN EMPTY STOMACH (Patient taking differently: Take 200 mcg by mouth Every morning on an empty stomach.) 90 Tab 3   • diltiazem (DILACOR XR) 180 MG XR capsule Take 1 Cap by mouth every day. 90 Cap 3   • carvedilol (COREG) 6.25 MG Tab Take 1 Tab by mouth 2 times a day, with meals. 180 Tab 3   • atorvastatin (LIPITOR) 80 MG tablet Take 1 Tab by mouth every evening. 90 Tab 3   • FLUoxetine (PROZAC) 10 MG Cap Take 1 Cap by mouth every day. (Patient taking differently: Take 10 mg by mouth every morning.) 90 Cap 3   • nitroGLYCERIN (NITROSTAT) 0.3 MG SL tablet Place 1 Tab under tongue as needed for Chest Pain. 25 Tab 3   • aspirin (ASA) 81 MG Chew Tab chewable tablet Chew 81 mg every morning.       No current [facility]-ordered facility-administered medications on file.       Past Medical History:   Diagnosis Date   • History of myocardial infarction     x5; cardiac caths only, no stents placed   • Hyperlipidemia    • Hypertension    • Thyroid disease        Past Surgical History:   Procedure Laterality Date   •  "CHOLECYSTECTOMY     • THYROIDECTOMY TOTAL         Social History     Tobacco Use   • Smoking status: Never Smoker   • Smokeless tobacco: Never Used   Vaping Use   • Vaping Use: Never used   Substance Use Topics   • Alcohol use: Not Currently   • Drug use: No       Social History     Social History Narrative   • Not on file       Family History   Problem Relation Age of Onset   • Alzheimer's Disease Mother    • Heart Disease Father 70        CAD     Exam: /72 (BP Location: Left arm, Patient Position: Sitting, BP Cuff Size: Large adult)   Pulse 82   Temp 37.2 °C (98.9 °F) (Temporal)   Ht 1.651 m (5' 5\")   Wt 99.8 kg (220 lb)   SpO2 94%  Body mass index is 36.61 kg/m².    General: Well appearing, NAD  Skin: Warm and dry.  No obvious lesions.  Musculoskeletal: Patient has bony hypertrophy of DIP joints of hands, more prominent on third digits.  She is tender to palpation along the DIP joint on the left third digit.  Psych: Normal mood and affect. Alert and oriented. Judgment and insight is normal.    Please note that this dictation was created using voice recognition software. I have made every reasonable attempt to correct obvious errors, but I expect that there are errors of grammar and possibly content that I did not discover before finalizing the note.      Assessment/Plan  Priscilla was seen today for hand pain.    Diagnoses and all orders for this visit:    Pain of finger of left hand  Based on appearance I suspect most likely arthritis.  We will go ahead and obtain x-ray at this time.  We will trial Medrol Dosepak and diclofenac gel.  If symptoms persist after these treatments, would recommend referral to hand specialist for possible corticosteroid injection.  -     DX-FINGER(S) 2+ LEFT; Future  -     methylPREDNISolone (MEDROL DOSEPAK) 4 MG Tablet Therapy Pack; As directed on the packaging label.  -     diclofenac sodium 1 % Gel; Apply to affected area daily prn.    Follow-up if symptoms not " improving.    Jagruti Stewart DO  [facility]

## 2021-07-26 ENCOUNTER — HOSPITAL ENCOUNTER (OUTPATIENT)
Dept: LAB | Facility: MEDICAL CENTER | Age: 61
End: 2021-07-26
Attending: FAMILY MEDICINE
Payer: COMMERCIAL

## 2021-07-26 DIAGNOSIS — R73.03 PREDIABETES: ICD-10-CM

## 2021-07-26 DIAGNOSIS — E78.00 HYPERCHOLESTEREMIA: ICD-10-CM

## 2021-07-26 DIAGNOSIS — E55.9 VITAMIN D DEFICIENCY: ICD-10-CM

## 2021-07-26 DIAGNOSIS — Z00.00 ENCOUNTER FOR PREVENTIVE CARE: ICD-10-CM

## 2021-07-26 DIAGNOSIS — E03.9 HYPOTHYROIDISM, UNSPECIFIED TYPE: ICD-10-CM

## 2021-07-26 DIAGNOSIS — I10 BENIGN ESSENTIAL HTN: ICD-10-CM

## 2021-07-26 LAB
25(OH)D3 SERPL-MCNC: 34 NG/ML (ref 30–100)
ALBUMIN SERPL BCP-MCNC: 4.2 G/DL (ref 3.2–4.9)
ALBUMIN/GLOB SERPL: 1.6 G/DL
ALP SERPL-CCNC: 131 U/L (ref 30–99)
ALT SERPL-CCNC: 13 U/L (ref 2–50)
ANION GAP SERPL CALC-SCNC: 9 MMOL/L (ref 7–16)
AST SERPL-CCNC: 11 U/L (ref 12–45)
BASOPHILS # BLD AUTO: 0.3 % (ref 0–1.8)
BASOPHILS # BLD: 0.05 K/UL (ref 0–0.12)
BILIRUB SERPL-MCNC: 0.3 MG/DL (ref 0.1–1.5)
BUN SERPL-MCNC: 20 MG/DL (ref 8–22)
CALCIUM SERPL-MCNC: 9.3 MG/DL (ref 8.5–10.5)
CHLORIDE SERPL-SCNC: 107 MMOL/L (ref 96–112)
CHOLEST SERPL-MCNC: 167 MG/DL (ref 100–199)
CO2 SERPL-SCNC: 25 MMOL/L (ref 20–33)
CREAT SERPL-MCNC: 1.02 MG/DL (ref 0.5–1.4)
EOSINOPHIL # BLD AUTO: 0 K/UL (ref 0–0.51)
EOSINOPHIL NFR BLD: 0 % (ref 0–6.9)
ERYTHROCYTE [DISTWIDTH] IN BLOOD BY AUTOMATED COUNT: 47.1 FL (ref 35.9–50)
EST. AVERAGE GLUCOSE BLD GHB EST-MCNC: 120 MG/DL
FASTING STATUS PATIENT QL REPORTED: NORMAL
GLOBULIN SER CALC-MCNC: 2.7 G/DL (ref 1.9–3.5)
GLUCOSE SERPL-MCNC: 115 MG/DL (ref 65–99)
HBA1C MFR BLD: 5.8 % (ref 4–5.6)
HCT VFR BLD AUTO: 45.1 % (ref 37–47)
HDLC SERPL-MCNC: 52 MG/DL
HGB BLD-MCNC: 14.4 G/DL (ref 12–16)
IMM GRANULOCYTES # BLD AUTO: 0.1 K/UL (ref 0–0.11)
IMM GRANULOCYTES NFR BLD AUTO: 0.6 % (ref 0–0.9)
LDLC SERPL CALC-MCNC: 97 MG/DL
LYMPHOCYTES # BLD AUTO: 2.57 K/UL (ref 1–4.8)
LYMPHOCYTES NFR BLD: 15.3 % (ref 22–41)
MCH RBC QN AUTO: 30.6 PG (ref 27–33)
MCHC RBC AUTO-ENTMCNC: 31.9 G/DL (ref 33.6–35)
MCV RBC AUTO: 96 FL (ref 81.4–97.8)
MONOCYTES # BLD AUTO: 0.71 K/UL (ref 0–0.85)
MONOCYTES NFR BLD AUTO: 4.2 % (ref 0–13.4)
NEUTROPHILS # BLD AUTO: 13.35 K/UL (ref 2–7.15)
NEUTROPHILS NFR BLD: 79.6 % (ref 44–72)
NRBC # BLD AUTO: 0 K/UL
NRBC BLD-RTO: 0 /100 WBC
PLATELET # BLD AUTO: 462 K/UL (ref 164–446)
PMV BLD AUTO: 10.1 FL (ref 9–12.9)
POTASSIUM SERPL-SCNC: 4.3 MMOL/L (ref 3.6–5.5)
PROT SERPL-MCNC: 6.9 G/DL (ref 6–8.2)
RBC # BLD AUTO: 4.7 M/UL (ref 4.2–5.4)
SODIUM SERPL-SCNC: 141 MMOL/L (ref 135–145)
T4 FREE SERPL-MCNC: 1.43 NG/DL (ref 0.93–1.7)
TRIGL SERPL-MCNC: 91 MG/DL (ref 0–149)
TSH SERPL DL<=0.005 MIU/L-ACNC: 0.48 UIU/ML (ref 0.38–5.33)
WBC # BLD AUTO: 16.8 K/UL (ref 4.8–10.8)

## 2021-07-26 PROCEDURE — 84443 ASSAY THYROID STIM HORMONE: CPT

## 2021-07-26 PROCEDURE — 80053 COMPREHEN METABOLIC PANEL: CPT

## 2021-07-26 PROCEDURE — 82306 VITAMIN D 25 HYDROXY: CPT

## 2021-07-26 PROCEDURE — 83036 HEMOGLOBIN GLYCOSYLATED A1C: CPT

## 2021-07-26 PROCEDURE — 84439 ASSAY OF FREE THYROXINE: CPT

## 2021-07-26 PROCEDURE — 80061 LIPID PANEL: CPT

## 2021-07-26 PROCEDURE — 36415 COLL VENOUS BLD VENIPUNCTURE: CPT

## 2021-07-26 PROCEDURE — 85025 COMPLETE CBC W/AUTO DIFF WBC: CPT

## 2021-07-28 DIAGNOSIS — R79.89 ABNORMAL CBC: ICD-10-CM

## 2021-08-03 ENCOUNTER — HOSPITAL ENCOUNTER (OUTPATIENT)
Dept: RADIOLOGY | Facility: MEDICAL CENTER | Age: 61
End: 2021-08-03
Attending: FAMILY MEDICINE
Payer: COMMERCIAL

## 2021-08-03 DIAGNOSIS — Z12.31 VISIT FOR SCREENING MAMMOGRAM: ICD-10-CM

## 2021-08-03 PROCEDURE — 77063 BREAST TOMOSYNTHESIS BI: CPT

## 2021-08-17 DIAGNOSIS — E78.00 HYPERCHOLESTEREMIA: ICD-10-CM

## 2021-08-17 DIAGNOSIS — I25.2 HISTORY OF MYOCARDIAL INFARCTION: ICD-10-CM

## 2021-08-20 RX ORDER — CARVEDILOL 6.25 MG/1
TABLET ORAL
Qty: 180 TABLET | Refills: 0 | Status: SHIPPED | OUTPATIENT
Start: 2021-08-20 | End: 2021-08-31 | Stop reason: SDUPTHER

## 2021-08-20 RX ORDER — DILTIAZEM HYDROCHLORIDE 180 MG/1
CAPSULE, EXTENDED RELEASE ORAL
Qty: 90 CAPSULE | Refills: 0 | Status: SHIPPED | OUTPATIENT
Start: 2021-08-20 | End: 2021-08-31 | Stop reason: SDUPTHER

## 2021-08-20 RX ORDER — ATORVASTATIN CALCIUM 80 MG/1
TABLET, FILM COATED ORAL
Qty: 90 TABLET | Refills: 0 | Status: SHIPPED | OUTPATIENT
Start: 2021-08-20 | End: 2021-08-31 | Stop reason: SDUPTHER

## 2021-08-20 NOTE — TELEPHONE ENCOUNTER
Last appointment was on 07/23/2021 7/23/21 4/26/21 12/2/20   Blood Pressure 118/72 112/74 --     Lab Results   Component Value Date/Time    CHOLSTRLTOT 167 07/26/2021 09:15 AM    LDL 97 07/26/2021 09:15 AM    HDL 52 07/26/2021 09:15 AM    TRIGLYCERIDE 91 07/26/2021 09:15 AM       Lab Results   Component Value Date/Time    SODIUM 141 07/26/2021 09:15 AM    POTASSIUM 4.3 07/26/2021 09:15 AM    CHLORIDE 107 07/26/2021 09:15 AM    CO2 25 07/26/2021 09:15 AM    GLUCOSE 115 (H) 07/26/2021 09:15 AM    BUN 20 07/26/2021 09:15 AM    CREATININE 1.02 07/26/2021 09:15 AM

## 2021-08-31 DIAGNOSIS — E78.00 HYPERCHOLESTEREMIA: ICD-10-CM

## 2021-08-31 DIAGNOSIS — I25.2 HISTORY OF MYOCARDIAL INFARCTION: ICD-10-CM

## 2021-08-31 RX ORDER — ATORVASTATIN CALCIUM 80 MG/1
TABLET, FILM COATED ORAL
Qty: 90 TABLET | Refills: 3 | Status: SHIPPED | OUTPATIENT
Start: 2021-08-31 | End: 2024-03-07

## 2021-08-31 RX ORDER — DILTIAZEM HYDROCHLORIDE 180 MG/1
CAPSULE, EXTENDED RELEASE ORAL
Qty: 90 CAPSULE | Refills: 3 | Status: SHIPPED | OUTPATIENT
Start: 2021-08-31 | End: 2024-03-07

## 2021-08-31 RX ORDER — CARVEDILOL 6.25 MG/1
6.25 TABLET ORAL 2 TIMES DAILY WITH MEALS
Qty: 180 TABLET | Refills: 3 | Status: SHIPPED | OUTPATIENT
Start: 2021-08-31

## 2021-09-06 DIAGNOSIS — R23.2 HOT FLASHES: ICD-10-CM

## 2021-09-07 RX ORDER — FLUOXETINE 10 MG/1
CAPSULE ORAL
Qty: 90 CAPSULE | Refills: 3 | Status: SHIPPED | OUTPATIENT
Start: 2021-09-07 | End: 2024-03-21

## 2021-09-07 RX ORDER — LEVOTHYROXINE SODIUM 200 MCG
TABLET ORAL
Qty: 90 TABLET | Refills: 3 | Status: SHIPPED | OUTPATIENT
Start: 2021-09-07

## 2024-03-07 ENCOUNTER — ANESTHESIA (OUTPATIENT)
Dept: SURGERY | Facility: MEDICAL CENTER | Age: 64
DRG: 329 | End: 2024-03-07
Payer: COMMERCIAL

## 2024-03-07 ENCOUNTER — APPOINTMENT (OUTPATIENT)
Dept: RADIOLOGY | Facility: MEDICAL CENTER | Age: 64
DRG: 329 | End: 2024-03-07
Attending: EMERGENCY MEDICINE
Payer: COMMERCIAL

## 2024-03-07 ENCOUNTER — OFFICE VISIT (OUTPATIENT)
Dept: URGENT CARE | Facility: PHYSICIAN GROUP | Age: 64
End: 2024-03-07
Payer: COMMERCIAL

## 2024-03-07 ENCOUNTER — HOSPITAL ENCOUNTER (INPATIENT)
Facility: MEDICAL CENTER | Age: 64
LOS: 2 days | DRG: 329 | End: 2024-03-09
Attending: EMERGENCY MEDICINE | Admitting: INTERNAL MEDICINE
Payer: COMMERCIAL

## 2024-03-07 ENCOUNTER — APPOINTMENT (OUTPATIENT)
Dept: RADIOLOGY | Facility: MEDICAL CENTER | Age: 64
DRG: 329 | End: 2024-03-07
Attending: INTERNAL MEDICINE
Payer: COMMERCIAL

## 2024-03-07 ENCOUNTER — ANESTHESIA EVENT (OUTPATIENT)
Dept: SURGERY | Facility: MEDICAL CENTER | Age: 64
DRG: 329 | End: 2024-03-07
Payer: COMMERCIAL

## 2024-03-07 VITALS
WEIGHT: 220 LBS | HEART RATE: 118 BPM | HEIGHT: 65 IN | DIASTOLIC BLOOD PRESSURE: 86 MMHG | BODY MASS INDEX: 36.65 KG/M2 | RESPIRATION RATE: 16 BRPM | OXYGEN SATURATION: 92 % | SYSTOLIC BLOOD PRESSURE: 136 MMHG | TEMPERATURE: 99.2 F

## 2024-03-07 DIAGNOSIS — R10.9 ABDOMINAL PAIN, UNSPECIFIED ABDOMINAL LOCATION: ICD-10-CM

## 2024-03-07 DIAGNOSIS — R00.0 TACHYCARDIA: ICD-10-CM

## 2024-03-07 DIAGNOSIS — K35.30 ACUTE APPENDICITIS WITH LOCALIZED PERITONITIS, WITHOUT PERFORATION, ABSCESS, OR GANGRENE: ICD-10-CM

## 2024-03-07 DIAGNOSIS — R11.2 NAUSEA AND VOMITING, UNSPECIFIED VOMITING TYPE: ICD-10-CM

## 2024-03-07 PROBLEM — K35.80 ACUTE APPENDICITIS: Status: ACTIVE | Noted: 2024-03-07

## 2024-03-07 LAB
ALBUMIN SERPL BCP-MCNC: 4.5 G/DL (ref 3.2–4.9)
ALBUMIN/GLOB SERPL: 1.4 G/DL
ALP SERPL-CCNC: 159 U/L (ref 30–99)
ALT SERPL-CCNC: 21 U/L (ref 2–50)
ANION GAP SERPL CALC-SCNC: 13 MMOL/L (ref 7–16)
APPEARANCE UR: CLEAR
APPEARANCE UR: CLEAR
AST SERPL-CCNC: 19 U/L (ref 12–45)
BACTERIA #/AREA URNS HPF: ABNORMAL /HPF
BASE EXCESS BLDA CALC-SCNC: -1 MMOL/L (ref -4–3)
BASOPHILS # BLD AUTO: 0.5 % (ref 0–1.8)
BASOPHILS # BLD: 0.11 K/UL (ref 0–0.12)
BILIRUB SERPL-MCNC: 0.6 MG/DL (ref 0.1–1.5)
BILIRUB UR QL STRIP.AUTO: NEGATIVE
BILIRUB UR STRIP-MCNC: NEGATIVE MG/DL
BODY TEMPERATURE: ABNORMAL DEGREES
BREATHS SETTING VENT: 20
BUN SERPL-MCNC: 16 MG/DL (ref 8–22)
CALCIUM ALBUM COR SERPL-MCNC: 8.9 MG/DL (ref 8.5–10.5)
CALCIUM SERPL-MCNC: 9.3 MG/DL (ref 8.4–10.2)
CHLORIDE SERPL-SCNC: 101 MMOL/L (ref 96–112)
CO2 BLDA-SCNC: 27 MMOL/L (ref 20–33)
CO2 SERPL-SCNC: 26 MMOL/L (ref 20–33)
COLOR UR AUTO: YELLOW
COLOR UR: YELLOW
CREAT SERPL-MCNC: 0.99 MG/DL (ref 0.5–1.4)
DELSYS IDSYS: ABNORMAL
END TIDAL CARBON DIOXIDE IECO2: 42 MMHG
EOSINOPHIL # BLD AUTO: 0.03 K/UL (ref 0–0.51)
EOSINOPHIL NFR BLD: 0.1 % (ref 0–6.9)
EPI CELLS #/AREA URNS HPF: ABNORMAL /HPF
ERYTHROCYTE [DISTWIDTH] IN BLOOD BY AUTOMATED COUNT: 40.6 FL (ref 35.9–50)
GFR SERPLBLD CREATININE-BSD FMLA CKD-EPI: 64 ML/MIN/1.73 M 2
GLOBULIN SER CALC-MCNC: 3.2 G/DL (ref 1.9–3.5)
GLUCOSE SERPL-MCNC: 97 MG/DL (ref 65–99)
GLUCOSE UR STRIP.AUTO-MCNC: NEGATIVE MG/DL
GLUCOSE UR STRIP.AUTO-MCNC: NEGATIVE MG/DL
HCO3 BLDA-SCNC: 25.4 MMOL/L (ref 17–25)
HCT VFR BLD AUTO: 43.1 % (ref 37–47)
HGB BLD-MCNC: 14.7 G/DL (ref 12–16)
HOROWITZ INDEX BLDA+IHG-RTO: 69 MM[HG]
IMM GRANULOCYTES # BLD AUTO: 0.12 K/UL (ref 0–0.11)
IMM GRANULOCYTES NFR BLD AUTO: 0.6 % (ref 0–0.9)
KETONES UR STRIP.AUTO-MCNC: NEGATIVE MG/DL
KETONES UR STRIP.AUTO-MCNC: NEGATIVE MG/DL
LACTATE BLD-SCNC: 0.8 MMOL/L (ref 0.5–2)
LACTATE SERPL-SCNC: 1.5 MMOL/L (ref 0.5–2)
LEUKOCYTE ESTERASE UR QL STRIP.AUTO: ABNORMAL
LEUKOCYTE ESTERASE UR QL STRIP.AUTO: NORMAL
LIPASE SERPL-CCNC: 41 U/L (ref 11–82)
LYMPHOCYTES # BLD AUTO: 4.63 K/UL (ref 1–4.8)
LYMPHOCYTES NFR BLD: 21.3 % (ref 22–41)
MCH RBC QN AUTO: 30.9 PG (ref 27–33)
MCHC RBC AUTO-ENTMCNC: 34.1 G/DL (ref 32.2–35.5)
MCV RBC AUTO: 90.7 FL (ref 81.4–97.8)
MICRO URNS: ABNORMAL
MODE IMODE: ABNORMAL
MONOCYTES # BLD AUTO: 1.68 K/UL (ref 0–0.85)
MONOCYTES NFR BLD AUTO: 7.7 % (ref 0–13.4)
NEUTROPHILS # BLD AUTO: 15.12 K/UL (ref 1.82–7.42)
NEUTROPHILS NFR BLD: 69.8 % (ref 44–72)
NITRITE UR QL STRIP.AUTO: NEGATIVE
NITRITE UR QL STRIP.AUTO: NEGATIVE
NRBC # BLD AUTO: 0 K/UL
NRBC BLD-RTO: 0 /100 WBC (ref 0–0.2)
O2/TOTAL GAS SETTING VFR VENT: 100 %
PATHOLOGY CONSULT NOTE: NORMAL
PCO2 BLDA: 47 MMHG (ref 26–37)
PEEP END EXPIRATORY PRESSURE IPEEP: 8 CMH20
PH BLDA: 7.34 [PH] (ref 7.4–7.5)
PH TEMP ADJ BLDA: 7.35 [PH] (ref 7.4–7.5)
PH UR STRIP.AUTO: 6 [PH] (ref 5–8)
PH UR STRIP.AUTO: 6 [PH] (ref 5–8)
PLATELET # BLD AUTO: 462 K/UL (ref 164–446)
PMV BLD AUTO: 9.8 FL (ref 9–12.9)
PO2 BLDA: 69 MMHG (ref 64–87)
PO2 TEMP ADJ BLDA: 65 MMHG (ref 64–87)
POTASSIUM SERPL-SCNC: 3.6 MMOL/L (ref 3.6–5.5)
PROT SERPL-MCNC: 7.7 G/DL (ref 6–8.2)
PROT UR QL STRIP: NEGATIVE MG/DL
PROT UR QL STRIP: NEGATIVE MG/DL
RBC # BLD AUTO: 4.75 M/UL (ref 4.2–5.4)
RBC # URNS HPF: ABNORMAL /HPF
RBC UR QL AUTO: ABNORMAL
RBC UR QL AUTO: NORMAL
SAO2 % BLDA: 92 % (ref 93–99)
SODIUM SERPL-SCNC: 140 MMOL/L (ref 135–145)
SP GR UR STRIP.AUTO: 1.01
SP GR UR STRIP.AUTO: <=1.005
SPECIMEN DRAWN FROM PATIENT: ABNORMAL
TIDAL VOLUME IVT: 400 ML
UROBILINOGEN UR STRIP-MCNC: 0.2 MG/DL
WBC # BLD AUTO: 21.7 K/UL (ref 4.8–10.8)
WBC #/AREA URNS HPF: ABNORMAL /HPF

## 2024-03-07 PROCEDURE — 83605 ASSAY OF LACTIC ACID: CPT

## 2024-03-07 PROCEDURE — 99214 OFFICE O/P EST MOD 30 MIN: CPT | Performed by: NURSE PRACTITIONER

## 2024-03-07 PROCEDURE — 36415 COLL VENOUS BLD VENIPUNCTURE: CPT

## 2024-03-07 PROCEDURE — 81002 URINALYSIS NONAUTO W/O SCOPE: CPT | Performed by: NURSE PRACTITIONER

## 2024-03-07 PROCEDURE — 770022 HCHG ROOM/CARE - ICU (200)

## 2024-03-07 PROCEDURE — 160041 HCHG SURGERY MINUTES - EA ADDL 1 MIN LEVEL 4: Performed by: SURGERY

## 2024-03-07 PROCEDURE — 87633 RESP VIRUS 12-25 TARGETS: CPT

## 2024-03-07 PROCEDURE — 160029 HCHG SURGERY MINUTES - 1ST 30 MINS LEVEL 4: Performed by: SURGERY

## 2024-03-07 PROCEDURE — 700111 HCHG RX REV CODE 636 W/ 250 OVERRIDE (IP): Mod: JZ | Performed by: EMERGENCY MEDICINE

## 2024-03-07 PROCEDURE — 96374 THER/PROPH/DIAG INJ IV PUSH: CPT

## 2024-03-07 PROCEDURE — 0DTJ4ZZ RESECTION OF APPENDIX, PERCUTANEOUS ENDOSCOPIC APPROACH: ICD-10-PCS | Performed by: SURGERY

## 2024-03-07 PROCEDURE — 3079F DIAST BP 80-89 MM HG: CPT | Performed by: NURSE PRACTITIONER

## 2024-03-07 PROCEDURE — 3075F SYST BP GE 130 - 139MM HG: CPT | Performed by: NURSE PRACTITIONER

## 2024-03-07 PROCEDURE — 160048 HCHG OR STATISTICAL LEVEL 1-5: Performed by: SURGERY

## 2024-03-07 PROCEDURE — 94760 N-INVAS EAR/PLS OXIMETRY 1: CPT

## 2024-03-07 PROCEDURE — 700111 HCHG RX REV CODE 636 W/ 250 OVERRIDE (IP)

## 2024-03-07 PROCEDURE — 94002 VENT MGMT INPAT INIT DAY: CPT

## 2024-03-07 PROCEDURE — 160009 HCHG ANES TIME/MIN: Performed by: SURGERY

## 2024-03-07 PROCEDURE — 0241U HCHG SARS-COV-2 COVID-19 NFCT DS RESP RNA 4 TRGT MIC: CPT

## 2024-03-07 PROCEDURE — 87798 DETECT AGENT NOS DNA AMP: CPT | Mod: 91

## 2024-03-07 PROCEDURE — 0DBH4ZZ EXCISION OF CECUM, PERCUTANEOUS ENDOSCOPIC APPROACH: ICD-10-PCS | Performed by: SURGERY

## 2024-03-07 PROCEDURE — 82803 BLOOD GASES ANY COMBINATION: CPT

## 2024-03-07 PROCEDURE — 700105 HCHG RX REV CODE 258: Performed by: EMERGENCY MEDICINE

## 2024-03-07 PROCEDURE — 700101 HCHG RX REV CODE 250: Performed by: SURGERY

## 2024-03-07 PROCEDURE — 83690 ASSAY OF LIPASE: CPT

## 2024-03-07 PROCEDURE — 96375 TX/PRO/DX INJ NEW DRUG ADDON: CPT

## 2024-03-07 PROCEDURE — 5A1945Z RESPIRATORY VENTILATION, 24-96 CONSECUTIVE HOURS: ICD-10-PCS | Performed by: INTERNAL MEDICINE

## 2024-03-07 PROCEDURE — 99291 CRITICAL CARE FIRST HOUR: CPT

## 2024-03-07 PROCEDURE — 84478 ASSAY OF TRIGLYCERIDES: CPT

## 2024-03-07 PROCEDURE — 74177 CT ABD & PELVIS W/CONTRAST: CPT

## 2024-03-07 PROCEDURE — 99291 CRITICAL CARE FIRST HOUR: CPT | Performed by: INTERNAL MEDICINE

## 2024-03-07 PROCEDURE — 71045 X-RAY EXAM CHEST 1 VIEW: CPT

## 2024-03-07 PROCEDURE — 87486 CHLMYD PNEUM DNA AMP PROBE: CPT

## 2024-03-07 PROCEDURE — 700105 HCHG RX REV CODE 258: Performed by: SURGERY

## 2024-03-07 PROCEDURE — 700111 HCHG RX REV CODE 636 W/ 250 OVERRIDE (IP): Performed by: STUDENT IN AN ORGANIZED HEALTH CARE EDUCATION/TRAINING PROGRAM

## 2024-03-07 PROCEDURE — 81001 URINALYSIS AUTO W/SCOPE: CPT

## 2024-03-07 PROCEDURE — 700111 HCHG RX REV CODE 636 W/ 250 OVERRIDE (IP): Performed by: INTERNAL MEDICINE

## 2024-03-07 PROCEDURE — 88304 TISSUE EXAM BY PATHOLOGIST: CPT

## 2024-03-07 PROCEDURE — 80053 COMPREHEN METABOLIC PANEL: CPT

## 2024-03-07 PROCEDURE — 0BH18EZ INSERTION OF ENDOTRACHEAL AIRWAY INTO TRACHEA, VIA NATURAL OR ARTIFICIAL OPENING ENDOSCOPIC: ICD-10-PCS | Performed by: INTERNAL MEDICINE

## 2024-03-07 PROCEDURE — 700111 HCHG RX REV CODE 636 W/ 250 OVERRIDE (IP): Performed by: EMERGENCY MEDICINE

## 2024-03-07 PROCEDURE — 700101 HCHG RX REV CODE 250: Performed by: STUDENT IN AN ORGANIZED HEALTH CARE EDUCATION/TRAINING PROGRAM

## 2024-03-07 PROCEDURE — 36600 WITHDRAWAL OF ARTERIAL BLOOD: CPT

## 2024-03-07 PROCEDURE — 700111 HCHG RX REV CODE 636 W/ 250 OVERRIDE (IP): Performed by: ANESTHESIOLOGY

## 2024-03-07 PROCEDURE — 93005 ELECTROCARDIOGRAM TRACING: CPT | Performed by: SURGERY

## 2024-03-07 PROCEDURE — 85025 COMPLETE CBC W/AUTO DIFF WBC: CPT

## 2024-03-07 PROCEDURE — 87581 M.PNEUMON DNA AMP PROBE: CPT

## 2024-03-07 PROCEDURE — 94799 UNLISTED PULMONARY SVC/PX: CPT

## 2024-03-07 PROCEDURE — 700117 HCHG RX CONTRAST REV CODE 255: Performed by: EMERGENCY MEDICINE

## 2024-03-07 PROCEDURE — 99292 CRITICAL CARE ADDL 30 MIN: CPT | Performed by: INTERNAL MEDICINE

## 2024-03-07 RX ORDER — SODIUM CHLORIDE, SODIUM LACTATE, POTASSIUM CHLORIDE, CALCIUM CHLORIDE 600; 310; 30; 20 MG/100ML; MG/100ML; MG/100ML; MG/100ML
1000 INJECTION, SOLUTION INTRAVENOUS ONCE
Status: COMPLETED | OUTPATIENT
Start: 2024-03-07 | End: 2024-03-07

## 2024-03-07 RX ORDER — ROCURONIUM BROMIDE 10 MG/ML
INJECTION, SOLUTION INTRAVENOUS PRN
Status: DISCONTINUED | OUTPATIENT
Start: 2024-03-07 | End: 2024-03-07 | Stop reason: SURG

## 2024-03-07 RX ORDER — PROCHLORPERAZINE EDISYLATE 5 MG/ML
5-10 INJECTION INTRAMUSCULAR; INTRAVENOUS EVERY 4 HOURS PRN
Status: DISCONTINUED | OUTPATIENT
Start: 2024-03-07 | End: 2024-03-09 | Stop reason: HOSPADM

## 2024-03-07 RX ORDER — MIDAZOLAM HYDROCHLORIDE 1 MG/ML
INJECTION INTRAMUSCULAR; INTRAVENOUS PRN
Status: DISCONTINUED | OUTPATIENT
Start: 2024-03-07 | End: 2024-03-07 | Stop reason: SURG

## 2024-03-07 RX ORDER — FUROSEMIDE 10 MG/ML
40 INJECTION INTRAMUSCULAR; INTRAVENOUS ONCE
Status: COMPLETED | OUTPATIENT
Start: 2024-03-07 | End: 2024-03-07

## 2024-03-07 RX ORDER — ONDANSETRON 2 MG/ML
4 INJECTION INTRAMUSCULAR; INTRAVENOUS ONCE
Status: COMPLETED | OUTPATIENT
Start: 2024-03-07 | End: 2024-03-07

## 2024-03-07 RX ORDER — PROMETHAZINE HYDROCHLORIDE 25 MG/1
12.5-25 SUPPOSITORY RECTAL EVERY 4 HOURS PRN
Status: DISCONTINUED | OUTPATIENT
Start: 2024-03-07 | End: 2024-03-09 | Stop reason: HOSPADM

## 2024-03-07 RX ORDER — ONDANSETRON 4 MG/1
4 TABLET, ORALLY DISINTEGRATING ORAL EVERY 4 HOURS PRN
Status: DISCONTINUED | OUTPATIENT
Start: 2024-03-07 | End: 2024-03-09 | Stop reason: HOSPADM

## 2024-03-07 RX ORDER — METRONIDAZOLE 500 MG/100ML
500 INJECTION, SOLUTION INTRAVENOUS ONCE
Status: COMPLETED | OUTPATIENT
Start: 2024-03-07 | End: 2024-03-07

## 2024-03-07 RX ORDER — DEXAMETHASONE SODIUM PHOSPHATE 4 MG/ML
INJECTION, SOLUTION INTRA-ARTICULAR; INTRALESIONAL; INTRAMUSCULAR; INTRAVENOUS; SOFT TISSUE PRN
Status: DISCONTINUED | OUTPATIENT
Start: 2024-03-07 | End: 2024-03-07 | Stop reason: SURG

## 2024-03-07 RX ORDER — CEFTRIAXONE 2 G/1
2000 INJECTION, POWDER, FOR SOLUTION INTRAMUSCULAR; INTRAVENOUS ONCE
Status: COMPLETED | OUTPATIENT
Start: 2024-03-07 | End: 2024-03-07

## 2024-03-07 RX ORDER — CHLORHEXIDINE GLUCONATE 4 %
1 LIQUID (ML) TOPICAL DAILY
COMMUNITY

## 2024-03-07 RX ORDER — PROMETHAZINE HYDROCHLORIDE 25 MG/1
12.5-25 TABLET ORAL EVERY 4 HOURS PRN
Status: DISCONTINUED | OUTPATIENT
Start: 2024-03-07 | End: 2024-03-09 | Stop reason: HOSPADM

## 2024-03-07 RX ORDER — HYDROMORPHONE HYDROCHLORIDE 2 MG/ML
INJECTION, SOLUTION INTRAMUSCULAR; INTRAVENOUS; SUBCUTANEOUS PRN
Status: DISCONTINUED | OUTPATIENT
Start: 2024-03-07 | End: 2024-03-07 | Stop reason: SURG

## 2024-03-07 RX ORDER — BUPIVACAINE HYDROCHLORIDE AND EPINEPHRINE 5; 5 MG/ML; UG/ML
INJECTION, SOLUTION EPIDURAL; INTRACAUDAL; PERINEURAL
Status: DISCONTINUED | OUTPATIENT
Start: 2024-03-07 | End: 2024-03-07 | Stop reason: HOSPADM

## 2024-03-07 RX ORDER — GABAPENTIN 100 MG/1
100 CAPSULE ORAL NIGHTLY
COMMUNITY
Start: 2024-02-28 | End: 2024-03-07

## 2024-03-07 RX ORDER — DEXMEDETOMIDINE HYDROCHLORIDE 4 UG/ML
0-1.5 INJECTION, SOLUTION INTRAVENOUS CONTINUOUS
Status: DISCONTINUED | OUTPATIENT
Start: 2024-03-07 | End: 2024-03-09 | Stop reason: HOSPADM

## 2024-03-07 RX ORDER — ATORVASTATIN CALCIUM 80 MG/1
80 TABLET, FILM COATED ORAL DAILY
COMMUNITY
End: 2024-04-16 | Stop reason: SDUPTHER

## 2024-03-07 RX ORDER — SODIUM CHLORIDE, SODIUM LACTATE, POTASSIUM CHLORIDE, CALCIUM CHLORIDE 600; 310; 30; 20 MG/100ML; MG/100ML; MG/100ML; MG/100ML
INJECTION, SOLUTION INTRAVENOUS CONTINUOUS
Status: DISCONTINUED | OUTPATIENT
Start: 2024-03-07 | End: 2024-03-07

## 2024-03-07 RX ORDER — EPHEDRINE SULFATE 50 MG/ML
INJECTION, SOLUTION INTRAVENOUS PRN
Status: DISCONTINUED | OUTPATIENT
Start: 2024-03-07 | End: 2024-03-07 | Stop reason: SURG

## 2024-03-07 RX ORDER — FAMOTIDINE 20 MG/1
20 TABLET, FILM COATED ORAL EVERY 12 HOURS
Status: DISCONTINUED | OUTPATIENT
Start: 2024-03-07 | End: 2024-03-09 | Stop reason: HOSPADM

## 2024-03-07 RX ORDER — HYDROMORPHONE HYDROCHLORIDE 1 MG/ML
0.5 INJECTION, SOLUTION INTRAMUSCULAR; INTRAVENOUS; SUBCUTANEOUS ONCE
Status: COMPLETED | OUTPATIENT
Start: 2024-03-07 | End: 2024-03-07

## 2024-03-07 RX ORDER — AMLODIPINE BESYLATE 5 MG/1
5 TABLET ORAL DAILY
COMMUNITY

## 2024-03-07 RX ORDER — ACETAMINOPHEN 325 MG/1
650 TABLET ORAL EVERY 6 HOURS PRN
Status: DISCONTINUED | OUTPATIENT
Start: 2024-03-07 | End: 2024-03-09 | Stop reason: HOSPADM

## 2024-03-07 RX ORDER — LEVOTHYROXINE SODIUM 0.1 MG/1
200 TABLET ORAL
Status: DISCONTINUED | OUTPATIENT
Start: 2024-03-08 | End: 2024-03-09 | Stop reason: HOSPADM

## 2024-03-07 RX ORDER — HYDROCODONE BITARTRATE AND ACETAMINOPHEN 5; 325 MG/1; MG/1
TABLET ORAL
COMMUNITY
Start: 2024-01-05 | End: 2024-03-07

## 2024-03-07 RX ORDER — FLUOXETINE 10 MG/1
10 CAPSULE ORAL DAILY
Status: DISCONTINUED | OUTPATIENT
Start: 2024-03-08 | End: 2024-03-09 | Stop reason: HOSPADM

## 2024-03-07 RX ORDER — DEXAMETHASONE SODIUM PHOSPHATE 4 MG/ML
4 INJECTION, SOLUTION INTRA-ARTICULAR; INTRALESIONAL; INTRAMUSCULAR; INTRAVENOUS; SOFT TISSUE EVERY 6 HOURS
Status: DISCONTINUED | OUTPATIENT
Start: 2024-03-08 | End: 2024-03-09 | Stop reason: HOSPADM

## 2024-03-07 RX ORDER — SUCCINYLCHOLINE CHLORIDE 20 MG/ML
INJECTION INTRAMUSCULAR; INTRAVENOUS PRN
Status: DISCONTINUED | OUTPATIENT
Start: 2024-03-07 | End: 2024-03-07 | Stop reason: SURG

## 2024-03-07 RX ORDER — ONDANSETRON 2 MG/ML
4 INJECTION INTRAMUSCULAR; INTRAVENOUS EVERY 4 HOURS PRN
Status: DISCONTINUED | OUTPATIENT
Start: 2024-03-07 | End: 2024-03-09 | Stop reason: HOSPADM

## 2024-03-07 RX ADMIN — Medication 50 MCG/HR: at 21:30

## 2024-03-07 RX ADMIN — Medication 50 MCG/HR: at 21:21

## 2024-03-07 RX ADMIN — HYDROMORPHONE HYDROCHLORIDE 0.5 MG: 1 INJECTION, SOLUTION INTRAMUSCULAR; INTRAVENOUS; SUBCUTANEOUS at 15:15

## 2024-03-07 RX ADMIN — MIDAZOLAM HYDROCHLORIDE 2 MG: 1 INJECTION, SOLUTION INTRAMUSCULAR; INTRAVENOUS at 19:14

## 2024-03-07 RX ADMIN — CEFTRIAXONE SODIUM 2000 MG: 2 INJECTION, POWDER, FOR SOLUTION INTRAMUSCULAR; INTRAVENOUS at 17:39

## 2024-03-07 RX ADMIN — METRONIDAZOLE 500 MG: 5 INJECTION, SOLUTION INTRAVENOUS at 17:55

## 2024-03-07 RX ADMIN — DEXAMETHASONE SODIUM PHOSPHATE 10 MG: 4 INJECTION INTRA-ARTICULAR; INTRALESIONAL; INTRAMUSCULAR; INTRAVENOUS; SOFT TISSUE at 19:41

## 2024-03-07 RX ADMIN — FUROSEMIDE 40 MG: 10 INJECTION, SOLUTION INTRAMUSCULAR; INTRAVENOUS at 23:08

## 2024-03-07 RX ADMIN — ROCURONIUM BROMIDE 50 MG: 50 INJECTION, SOLUTION INTRAVENOUS at 19:25

## 2024-03-07 RX ADMIN — PROPOFOL 200 MG: 10 INJECTION, EMULSION INTRAVENOUS at 19:18

## 2024-03-07 RX ADMIN — FAMOTIDINE 20 MG: 10 INJECTION INTRAVENOUS at 22:14

## 2024-03-07 RX ADMIN — SUCCINYLCHOLINE CHLORIDE 160 MG: 20 INJECTION, SOLUTION INTRAMUSCULAR; INTRAVENOUS at 19:18

## 2024-03-07 RX ADMIN — FENTANYL CITRATE 50 MCG: 50 INJECTION, SOLUTION INTRAMUSCULAR; INTRAVENOUS at 19:15

## 2024-03-07 RX ADMIN — SODIUM CHLORIDE, POTASSIUM CHLORIDE, SODIUM LACTATE AND CALCIUM CHLORIDE: 600; 310; 30; 20 INJECTION, SOLUTION INTRAVENOUS at 19:13

## 2024-03-07 RX ADMIN — PROPOFOL 50 MCG/KG/MIN: 10 INJECTION, EMULSION INTRAVENOUS at 22:21

## 2024-03-07 RX ADMIN — FENTANYL CITRATE 50 MCG: 50 INJECTION, SOLUTION INTRAMUSCULAR; INTRAVENOUS at 19:36

## 2024-03-07 RX ADMIN — IOHEXOL 100 ML: 350 INJECTION, SOLUTION INTRAVENOUS at 16:55

## 2024-03-07 RX ADMIN — SODIUM CHLORIDE, POTASSIUM CHLORIDE, SODIUM LACTATE AND CALCIUM CHLORIDE 1000 ML: 600; 310; 30; 20 INJECTION, SOLUTION INTRAVENOUS at 15:15

## 2024-03-07 RX ADMIN — ONDANSETRON 4 MG: 2 INJECTION INTRAMUSCULAR; INTRAVENOUS at 15:15

## 2024-03-07 RX ADMIN — HYDROMORPHONE HYDROCHLORIDE 1 MG: 2 INJECTION INTRAMUSCULAR; INTRAVENOUS; SUBCUTANEOUS at 20:13

## 2024-03-07 RX ADMIN — FENTANYL CITRATE 50 MCG/HR: 50 INJECTION, SOLUTION INTRAMUSCULAR; INTRAVENOUS at 21:30

## 2024-03-07 RX ADMIN — EPHEDRINE SULFATE 10 MG: 50 INJECTION, SOLUTION INTRAVENOUS at 19:47

## 2024-03-07 RX ADMIN — DEXAMETHASONE SODIUM PHOSPHATE 4 MG: 4 INJECTION INTRA-ARTICULAR; INTRALESIONAL; INTRAMUSCULAR; INTRAVENOUS; SOFT TISSUE at 23:08

## 2024-03-07 ASSESSMENT — PAIN SCALES - GENERAL: PAIN_LEVEL: 0

## 2024-03-07 ASSESSMENT — ENCOUNTER SYMPTOMS
DIARRHEA: 0
FEVER: 0
VOMITING: 1
CHILLS: 0
MYALGIAS: 0
ABDOMINAL PAIN: 1
CONSTIPATION: 0
NAUSEA: 1

## 2024-03-07 ASSESSMENT — PAIN DESCRIPTION - PAIN TYPE
TYPE: ACUTE PAIN
TYPE: ACUTE PAIN

## 2024-03-07 ASSESSMENT — FIBROSIS 4 INDEX: FIB4 SCORE: 0.56

## 2024-03-07 NOTE — LETTER
"March 7, 2024    Priscilla Childress  [address]  [facility] [address]      Dear Ms. Childress,     I was one of the anesthesiologists taking care of you during your recent hospitalization at <[facility]>. During the initial management of your general anesthetic, I noted that the structure of your airway made managing your breathing different from the \"routine.\" Sometimes these issues are predictable, but many times they are not. This issue is something you need to know about.     I will place an alert about this in your medical record, but you should keep this letter and show it to any future anesthesia providers. You and your medical providers should be aware of increased planning needs for future anesthetics.     Reasons for managing your airway (breathing) \"differently\" include:   Small mouth opening Other: narrow breathing tube \"glottis\"   We ultimately managed your breathing while you were:   Unconscious (we controlled your breathing and used muscle relaxants)  Ventilation with bag - mask was:   Impossible    Techniques Used:   Non-tracheal intubation technique with:   Supraglottic mask airway (LMA, other)  Direct laryngoscopy and tracheal intubation with:   Flexible fiberoptic intubating scope Other: glidescope fiberoptic combo still with difficulty given narrowed glottis.   Non-Airway visualized techniques with:   NA  No Airway Access:   NA    Complications:   There were the following complications: unanticipated difficult airway leading to ICU admission for careful extubation.     Other explanations: We had difficulty inserting the breathing tube and you required ICU care on a ventilator following your appendectomy to wait for airway swelling to resolve before we could safely pull out the breathing tube.     Please contact my office with any questions.     Regards,        Mele Arnold M.D.    "

## 2024-03-07 NOTE — PROGRESS NOTES
Subjective     Madga Childress is a 63 y.o. female who presents with Abdominal Pain (Central abdominal pain, emesis, RLQ pain, x last night/Pt states no hx of ovarian cysts, does ha appendix.)            HPI  New problem.  Patient is a very pleasant 63-year-old female presents with right lower quadrant pain that she states started last night.  She reports the pain in her mid originally it started around her bellybutton and has migrated now to her right lower quadrant.  She denies fever, chills, myalgia but states she did have some vomiting this morning.  She denies any urinary symptoms of burning, frequency, or urgency.  She reports she was interacting with her grandson this morning and he excellently kicked her in her right lower quadrant and that set off or sharp pain.  She reports this pain is a 9-10 out of 10.    Inapsine [droperidol]  Current Outpatient Medications on File Prior to Visit   Medication Sig Dispense Refill    amLODIPine (NORVASC) 5 MG Tab Take 5 mg by mouth every day.      gabapentin (NEURONTIN) 100 MG Cap Take 100 mg by mouth every evening.      FLUoxetine (PROZAC) 10 MG Cap TAKE 1 CAPSULE DAILY 90 Capsule 3    SYNTHROID 200 MCG Tab TAKE 1 TABLET EVERY MORNINGON AN EMPTY STOMACH 90 Tablet 3    atorvastatin (LIPITOR) 80 MG tablet TAKE 1 TABLET EVERY EVENING 90 Tablet 3    carvedilol (COREG) 6.25 MG Tab Take 1 Tablet by mouth 2 times a day with meals. 180 Tablet 3    diclofenac sodium 1 % Gel Apply to affected area daily prn. 100 g 3    omeprazole (PRILOSEC) 40 MG delayed-release capsule Take 1 capsule by mouth every day. 90 capsule 3    aspirin (ASA) 81 MG Chew Tab chewable tablet Chew 81 mg every morning.      HYDROcodone-acetaminophen (NORCO) 5-325 MG Tab per tablet TAKE 1-2 TABLETS BY MOUTH EVERY 4 TO 6 HOURS FOR 1 DAY AS NEEDED FOR MODERATE PAIN ( TAKE 30 MINUTES PRIOR TO PROCEDURE) (Patient not taking: Reported on 3/7/2024)      diltiazem (DILT-XR) 180 MG XR capsule TAKE 1 CAPSULE DAILY  "(Patient not taking: Reported on 3/7/2024) 90 Capsule 3    methylPREDNISolone (MEDROL DOSEPAK) 4 MG Tablet Therapy Pack As directed on the packaging label. (Patient not taking: Reported on 3/7/2024) 21 tablet 0    nitroGLYCERIN (NITROSTAT) 0.3 MG SL tablet Place 1 Tab under tongue as needed for Chest Pain. (Patient not taking: Reported on 3/7/2024) 25 Tab 3     No current facility-administered medications on file prior to visit.     Social History     Socioeconomic History    Marital status:      Spouse name: Not on file    Number of children: Not on file    Years of education: Not on file    Highest education level: Not on file   Occupational History    Not on file   Tobacco Use    Smoking status: Never    Smokeless tobacco: Never   Vaping Use    Vaping Use: Never used   Substance and Sexual Activity    Alcohol use: Not Currently    Drug use: No    Sexual activity: Not on file   Other Topics Concern    Not on file   Social History Narrative    Not on file     Social Determinants of Health     Financial Resource Strain: Not on file   Food Insecurity: Not on file   Transportation Needs: Not on file   Physical Activity: Not on file   Stress: Not on file   Social Connections: Not on file   Intimate Partner Violence: Not on file   Housing Stability: Not on file     Breast Cancer-related family history is not on file.      Review of Systems   Constitutional:  Negative for chills and fever.   Gastrointestinal:  Positive for abdominal pain, nausea and vomiting. Negative for constipation and diarrhea.   Genitourinary: Negative.    Musculoskeletal:  Negative for myalgias.              Objective     /86   Pulse (!) 118   Temp 37.3 °C (99.2 °F)   Resp 16   Ht 1.651 m (5' 5\")   Wt 99.8 kg (220 lb)   SpO2 92%   BMI 36.61 kg/m²      Physical Exam  Vitals and nursing note reviewed.   Constitutional:       General: She is in acute distress.      Appearance: Normal appearance.   Cardiovascular:      Rate and " Rhythm: Tachycardia present.   Pulmonary:      Effort: Pulmonary effort is normal.      Breath sounds: Normal breath sounds.   Abdominal:      Tenderness: There is abdominal tenderness. There is guarding.   Skin:     General: Skin is warm and dry.   Neurological:      General: No focal deficit present.      Mental Status: She is alert and oriented to person, place, and time.   Psychiatric:         Mood and Affect: Mood normal.         Behavior: Behavior normal.                             Assessment & Plan        1. Abdominal pain, unspecified abdominal location  POCT Urinalysis      2. Nausea and vomiting, unspecified vomiting type        3. Tachycardia            Diagnosis with uncertain prognosis.  Patient presents with severe right lower quadrant pain that migrated from her umbilicus during the night.  She is being referred to [facility] emergency department for higher level of care.  I have called the transfer center and talk to [facility] and report is given. Suspect appendicitis versus diverticulitis

## 2024-03-07 NOTE — ED NOTES
Med Rec completed per patient with med list (returned)   Allergies reviewed  No ORAL antibiotics in last 30 days

## 2024-03-07 NOTE — ED PROVIDER NOTES
ED Provider Note    CHIEF COMPLAINT  Chief Complaint   Patient presents with    RLQ Pain     Onset yesterday around 1700. Patient had several round of emesis last night and this morning, pain has radiated from Mid abdomen to RLQ pain around 1000 am this morning. HX of lap edgar.      EXTERNAL RECORDS REVIEWED  Patient was seen at urgent care today and sent here for further evaluation.  She was last seen by her primary care physician 2/28/2024 for chronic issues of hypothyroidism hyperlipidemia.    HPI/ROS  LIMITATION TO HISTORY   Select: : None  OUTSIDE HISTORIAN(S):  None    Priscilla Childress is a 63 y.o. female who presents to the Emergency Department with abdominal pain.  Patient states symptoms started yesterday with epigastric abdominal pain that has now moved more over to the right side.  She had about 5 episodes of vomiting last night and may be few episodes of mild diarrhea.  She feels warm however has not taken her temperature.  She does not have any urinary symptoms including pain with urination or blood in the urine.  He has had prior cholecystectomy.    PAST MEDICAL HISTORY  Past Medical History:   Diagnosis Date    History of myocardial infarction     x5; cardiac caths only, no stents placed    Hyperlipidemia     Hypertension     Thyroid disease         SURGICAL HISTORY  Past Surgical History:   Procedure Laterality Date    CHOLECYSTECTOMY      THYROIDECTOMY TOTAL          FAMILY HISTORY  Family History   Problem Relation Age of Onset    Alzheimer's Disease Mother     Heart Disease Father 70        CAD       SOCIAL HISTORY   reports that she has never smoked. She has never used smokeless tobacco. She reports that she does not currently use alcohol. She reports that she does not use drugs.    CURRENT MEDICATIONS  Current Discharge Medication List        CONTINUE these medications which have NOT CHANGED    Details   amLODIPine (NORVASC) 5 MG Tab Take 5 mg by mouth every day.      atorvastatin (LIPITOR)  "80 MG tablet Take 80 mg by mouth every day.      Multiple Vitamins-Minerals (HAIR SKIN & NAILS) Tab Take 1 Tablet by mouth every day.      FLUoxetine (PROZAC) 10 MG Cap TAKE 1 CAPSULE DAILY  Qty: 90 Capsule, Refills: 3    Associated Diagnoses: Hot flashes      SYNTHROID 200 MCG Tab TAKE 1 TABLET EVERY MORNINGON AN EMPTY STOMACH  Qty: 90 Tablet, Refills: 3      carvedilol (COREG) 6.25 MG Tab Take 1 Tablet by mouth 2 times a day with meals.  Qty: 180 Tablet, Refills: 3    Associated Diagnoses: History of myocardial infarction      diclofenac sodium 1 % Gel Apply to affected area daily prn.  Qty: 100 g, Refills: 3    Associated Diagnoses: Pain of finger of left hand      aspirin (ASA) 81 MG Chew Tab chewable tablet Chew 81 mg every day.             ALLERGIES  Inapsine [droperidol]    PHYSICAL EXAM  BP (!) 141/68   Pulse 95   Temp 37.7 °C (99.9 °F) (Temporal)   Resp 16   Ht 1.626 m (5' 4\")   Wt 108 kg (238 lb 1.6 oz)   SpO2 95%      Constitutional: Nontoxic appearing. Alert in no apparent distress.  HENT: Normocephalic, Atraumatic. Bilateral external ears normal. Nose normal.  Moist mucous membranes.  Oropharynx clear.  Eyes: Pupils are equal and reactive. Conjunctiva normal.   Neck: Supple, full range of motion  Heart: Regular rate and rhythm.  No murmurs.    Lungs: No respiratory distress, normal work of breathing. Lungs clear to auscultation bilaterally.  Abdomen Soft, no distention.  Right upper and right lower quadrant tenderness to palpation with voluntary guarding.  Musculoskeletal: Atraumatic. No obvious deformities noted.  No lower extremity edema.  Skin: Warm, Dry.  No erythema, No rash.   Neurologic: Alert and oriented x3. Moving all extremities spontaneously without focal deficits.  Psychiatric: Affect normal, Mood normal, Appears appropriate and not intoxicated.      DIAGNOSTIC STUDIES / PROCEDURES      LABS  Labs Reviewed   CBC WITH DIFFERENTIAL - Abnormal; Notable for the following components:       " Result Value    WBC 21.7 (*)     Platelet Count 462 (*)     Lymphocytes 21.30 (*)     Neutrophils (Absolute) 15.12 (*)     Monos (Absolute) 1.68 (*)     Immature Granulocytes (abs) 0.12 (*)     All other components within normal limits   COMP METABOLIC PANEL - Abnormal; Notable for the following components:    Alkaline Phosphatase 159 (*)     All other components within normal limits   URINALYSIS - Abnormal; Notable for the following components:    Leukocyte Esterase Small (*)     Occult Blood Small (*)     All other components within normal limits   URINE MICROSCOPIC (W/UA) - Abnormal; Notable for the following components:    WBC 10-20 (*)     RBC 5-10 (*)     Bacteria Few (*)     All other components within normal limits   LIPASE   ESTIMATED GFR   LACTIC ACID         RADIOLOGY  I have independently interpreted the diagnostic imaging associated with this visit and am waiting the final reading from the radiologist.   My preliminary interpretation is a follows: Acute appendicitis  Radiologist interpretation:   CT-ABDOMEN-PELVIS WITH   Final Result      1.  Dilated fluid-filled appendix with wall thickening and surrounding inflammatory change as well as a small amount of fluid within the adjacent mesentery consistent with acute appendicitis.      2.  Fatty liver.      3.  Prior cholecystectomy.      DX-CHEST-PORTABLE (1 VIEW)    (Results Pending)   DX-CHEST-PORTABLE (1 VIEW)    (Results Pending)         COURSE & MEDICAL DECISION MAKING    ED Observation Status? No; Patient does not meet criteria for ED Observation.     INITIAL ASSESSMENT, COURSE AND PLAN  Care Narrative: Relatively healthy 61-year-old female who presents with right lower quadrant abdominal pain in addition to vomiting over the last few days.  She is afebrile with reassuring vital signs on arrival.  Labs did return with a leukocytosis of 21 however normal lactate without other signs of sepsis.  No evidence of renal dysfunction or electrolyte  abnormality.  Urinalysis is negative for infection.  Imaging does show evidence of acute appendicitis without evidence of perforation or abscess.  She will be given antibiotics and general surgery consulted    Upon reassessment, patient is resting comfortably with normal vital signs.  No new complaints at this time.  Discussed results with patient and/or family as well as plan of care for surgery and admission.  Patient is agreeable.      ADDITIONAL PROBLEM LIST  Problem #1: Acute appendicitis -given antibiotics, general surgery consult    DISPOSITION AND DISCUSSIONS  I have discussed management of the patient with the following physicians and ERNESTO's:    Dr. Zamudio, general surgeon on-call who will plan for surgery this evening      DISPOSITION:  Patient will be transferred to the operating room with Dr. Zamudio in stable condition.      FINAL DIAGNOSIS  1. Acute appendicitis with localized peritonitis, without perforation, abscess, or gangrene

## 2024-03-07 NOTE — ED TRIAGE NOTES
"Patient presents to the ER with the following complaints:    Chief Complaint   Patient presents with    RLQ Pain     Onset yesterday around 1700. Patient had several round of emesis last night and this morning, pain has radiated from Mid abdomen to RLQ pain around 1000 am this morning. HX of lap edgar.        BP (!) 164/95   Pulse (!) 109   Temp 36.9 °C (98.4 °F) (Temporal)   Resp 18   Ht 1.626 m (5' 4\")   Wt 108 kg (238 lb 1.6 oz)   SpO2 95%   BMI 40.87 kg/m²       "

## 2024-03-08 ENCOUNTER — APPOINTMENT (OUTPATIENT)
Dept: CARDIOLOGY | Facility: MEDICAL CENTER | Age: 64
DRG: 329 | End: 2024-03-08
Attending: INTERNAL MEDICINE
Payer: COMMERCIAL

## 2024-03-08 ENCOUNTER — APPOINTMENT (OUTPATIENT)
Dept: RADIOLOGY | Facility: MEDICAL CENTER | Age: 64
DRG: 329 | End: 2024-03-08
Attending: INTERNAL MEDICINE
Payer: COMMERCIAL

## 2024-03-08 LAB
ANION GAP SERPL CALC-SCNC: 9 MMOL/L (ref 7–16)
ARTERIAL PATENCY WRIST A: ABNORMAL
ARTERIAL PATENCY WRIST A: ABNORMAL
B PARAP IS1001 DNA NPH QL NAA+NON-PROBE: NOT DETECTED
B PERT.PT PRMT NPH QL NAA+NON-PROBE: NOT DETECTED
BASE EXCESS BLDA CALC-SCNC: -1 MMOL/L (ref -4–3)
BASE EXCESS BLDA CALC-SCNC: -1 MMOL/L (ref -4–3)
BASOPHILS # BLD AUTO: 0.3 % (ref 0–1.8)
BASOPHILS # BLD: 0.05 K/UL (ref 0–0.12)
BODY TEMPERATURE: ABNORMAL DEGREES
BODY TEMPERATURE: ABNORMAL DEGREES
BREATHS SETTING VENT: 20
BREATHS SETTING VENT: 20
BUN SERPL-MCNC: 16 MG/DL (ref 8–22)
C PNEUM DNA NPH QL NAA+NON-PROBE: NOT DETECTED
CALCIUM SERPL-MCNC: 8.7 MG/DL (ref 8.4–10.2)
CHLORIDE SERPL-SCNC: 104 MMOL/L (ref 96–112)
CO2 BLDA-SCNC: 25 MMOL/L (ref 20–33)
CO2 BLDA-SCNC: 25 MMOL/L (ref 20–33)
CO2 SERPL-SCNC: 25 MMOL/L (ref 20–33)
CREAT SERPL-MCNC: 1.14 MG/DL (ref 0.5–1.4)
DELSYS IDSYS: ABNORMAL
DELSYS IDSYS: ABNORMAL
EKG IMPRESSION: NORMAL
END TIDAL CARBON DIOXIDE IECO2: 35 MMHG
EOSINOPHIL # BLD AUTO: 0 K/UL (ref 0–0.51)
EOSINOPHIL NFR BLD: 0 % (ref 0–6.9)
ERYTHROCYTE [DISTWIDTH] IN BLOOD BY AUTOMATED COUNT: 42.5 FL (ref 35.9–50)
FLUAV RNA NPH QL NAA+NON-PROBE: NOT DETECTED
FLUAV RNA SPEC QL NAA+PROBE: NEGATIVE
FLUBV RNA NPH QL NAA+NON-PROBE: NOT DETECTED
FLUBV RNA SPEC QL NAA+PROBE: NEGATIVE
GFR SERPLBLD CREATININE-BSD FMLA CKD-EPI: 54 ML/MIN/1.73 M 2
GLUCOSE SERPL-MCNC: 186 MG/DL (ref 65–99)
HADV DNA NPH QL NAA+NON-PROBE: NOT DETECTED
HCO3 BLDA-SCNC: 23.6 MMOL/L (ref 17–25)
HCO3 BLDA-SCNC: 24.1 MMOL/L (ref 17–25)
HCOV 229E RNA NPH QL NAA+NON-PROBE: NOT DETECTED
HCOV HKU1 RNA NPH QL NAA+NON-PROBE: NOT DETECTED
HCOV NL63 RNA NPH QL NAA+NON-PROBE: NOT DETECTED
HCOV OC43 RNA NPH QL NAA+NON-PROBE: NOT DETECTED
HCT VFR BLD AUTO: 37.9 % (ref 37–47)
HGB BLD-MCNC: 12.9 G/DL (ref 12–16)
HMPV RNA NPH QL NAA+NON-PROBE: NOT DETECTED
HOROWITZ INDEX BLDA+IHG-RTO: 128 MM[HG]
HOROWITZ INDEX BLDA+IHG-RTO: 93 MM[HG]
HPIV1 RNA NPH QL NAA+NON-PROBE: NOT DETECTED
HPIV2 RNA NPH QL NAA+NON-PROBE: NOT DETECTED
HPIV3 RNA NPH QL NAA+NON-PROBE: NOT DETECTED
HPIV4 RNA NPH QL NAA+NON-PROBE: NOT DETECTED
IMM GRANULOCYTES # BLD AUTO: 0.07 K/UL (ref 0–0.11)
IMM GRANULOCYTES NFR BLD AUTO: 0.5 % (ref 0–0.9)
LACTATE BLD-SCNC: 0.8 MMOL/L (ref 0.5–2)
LV EJECT FRACT  99904: 55
LV EJECT FRACT MOD 2C 99903: 36.19
LV EJECT FRACT MOD 4C 99902: 60.77
LV EJECT FRACT MOD BP 99901: 48.75
LYMPHOCYTES # BLD AUTO: 1.28 K/UL (ref 1–4.8)
LYMPHOCYTES NFR BLD: 8.4 % (ref 22–41)
M PNEUMO DNA NPH QL NAA+NON-PROBE: NOT DETECTED
MAGNESIUM SERPL-MCNC: 1.9 MG/DL (ref 1.5–2.5)
MCH RBC QN AUTO: 31.4 PG (ref 27–33)
MCHC RBC AUTO-ENTMCNC: 34 G/DL (ref 32.2–35.5)
MCV RBC AUTO: 92.2 FL (ref 81.4–97.8)
MODE IMODE: ABNORMAL
MODE IMODE: ABNORMAL
MONOCYTES # BLD AUTO: 0.31 K/UL (ref 0–0.85)
MONOCYTES NFR BLD AUTO: 2 % (ref 0–13.4)
NEUTROPHILS # BLD AUTO: 13.44 K/UL (ref 1.82–7.42)
NEUTROPHILS NFR BLD: 88.8 % (ref 44–72)
NRBC # BLD AUTO: 0 K/UL
NRBC BLD-RTO: 0 /100 WBC (ref 0–0.2)
NT-PROBNP SERPL IA-MCNC: 269 PG/ML (ref 0–125)
O2/TOTAL GAS SETTING VFR VENT: 100 %
O2/TOTAL GAS SETTING VFR VENT: 90 %
PCO2 BLDA: 39.2 MMHG (ref 26–37)
PCO2 BLDA: 41.2 MMHG (ref 26–37)
PEEP END EXPIRATORY PRESSURE IPEEP: 10 CMH20
PEEP END EXPIRATORY PRESSURE IPEEP: 12 CMH20
PH BLDA: 7.38 [PH] (ref 7.4–7.5)
PH BLDA: 7.39 [PH] (ref 7.4–7.5)
PH TEMP ADJ BLDA: 7.39 [PH] (ref 7.4–7.5)
PHOSPHATE SERPL-MCNC: 4.3 MG/DL (ref 2.5–4.5)
PLATELET # BLD AUTO: 380 K/UL (ref 164–446)
PMV BLD AUTO: 9.8 FL (ref 9–12.9)
PO2 BLDA: 128 MMHG (ref 64–87)
PO2 BLDA: 84 MMHG (ref 64–87)
PO2 TEMP ADJ BLDA: 125 MMHG (ref 64–87)
POTASSIUM SERPL-SCNC: 3.8 MMOL/L (ref 3.6–5.5)
RBC # BLD AUTO: 4.11 M/UL (ref 4.2–5.4)
RSV RNA NPH QL NAA+NON-PROBE: NOT DETECTED
RSV RNA SPEC QL NAA+PROBE: NEGATIVE
RV+EV RNA NPH QL NAA+NON-PROBE: NOT DETECTED
SAO2 % BLDA: 96 % (ref 93–99)
SAO2 % BLDA: 99 % (ref 93–99)
SARS-COV-2 RNA NPH QL NAA+NON-PROBE: NOTDETECTED
SARS-COV-2 RNA RESP QL NAA+PROBE: NOTDETECTED
SODIUM SERPL-SCNC: 138 MMOL/L (ref 135–145)
SPECIMEN DRAWN FROM PATIENT: ABNORMAL
SPECIMEN DRAWN FROM PATIENT: ABNORMAL
SPECIMEN SOURCE: NORMAL
TIDAL VOLUME IVT: 380 ML
TIDAL VOLUME IVT: 380 ML
TRIGL SERPL-MCNC: 105 MG/DL (ref 0–149)
TROPONIN T SERPL-MCNC: <6 NG/L (ref 6–19)
WBC # BLD AUTO: 15.2 K/UL (ref 4.8–10.8)

## 2024-03-08 PROCEDURE — 93306 TTE W/DOPPLER COMPLETE: CPT

## 2024-03-08 PROCEDURE — 302977 HCHG BRONCHOSCOPY PROC-THERAPEUTIC

## 2024-03-08 PROCEDURE — 71275 CT ANGIOGRAPHY CHEST: CPT

## 2024-03-08 PROCEDURE — 80048 BASIC METABOLIC PNL TOTAL CA: CPT

## 2024-03-08 PROCEDURE — 85025 COMPLETE CBC W/AUTO DIFF WBC: CPT

## 2024-03-08 PROCEDURE — 99291 CRITICAL CARE FIRST HOUR: CPT | Mod: 25 | Performed by: INTERNAL MEDICINE

## 2024-03-08 PROCEDURE — 99292 CRITICAL CARE ADDL 30 MIN: CPT | Mod: 25 | Performed by: INTERNAL MEDICINE

## 2024-03-08 PROCEDURE — 700111 HCHG RX REV CODE 636 W/ 250 OVERRIDE (IP): Mod: JZ | Performed by: INTERNAL MEDICINE

## 2024-03-08 PROCEDURE — 84100 ASSAY OF PHOSPHORUS: CPT

## 2024-03-08 PROCEDURE — 70491 CT SOFT TISSUE NECK W/DYE: CPT

## 2024-03-08 PROCEDURE — 700117 HCHG RX CONTRAST REV CODE 255: Performed by: INTERNAL MEDICINE

## 2024-03-08 PROCEDURE — 82803 BLOOD GASES ANY COMBINATION: CPT

## 2024-03-08 PROCEDURE — 71045 X-RAY EXAM CHEST 1 VIEW: CPT

## 2024-03-08 PROCEDURE — 83605 ASSAY OF LACTIC ACID: CPT

## 2024-03-08 PROCEDURE — 93306 TTE W/DOPPLER COMPLETE: CPT | Mod: 26 | Performed by: INTERNAL MEDICINE

## 2024-03-08 PROCEDURE — 36600 WITHDRAWAL OF ARTERIAL BLOOD: CPT

## 2024-03-08 PROCEDURE — 31622 DX BRONCHOSCOPE/WASH: CPT | Performed by: INTERNAL MEDICINE

## 2024-03-08 PROCEDURE — 83880 ASSAY OF NATRIURETIC PEPTIDE: CPT

## 2024-03-08 PROCEDURE — 83735 ASSAY OF MAGNESIUM: CPT

## 2024-03-08 PROCEDURE — 770022 HCHG ROOM/CARE - ICU (200)

## 2024-03-08 PROCEDURE — 94003 VENT MGMT INPAT SUBQ DAY: CPT

## 2024-03-08 PROCEDURE — 93010 ELECTROCARDIOGRAM REPORT: CPT | Performed by: INTERNAL MEDICINE

## 2024-03-08 PROCEDURE — 94799 UNLISTED PULMONARY SVC/PX: CPT

## 2024-03-08 PROCEDURE — 84484 ASSAY OF TROPONIN QUANT: CPT

## 2024-03-08 PROCEDURE — 94760 N-INVAS EAR/PLS OXIMETRY 1: CPT

## 2024-03-08 RX ORDER — ENOXAPARIN SODIUM 100 MG/ML
40 INJECTION SUBCUTANEOUS DAILY
Status: DISCONTINUED | OUTPATIENT
Start: 2024-03-08 | End: 2024-03-09 | Stop reason: HOSPADM

## 2024-03-08 RX ORDER — POTASSIUM CHLORIDE 7.45 MG/ML
10 INJECTION INTRAVENOUS
Status: COMPLETED | OUTPATIENT
Start: 2024-03-08 | End: 2024-03-08

## 2024-03-08 RX ORDER — MAGNESIUM SULFATE HEPTAHYDRATE 40 MG/ML
2 INJECTION, SOLUTION INTRAVENOUS ONCE
Status: COMPLETED | OUTPATIENT
Start: 2024-03-08 | End: 2024-03-08

## 2024-03-08 RX ORDER — FUROSEMIDE 10 MG/ML
40 INJECTION INTRAMUSCULAR; INTRAVENOUS ONCE
Status: COMPLETED | OUTPATIENT
Start: 2024-03-08 | End: 2024-03-08

## 2024-03-08 RX ADMIN — FAMOTIDINE 20 MG: 10 INJECTION INTRAVENOUS at 05:15

## 2024-03-08 RX ADMIN — PROPOFOL 50 MCG/KG/MIN: 10 INJECTION, EMULSION INTRAVENOUS at 06:29

## 2024-03-08 RX ADMIN — HUMAN ALBUMIN MICROSPHERES AND PERFLUTREN 3 ML: 10; .22 INJECTION, SOLUTION INTRAVENOUS at 17:15

## 2024-03-08 RX ADMIN — FUROSEMIDE 40 MG: 10 INJECTION INTRAMUSCULAR; INTRAVENOUS at 11:12

## 2024-03-08 RX ADMIN — PROPOFOL 50 MCG/KG/MIN: 10 INJECTION, EMULSION INTRAVENOUS at 01:26

## 2024-03-08 RX ADMIN — PROPOFOL 40 MCG/KG/MIN: 10 INJECTION, EMULSION INTRAVENOUS at 11:54

## 2024-03-08 RX ADMIN — Medication 50 MCG/HR: at 19:38

## 2024-03-08 RX ADMIN — POTASSIUM CHLORIDE 10 MEQ: 7.46 INJECTION, SOLUTION INTRAVENOUS at 12:39

## 2024-03-08 RX ADMIN — DEXAMETHASONE SODIUM PHOSPHATE 4 MG: 4 INJECTION INTRA-ARTICULAR; INTRALESIONAL; INTRAMUSCULAR; INTRAVENOUS; SOFT TISSUE at 11:12

## 2024-03-08 RX ADMIN — IOHEXOL 100 ML: 350 INJECTION, SOLUTION INTRAVENOUS at 15:15

## 2024-03-08 RX ADMIN — DEXAMETHASONE SODIUM PHOSPHATE 4 MG: 4 INJECTION INTRA-ARTICULAR; INTRALESIONAL; INTRAMUSCULAR; INTRAVENOUS; SOFT TISSUE at 05:15

## 2024-03-08 RX ADMIN — FAMOTIDINE 20 MG: 10 INJECTION INTRAVENOUS at 17:23

## 2024-03-08 RX ADMIN — PROPOFOL 50 MCG/KG/MIN: 10 INJECTION, EMULSION INTRAVENOUS at 21:25

## 2024-03-08 RX ADMIN — ENOXAPARIN SODIUM 40 MG: 100 INJECTION SUBCUTANEOUS at 17:22

## 2024-03-08 RX ADMIN — DEXAMETHASONE SODIUM PHOSPHATE 4 MG: 4 INJECTION INTRA-ARTICULAR; INTRALESIONAL; INTRAMUSCULAR; INTRAVENOUS; SOFT TISSUE at 17:23

## 2024-03-08 RX ADMIN — POTASSIUM CHLORIDE 10 MEQ: 7.46 INJECTION, SOLUTION INTRAVENOUS at 11:20

## 2024-03-08 RX ADMIN — MAGNESIUM SULFATE HEPTAHYDRATE 2 G: 2 INJECTION, SOLUTION INTRAVENOUS at 08:18

## 2024-03-08 RX ADMIN — PROPOFOL 50 MCG/KG/MIN: 10 INJECTION, EMULSION INTRAVENOUS at 16:07

## 2024-03-08 ASSESSMENT — PAIN DESCRIPTION - PAIN TYPE
TYPE: ACUTE PAIN

## 2024-03-08 NOTE — PROGRESS NOTES
Pulmonary Progress Note    Date of admission  3/7/2024    Chief Complaint  63 y.o. female admitted 3/7/2024 with respiratory failure    Hospital Course  This is a 63 year old female with past medical history of thyroid disease, MI, HLD and HTN here with acute appendicitis.  She was admitted to ICU due to difficult intubation in the OR requiring several attempts and two anesthesiologists.  She was eventually intubated with fiberoptics and a 6.5 tube.     DIFFICULT AIRWAY    Interval Problem Update  Reviewed last 24 hour events:  ICU CHECKLIST:  Chart review from the past 24 hours includes imaging, laboratory studies, vital signs and notes available.  Pertinent system review for today's visit includes:  Significant overnight events:     Neuro: Deeply sedated  Cardiac: Stable, history of what sounds like takotsubo, CHF, last known EF 45%  Pulmonary: Vent: Rate 20. , PEEP 12, FiO2 90% 7.37/41/84/24  Heme: Improving leukocytosis   DVT Prophylaxis: Lovenox  /Renal: Stable   I/O: 216/1060 = -165 = -165   Alvares: immobility  ID:  No abx for now  Skin: intact surgical sites  GI/Nutrition: NPO   Prophylaxis: pepcid   Endo: Stable  6.5 ETT     Review of Systems  Review of Systems   Unable to perform ROS: Intubated        Vital Signs for last 24 hours   Temp:  [36.9 °C (98.4 °F)-37.7 °C (99.9 °F)] 37.2 °C (99 °F)  Pulse:  [] 74  Resp:  [16-20] 20  BP: (105-164)/(60-95) 105/60  SpO2:  [95 %-97 %] 97 %    Hemodynamic parameters for last 24 hours       Respiratory Information for the last 24 hours  Vent Mode: APVCMV  Rate (breaths/min): 20  Vt Target (mL): 380  PEEP/CPAP: 10  MAP: 13    Physical Exam   Physical Exam  Constitutional:       Appearance: She is ill-appearing.   HENT:      Head: Atraumatic.      Mouth/Throat:      Mouth: Mucous membranes are dry.   Eyes:      Extraocular Movements: Extraocular movements intact.   Cardiovascular:      Rate and Rhythm: Normal rate and regular rhythm.      Pulses: Normal  pulses.   Pulmonary:      Effort: Pulmonary effort is normal.      Breath sounds: Normal breath sounds. No wheezing, rhonchi or rales.   Abdominal:      Palpations: Abdomen is soft.   Musculoskeletal:         General: No swelling, tenderness or deformity.   Skin:     General: Skin is warm and dry.         Medications  Current Facility-Administered Medications   Medication Dose Route Frequency Provider Last Rate Last Admin    magnesium sulfate IVPB premix 2 g  2 g Intravenous Once Jaida Monsivais M.D.        Respiratory Therapy Consult   Nebulization Continuous RT Tracie Shearer D.O.        famotidine (Pepcid) tablet 20 mg  20 mg Enteral Tube Q12HRS Tracie Shearer D.O.        Or    famotidine (Pepcid) injection 20 mg  20 mg Intravenous Q12HRS SUNIL ColeyO.   20 mg at 03/08/24 0515    MD Alert...ICU Electrolyte Replacement per Pharmacy   Other PHARMACY TO DOSE Tracie Shearer D.O.        lidocaine (Xylocaine) 1 % injection 2 mL  2 mL Tracheal Tube Q30 MIN PRN Tracie Shearer D.O.        fentaNYL (SUBLIMAZE) 50 mcg/mL in 50mL   Intravenous Continuous SUNIL ColeyO. 2 mL/hr at 03/08/24 0128 100 mcg/hr at 03/08/24 0128    dexmedetomidine (PRECEDEX) 400 mcg/100mL NS premix infusion  0-1.5 mcg/kg/hr (Ideal) Intravenous Continuous RAMESH Coley.OAlesha   Dose not Required at 03/07/24 2030    FLUoxetine (PROzac) capsule 10 mg  10 mg Enteral Tube DAILY SUNIL ColeyOAlesha        levothyroxine (Synthroid) tablet 200 mcg  200 mcg Enteral Tube AM ES SUNIL ColeyOAlesha        acetaminophen (Tylenol) tablet 650 mg  650 mg Enteral Tube Q6HRS PRN Tracie Shearer D.O.        ondansetron (Zofran) syringe/vial injection 4 mg  4 mg Intravenous Q4HRS PRN SUNIL ColeyOAlesha        ondansetron (Zofran ODT) dispertab 4 mg  4 mg Enteral Tube Q4HRS PRN SUNIL ColeyOAlesha        promethazine (Phenergan) tablet 12.5-25 mg  12.5-25 mg Enteral Tube Q4HRS PRN Tracie  MARIXA Shearer D.O.        promethazine (Phenergan) suppository 12.5-25 mg  12.5-25 mg Rectal Q4HRS PRN Tracie Shearer D.O.        prochlorperazine (Compazine) injection 5-10 mg  5-10 mg Intravenous Q4HRS PRN Tracie Shearer D.O.        propofol (DIPRIVAN) injection  0-80 mcg/kg/min (Ideal) Intravenous Continuous Tracie Shearer D.O. 19.2 mL/hr at 03/08/24 0629 50 mcg/kg/min at 03/08/24 0629    dexamethasone (Decadron) injection 4 mg  4 mg Intravenous Q6HRS Tracie Shearer D.O.   4 mg at 03/08/24 0515       Fluids    Intake/Output Summary (Last 24 hours) at 3/8/2024 0745  Last data filed at 3/8/2024 0600  Gross per 24 hour   Intake 979.89 ml   Output 1145 ml   Net -165.11 ml       Laboratory  Recent Labs     03/07/24 2120 03/08/24  0454   ISTATAPH 7.341* 7.386*   ISTATAPCO2 47.0* 39.2*   ISTATAPO2 69 128*   ISTATATCO2 27 25   [id number] 92* 99   ISTATARTHCO3 25.4* 23.6   ISTATARTBE -1 -1   ISTATTEMP 97.0 F 97.7 F   ISTATFIO2 100 100   ISTATSPEC Arterial Arterial   ISTATAPHTC 7.353* 7.394*   [id number] 65 125*         Recent Labs     03/07/24  1452 03/08/24  0342   SODIUM 140 138   POTASSIUM 3.6 3.8   CHLORIDE 101 104   CO2 26 25   BUN 16 16   CREATININE 0.99 1.14   MAGNESIUM  --  1.9   PHOSPHORUS  --  4.3   CALCIUM 9.3 8.7     Recent Labs     03/07/24  1452 03/08/24  0342   ALTSGPT 21  --    ASTSGOT 19  --    ALKPHOSPHAT 159*  --    TBILIRUBIN 0.6  --    LIPASE 41  --    GLUCOSE 97 186*     Recent Labs     03/07/24  1452 03/08/24  0342   WBC 21.7* 15.2*   NEUTSPOLYS 69.80 88.80*   LYMPHOCYTES 21.30* 8.40*   MONOCYTES 7.70 2.00   EOSINOPHILS 0.10 0.00   BASOPHILS 0.50 0.30   ASTSGOT 19  --    ALTSGPT 21  --    ALKPHOSPHAT 159*  --    TBILIRUBIN 0.6  --      Recent Labs     03/07/24  1452 03/08/24  0342   RBC 4.75 4.11*   HEMOGLOBIN 14.7 12.9   HEMATOCRIT 43.1 37.9   PLATELETCT 462* 380       Imaging  X-Ray:  I have personally reviewed the images and compared with prior  images.    Assessment/Plan  #Acute hypoxic respiratory failure, likely due to pulmonary edema, on ventilator  #Hx of HFrEF, last echo 2019  Plan:  - Continue ICU care  - Lasix 40 mg IV   - Follow-up echocardiogram stat  - Daily SAT/SBT per protocol  - PRN ABG  - Elevate HOB to 30 degrees  - Chlorhexidine rinse to decrease VAP incidence  - Maintain O2 saturation > 90%   - Tube placement verified  - Pepcid for GI prophylaxis  - Sedation with propofol for RASS +1 to -1  - Analgesia with Fentanyl for CPOT < 2  - Restraints to prevent self extubation  - Utilizing lung protective ventilation with tidal volume 6ml/kg  - Monitoring peak and plateau pressures     #Difficult airway due to airway edema, question allergic reaction to ceftriaxone versus inflammation after tubes were placed in her ears v epiglottitis   - Continue dexamethasone 4 mg q6 hours   - + air leak today  - CTA without significant abnormalities  - Biofire  - Tube advanced with bronchoscope today  - Close monitoring of tube placement  - Keep sedated to assure no self extubation     #Acute appendicitis, s/p lap appendectomy on 3/7/2024  Plan:  - fentanyl for pain  - PT/OT when awake     #Hypothryoidism  Continue home levothyroxine     #Hypertension, currently normotensive  Hold home meds     #Hyperlipidemia   Restart statin     #Hx of MI, no stents  - Echo with aneurysmal apex, EF 55%  Plan:  - ASA     #LIDYA, compliant with CPAP at home  Plan:  - continued CPAP use after extubation     #Elevated alkaline phosphatase, chronic   Outpatient work up     VTE:  Lovenox  Ulcer: H2 Antagonist  Lines: Alvares Catheter  Ongoing indication addressed    I have performed a physical exam and reviewed and updated ROS and Plan today (3/8/2024). In review of yesterday's note (3/7/2024), there are no changes except as documented above.     Discussed patient condition and risk of morbidity and/or mortality with RN, RT, and Pharmacy  The patient remains critically ill.  Critical  care time = 90 minutes in directly providing and coordinating critical care and extensive data review.  No time overlap and excludes procedures.    Jaida Monsivais MD RD  Pulmonary and Critical Care    Available on [employer]

## 2024-03-08 NOTE — CARE PLAN
The patient is Unstable - High likelihood or risk of patient condition declining or worsening    Shift Goals  Clinical Goals: spO2>90%, keep ET tube at same place as MD placement, RASS -1 to +1  Patient Goals: MANUEL  Family Goals: no family present    Progress made toward(s) clinical / shift goals:      Patient is not progressing towards the following goals:      Problem: Knowledge Deficit - Standard  Goal: Patient and family/care givers will demonstrate understanding of plan of care, disease process/condition, diagnostic tests and medications  Description: Target End Date:  1-3 days or as soon as patient condition allows    Document in Patient Education    1.  Patient and family/caregiver oriented to unit, equipment, visitation policy and means for communicating concern  2.  Complete/review Learning Assessment  3.  Assess knowledge level of disease process/condition, treatment plan, diagnostic tests and medications  4.  Explain disease process/condition, treatment plan, diagnostic tests and medications  Outcome: Not Progressing     Problem: Respiratory  Goal: Patient will achieve/maintain optimum respiratory ventilation and gas exchange  Description: Target End Date:  Prior to discharge or change in level of care    Document on Assessment flowsheet    1.  Assess and monitor rate, rhythm, depth and effort of respiration  2.  Breath sounds assessed qshift and/or as needed  3.  Assess O2 saturation, administer/titrate oxygen as ordered  4.  Position patient for maximum ventilatory efficiency  5.  Turn, cough, and deep breath with splinting to improve effectiveness  6.  Collaborate with RT to administer medication/treatments per order  7.  Encourage use of incentive spirometer and encourage patient to cough after use and utilize splinting techniques if applicable  8.  Airway suctioning  9.  Monitor sputum production for changes in color, consistency and frequency  10. Perform frequent oral hygiene  11. Alternate physical  activity with rest periods  Outcome: Not Progressing     Problem: Mechanical Ventilation  Goal: Successful weaning off mechanical ventilator, spontaneously maintains adequate gas exchange  Description: Target End Date:  when vent discontinued    1.  Follow universal weaning protocol for patients on mechanical ventilation per order  2.  Review contraindication list.  Obtain provider order to wean in presence of contraindication.  3.  Obtain Doreen Sedation-Agitation Score  4.  Doreen Score 1-2:  sedation vacation  5.  Doreen Score 3-4:  Collaborate with provider and/or RT to determine readiness for trial  6.  Begin 2 hour trial of weaning following protocol  7.  Evaluate for fatigue parameters per protocol  8.  Fatigue parameters triggered:  Stop wean and return to previous ASV% setting or increase % minute volume to offset work or breathing  Outcome: Not Progressing  Goal: Patient will be able to express needs and understand communication  Description: Target End Date:  when vent discontinued    1.  Assess ability to communicate and understand  2.  Provide communication tools  3.  Collaborate with Speech Therapy for [facility]  Outcome: Not Progressing

## 2024-03-08 NOTE — OP REPORT
OP Note    PreOp Diagnosis:   Acute appendicitis    PostOp Diagnosis:   Acute appendicitis  Mass at the appendiceal orifice    Procedure(s):  Laparoscopic cecectomy    Surgeon(s):  Manfred Zamudio M.D.    Anesthesiologist/Type of Anesthesia:  Anesthesiologist: Almas Simms M.D.; Mele Arnold M.D./General    Surgical Staff:  Relief Circulator: Jagruti Lamb R.N.  Scrub Person: Lexa Cano    Specimens removed if any:  ID Type Source Tests Collected by Time Destination   A :  Tissue Appendix PATHOLOGY SPECIMEN Manfred Zamudio M.D. 3/7/2024  8:25 PM        Estimated Blood Loss: 20cc    Findings:   Appendiceal mass  Acute appendicitis    Complications: no complications    Details of procedure:  The patient was met in the preoperative holding area where all of the potential risks and benefits of the procedure were discussed in detail with the patient. All of her questions were answered to her satisfaction and informed consent was signed. The patient was taken back to the operating room and placed supine on the operating room table. General endotracheal anesthesia was induced and all of the patients pressure points were padded appropriately. The patients abdomen was prepped and draped in the usual sterile fashion.  A timeout was performed which correctly identified the patient and the procedure being performed and preoperative antibiotics were given according to SCIP guidelines.     The procedure was started with infiltration of 0.5% marcaine into the supra-umbilical skin. A 12mm incision was made and carried down to fascia. The verress needle was inserted into the abdominal cavity and insufflation carried out to 15mmHg.  A 5mm visiport was inserted into the abdominal cavity under direct visualization and then this was exchanged for a 12mm trochar. The camera was inserted into the abdominal cavity and a brief survey of the abdominal cavity demonstrated no obvious injuries or pathologies.   5mm ports were then placed in the suprapubic and left lower quadrants after the skin was infiltrated with 0.5% marcaine.     The appendix could not be initially visualized.  There is apparent partial torsion of the ascending colon with twisting of the appendiceal pedicle to the lateral portion of the abdominal wall.  The appendix was eventually located in the right paracolic gutter and retracted superiorly.  There was torsed mesentery and omentum densely adherent to the base.  Initially began to take the parent mesoappendix down with a 5 mm laparoscopic ligature from the lateral side.  I was then able to identify a peel of omentum and gently rolled this off of the cecum and appendiceal base.  This allowed for detorsion of the ascending colon and allowed me to place the colon and appendix back into anatomic position.  The appendix was then grasped and retracted superiorly.  There was a golf ball sized masslike swelling of the appendiceal base and cecum.  Dissected out bluntly along the terminal ileum and transected small blood vessels with a 5 mm laparoscopic LigaSure taking care not to cause thermal injury to the terminal ileum.  The mesentery to the terminal ileum was visualized and preserved.  I then gently worked beneath the cecum with the LigaSure device in order to free up the inferior portion of the cecum.  The white line of Toldt was transected superiorly up the ascending colon until mass enough mobility of the cecum was achieved.  Then use a 45 mm Endo RADHA stapler with a white vascular loads to fire across of the cecum and include the masslike swelling in the specimen.  I fired 4 loads of the stapler across the cecum.  An Endo Catch was then inserted into the abdominal cavity to retrieve the appendix via the supra-umbilical incision.  The staple line was intact and not bleeding at end of the case.  Hemostasis was meticulously achieved.  The right lower quadrant was briefly irrigated and suction once again.   An Endo Close device was used to close the fascia of the supra-umbilical trocar with a 0 vicryl suture.  4/0 Monocryl was used to close skin edges.  Dermabond was applied as a dressing.      The patient tolerated the procedure well and was extubated at the conclusion of the case without incident. All of the sponge, needle and instrument counts were correct at the conclusion of the case. After she was awoken from anesthesia she was taken to the recovery room in stable condition.         3/7/2024 8:57 PM aMnfred Zamudio M.D.

## 2024-03-08 NOTE — PROGRESS NOTES
4 Eyes Skin Assessment Completed by Ирина, RN and WATSON Gooden.    Head WDL  Ears WDL  Nose WDL  Mouth WDL  Neck WDL  Breast/Chest WDL  Shoulder Blades WDL  Spine WDL  (R) Arm/Elbow/Hand Scab and Rash  (L) Arm/Elbow/Hand Rash  Abdomen Incision  Groin WDL  Scrotum/Coccyx/Buttocks WDL  (R) Leg WDL  (L) Leg WDL  (R) Heel/Foot/Toe WDL  (L) Heel/Foot/Toe WDL          Devices In Places ECG, Blood Pressure Cuff, Pulse Ox, and Alvares      Interventions In Place Sacral Mepilex, TAP System, Pillows, Q2 Turns, and Low Air Loss Mattress    Possible Skin Injury No    Pictures Uploaded Into Epic N/A  Wound Consult Placed N/A  RN Wound Prevention Protocol Ordered No

## 2024-03-08 NOTE — PROGRESS NOTES
Pt's daughter Dulce at bedside at this time. Dulce took two bags of pt's belongings that were received from PACU home.

## 2024-03-08 NOTE — PROGRESS NOTES
12-hour chart check complete.    Monitor Summary  Rhythm: SR  Rate: 73-82  Ectopy: oPVC  Measurements: 0.14/0.08/0.42

## 2024-03-08 NOTE — ANESTHESIA PREPROCEDURE EVALUATION
Case: [id number] Date/Time: 03/07/24 1741    Procedure: APPENDECTOMY, LAPAROSCOPIC    Location: [facility] OR 06 / SURGERY [facility]    Surgeons: Manfred Zamudio M.D.          Multiple MIs, no symptoms x 15 years.    LIDYA/CPAP      Relevant Problems   ANESTHESIA   (positive) LIDYA (obstructive sleep apnea)      CARDIAC   (positive) Benign essential HTN   (positive) Coronary artery disease due to calcified coronary lesion: History of small diagonal occlusion without other significant disease.   (positive) Hot flashes      GI   (positive) GERD (gastroesophageal reflux disease)         (positive) Fatty liver disease, nonalcoholic      ENDO   (positive) Hypothyroid       Physical Exam    Airway   Mallampati: II  TM distance: >3 FB  Neck ROM: full       Cardiovascular - normal exam  Rhythm: regular  Rate: normal     Dental - normal exam           Pulmonary - normal exam     Abdominal    Neurological - normal exam                   Anesthesia Plan    ASA 3       Plan - general       Airway plan will be ETT          Induction: intravenous    Postoperative Plan: Postoperative administration of opioids is intended.    Pertinent diagnostic labs and testing reviewed    Informed Consent:    Anesthetic plan and risks discussed with patient.    Use of blood products discussed with: patient whom consented to blood products.

## 2024-03-08 NOTE — CONSULTS
Critical Care Consultation    Date of consult: 3/7/2024    Referring Physician  Tracie Shearer D.O.    Reason for Consultation  Difficult airway     History of Presenting Illness  63 y.o. female who presented 3/7/2024 with abdominal pain and was found to have an acute appendicitis. She was taken to the OR and during intubation, she was noted to have a very swollen glottis. DL was attempted initially and anesthesia could not see the cords due to edema. Then attempts were made with glidescope with a 7.0 tube and a 6.5 tube but were unsuccessful. Eventually the airway was secured using fiberoptics and 6.5 tube, this required two anesthesiologists. Patient was unable to be bagged well. She was given dexamethasone in the OR.   The remainder of the surgery was uneventful and patient arrived to the icu in stable condition.   Has had severe ear congestion in the last several months. She had a tube in her ears, last one was put in about 2 weeks ago.   Ceftriaxone at 1739, and intubation was done around 1900. Per daughter, she has never had an allergic reaction to an antibiotic.     Code Status  Full Code    Review of Systems   Unable to perform ROS: Intubated     Past Medical History   has a past medical history of History of myocardial infarction, Hyperlipidemia, Hypertension, and Thyroid disease.    Surgical History   has a past surgical history that includes thyroidectomy total and cholecystectomy.    Family History  family history includes Alzheimer's Disease in her mother; Heart Disease (age of onset: 70) in her father.    Social History   reports that she has never smoked. She has never used smokeless tobacco. She reports that she does not currently use alcohol. She reports that she does not use drugs.    Medications  Home Medications       Reviewed by Kriss Miller R.N. (Registered Nurse) on 03/07/24 at 1804  Med List Status: Complete     Medication Last Dose Status   amLODIPine (NORVASC) 5 MG Tab 3/6/2024  Active   aspirin (ASA) 81 MG Chew Tab chewable tablet 3/6/2024 Active   atorvastatin (LIPITOR) 80 MG tablet 3/7/2024 Active   carvedilol (COREG) 6.25 MG Tab 3/7/2024 Active   diclofenac sodium 1 % Gel 3/7/2024 Active   FLUoxetine (PROZAC) 10 MG Cap 3/7/2024 Active   Multiple Vitamins-Minerals (HAIR SKIN & NAILS) Tab 3/6/2024 Active   SYNTHROID 200 MCG Tab 3/7/2024 Active                  Current Facility-Administered Medications   Medication Dose Route Frequency Provider Last Rate Last Admin    lactated ringers infusion   Intravenous Continuous Manfred Zamudio M.D.   New Bag at 03/07/24 1913    Respiratory Therapy Consult   Nebulization Continuous RT Tracie Shearer D.O.        famotidine (Pepcid) tablet 20 mg  20 mg Enteral Tube Q12HRS Tracie Shearer D.O.        Or    famotidine (Pepcid) injection 20 mg  20 mg Intravenous Q12HRS Tracie Shearer D.O.        MD Alert...ICU Electrolyte Replacement per Pharmacy   Other PHARMACY TO DOSE Tracie Shearer D.O.        lidocaine (Xylocaine) 1 % injection 2 mL  2 mL Tracheal Tube Q30 MIN PRN Tracie Shearer D.O.        fentaNYL (SUBLIMAZE) 50 mcg/mL in 50mL   Intravenous Continuous Tracie Shearer D.O.        dexmedetomidine (PRECEDEX) 400 mcg/100mL NS premix infusion  0-1.5 mcg/kg/hr (Ideal) Intravenous Continuous Tracie Shearer D.O.        [START ON 3/8/2024] FLUoxetine (PROzac) capsule 10 mg  10 mg Oral DAILY Tracie Shearer D.O.        [START ON 3/8/2024] levothyroxine (Synthroid) tablet 200 mcg  200 mcg Oral AM ES Tracie Shearer D.O.        acetaminophen (Tylenol) tablet 650 mg  650 mg Oral Q6HRS PRN Tracie Shearer D.O.        ondansetron (Zofran) syringe/vial injection 4 mg  4 mg Intravenous Q4HRS PRN SUNIL ColeyOAlesha        ondansetron (Zofran ODT) dispertab 4 mg  4 mg Oral Q4HRS PRN Tracie Shearer D.O.        promethazine (Phenergan) tablet 12.5-25 mg  12.5-25 mg Oral Q4HRS PRN Tracie CALVIN  SENIA Shearer        promethazine (Phenergan) suppository 12.5-25 mg  12.5-25 mg Rectal Q4HRS PRN Tracie Shearer D.O.        prochlorperazine (Compazine) injection 5-10 mg  5-10 mg Intravenous Q4HRS PRN Tracie Shearer D.O.         Facility-Administered Medications Ordered in Other Encounters   Medication Dose Route Frequency Provider Last Rate Last Admin    fentaNYL (Sublimaze) injection   Intravenous PRN Almas Simms M.D.   50 mcg at 03/07/24 1936    propofol (DIPRIVAN) injection   Intravenous PRN Almas Simms M.D.   200 mg at 03/07/24 1918    succinylcholine (Anectine) injection   Intravenous PRN Almas Simms M.D.   160 mg at 03/07/24 1918    rocuronium (Zemuron) injection   Intravenous PRN Almas Simms M.D.   50 mg at 03/07/24 1925    midazolam (Versed) injection   Intravenous PRN Almas Simms M.D.   2 mg at 03/07/24 1914    dexamethasone (Decadron) injection   Intravenous PRN Almas Simms M.D.   10 mg at 03/07/24 1941    ePHEDrine injection   Intravenous PRN Almas Simms M.D.   10 mg at 03/07/24 1947    HYDROmorphone (Dilaudid) injection   Intravenous PRN Mele Arnold M.D.   1 mg at 03/07/24 2013     Allergies   Allergen Reactions    Inapsine [Droperidol] Anaphylaxis       Vital Signs last 24 hours  Temp:  [36.9 °C (98.4 °F)-37.7 °C (99.9 °F)] 37.7 °C (99.9 °F)  Pulse:  [] 95  Resp:  [16-18] 16  BP: (141-164)/(68-95) 141/68  SpO2:  [95 %-96 %] 95 %    Physical Exam  Vitals and nursing note reviewed.   Constitutional:       General: She is not in acute distress.     Appearance: She is obese.   HENT:      Head: Normocephalic.   Eyes:      Conjunctiva/sclera: Conjunctivae normal.      Pupils: Pupils are equal, round, and reactive to light.   Cardiovascular:      Rate and Rhythm: Normal rate and regular rhythm.   Pulmonary:      Effort: Pulmonary effort is normal.      Breath sounds: Rales present.   Abdominal:      Palpations: Abdomen is soft.      Comments: Lap wounds CDI    Musculoskeletal:      Right lower leg: No edema.      Left lower leg: No edema.   Skin:     General: Skin is warm and dry.   Neurological:      Comments: intubated       Fluids  No intake or output data in the 24 hours ending 03/07/24 2057    Laboratory  Recent Results (from the past 48 hour(s))   POCT Urinalysis    Collection Time: 03/07/24  1:29 PM   Result Value Ref Range    POC Color yellow Negative    POC Appearance clear Negative    POC Glucose negative Negative mg/dL    POC Bilirubin negative Negative mg/dL    POC Ketones negative Negative mg/dL    POC Specific Gravity 1.010 <1.005 - >1.030    POC Blood small Negative    POC Urine PH 6.0 5.0 - 8.0    POC Protein negative Negative mg/dL    POC Urobiligen 0.2 Negative (0.2) mg/dL    POC Nitrites negative Negative    POC Leukocyte Esterase small Negative   Urinalysis    Collection Time: 03/07/24  2:32 PM    Specimen: Blood   Result Value Ref Range    Color Yellow     Character Clear     Specific Gravity <=1.005 <1.035    Ph 6.0 5.0 - 8.0    Glucose Negative Negative mg/dL    Ketones Negative Negative mg/dL    Protein Negative Negative mg/dL    Bilirubin Negative Negative    Nitrite Negative Negative    Leukocyte Esterase Small (A) Negative    Occult Blood Small (A) Negative    Micro Urine Req Microscopic    URINE MICROSCOPIC (W/UA)    Collection Time: 03/07/24  2:32 PM   Result Value Ref Range    WBC 10-20 (A) /hpf    RBC 5-10 (A) /hpf    Bacteria Few (A) None /hpf    Epithelial Cells Few Few /hpf   CBC with Differential    Collection Time: 03/07/24  2:52 PM   Result Value Ref Range    WBC 21.7 (H) 4.8 - 10.8 K/uL    RBC 4.75 4.20 - 5.40 M/uL    Hemoglobin 14.7 12.0 - 16.0 g/dL    Hematocrit 43.1 37.0 - 47.0 %    MCV 90.7 81.4 - 97.8 fL    MCH 30.9 27.0 - 33.0 pg    MCHC 34.1 32.2 - 35.5 g/dL    RDW 40.6 35.9 - 50.0 fL    Platelet Count 462 (H) 164 - 446 K/uL    MPV 9.8 9.0 - 12.9 fL    Neutrophils-Polys 69.80 44.00 - 72.00 %    Lymphocytes 21.30 (L) 22.00 -  41.00 %    Monocytes 7.70 0.00 - 13.40 %    Eosinophils 0.10 0.00 - 6.90 %    Basophils 0.50 0.00 - 1.80 %    Immature Granulocytes 0.60 0.00 - 0.90 %    Nucleated RBC 0.00 0.00 - 0.20 /100 WBC    Neutrophils (Absolute) 15.12 (H) 1.82 - 7.42 K/uL    Lymphs (Absolute) 4.63 1.00 - 4.80 K/uL    Monos (Absolute) 1.68 (H) 0.00 - 0.85 K/uL    Eos (Absolute) 0.03 0.00 - 0.51 K/uL    Baso (Absolute) 0.11 0.00 - 0.12 K/uL    Immature Granulocytes (abs) 0.12 (H) 0.00 - 0.11 K/uL    NRBC (Absolute) 0.00 K/uL   Complete Metabolic Panel    Collection Time: 24  2:52 PM   Result Value Ref Range    Sodium 140 135 - 145 mmol/L    Potassium 3.6 3.6 - 5.5 mmol/L    Chloride 101 96 - 112 mmol/L    Co2 26 20 - 33 mmol/L    Anion Gap 13.0 7.0 - 16.0    Glucose 97 65 - 99 mg/dL    Bun 16 8 - 22 mg/dL    Creatinine 0.99 0.50 - 1.40 mg/dL    Calcium 9.3 8.4 - 10.2 mg/dL    Correct Calcium 8.9 8.5 - 10.5 mg/dL    AST(SGOT) 19 12 - 45 U/L    ALT(SGPT) 21 2 - 50 U/L    Alkaline Phosphatase 159 (H) 30 - 99 U/L    Total Bilirubin 0.6 0.1 - 1.5 mg/dL    Albumin 4.5 3.2 - 4.9 g/dL    Total Protein 7.7 6.0 - 8.2 g/dL    Globulin 3.2 1.9 - 3.5 g/dL    A-G Ratio 1.4 g/dL   Lipase    Collection Time: 24  2:52 PM   Result Value Ref Range    Lipase 41 11 - 82 U/L   ESTIMATED GFR    Collection Time: 24  2:52 PM   Result Value Ref Range    GFR (CKD-EPI) 64 >60 mL/min/1.73 m 2   LACTIC ACID    Collection Time: 24  3:39 PM   Result Value Ref Range    Lactic Acid 1.5 0.5 - 2.0 mmol/L   ECG    Collection Time: 24  6:18 PM   Result Value Ref Range    Report       [facility]    Test Date:  2024  Pt Name:    EDYTA BOWEN               Department: [facility]  MRN:        [id number]                      Room:       [facility]  Gender:     Female                       Technician: [id number]  :        1960                   Requested By:PASHA LO  Order #:    [id number]                    Reading  MD:    Measurements  Intervals                                Axis  Rate:       94                           P:          0  MN:         0                            QRS:        7  QRSD:       88                           T:          40  QT:         353  QTc:        442    Interpretive Statements  Atrial fibrillation  Low voltage, precordial leads  Abnormal inferior Q waves  Probable anterolateral infarct, old  Compared to ECG 11/27/2020 23:30:14  Low QRS voltage now present  Inferior Q waves now present  Q waves now present  Sinus rhythm no longer present  Myocardial infarct finding still present         Imaging  CT-ABDOMEN-PELVIS WITH   Final Result      1.  Dilated fluid-filled appendix with wall thickening and surrounding inflammatory change as well as a small amount of fluid within the adjacent mesentery consistent with acute appendicitis.      2.  Fatty liver.      3.  Prior cholecystectomy.      DX-CHEST-PORTABLE (1 VIEW)    (Results Pending)   DX-CHEST-PORTABLE (1 VIEW)    (Results Pending)     Echo 9/18/2019  CONCLUSIONS  No prior study is available for comparison.   Left ventricular ejection fraction is visually estimated to be 45%.  Apical and anterio-lateral  hypokinesis.    Assessment/Plan  #Acute hypoxic respiratory failure, likely due to pulmonary edema, on ventilator  #Hx of HFrEF, last echo 2019  Give Lasix 40 mg IV x 1 now  Follow-up echocardiogram  ABCDEF bundle  Head of bed greater than 30 degrees    #Difficult airway due to airway edema, question allergic reaction to ceftriaxone versus inflammation after tubes were placed in her ears v epiglottitis   Give dexamethasone 4 mg q6 hours and assess again tomorrow   Consider CT neck  COVID/RSV/Flu panel pending  Keep deeply sedated so that she does not pull her tube     #Acute appendicitis, s/p lap appendectomy on 3/7/2024  Pain management, currently on fentanyl gtt  PT/OT when extubated     #Hypothryoidism  Continue home  levothyroxine    #Hypertension, currently normotensive  Hold home meds    #Hyperlipidemia   Restart statin    #Hx of MI, no stents  Plan to restart asa tomorrow if ok with surgery     #LIDYA, compliant with CPAP at home  Plan for continued CPAP use after extubation    #Elevated alkaline phosphatase, chronic   Outpatient work up     Lines: PIVs  Tubes: ETT 6.5 (3/7)   Diet: NPO    DVT ppx: SCDs, will start chemical tomorrow   GI ppx: pepcid  Bowel regiment: yes  Ventilator ppx: Cholorohexadine, HOB> 30, oral care      Discussed patient condition and risk of morbidity and/or mortality with Hospitalist, Family, RN, RT, and anesthesiologists .    The patient remains critically ill.  Critical care time = 102 minutes in directly providing and coordinating critical care and extensive data review.  No time overlap and excludes procedures.    __________  This note was generated using voice recognition software which has a chance of producing errors of grammar and content.  I have made every reasonable attempt to find and correct any errors, but it should be expected that some may not be found prior to finalization of this note.    Tracie Shearer, DO   Pulmonary and Critical Care

## 2024-03-08 NOTE — ANESTHESIA TIME REPORT
Anesthesia Start and Stop Event Times       Date Time Event    3/7/2024 1843 Ready for Procedure     1913 Anesthesia Start     2104 Anesthesia Stop          Responsible Staff  03/07/24      Name Role Begin End    Almas Simms M.D. Anesth 1913 1945    Mele Arnold M.D. Anesth 1945 2104          Overtime Reason:  no overtime (within assigned shift)    Comments:

## 2024-03-08 NOTE — ANESTHESIA POSTPROCEDURE EVALUATION
Patient: Priscilla Childress    Procedure Summary       Date: 03/07/24 Room / Location:  OR 06 / SURGERY [facility]    Anesthesia Start: 1913 Anesthesia Stop: 2104    Procedure: APPENDECTOMY, LAPAROSCOPIC (Abdomen) Diagnosis: (Acute appendicitis)    Surgeons: Manfred Zamudio M.D. Responsible Provider: Mele Arnold M.D.    Anesthesia Type: general ASA Status: 3            Final Anesthesia Type: general  Last vitals  BP   Blood Pressure: (!) 141/68    Temp   37.7 °C (99.9 °F)    Pulse   95   Resp   16    SpO2   95 %      Anesthesia Post Evaluation    Patient location during evaluation: ICU  Patient participation: waiting for patient participation  Level of consciousness: obtunded/minimal responses  Pain score: 0    Airway patency: patent  Anesthetic complications: no  Cardiovascular status: adequate  Respiratory status: acceptable, ETT and ventilator  Hydration status: stable  Patient has been marked as needing Post Surgery Rounding  PONV: none          No notable events documented.     Nurse Pain Score: 0 (NPRS)      Taken to ICU uneventfully intubated on propofol. Handoff complete. Airway is tenuous, advised staff to ensure ETT stays in place.

## 2024-03-08 NOTE — ANESTHESIA PROCEDURE NOTES
Airway    Date/Time: 3/7/2024 7:34 PM    Performed by: Almas Simms M.D.  Authorized by: Almas Simms M.D.    Location:  OR  Urgency:  Elective  Difficult Airway: Yes     Patient had a very narrow glottis, unable to pass ETT 7.0 with DL, attempted with glidescope and noticed visible swelling, again unable to pass 6.5.  Placed 6.5 ETT over bronchoscope and with difficulty was able to pass the ETT successfully.    Indications for Airway Management:  Anesthesia      Spontaneous Ventilation: absent    Sedation Level:  Deep  Preoxygenated: Yes    Patient Position:  Sniffing  Mask Difficulty Assessment:  4 - unable to mask vent +/- NMBA  Final Airway Type:  Endotracheal airway  Final Endotracheal Airway:  ETT  Cuffed: Yes    Technique Used for Successful ETT Placement:  Flexible bronchoscopy  Devices/Methods Used in Placement:  Anterior pressure/BURP and intubating stylet    Insertion Site:  Oral  ETT Size (mm):  6.5  Measured from:  Teeth  ETT to Teeth (cm):  23  Placement Verified by: auscultation and capnometry    Number of Attempts at Approach:  1  Number of Other Approaches Attempted:  3 or more  Unsuccessful Airway(s) Attempted:  Oropharyngeal  Unsuccessful Approach(es) for ETT:  Direct laryngoscopy and video laryngoscopy   Grade III view with MAC 3  Grade 1 view with Glide 3, but unable to advance 7.0 tube  Grade 1 view with [place] 3, unable to advance 6.5 tube  Grade 1 view with Glide 3, 6.5 tube advanced over fiberoptic bronch with help of 2nd anesthesiologist.

## 2024-03-08 NOTE — PROGRESS NOTES
Surgery  POD#1  Lap appy with difficult airway  Remains intubated  CT neck today  May need ENT consult depending on CT results  Surgery will follow

## 2024-03-08 NOTE — CARE PLAN
The patient is Watcher - Medium risk of patient condition declining or worsening    Shift Goals  Clinical Goals: Monitor O2 demand, SAT/SBT, RASS -1 to +1  Patient Goals: MANUEL  Family Goals: Updates    Progress made toward(s) clinical / shift goals:    Problem: Hemodynamics  Goal: Patient's hemodynamics, fluid balance and neurologic status will be stable or improve  Outcome: Progressing     Problem: Respiratory  Goal: Patient will achieve/maintain optimum respiratory ventilation and gas exchange  Outcome: Progressing     Problem: Mechanical Ventilation  Goal: Safe management of artificial airway and ventilation  Outcome: Progressing     Problem: Risk for Aspiration  Goal: Patient's risk for aspiration will be absent or decrease  Outcome: Progressing     Problem: Urinary - Renal Perfusion  Goal: Ability to achieve and maintain adequate renal perfusion and functioning will improve  Outcome: Progressing     Problem: Urinary Elimination  Goal: Establish and maintain regular urinary output  Outcome: Progressing       Patient is not progressing towards the following goals:

## 2024-03-08 NOTE — CONSULTS
General Surgery Consult    CHIEF COMPLAINT: Abdominal pain    REFERRING PROVIDER Dr. Stephanie Dupont    HISTORY OF PRESENT ILLNESS: The patient is a 63 y.o. female, who presents with 2-day history of vague abdominal pain which has migrated to the right lower quadrant.  She states she has a few episodes of diarrhea and has felt warm but no fevers.  She does report nausea and emesis.  CT from the emergency room demonstrates acute appendicitis with adjacent fluid.    PAST MEDICAL HISTORY:  has a past medical history of History of myocardial infarction, Hyperlipidemia, Hypertension, and Thyroid disease.     PAST SURGICAL HISTORY:  has a past surgical history that includes thyroidectomy total and cholecystectomy.     ALLERGIES:   Allergies   Allergen Reactions    Inapsine [Droperidol] Anaphylaxis        CURRENT MEDICATIONS:   Home Medications       Reviewed by Kriss Miller R.N. (Registered Nurse) on 03/07/24 at 1804  Med List Status: Complete     Medication Last Dose Status   amLODIPine (NORVASC) 5 MG Tab 3/6/2024 Active   aspirin (ASA) 81 MG Chew Tab chewable tablet 3/6/2024 Active   atorvastatin (LIPITOR) 80 MG tablet 3/7/2024 Active   carvedilol (COREG) 6.25 MG Tab 3/7/2024 Active   diclofenac sodium 1 % Gel 3/7/2024 Active   FLUoxetine (PROZAC) 10 MG Cap 3/7/2024 Active   Multiple Vitamins-Minerals (HAIR SKIN & NAILS) Tab 3/6/2024 Active   SYNTHROID 200 MCG Tab 3/7/2024 Active                    FAMILY HISTORY:   Family History   Problem Relation Age of Onset    Alzheimer's Disease Mother     Heart Disease Father 70        CAD        SOCIAL HISTORY:   Social History     Tobacco Use    Smoking status: Never    Smokeless tobacco: Never   Vaping Use    Vaping Use: Never used   Substance and Sexual Activity    Alcohol use: Not Currently    Drug use: No    Sexual activity: Not on file       REVIEW OF SYSTEMS: Comprehensive review of systems was negative aside from otherwise negative other than mentioned above in  "HPI for abdominal pain, nausea vomiting and diarrhea    PHYSICAL EXAMINATION:       VITAL SIGNS: BP (!) 141/68   Pulse 95   Temp 37.7 °C (99.9 °F) (Temporal)   Resp 16   Ht 1.626 m (5' 4\")   Wt 108 kg (238 lb 1.6 oz)   SpO2 95%   GENERAL: The patient is awake and alert, appears well  HEENT:  MMM. Neck supple. No adenopathy.  PULM:No respiratory distress.  CARDIOVASCULAR: Regular rate. The extremities are well perfused.   ABDOMEN: Soft, tenderness palpation in the right lower quadrant, nontender in the left abdomen  EXTREMITIES: Examination of the upper and lower extremities demonstrates no cyanosis edema or clubbing.  NEUROLOGIC: Alert & oriented x 3, Normal motor function, Normal sensory function, No focal deficits noted.    LABORATORY VALUES:   Recent Labs     03/07/24  1452   WBC 21.7*   RBC 4.75   HEMOGLOBIN 14.7   HEMATOCRIT 43.1   MCV 90.7   MCH 30.9   MCHC 34.1   RDW 40.6   PLATELETCT 462*   MPV 9.8     Recent Labs     03/07/24  1452   SODIUM 140   POTASSIUM 3.6   CHLORIDE 101   CO2 26   GLUCOSE 97   BUN 16   CREATININE 0.99   CALCIUM 9.3     Recent Labs     03/07/24  1452   ASTSGOT 19   ALTSGPT 21   TBILIRUBIN 0.6   ALKPHOSPHAT 159*   GLOBULIN 3.2            IMAGING:   CT-ABDOMEN-PELVIS WITH   Final Result      1.  Dilated fluid-filled appendix with wall thickening and surrounding inflammatory change as well as a small amount of fluid within the adjacent mesentery consistent with acute appendicitis.      2.  Fatty liver.      3.  Prior cholecystectomy.          IMPRESSION AND PLAN:   63-year-old female with acute appendicitis  1.  N.p.o.  2.  IV fluids  3.  IV antibiotics  4.  Will proceed to the operating room for laparoscopic appendectomy, remainder of care based on intraoperative findings    1.  The patient will be taken to the operating room for laparoscopic appendectomy. The surgical conduct was explained. Potential complications including but not limited to infection, bleeding, damage to adjacent " structures, anesthetic complications were discussed in detail. Questions were elicited and answered to her satisfaction. She understands the rationale for surgery and elects to proceed.  Operative consent signed.      Manfred Zamudio MD    ___________________________________   Manfred Zamudio M.D.    DD: 3/7/2024 DT: 7:03 PM

## 2024-03-09 ENCOUNTER — APPOINTMENT (OUTPATIENT)
Dept: RADIOLOGY | Facility: MEDICAL CENTER | Age: 64
DRG: 329 | End: 2024-03-09
Attending: INTERNAL MEDICINE
Payer: COMMERCIAL

## 2024-03-09 ENCOUNTER — APPOINTMENT (OUTPATIENT)
Dept: RADIOLOGY | Facility: MEDICAL CENTER | Age: 64
End: 2024-03-09
Attending: INTERNAL MEDICINE
Payer: COMMERCIAL

## 2024-03-09 ENCOUNTER — HOSPITAL ENCOUNTER (INPATIENT)
Facility: MEDICAL CENTER | Age: 64
LOS: 2 days | End: 2024-03-11
Attending: INTERNAL MEDICINE | Admitting: INTERNAL MEDICINE
Payer: COMMERCIAL

## 2024-03-09 VITALS
RESPIRATION RATE: 19 BRPM | SYSTOLIC BLOOD PRESSURE: 99 MMHG | BODY MASS INDEX: 36.19 KG/M2 | DIASTOLIC BLOOD PRESSURE: 40 MMHG | OXYGEN SATURATION: 96 % | HEART RATE: 55 BPM | HEIGHT: 68 IN | TEMPERATURE: 98.6 F | WEIGHT: 238.76 LBS

## 2024-03-09 DIAGNOSIS — K35.30 ACUTE APPENDICITIS WITH LOCALIZED PERITONITIS, WITHOUT PERFORATION, ABSCESS, OR GANGRENE: ICD-10-CM

## 2024-03-09 DIAGNOSIS — T88.4XXA DIFFICULT AIRWAY FOR INTUBATION, INITIAL ENCOUNTER: ICD-10-CM

## 2024-03-09 DIAGNOSIS — J96.01 ACUTE RESPIRATORY FAILURE WITH HYPOXIA (HCC): ICD-10-CM

## 2024-03-09 PROBLEM — J96.91 RESPIRATORY FAILURE WITH HYPOXIA (HCC): Status: ACTIVE | Noted: 2024-03-09

## 2024-03-09 LAB
ANION GAP SERPL CALC-SCNC: 9 MMOL/L (ref 7–16)
ARTERIAL PATENCY WRIST A: NORMAL
BASE EXCESS BLDA CALC-SCNC: -1 MMOL/L (ref -4–3)
BASOPHILS # BLD AUTO: 0.1 % (ref 0–1.8)
BASOPHILS # BLD: 0.02 K/UL (ref 0–0.12)
BODY TEMPERATURE: NORMAL DEGREES
BREATHS SETTING VENT: 20
BUN SERPL-MCNC: 27 MG/DL (ref 8–22)
CALCIUM SERPL-MCNC: 8.8 MG/DL (ref 8.4–10.2)
CHLORIDE SERPL-SCNC: 104 MMOL/L (ref 96–112)
CO2 BLDA-SCNC: 25 MMOL/L (ref 20–33)
CO2 SERPL-SCNC: 24 MMOL/L (ref 20–33)
CREAT SERPL-MCNC: 1.1 MG/DL (ref 0.5–1.4)
DELSYS IDSYS: NORMAL
END TIDAL CARBON DIOXIDE IECO2: 35 MMHG
EOSINOPHIL # BLD AUTO: 0.02 K/UL (ref 0–0.51)
EOSINOPHIL NFR BLD: 0.1 % (ref 0–6.9)
ERYTHROCYTE [DISTWIDTH] IN BLOOD BY AUTOMATED COUNT: 42.3 FL (ref 35.9–50)
GFR SERPLBLD CREATININE-BSD FMLA CKD-EPI: 57 ML/MIN/1.73 M 2
GLUCOSE BLD STRIP.AUTO-MCNC: 159 MG/DL (ref 65–99)
GLUCOSE SERPL-MCNC: 169 MG/DL (ref 65–99)
HCO3 BLDA-SCNC: 23.7 MMOL/L (ref 17–25)
HCT VFR BLD AUTO: 36.8 % (ref 37–47)
HGB BLD-MCNC: 12.2 G/DL (ref 12–16)
HOROWITZ INDEX BLDA+IHG-RTO: 166 MM[HG]
IMM GRANULOCYTES # BLD AUTO: 0.1 K/UL (ref 0–0.11)
IMM GRANULOCYTES NFR BLD AUTO: 0.6 % (ref 0–0.9)
LYMPHOCYTES # BLD AUTO: 1.59 K/UL (ref 1–4.8)
LYMPHOCYTES NFR BLD: 9.2 % (ref 22–41)
MAGNESIUM SERPL-MCNC: 2.6 MG/DL (ref 1.5–2.5)
MCH RBC QN AUTO: 30.6 PG (ref 27–33)
MCHC RBC AUTO-ENTMCNC: 33.2 G/DL (ref 32.2–35.5)
MCV RBC AUTO: 92.2 FL (ref 81.4–97.8)
MODE IMODE: NORMAL
MONOCYTES # BLD AUTO: 0.91 K/UL (ref 0–0.85)
MONOCYTES NFR BLD AUTO: 5.3 % (ref 0–13.4)
NEUTROPHILS # BLD AUTO: 14.63 K/UL (ref 1.82–7.42)
NEUTROPHILS NFR BLD: 84.7 % (ref 44–72)
NRBC # BLD AUTO: 0 K/UL
NRBC BLD-RTO: 0 /100 WBC (ref 0–0.2)
O2/TOTAL GAS SETTING VFR VENT: 50 %
PCO2 BLDA: 36.6 MMHG (ref 26–37)
PEEP END EXPIRATORY PRESSURE IPEEP: 10 CMH20
PH BLDA: 7.42 [PH] (ref 7.4–7.5)
PH TEMP ADJ BLDA: 7.42 [PH] (ref 7.4–7.5)
PHOSPHATE SERPL-MCNC: 3.4 MG/DL (ref 2.5–4.5)
PLATELET # BLD AUTO: 391 K/UL (ref 164–446)
PMV BLD AUTO: 10.1 FL (ref 9–12.9)
PO2 BLDA: 83 MMHG (ref 64–87)
PO2 TEMP ADJ BLDA: 83 MMHG (ref 64–87)
POTASSIUM SERPL-SCNC: 3.8 MMOL/L (ref 3.6–5.5)
RBC # BLD AUTO: 3.99 M/UL (ref 4.2–5.4)
SAO2 % BLDA: 96 % (ref 93–99)
SODIUM SERPL-SCNC: 137 MMOL/L (ref 135–145)
SPECIMEN DRAWN FROM PATIENT: NORMAL
T4 FREE SERPL-MCNC: 2.79 NG/DL (ref 0.93–1.7)
TIDAL VOLUME IVT: 380 ML
TRIGL SERPL-MCNC: 121 MG/DL (ref 0–149)
TSH SERPL DL<=0.005 MIU/L-ACNC: 0.31 UIU/ML (ref 0.38–5.33)
WBC # BLD AUTO: 17.3 K/UL (ref 4.8–10.8)

## 2024-03-09 PROCEDURE — 770022 HCHG ROOM/CARE - ICU (200)

## 2024-03-09 PROCEDURE — 83735 ASSAY OF MAGNESIUM: CPT

## 2024-03-09 PROCEDURE — 94003 VENT MGMT INPAT SUBQ DAY: CPT

## 2024-03-09 PROCEDURE — 700101 HCHG RX REV CODE 250: Performed by: INTERNAL MEDICINE

## 2024-03-09 PROCEDURE — 82803 BLOOD GASES ANY COMBINATION: CPT

## 2024-03-09 PROCEDURE — 700111 HCHG RX REV CODE 636 W/ 250 OVERRIDE (IP): Performed by: INTERNAL MEDICINE

## 2024-03-09 PROCEDURE — 71045 X-RAY EXAM CHEST 1 VIEW: CPT

## 2024-03-09 PROCEDURE — 82962 GLUCOSE BLOOD TEST: CPT

## 2024-03-09 PROCEDURE — 85025 COMPLETE CBC W/AUTO DIFF WBC: CPT

## 2024-03-09 PROCEDURE — 94760 N-INVAS EAR/PLS OXIMETRY 1: CPT

## 2024-03-09 PROCEDURE — 99291 CRITICAL CARE FIRST HOUR: CPT | Mod: 25 | Performed by: INTERNAL MEDICINE

## 2024-03-09 PROCEDURE — 31500 INSERT EMERGENCY AIRWAY: CPT | Performed by: INTERNAL MEDICINE

## 2024-03-09 PROCEDURE — 84443 ASSAY THYROID STIM HORMONE: CPT

## 2024-03-09 PROCEDURE — 94799 UNLISTED PULMONARY SVC/PX: CPT

## 2024-03-09 PROCEDURE — 36600 WITHDRAWAL OF ARTERIAL BLOOD: CPT

## 2024-03-09 PROCEDURE — 84478 ASSAY OF TRIGLYCERIDES: CPT

## 2024-03-09 PROCEDURE — A9270 NON-COVERED ITEM OR SERVICE: HCPCS | Performed by: INTERNAL MEDICINE

## 2024-03-09 PROCEDURE — 700111 HCHG RX REV CODE 636 W/ 250 OVERRIDE (IP): Mod: JZ | Performed by: INTERNAL MEDICINE

## 2024-03-09 PROCEDURE — 80048 BASIC METABOLIC PNL TOTAL CA: CPT

## 2024-03-09 PROCEDURE — 84100 ASSAY OF PHOSPHORUS: CPT

## 2024-03-09 PROCEDURE — 700105 HCHG RX REV CODE 258: Performed by: NURSE PRACTITIONER

## 2024-03-09 PROCEDURE — 99292 CRITICAL CARE ADDL 30 MIN: CPT | Mod: 25 | Performed by: INTERNAL MEDICINE

## 2024-03-09 PROCEDURE — 700102 HCHG RX REV CODE 250 W/ 637 OVERRIDE(OP): Performed by: INTERNAL MEDICINE

## 2024-03-09 PROCEDURE — 84439 ASSAY OF FREE THYROXINE: CPT

## 2024-03-09 RX ORDER — FAMOTIDINE 20 MG/1
20 TABLET, FILM COATED ORAL EVERY 12 HOURS
Status: DISCONTINUED | OUTPATIENT
Start: 2024-03-09 | End: 2024-03-10

## 2024-03-09 RX ORDER — POTASSIUM CHLORIDE 7.45 MG/ML
10 INJECTION INTRAVENOUS
Status: COMPLETED | OUTPATIENT
Start: 2024-03-09 | End: 2024-03-09

## 2024-03-09 RX ORDER — PROMETHAZINE HYDROCHLORIDE 25 MG/1
12.5-25 TABLET ORAL EVERY 4 HOURS PRN
Status: CANCELLED | OUTPATIENT
Start: 2024-03-09

## 2024-03-09 RX ORDER — ACETAMINOPHEN 325 MG/1
650 TABLET ORAL EVERY 6 HOURS PRN
Status: CANCELLED | OUTPATIENT
Start: 2024-03-09

## 2024-03-09 RX ORDER — ROCURONIUM BROMIDE 10 MG/ML
100 INJECTION, SOLUTION INTRAVENOUS ONCE
Status: COMPLETED | OUTPATIENT
Start: 2024-03-09 | End: 2024-03-09

## 2024-03-09 RX ORDER — ONDANSETRON 2 MG/ML
4 INJECTION INTRAMUSCULAR; INTRAVENOUS EVERY 4 HOURS PRN
Status: CANCELLED | OUTPATIENT
Start: 2024-03-09

## 2024-03-09 RX ORDER — SCOLOPAMINE TRANSDERMAL SYSTEM 1 MG/1
1 PATCH, EXTENDED RELEASE TRANSDERMAL
Status: DISCONTINUED | OUTPATIENT
Start: 2024-03-09 | End: 2024-03-09 | Stop reason: HOSPADM

## 2024-03-09 RX ORDER — ACETAMINOPHEN 325 MG/1
650 TABLET ORAL EVERY 6 HOURS PRN
Status: DISCONTINUED | OUTPATIENT
Start: 2024-03-09 | End: 2024-03-10

## 2024-03-09 RX ORDER — FLUOXETINE 10 MG/1
10 CAPSULE ORAL DAILY
Status: DISCONTINUED | OUTPATIENT
Start: 2024-03-10 | End: 2024-03-10

## 2024-03-09 RX ORDER — ENOXAPARIN SODIUM 100 MG/ML
40 INJECTION SUBCUTANEOUS DAILY
Status: DISCONTINUED | OUTPATIENT
Start: 2024-03-09 | End: 2024-03-11 | Stop reason: HOSPADM

## 2024-03-09 RX ORDER — DEXAMETHASONE SODIUM PHOSPHATE 4 MG/ML
4 INJECTION, SOLUTION INTRA-ARTICULAR; INTRALESIONAL; INTRAMUSCULAR; INTRAVENOUS; SOFT TISSUE EVERY 6 HOURS
Status: DISCONTINUED | OUTPATIENT
Start: 2024-03-09 | End: 2024-03-09

## 2024-03-09 RX ORDER — LEVOTHYROXINE SODIUM 0.1 MG/1
200 TABLET ORAL
Status: CANCELLED | OUTPATIENT
Start: 2024-03-10

## 2024-03-09 RX ORDER — PROMETHAZINE HYDROCHLORIDE 25 MG/1
12.5-25 SUPPOSITORY RECTAL EVERY 4 HOURS PRN
Status: DISCONTINUED | OUTPATIENT
Start: 2024-03-09 | End: 2024-03-11 | Stop reason: HOSPADM

## 2024-03-09 RX ORDER — SCOLOPAMINE TRANSDERMAL SYSTEM 1 MG/1
1 PATCH, EXTENDED RELEASE TRANSDERMAL
Status: DISCONTINUED | OUTPATIENT
Start: 2024-03-12 | End: 2024-03-09

## 2024-03-09 RX ORDER — FLUOXETINE 10 MG/1
10 CAPSULE ORAL DAILY
Status: CANCELLED | OUTPATIENT
Start: 2024-03-10

## 2024-03-09 RX ORDER — PROCHLORPERAZINE EDISYLATE 5 MG/ML
5-10 INJECTION INTRAMUSCULAR; INTRAVENOUS EVERY 4 HOURS PRN
Status: DISCONTINUED | OUTPATIENT
Start: 2024-03-09 | End: 2024-03-11 | Stop reason: HOSPADM

## 2024-03-09 RX ORDER — PROMETHAZINE HYDROCHLORIDE 25 MG/1
12.5-25 TABLET ORAL EVERY 4 HOURS PRN
Status: DISCONTINUED | OUTPATIENT
Start: 2024-03-09 | End: 2024-03-10

## 2024-03-09 RX ORDER — SCOLOPAMINE TRANSDERMAL SYSTEM 1 MG/1
1 PATCH, EXTENDED RELEASE TRANSDERMAL
Status: CANCELLED | OUTPATIENT
Start: 2024-03-12

## 2024-03-09 RX ORDER — ONDANSETRON 2 MG/ML
4 INJECTION INTRAMUSCULAR; INTRAVENOUS EVERY 4 HOURS PRN
Status: DISCONTINUED | OUTPATIENT
Start: 2024-03-09 | End: 2024-03-11 | Stop reason: HOSPADM

## 2024-03-09 RX ORDER — FAMOTIDINE 20 MG/1
20 TABLET, FILM COATED ORAL EVERY 12 HOURS
Status: CANCELLED | OUTPATIENT
Start: 2024-03-09

## 2024-03-09 RX ORDER — ONDANSETRON 4 MG/1
4 TABLET, ORALLY DISINTEGRATING ORAL EVERY 4 HOURS PRN
Status: DISCONTINUED | OUTPATIENT
Start: 2024-03-09 | End: 2024-03-10

## 2024-03-09 RX ORDER — PROMETHAZINE HYDROCHLORIDE 25 MG/1
12.5-25 SUPPOSITORY RECTAL EVERY 4 HOURS PRN
Status: CANCELLED | OUTPATIENT
Start: 2024-03-09

## 2024-03-09 RX ORDER — SODIUM CHLORIDE, SODIUM LACTATE, POTASSIUM CHLORIDE, CALCIUM CHLORIDE 600; 310; 30; 20 MG/100ML; MG/100ML; MG/100ML; MG/100ML
INJECTION, SOLUTION INTRAVENOUS CONTINUOUS
Status: DISCONTINUED | OUTPATIENT
Start: 2024-03-09 | End: 2024-03-10

## 2024-03-09 RX ORDER — ONDANSETRON 4 MG/1
4 TABLET, ORALLY DISINTEGRATING ORAL EVERY 4 HOURS PRN
Status: CANCELLED | OUTPATIENT
Start: 2024-03-09

## 2024-03-09 RX ORDER — LEVOTHYROXINE SODIUM 0.2 MG/1
200 TABLET ORAL
Status: DISCONTINUED | OUTPATIENT
Start: 2024-03-10 | End: 2024-03-10

## 2024-03-09 RX ORDER — PROCHLORPERAZINE EDISYLATE 5 MG/ML
5-10 INJECTION INTRAMUSCULAR; INTRAVENOUS EVERY 4 HOURS PRN
Status: CANCELLED | OUTPATIENT
Start: 2024-03-09

## 2024-03-09 RX ORDER — DEXAMETHASONE SODIUM PHOSPHATE 4 MG/ML
4 INJECTION, SOLUTION INTRA-ARTICULAR; INTRALESIONAL; INTRAMUSCULAR; INTRAVENOUS; SOFT TISSUE EVERY 6 HOURS
Status: CANCELLED | OUTPATIENT
Start: 2024-03-09

## 2024-03-09 RX ORDER — ENOXAPARIN SODIUM 100 MG/ML
40 INJECTION SUBCUTANEOUS DAILY
Status: CANCELLED | OUTPATIENT
Start: 2024-03-09

## 2024-03-09 RX ORDER — ETOMIDATE 2 MG/ML
20 INJECTION INTRAVENOUS ONCE
Status: COMPLETED | OUTPATIENT
Start: 2024-03-09 | End: 2024-03-09

## 2024-03-09 RX ADMIN — DEXAMETHASONE SODIUM PHOSPHATE 4 MG: 4 INJECTION INTRA-ARTICULAR; INTRALESIONAL; INTRAMUSCULAR; INTRAVENOUS; SOFT TISSUE at 11:47

## 2024-03-09 RX ADMIN — PROPOFOL 50 MCG/KG/MIN: 10 INJECTION, EMULSION INTRAVENOUS at 14:15

## 2024-03-09 RX ADMIN — ROCURONIUM BROMIDE 100 MG: 10 INJECTION, SOLUTION INTRAVENOUS at 14:08

## 2024-03-09 RX ADMIN — ROCURONIUM BROMIDE 100 MG: 50 INJECTION, SOLUTION INTRAVENOUS at 10:36

## 2024-03-09 RX ADMIN — PROPOFOL 50 MCG/KG/MIN: 10 INJECTION, EMULSION INTRAVENOUS at 02:42

## 2024-03-09 RX ADMIN — POTASSIUM CHLORIDE 10 MEQ: 7.46 INJECTION, SOLUTION INTRAVENOUS at 07:43

## 2024-03-09 RX ADMIN — ETOMIDATE 20 MG: 2 INJECTION, SOLUTION INTRAVENOUS at 14:07

## 2024-03-09 RX ADMIN — PROPOFOL 50 MCG/KG/MIN: 10 INJECTION, EMULSION INTRAVENOUS at 06:57

## 2024-03-09 RX ADMIN — FAMOTIDINE 20 MG: 10 INJECTION INTRAVENOUS at 05:11

## 2024-03-09 RX ADMIN — LEVOTHYROXINE SODIUM ANHYDROUS 200 MCG: 100 INJECTION, POWDER, LYOPHILIZED, FOR SOLUTION INTRAVENOUS at 18:29

## 2024-03-09 RX ADMIN — DEXAMETHASONE SODIUM PHOSPHATE 4 MG: 4 INJECTION INTRA-ARTICULAR; INTRALESIONAL; INTRAMUSCULAR; INTRAVENOUS; SOFT TISSUE at 00:44

## 2024-03-09 RX ADMIN — DEXAMETHASONE SODIUM PHOSPHATE 4 MG: 4 INJECTION INTRA-ARTICULAR; INTRALESIONAL; INTRAMUSCULAR; INTRAVENOUS; SOFT TISSUE at 17:16

## 2024-03-09 RX ADMIN — POTASSIUM CHLORIDE 10 MEQ: 7.46 INJECTION, SOLUTION INTRAVENOUS at 06:26

## 2024-03-09 RX ADMIN — PROPOFOL 50 MCG/KG/MIN: 10 INJECTION, EMULSION INTRAVENOUS at 11:15

## 2024-03-09 RX ADMIN — SCOPOLAMINE 1 PATCH: 1.5 PATCH, EXTENDED RELEASE TRANSDERMAL at 11:45

## 2024-03-09 RX ADMIN — SODIUM CHLORIDE, POTASSIUM CHLORIDE, SODIUM LACTATE AND CALCIUM CHLORIDE: 600; 310; 30; 20 INJECTION, SOLUTION INTRAVENOUS at 23:25

## 2024-03-09 RX ADMIN — FAMOTIDINE 20 MG: 10 INJECTION, SOLUTION INTRAVENOUS at 17:16

## 2024-03-09 RX ADMIN — FENTANYL CITRATE 25 MCG: 50 INJECTION, SOLUTION INTRAMUSCULAR; INTRAVENOUS at 21:35

## 2024-03-09 RX ADMIN — ONDANSETRON 4 MG: 2 INJECTION INTRAMUSCULAR; INTRAVENOUS at 16:41

## 2024-03-09 RX ADMIN — FENTANYL CITRATE 25 MCG: 50 INJECTION, SOLUTION INTRAMUSCULAR; INTRAVENOUS at 18:23

## 2024-03-09 RX ADMIN — FENTANYL CITRATE 25 MCG: 50 INJECTION, SOLUTION INTRAMUSCULAR; INTRAVENOUS at 16:45

## 2024-03-09 RX ADMIN — DEXAMETHASONE SODIUM PHOSPHATE 4 MG: 4 INJECTION INTRA-ARTICULAR; INTRALESIONAL; INTRAMUSCULAR; INTRAVENOUS; SOFT TISSUE at 05:12

## 2024-03-09 RX ADMIN — ENOXAPARIN SODIUM 40 MG: 100 INJECTION SUBCUTANEOUS at 17:16

## 2024-03-09 RX ADMIN — PROPOFOL 35 MCG/KG/MIN: 10 INJECTION, EMULSION INTRAVENOUS at 22:50

## 2024-03-09 RX ADMIN — PROCHLORPERAZINE EDISYLATE 10 MG: 5 INJECTION, SOLUTION INTRAMUSCULAR; INTRAVENOUS at 16:49

## 2024-03-09 ASSESSMENT — PAIN DESCRIPTION - PAIN TYPE
TYPE: ACUTE PAIN

## 2024-03-09 ASSESSMENT — FIBROSIS 4 INDEX: FIB4 SCORE: 0.66

## 2024-03-09 NOTE — PROGRESS NOTES
Pulmonary Progress Note    Date of admission  3/7/2024    Chief Complaint  63 y.o. female admitted 3/7/2024 with respiratory failure    Hospital Course  This is a 63 year old female with past medical history of thyroid disease, MI, HLD and HTN here with acute appendicitis.  She was admitted to ICU due to difficult intubation in the OR requiring several attempts and two anesthesiologists.  She was eventually intubated with fiberoptics and a 6.5 tube.     Per report her airway was very reactive with immediate swelling after first attempt at intubation.  There appeared to also be subglottic tissue/swelling which caused the fiberoptic scope to bounce out of the airway during attempts at bronchoscopic intubation.  The patient was UNABLE to be bag mask ventilated during these attempts.  Her neck is short with significant redundant tissue making an emergent cricothyrotomy likely to be very difficult.     DIFFICULT AIRWAY    Interval Problem Update  Reviewed last 24 hour events:  ICU CHECKLIST:  Chart review from the past 24 hours includes imaging, laboratory studies, vital signs and notes available.  Pertinent system review for today's visit includes:  Significant overnight events: None    Neuro: Deeply sedated  Cardiac: Stable, EF 55%, apical aneurysm   Pulmonary: Vent: Rate 20. , PEEP 10, FiO2 40%.  Scant pulmonary secretions. Mild atelectasis vs aspiration in the bilateral lower lobes on CT.   Heme: leukocytosis likely 2/2 high dose steroids   DVT Prophylaxis: Lovenox  /Renal: Stable   I/O: 710/1860 = -1149 = -1314   Alvares: immobility  ID:  No signs of infection  Skin: intact surgical sites  GI/Nutrition: NPO   Prophylaxis: pepcid   Endo: Stable  6.5 ETT     Review of Systems  Review of Systems   Unable to perform ROS: Intubated        Vital Signs for last 24 hours   Temp:  [36.7 °C (98.1 °F)-37 °C (98.6 °F)] 37 °C (98.6 °F)  Pulse:  [54-65] 64  Resp:  [19-21] 19  BP: ()/(44-73) 117/73  SpO2:  [90 %-97 %]  95 %    Hemodynamic parameters for last 24 hours       Respiratory Information for the last 24 hours  Vent Mode: APVCMV  Rate (breaths/min): 20  Vt Target (mL): 380  PEEP/CPAP: 10  MAP: 11  Control VTE (exp VT): 372    Physical Exam   Physical Exam  Constitutional:       Appearance: She is obese. She is ill-appearing.   HENT:      Head: Atraumatic.      Mouth/Throat:      Mouth: Mucous membranes are dry.   Eyes:      Extraocular Movements: Extraocular movements intact.   Cardiovascular:      Rate and Rhythm: Normal rate and regular rhythm.      Pulses: Normal pulses.   Pulmonary:      Effort: Pulmonary effort is normal.      Breath sounds: Normal breath sounds. No wheezing, rhonchi or rales.   Abdominal:      Palpations: Abdomen is soft.   Musculoskeletal:         General: No swelling, tenderness or deformity.   Skin:     General: Skin is warm and dry.         Medications  Current Facility-Administered Medications   Medication Dose Route Frequency Provider Last Rate Last Admin    scopolamine (Transderm-Scop) patch 1 Patch  1 Patch Transdermal Q72HRS Jaida Monsivais M.D.   1 Patch at 03/09/24 1145    enoxaparin (Lovenox) inj 40 mg  40 mg Subcutaneous DAILY AT 1800 Jaida Monsivais M.D.   40 mg at 03/08/24 1722    Respiratory Therapy Consult   Nebulization Continuous RT Tracie Shearer D.O.        famotidine (Pepcid) tablet 20 mg  20 mg Enteral Tube Q12HRS Tracie Shearer D.O.        Or    famotidine (Pepcid) injection 20 mg  20 mg Intravenous Q12HRS SUNIL ColeyOAlesha   20 mg at 03/09/24 0511    MD Alert...ICU Electrolyte Replacement per Pharmacy   Other PHARMACY TO DOSE Tracie Shearer D.O.        lidocaine (Xylocaine) 1 % injection 2 mL  2 mL Tracheal Tube Q30 MIN PRN Tracie Shearer D.O.        fentaNYL (SUBLIMAZE) 50 mcg/mL in 50mL   Intravenous Continuous Tracie Shearer D.O. 1 mL/hr at 03/09/24 1154 50 mcg/hr at 03/09/24 1154    dexmedetomidine (PRECEDEX) 400 mcg/100mL NS  premix infusion  0-1.5 mcg/kg/hr (Ideal) Intravenous Continuous SUNIL ColeyOAlesha   Dose not Required at 03/07/24 2030    FLUoxetine (PROzac) capsule 10 mg  10 mg Enteral Tube DAILY Tracie Shearer D.O.        levothyroxine (Synthroid) tablet 200 mcg  200 mcg Enteral Tube AM ES Tracie Shearer D.O.        acetaminophen (Tylenol) tablet 650 mg  650 mg Enteral Tube Q6HRS PRN Tracie Shearer D.O.        ondansetron (Zofran) syringe/vial injection 4 mg  4 mg Intravenous Q4HRS PRN SUNIL ColeyOAlesha        ondansetron (Zofran ODT) dispertab 4 mg  4 mg Enteral Tube Q4HRS PRN SUNIL ColeyOAlesha        promethazine (Phenergan) tablet 12.5-25 mg  12.5-25 mg Enteral Tube Q4HRS PRN SUNIL ColeyOAlesha        promethazine (Phenergan) suppository 12.5-25 mg  12.5-25 mg Rectal Q4HRS PRN SUNIL ColeyO.        prochlorperazine (Compazine) injection 5-10 mg  5-10 mg Intravenous Q4HRS PRN Tracie Shearer D.O.        propofol (DIPRIVAN) injection  0-80 mcg/kg/min (Ideal) Intravenous Continuous RAMESH Coley.OAlesha 19.2 mL/hr at 03/09/24 1115 50 mcg/kg/min at 03/09/24 1115    dexamethasone (Decadron) injection 4 mg  4 mg Intravenous Q6HRS RAMESH Coley.O.   4 mg at 03/09/24 1147       Fluids    Intake/Output Summary (Last 24 hours) at 3/9/2024 1215  Last data filed at 3/9/2024 1059  Gross per 24 hour   Intake 799.73 ml   Output 1755 ml   Net -955.27 ml       Laboratory  Recent Labs     03/07/24  2120 03/08/24  0454 03/08/24  1353 03/09/24  0448   ISTATAPH 7.341* 7.386* 7.376* 7.419   ISTATAPCO2 47.0* 39.2* 41.2* 36.6   ISTATAPO2 69 128* 84 83   ISTATATCO2 27 25 25 25   JQZGXIB4TQP 92* 99 96 96   ISTATARTHCO3 25.4* 23.6 24.1 23.7   ISTATARTBE -1 -1 -1 -1   ISTATTEMP 97.0 F 97.7 F see below 98.4 F   ISTATFIO2 100 100 90 50   ISTATSPEC Arterial Arterial Arterial Arterial   ISTATAPHTC 7.353* 7.394*  --  7.420   [id number] 65 125*  --  83         Recent Labs      03/07/24 1452 03/08/24 0342 03/09/24  0402   SODIUM 140 138 137   POTASSIUM 3.6 3.8 3.8   CHLORIDE 101 104 104   CO2 26 25 24   BUN 16 16 27*   CREATININE 0.99 1.14 1.10   MAGNESIUM  --  1.9 2.6*   PHOSPHORUS  --  4.3 3.4   CALCIUM 9.3 8.7 8.8     Recent Labs     03/07/24 1452 03/08/24 0342 03/09/24  0402   ALTSGPT 21  --   --    ASTSGOT 19  --   --    ALKPHOSPHAT 159*  --   --    TBILIRUBIN 0.6  --   --    LIPASE 41  --   --    GLUCOSE 97 186* 169*     Recent Labs     03/07/24 1452 03/08/24 0342 03/09/24  0402   WBC 21.7* 15.2* 17.3*   NEUTSPOLYS 69.80 88.80* 84.70*   LYMPHOCYTES 21.30* 8.40* 9.20*   MONOCYTES 7.70 2.00 5.30   EOSINOPHILS 0.10 0.00 0.10   BASOPHILS 0.50 0.30 0.10   ASTSGOT 19  --   --    ALTSGPT 21  --   --    ALKPHOSPHAT 159*  --   --    TBILIRUBIN 0.6  --   --      Recent Labs     03/07/24 1452 03/08/24 0342 03/09/24  0402   RBC 4.75 4.11* 3.99*   HEMOGLOBIN 14.7 12.9 12.2   HEMATOCRIT 43.1 37.9 36.8*   PLATELETCT 462* 380 391       Imaging  X-Ray:  I have personally reviewed the images and compared with prior images.    CTA and CT neck reviewed  CT Neck 3/8/24:  IMPRESSION:     1.  Airway appears markedly narrowed at the oropharynx related to an enlarged glottis. No focal glottal mass seen.  2.  Left sphenoid sinus disease.  3.  Trace right mastoid fluid.      Assessment/Plan  #Acute hypoxic respiratory failure, likely due to pulmonary edema, on ventilator  Plan:  - Continue ICU care  - Lasix 40 mg IV   - Follow-up echocardiogram stat  - Daily SAT/SBT per protocol  - PRN ABG  - Elevate HOB to 30 degrees  - Chlorhexidine rinse to decrease VAP incidence  - Maintain O2 saturation > 90%   - Tube placement verified  - Pepcid for GI prophylaxis  - Sedation with propofol for RASS +1 to -1  - Analgesia with Fentanyl for CPOT < 2  - Restraints to prevent self extubation  - Utilizing lung protective ventilation with tidal volume 6ml/kg  - Monitoring peak and plateau pressures     #Difficult airway  due to airway edema and enlarged glottis.  - Continue dexamethasone 4 mg q6 hours   - + air leak today but unable to visualize glottis with glidescope at bedside.  Patient with scant pulmonary secretions but pooling of oral secretions in the oropharynx.  Required rocuronium to relax jaw for glidescope exam, minimal opening even with paralytics.  Copious redundant tissue in the oral and oropharyngeal cavities.    - Discussed extubation Dr. Ordonez from anesthesia who also spoke with on-call surgeon.  Neither feel this is a case that should be extubated in the ICU or at [facility] even in OR due to the lack of ENT backup and the high risk of airway compromise should extubation fail.    - CT neck noted enlarged glottis and markedly narrowed oropharynx post steroid administration  - Add scopolamine patch to dry up oral secretions in preparation for eventual extubation  - CTA without significant abnormalities  - Biofire  - Tube is hubbed to keep 2cm above delroy  - Close monitoring of tube placement  - Keep sedated to assure no self extubation     #Acute appendicitis, s/p lap appendectomy on 3/7/2024  Plan:  - fentanyl for pain  - PT/OT when awake     #Hypothryoidism s/p total thyroidectomy 20 years ago  - Continue home levothyroxine     #Hypertension, currently normotensive  - Hold home meds     #Hyperlipidemia   - Restart statin     #Hx of MI, no stents  #Hx of HFrEF, last echo 2019, New Echo with EF 55%  - Echo with aneurysmal apex, EF 55%  Plan:  - ASA     #LIDYA, compliant with CPAP at home   Plan:  - continue CPAP use after extubation     #Elevated alkaline phosphatase, chronic   Outpatient work up     VTE:  Lovenox  Ulcer: H2 Antagonist  Lines: Alvares Catheter  Ongoing indication addressed    I have performed a physical exam and reviewed and updated ROS and Plan today (3/9/2024). In review of yesterday's note (3/8/2024), there are no changes except as documented above.     Discussed patient condition and risk of  morbidity and/or mortality with RN, RT, and Pharmacy  The patient remains critically ill.  Critical care time = 90 minutes in directly providing and coordinating critical care and extensive data review.  No time overlap and excludes procedures.    Jaida Monsivais MD RD  Pulmonary and Critical Care    Available on [employer]

## 2024-03-09 NOTE — PROGRESS NOTES
"Med rec completed by [employer] [facility] tech at [facility] on 3/7/2024, prior to transfer:    \"Med Rec completed per patient with med list (returned)   Allergies reviewed  No ORAL antibiotics in last 30 days\"  "

## 2024-03-09 NOTE — PROCEDURES
Intubation    Date/Time: 3/9/2024 2:25 PM    Performed by: Anirudh Siddiqui M.D.  Authorized by: Anirudh Siddiqui M.D.    Consent:     Consent obtained:  Emergent situation    Alternatives discussed:  No treatment  Pre-procedure details:     Patient status:  Unresponsive    Pretreatment meds: Etomidate.    Paralytics:  Rocuronium  Procedure details:     Preoxygenation: She is currently ventilated with 6.5 ET.    CPR in progress: no      Intubation method:  Oral    Oral intubation technique:  Video-assisted    Laryngoscope type:  GlideScope    Laryngoscope blade:  Mac 3    Cormack-Lehane Classification:  Grade 1    Tube size (mm):  6.5    Tube type:  Cuffed    Number of attempts:  1  Placement assessment:     ETT to teeth:  24    Tube secured with:  ETT centeno    Breath sounds:  Equal    Placement verification: CXR verification      CXR findings:  ETT in proper place  Post-procedure details:     Patient tolerance of procedure:  Tolerated well, no immediate complications  Comments:      I evaluate patient airway in good sniffing position and etomidate 20mg and 100mg rocuronium IV. I then placed a mac 3 glidescope slowly down her tongue with a good grade 1 100% view of vocal cords with no significant edema noted on vocal cords.

## 2024-03-09 NOTE — PROCEDURES
Bronchoscopy Procedure Note      Bronchoscopy done under emergency situation for tube advancement in the setting of difficulty airway.     Findings:  ETT in trachea at about 6.5cm above the delroy.  Advanced to 2cm above the delroy.  Hubbed at the mouth.     The patient tolerated the procedure well.    Jaida Monsivais MD RD  Pulmonary and Critical Care    Available on [facility]

## 2024-03-09 NOTE — PROGRESS NOTES
12 hour chart check complete.    Monitor summary:    Rhythm: SR with BBB    Heart Rate: 73-82    Ectopy: o-rPVC    Measurements: 0.16/0.10/0.44

## 2024-03-09 NOTE — ASSESSMENT & PLAN NOTE
S/p Laparoscopic cecectomy with mass like on appendix base and torsion of cecum  By Dr Zamudio 3/7  General surgery will continue follow

## 2024-03-09 NOTE — ASSESSMENT & PLAN NOTE
Patient was a difficult airway by anesthesia requiring multiple attempts and fiberoptically intubated with 6.5 ET  CT neck reviewed  + Cuff leak with 6.5 ET   S/p steroids  I have evaluated her airway with DL  this afternoon 3/9 I have a grade I view with a mac 3 blade, no significant edema noted.   Plan to extubate with difficult airway equipment at bedside     3/10 extubated without difficult or stridor

## 2024-03-09 NOTE — PROGRESS NOTES
[facility] INTENSIVIST DIRECT ADMISSION REPORT    Transferring facility: [facility]  Transferring physician: Dr Alanna Nieto  Chief complaint: POD #2 after laparoscopic appendectomy with difficult airway, transfer request for high risk extubation  Pertinent history & patient course: 63-year-old female with history of CAD, MI, hypertension, hyperlipidemia, admitted 3/7 to [facility] with acute appendicitis.  She underwent laparoscopic appendectomy.  In the OR prior to the case she was very difficult airway requiring several attempts at intubation by multiple anesthesiologist and eventually intubated with fiberoptic and 6.5 ET tube.  She has been on dexamethasone 4 mg every 6 hours and does have an air leak today.  CTA without significant abnormalities.  Transfer request for high risk extubation attempt.  Pertinent imaging & lab results: Reviewed  Code Status: Full CODE STATUS per transferring provider  Further work up or recommendations prior to transfer: N/A  Consultants called prior to transfer and pertinent input from consultants: N/A  Patient accepted for transfer: Of course  Consultants to be called upon arrival: On-call intensivist  Floor requested: ICU  ADT order placed for accepted patient: Yes    Please inform the Intensivist upon assignment of an ICU bed and then again on arrival of the patient.

## 2024-03-09 NOTE — PROGRESS NOTES
12-hour chart check complete.    Monitor Summary  Rhythm: SB  Rate: 55-59  Ectopy: rPVC, rPAC, rTrigem  Measurements: 0.14/0.10/0.42

## 2024-03-09 NOTE — DISCHARGE PLANNING
Case Management Discharge Planning    Admission Date: 3/7/2024  GMLOS: 9.8  ALOS: 2    6-Clicks ADL Score:    6-Clicks Mobility Score:        Anticipated Discharge Dispo: Discharge Disposition: D/T to short term gen hosp for  care (02)     DME Needed: No    Action(s) Taken: Transfer Packet Completed    Escalations Completed: None    Medically Clear: Yes    Course of Action  **1155  Patient transferring to [facility]. PCS form faxed to [facility].     **1202  Phone call update provided to patient's daughter, Dulce. Consent obtained for transfer.     **1219  Transfer packet completed and handed to bedside RN. Transport time: 1300, per [facility].

## 2024-03-09 NOTE — PROGRESS NOTES
Pt arrived at 1330 to T912 via [facility], from [facility] as a Direct Admit.  Monitored, intubated, vented, currently on propfol@50 mcg/kg/min, and croft in place.  Pt followYRN.    Vital Signs:  HR: 56  BP: 119/56  RR:19  SpO2:94%, cmv +8/40%   Temp: 97.1F  Weight: 109.3kg    PIV x3    Belongings:  No belongings transferred with pt.    4 Eyes Skin Assessment Completed by Evette BECKER RN and Angela KRAUSE RN.    Head WDL  Ears WDL  Nose WDL  Mouth WDL  Neck Redness  Breast/Chest WDL  Shoulder Blades WDL  Spine WDL  (R) Arm/Elbow/Hand Abrasion abrasion right index finger, abrasion (healing) to right elbow  (L) Arm/Elbow/Hand WDL dryness to knuckles  Abdomen Bruising and Incision incisions x 3; bruising to umbilicus - demarcared  Groin Redness and Excoriation  Scrotum/Coccyx/Buttocks Redness and Blanching  (R) Leg WDL  (L) Leg WDL  (R) Heel/Foot/Toe WDL  (L) Heel/Foot/Toe WDL          Devices In Places ECG, Blood Pressure Cuff, Pulse Ox, Croft, SCD's, and ET Tube      Interventions In Place Sacral Mepilex, Heel Float Boots, TAP System, Pillows, Q2 Turns, Low Air Loss Mattress, Barrier Cream, ZFlo Pillow, and Heels Loaded W/Pillows    Possible Skin Injury No    Pictures Uploaded Into Epic N/A  Wound Consult Placed N/A  RN Wound Prevention Protocol Ordered Yes

## 2024-03-09 NOTE — CARE PLAN
Problem: Ventilation  Goal: Ability to achieve and maintain unassisted ventilation or tolerate decreased levels of ventilator support  Description: Target End Date:  3 days     Document on Vent flowsheet    1.  Support and monitor invasive and noninvasive mechanical ventilation  2.  Monitor ventilator weaning response  3.  Perform ventilator associated pneumonia prevention interventions  4.  Manage ventilation therapy by monitoring diagnostic test results  Outcome: Progressing

## 2024-03-09 NOTE — PROGRESS NOTES
Surgery  POD#2  Plan to extubate today  CT reviewed, large glottis  ENT consult recommended, phone consult since not available at [facility]  WBC mildly concerning however likely due to steroids  Surgery will follow

## 2024-03-09 NOTE — PROGRESS NOTES
Critical Care Progress Note    Date of admission  3/9/2024    Chief Complaint  63 y.o. female admitted 3/9/2024 with past medical history of thyroid disease, MI, HLD and HTN here with acute appendicitis.  She was admitted to ICU due to difficult intubation in the OR requiring several attempts and two anesthesiologists.  She was eventually intubated with fiberoptics and a 6.5 tube. Per report her airway was very reactive with immediate swelling after first attempt at intubation.  There appeared to also be subglottic tissue/swelling which caused the fiberoptic scope to bounce out of the airway during attempts at bronchoscopic intubation.  The patient was UNABLE to be bag mask ventilated during these attempts.  Her neck is short with significant redundant tissue making an emergent cricothyrotomy likely to be very difficult.     Hospital Course  3/9 transferred from [facility] to [facility] for difficult airway and no backup    Interval Problem Update  Reviewed last 24 hour events:    Per report with cuff leak but no backup    DL for evaluation of airway with grade 1 view will allow paralysis to wear off and plan to extubate.     She still failed SBT will need to allow more time for medication to wear off prior extubation with it late in the day    Updated daughters at bedside plan of care    Review of Systems  Review of Systems   Unable to perform ROS: Intubated        Vital Signs for last 24 hours   Temp:  [36.2 °C (97.1 °F)-37 °C (98.6 °F)] 36.2 °C (97.1 °F)  Pulse:  [52-82] 70  Resp:  [19-37] 20  BP: ()/(40-73) 130/60  SpO2:  [93 %-98 %] 95 %    Hemodynamic parameters for last 24 hours       Respiratory Information for the last 24 hours  Vent Mode: APVCMV  Rate (breaths/min): 20  Vt Target (mL): 380  PEEP/CPAP: 8  MAP: 13  Control VTE (exp VT): 399    Physical Exam   Physical Exam  Vitals and nursing note reviewed.   Constitutional:       General: She is not in acute distress.     Appearance: She is obese. She is not  ill-appearing.      Comments: Female on ventilator   HENT:      Mouth/Throat:      Comments: ET in place  Eyes:      Pupils: Pupils are equal, round, and reactive to light.   Neck:      Comments: Short neck no adenopathy  Cardiovascular:      Rate and Rhythm: Normal rate.      Heart sounds: No murmur heard.     No friction rub.   Pulmonary:      Effort: No respiratory distress.      Breath sounds: No stridor. No wheezing or rhonchi.      Comments: Mechanical breath sound bilateral.   Abdominal:      General: There is no distension.      Palpations: There is no mass.      Tenderness: There is no abdominal tenderness. There is no guarding or rebound.      Hernia: No hernia is present.      Comments: Surgical site look good, soft abdomen   Musculoskeletal:         General: No swelling or tenderness.   Skin:     Coloration: Skin is not jaundiced or pale.   Neurological:      Comments: She is sedated on ventilator   Psychiatric:      Comments: Unable to determine         Medications  Current Facility-Administered Medications   Medication Dose Route Frequency Provider Last Rate Last Admin    enoxaparin (Lovenox) inj 40 mg  40 mg Subcutaneous DAILY AT 1800 Jaida Monsivais M.D.   40 mg at 03/09/24 1716    [START ON 3/10/2024] FLUoxetine (PROzac) capsule 10 mg  10 mg Enteral Tube DAILY Jaida Monsivais M.D.        [START ON 3/10/2024] levothyroxine (Synthroid) tablet 200 mcg  200 mcg Enteral Tube AM ES Jaida Monsivais M.D.        famotidine (Pepcid) tablet 20 mg  20 mg Enteral Tube Q12HRS Jaida Monsivais M.D.        Or    famotidine (Pepcid) injection 20 mg  20 mg Intravenous Q12HRS Jaida Monsivais M.D.   20 mg at 03/09/24 1716    MD Alert...ICU Electrolyte Replacement per Pharmacy   Other PHARMACY TO DOSE Jaida Monsivais M.D.        lidocaine (Xylocaine) 1 % injection 2 mL  2 mL Tracheal Tube Q30 MIN PRN Jaida Monsviais M.D.        Respiratory Therapy Consult   Nebulization Continuous RT Jaida Monsivais M.D.         propofol (DIPRIVAN) injection  0-80 mcg/kg/min (Ideal) Intravenous Continuous Jaida Monsivais M.D. 5.8 mL/hr at 03/09/24 1653 15 mcg/kg/min at 03/09/24 1653    acetaminophen (Tylenol) tablet 650 mg  650 mg Enteral Tube Q6HRS PRN Jaida Monsivais M.D.        ondansetron (Zofran ODT) dispertab 4 mg  4 mg Enteral Tube Q4HRS PRN Jaida Monsivais M.D.        ondansetron (Zofran) syringe/vial injection 4 mg  4 mg Intravenous Q4HRS PRN Jaida Monsivais M.D.   4 mg at 03/09/24 1641    prochlorperazine (Compazine) injection 5-10 mg  5-10 mg Intravenous Q4HRS PRN Jaida Monsivais M.D.   10 mg at 03/09/24 1649    promethazine (Phenergan) suppository 12.5-25 mg  12.5-25 mg Rectal Q4HRS PRN Jaida Monsivais M.D.        promethazine (Phenergan) tablet 12.5-25 mg  12.5-25 mg Enteral Tube Q4HRS PRN Jaida Monsivais M.D.        fentaNYL (Sublimaze) injection 25 mcg  25 mcg Intravenous Q HOUR PRN Anirudh Siddiqui M.D.   25 mcg at 03/09/24 1645       Fluids    Intake/Output Summary (Last 24 hours) at 3/9/2024 1756  Last data filed at 3/9/2024 1700  Gross per 24 hour   Intake 11.1 ml   Output 435 ml   Net -423.9 ml       Laboratory  Recent Labs     03/07/24  2120 03/08/24  0454 03/08/24  1353 03/09/24  0448   ISTATAPH 7.341* 7.386* 7.376* 7.419   ISTATAPCO2 47.0* 39.2* 41.2* 36.6   ISTATAPO2 69 128* 84 83   ISTATATCO2 27 25 25 25   WBVQFSS5XWP 92* 99 96 96   ISTATARTHCO3 25.4* 23.6 24.1 23.7   ISTATARTBE -1 -1 -1 -1   ISTATTEMP 97.0 F 97.7 F see below 98.4 F   ISTATFIO2 100 100 90 50   ISTATSPEC Arterial Arterial Arterial Arterial   ISTATAPHTC 7.353* 7.394*  --  7.420   [id number] 65 125*  --  83         Recent Labs     03/07/24  1452 03/08/24  0342 03/09/24  0402   SODIUM 140 138 137   POTASSIUM 3.6 3.8 3.8   CHLORIDE 101 104 104   CO2 26 25 24   BUN 16 16 27*   CREATININE 0.99 1.14 1.10   MAGNESIUM  --  1.9 2.6*   PHOSPHORUS  --  4.3 3.4   CALCIUM 9.3 8.7 8.8     Recent Labs     03/07/24  1452 03/08/24  0342  03/09/24  0402   ALTSGPT 21  --   --    ASTSGOT 19  --   --    ALKPHOSPHAT 159*  --   --    TBILIRUBIN 0.6  --   --    LIPASE 41  --   --    GLUCOSE 97 186* 169*     Recent Labs     03/07/24  1452 03/08/24 0342 03/09/24  0402   WBC 21.7* 15.2* 17.3*   NEUTSPOLYS 69.80 88.80* 84.70*   LYMPHOCYTES 21.30* 8.40* 9.20*   MONOCYTES 7.70 2.00 5.30   EOSINOPHILS 0.10 0.00 0.10   BASOPHILS 0.50 0.30 0.10   ASTSGOT 19  --   --    ALTSGPT 21  --   --    ALKPHOSPHAT 159*  --   --    TBILIRUBIN 0.6  --   --      Recent Labs     03/07/24  1452 03/08/24 0342 03/09/24  0402   RBC 4.75 4.11* 3.99*   HEMOGLOBIN 14.7 12.9 12.2   HEMATOCRIT 43.1 37.9 36.8*   PLATELETCT 462* 380 391       Imaging  X-Ray:  I have personally reviewed the images and compared with prior images.  Echo:   Reviewed    Assessment/Plan  * Acute appendicitis- (present on admission)  Assessment & Plan  S/p Laparoscopic cecectomy with mass like on appendix base and torsion of cecum  By Dr Zamudio 3/7  General surgery will continue follow    Difficult airway for intubation  Assessment & Plan  Patient was a difficult airway by anesthesia requiring multiple attempts and fiberoptically intubated with 6.5 ET  CT neck reviewed  + Cuff leak with 6.5 ET   S/p steroids    I have evaluated her airway with RSI this afternoon I have a grade I view with a mac 3 blade, no significant edema noted.   Plan to extubate with difficult airway equipment at bedside     Respiratory failure with hypoxia (HCC)  Assessment & Plan  Intubated date: Was a difficult airway intubation in OR with multiple anesthesia 3/7  Goal saturation > 88%  Monitor ventilator waveforms and titrate flow/peep and volumes according.   Lung protective ventilation strategy w/ A-F bundle  CXR: monitor lung volumes and tube/line placement  VAP bundle prevention, oral care, post pyloric feeding  Head of bed > 30 degree  GI prophylaxis  Daily awakening and SBT trials unless contraindicated  Monitor for  liberation  Respiratory treatments: prn    Patient failed SBT trial today will re-attempt tomorrow       LIDYA (obstructive sleep apnea)- (present on admission)  Assessment & Plan  Talking with daughter sounds like a pretty significant hx of LIDYA  They will bring in her machine to understand her setting    Coronary artery disease due to calcified coronary lesion: History of small diagonal occlusion without other significant disease.- (present on admission)  Assessment & Plan  Hx of continue statin, aspirin  Echo with normal EF 55%    History of total thyroidectomy- (present on admission)  Assessment & Plan  Hx of    Hypothyroid- (present on admission)  Assessment & Plan  Hx of continue synthyroid    Check TSH and free T4    Will give a one time IV dose tonight    Benign essential HTN- (present on admission)  Assessment & Plan  Hx of         VTE:  Lovenox  Ulcer: H2 Antagonist  Lines: Alvares Catheter  Ongoing indication addressed    I have performed a physical exam and reviewed and updated ROS and Plan today (3/9/2024). In review of yesterday's note (3/8/2024), there are no changes except as documented above.     Discussed patient condition and risk of morbidity and/or mortality with RN, RT, Pharmacy, Charge nurse / hot rounds, and Patient    The patient remains critically ill on ventilator with a difficult airway with titration of sedation and ventilation.  Critical care time = 92 minutes in directly providing and coordinating critical care and extensive data review.  No time overlap and excludes procedures.

## 2024-03-09 NOTE — CARE PLAN
The patient is Watcher - Medium risk of patient condition declining or worsening    Shift Goals  Clinical Goals: SpO2 >90%, RASS -1 to +1, monitor VS, SAT/SBT  Patient Goals: MANUEL  Family Goals: MANUEL    Progress made toward(s) clinical / shift goals:    Problem: Skin Integrity  Goal: Skin integrity is maintained or improved  Outcome: Progressing     Problem: Safety - Medical Restraint  Goal: Remains free of injury from restraints (Restraint for Interference with Medical Device)  Outcome: Progressing     Problem: Pain - Standard  Goal: Alleviation of pain or a reduction in pain to the patient’s comfort goal  Outcome: Progressing     Problem: Psychosocial  Goal: Patient's level of anxiety will decrease  Outcome: Progressing     Problem: Hemodynamics  Goal: Patient's hemodynamics, fluid balance and neurologic status will be stable or improve  Outcome: Progressing     Problem: Respiratory  Goal: Patient will achieve/maintain optimum respiratory ventilation and gas exchange  Outcome: Progressing     Problem: Mechanical Ventilation  Goal: Safe management of artificial airway and ventilation  Outcome: Progressing       Patient is not progressing towards the following goals:

## 2024-03-09 NOTE — CARE PLAN
The patient is Unstable - High likelihood or risk of patient condition declining or worsening    Shift Goals  Clinical Goals: SpO2 >90%, RASS -1 to +1, monitor VS, SAT/SBT  Patient Goals: MANUEL  Family Goals: MANUEL    Progress made toward(s) clinical / shift goals:  Will attempt SAT/SBT per MD order    Patient is not progressing towards the following goals:      Problem: Mechanical Ventilation  Goal: Successful weaning off mechanical ventilator, spontaneously maintains adequate gas exchange  Outcome: Not Progressing  Goal: Patient will be able to express needs and understand communication  Outcome: Not Progressing     Problem: Nutrition  Goal: Patient's nutritional and fluid intake will be adequate or improve  Outcome: Not Progressing  Goal: Enteral nutrition will be maintained or improve  Outcome: Not Progressing  Goal: Enteral nutrition will be maintained or improve  Outcome: Not Progressing     Problem: Bowel Elimination  Goal: Establish and maintain regular bowel function  Outcome: Not Progressing

## 2024-03-10 ENCOUNTER — APPOINTMENT (OUTPATIENT)
Dept: RADIOLOGY | Facility: MEDICAL CENTER | Age: 64
End: 2024-03-10
Attending: INTERNAL MEDICINE
Payer: COMMERCIAL

## 2024-03-10 PROBLEM — K37 APPENDICITIS: Status: ACTIVE | Noted: 2024-03-10

## 2024-03-10 LAB
ANION GAP SERPL CALC-SCNC: 13 MMOL/L (ref 7–16)
BASOPHILS # BLD AUTO: 0.1 % (ref 0–1.8)
BASOPHILS # BLD: 0.01 K/UL (ref 0–0.12)
BUN SERPL-MCNC: 33 MG/DL (ref 8–22)
CALCIUM SERPL-MCNC: 8.6 MG/DL (ref 8.5–10.5)
CHLORIDE SERPL-SCNC: 104 MMOL/L (ref 96–112)
CO2 SERPL-SCNC: 23 MMOL/L (ref 20–33)
CREAT SERPL-MCNC: 0.88 MG/DL (ref 0.5–1.4)
EOSINOPHIL # BLD AUTO: 0 K/UL (ref 0–0.51)
EOSINOPHIL NFR BLD: 0 % (ref 0–6.9)
ERYTHROCYTE [DISTWIDTH] IN BLOOD BY AUTOMATED COUNT: 43.5 FL (ref 35.9–50)
GFR SERPLBLD CREATININE-BSD FMLA CKD-EPI: 74 ML/MIN/1.73 M 2
GLUCOSE SERPL-MCNC: 153 MG/DL (ref 65–99)
HCT VFR BLD AUTO: 37.5 % (ref 37–47)
HGB BLD-MCNC: 12.1 G/DL (ref 12–16)
IMM GRANULOCYTES # BLD AUTO: 0.07 K/UL (ref 0–0.11)
IMM GRANULOCYTES NFR BLD AUTO: 0.4 % (ref 0–0.9)
LYMPHOCYTES # BLD AUTO: 1.73 K/UL (ref 1–4.8)
LYMPHOCYTES NFR BLD: 11 % (ref 22–41)
MAGNESIUM SERPL-MCNC: 2.4 MG/DL (ref 1.5–2.5)
MCH RBC QN AUTO: 29.8 PG (ref 27–33)
MCHC RBC AUTO-ENTMCNC: 32.3 G/DL (ref 32.2–35.5)
MCV RBC AUTO: 92.4 FL (ref 81.4–97.8)
MONOCYTES # BLD AUTO: 1.02 K/UL (ref 0–0.85)
MONOCYTES NFR BLD AUTO: 6.5 % (ref 0–13.4)
NEUTROPHILS # BLD AUTO: 12.92 K/UL (ref 1.82–7.42)
NEUTROPHILS NFR BLD: 82 % (ref 44–72)
NRBC # BLD AUTO: 0 K/UL
NRBC BLD-RTO: 0 /100 WBC (ref 0–0.2)
PHOSPHATE SERPL-MCNC: 2.9 MG/DL (ref 2.5–4.5)
PLATELET # BLD AUTO: 428 K/UL (ref 164–446)
PMV BLD AUTO: 10.3 FL (ref 9–12.9)
POTASSIUM SERPL-SCNC: 4.3 MMOL/L (ref 3.6–5.5)
RBC # BLD AUTO: 4.06 M/UL (ref 4.2–5.4)
SODIUM SERPL-SCNC: 140 MMOL/L (ref 135–145)
WBC # BLD AUTO: 15.8 K/UL (ref 4.8–10.8)

## 2024-03-10 PROCEDURE — 700111 HCHG RX REV CODE 636 W/ 250 OVERRIDE (IP): Mod: JZ | Performed by: INTERNAL MEDICINE

## 2024-03-10 PROCEDURE — 80048 BASIC METABOLIC PNL TOTAL CA: CPT

## 2024-03-10 PROCEDURE — 83735 ASSAY OF MAGNESIUM: CPT

## 2024-03-10 PROCEDURE — 99233 SBSQ HOSP IP/OBS HIGH 50: CPT | Performed by: INTERNAL MEDICINE

## 2024-03-10 PROCEDURE — 84100 ASSAY OF PHOSPHORUS: CPT

## 2024-03-10 PROCEDURE — 5A1935Z RESPIRATORY VENTILATION, LESS THAN 24 CONSECUTIVE HOURS: ICD-10-PCS | Performed by: INTERNAL MEDICINE

## 2024-03-10 PROCEDURE — A9270 NON-COVERED ITEM OR SERVICE: HCPCS | Performed by: INTERNAL MEDICINE

## 2024-03-10 PROCEDURE — 0BH17EZ INSERTION OF ENDOTRACHEAL AIRWAY INTO TRACHEA, VIA NATURAL OR ARTIFICIAL OPENING: ICD-10-PCS | Performed by: INTERNAL MEDICINE

## 2024-03-10 PROCEDURE — 700105 HCHG RX REV CODE 258: Performed by: INTERNAL MEDICINE

## 2024-03-10 PROCEDURE — 99222 1ST HOSP IP/OBS MODERATE 55: CPT | Performed by: HOSPITALIST

## 2024-03-10 PROCEDURE — 99999 PR NO CHARGE: CPT | Performed by: SURGERY

## 2024-03-10 PROCEDURE — 85025 COMPLETE CBC W/AUTO DIFF WBC: CPT

## 2024-03-10 PROCEDURE — 94799 UNLISTED PULMONARY SVC/PX: CPT

## 2024-03-10 PROCEDURE — 700102 HCHG RX REV CODE 250 W/ 637 OVERRIDE(OP): Performed by: INTERNAL MEDICINE

## 2024-03-10 PROCEDURE — 71045 X-RAY EXAM CHEST 1 VIEW: CPT

## 2024-03-10 PROCEDURE — 770001 HCHG ROOM/CARE - MED/SURG/GYN PRIV*

## 2024-03-10 PROCEDURE — 94003 VENT MGMT INPAT SUBQ DAY: CPT

## 2024-03-10 RX ORDER — SODIUM CHLORIDE, SODIUM LACTATE, POTASSIUM CHLORIDE, CALCIUM CHLORIDE 600; 310; 30; 20 MG/100ML; MG/100ML; MG/100ML; MG/100ML
INJECTION, SOLUTION INTRAVENOUS CONTINUOUS
Status: DISCONTINUED | OUTPATIENT
Start: 2024-03-10 | End: 2024-03-10

## 2024-03-10 RX ORDER — ASPIRIN 81 MG/1
81 TABLET, CHEWABLE ORAL DAILY
Status: DISCONTINUED | OUTPATIENT
Start: 2024-03-10 | End: 2024-03-11 | Stop reason: HOSPADM

## 2024-03-10 RX ORDER — ATORVASTATIN CALCIUM 40 MG/1
80 TABLET, FILM COATED ORAL EVERY EVENING
Status: DISCONTINUED | OUTPATIENT
Start: 2024-03-10 | End: 2024-03-11 | Stop reason: HOSPADM

## 2024-03-10 RX ORDER — LEVOTHYROXINE SODIUM 0.2 MG/1
200 TABLET ORAL
Status: DISCONTINUED | OUTPATIENT
Start: 2024-03-16 | End: 2024-03-10

## 2024-03-10 RX ORDER — FLUOXETINE 10 MG/1
10 CAPSULE ORAL DAILY
Status: DISCONTINUED | OUTPATIENT
Start: 2024-03-11 | End: 2024-03-11 | Stop reason: HOSPADM

## 2024-03-10 RX ORDER — ACETAMINOPHEN 325 MG/1
650 TABLET ORAL EVERY 6 HOURS PRN
Status: DISCONTINUED | OUTPATIENT
Start: 2024-03-10 | End: 2024-03-11 | Stop reason: HOSPADM

## 2024-03-10 RX ORDER — PROMETHAZINE HYDROCHLORIDE 25 MG/1
12.5-25 TABLET ORAL EVERY 4 HOURS PRN
Status: DISCONTINUED | OUTPATIENT
Start: 2024-03-10 | End: 2024-03-11 | Stop reason: HOSPADM

## 2024-03-10 RX ORDER — SODIUM CHLORIDE, SODIUM LACTATE, POTASSIUM CHLORIDE, AND CALCIUM CHLORIDE .6; .31; .03; .02 G/100ML; G/100ML; G/100ML; G/100ML
1000 INJECTION, SOLUTION INTRAVENOUS ONCE
Status: COMPLETED | OUTPATIENT
Start: 2024-03-10 | End: 2024-03-10

## 2024-03-10 RX ORDER — ONDANSETRON 4 MG/1
4 TABLET, ORALLY DISINTEGRATING ORAL EVERY 4 HOURS PRN
Status: DISCONTINUED | OUTPATIENT
Start: 2024-03-10 | End: 2024-03-11 | Stop reason: HOSPADM

## 2024-03-10 RX ADMIN — FENTANYL CITRATE 25 MCG: 50 INJECTION, SOLUTION INTRAMUSCULAR; INTRAVENOUS at 03:27

## 2024-03-10 RX ADMIN — ATORVASTATIN CALCIUM 80 MG: 40 TABLET, FILM COATED ORAL at 17:21

## 2024-03-10 RX ADMIN — SODIUM CHLORIDE, POTASSIUM CHLORIDE, SODIUM LACTATE AND CALCIUM CHLORIDE 1000 ML: 600; 310; 30; 20 INJECTION, SOLUTION INTRAVENOUS at 07:54

## 2024-03-10 RX ADMIN — SODIUM CHLORIDE, POTASSIUM CHLORIDE, SODIUM LACTATE AND CALCIUM CHLORIDE: 600; 310; 30; 20 INJECTION, SOLUTION INTRAVENOUS at 06:31

## 2024-03-10 RX ADMIN — FAMOTIDINE 20 MG: 10 INJECTION, SOLUTION INTRAVENOUS at 05:04

## 2024-03-10 RX ADMIN — ASPIRIN 81 MG 81 MG: 81 TABLET ORAL at 09:48

## 2024-03-10 RX ADMIN — ENOXAPARIN SODIUM 40 MG: 100 INJECTION SUBCUTANEOUS at 17:21

## 2024-03-10 ASSESSMENT — COPD QUESTIONNAIRES
DURING THE PAST 4 WEEKS HOW MUCH DID YOU FEEL SHORT OF BREATH: SOME OF THE TIME
COPD SCREENING SCORE: 3
DO YOU EVER COUGH UP ANY MUCUS OR PHLEGM?: NO/ONLY WITH OCCASIONAL COLDS OR INFECTIONS
HAVE YOU SMOKED AT LEAST 100 CIGARETTES IN YOUR ENTIRE LIFE: NO/DON'T KNOW

## 2024-03-10 ASSESSMENT — PATIENT HEALTH QUESTIONNAIRE - PHQ9
1. LITTLE INTEREST OR PLEASURE IN DOING THINGS: NOT AT ALL
2. FEELING DOWN, DEPRESSED, IRRITABLE, OR HOPELESS: NOT AT ALL
SUM OF ALL RESPONSES TO PHQ9 QUESTIONS 1 AND 2: 0

## 2024-03-10 ASSESSMENT — COGNITIVE AND FUNCTIONAL STATUS - GENERAL
SUGGESTED CMS G CODE MODIFIER MOBILITY: CH
SUGGESTED CMS G CODE MODIFIER DAILY ACTIVITY: CH
MOBILITY SCORE: 24
DAILY ACTIVITIY SCORE: 24

## 2024-03-10 ASSESSMENT — ENCOUNTER SYMPTOMS
DIZZINESS: 0
DIARRHEA: 0
BACK PAIN: 0
PALPITATIONS: 0
COUGH: 0
SHORTNESS OF BREATH: 0
HEADACHES: 0
VOMITING: 0
ABDOMINAL PAIN: 1
FEVER: 0
NAUSEA: 0
CHILLS: 0
LOSS OF CONSCIOUSNESS: 0
CONSTIPATION: 1
SORE THROAT: 1

## 2024-03-10 ASSESSMENT — LIFESTYLE VARIABLES
TOTAL SCORE: 0
HAVE YOU EVER FELT YOU SHOULD CUT DOWN ON YOUR DRINKING: NO
EVER FELT BAD OR GUILTY ABOUT YOUR DRINKING: NO
AVERAGE NUMBER OF DAYS PER WEEK YOU HAVE A DRINK CONTAINING ALCOHOL: 1
DOES PATIENT WANT TO STOP DRINKING: NO
EVER HAD A DRINK FIRST THING IN THE MORNING TO STEADY YOUR NERVES TO GET RID OF A HANGOVER: NO
ON A TYPICAL DAY WHEN YOU DRINK ALCOHOL HOW MANY DRINKS DO YOU HAVE: 1
TOTAL SCORE: 0
HOW MANY TIMES IN THE PAST YEAR HAVE YOU HAD 5 OR MORE DRINKS IN A DAY: 0
ALCOHOL_USE: YES
CONSUMPTION TOTAL: NEGATIVE
HAVE PEOPLE ANNOYED YOU BY CRITICIZING YOUR DRINKING: NO
TOTAL SCORE: 0

## 2024-03-10 ASSESSMENT — FIBROSIS 4 INDEX
FIB4 SCORE: 0.6
FIB4 SCORE: 0.6

## 2024-03-10 ASSESSMENT — PAIN DESCRIPTION - PAIN TYPE
TYPE: ACUTE PAIN

## 2024-03-10 NOTE — ASSESSMENT & PLAN NOTE
Intubated for OR  Still on 2 LNC  Pt with Hx of LIDYA; ?obesity hypoventilations syndrome  CXR benign  Mobilize  O2/RT protocols

## 2024-03-10 NOTE — CARE PLAN
Problem: Pain - Standard  Goal: Alleviation of pain or a reduction in pain to the patient’s comfort goal  Description: Target End Date:  Prior to discharge or change in level of care    Document on Vitals flowsheet    1.  Document pain using the appropriate pain scale per order or unit policy  2.  Educate and implement non-pharmacologic comfort measures (i.e. relaxation, distraction, massage, cold/heat therapy, etc.)  3.  Pain management medications as ordered  4.  Reassess pain after pain med administration per policy  5.  If opiods administered assess patient's response to pain medication is appropriate per POSS sedation scale  6.  Follow pain management plan developed in collaboration with patient and interdisciplinary team (including palliative care or pain specialists if applicable)  Outcome: Progressing     Problem: Fall Risk  Goal: Patient will remain free from falls  Description: Target End Date:  Prior to discharge or change in level of care    Document interventions on the Lozada Bandar Fall Risk Assessment    1.  Assess for fall risk factors  2.  Implement fall precautions  Outcome: Progressing     Problem: Safety - Medical Restraint  Goal: Remains free of injury from restraints (Restraint for Interference with Medical Device)  Description: INTERVENTIONS:  1. Determine that other, less restrictive measures have been tried or would not be effective before applying the restraint  2. Evaluate the patient's condition at the time of restraint application  3. Educate patient/family regarding the reason for restraint  4. Q2H: Monitor safety, psychosocial status, comfort, circulation, respiratory status, LOC, nutrition and hydration  Outcome: Progressing  Goal: Free from restraint(s) (Restraint for Interference with Medical Device)  Description: INTERVENTIONS:  1.  ONCE/SHIFT or MINIMUM Q12H: Assess and document the continuing need for restraints  2.  Q24H: Continued use of restraint requires LIP to perform face  to face examination and written order  3.  Identify and implement measures to help patient regain control  4.  Educate patient/family on discontinuation criteria   5.  Assess patient's understanding and retention of education provided  6.  Assess readiness for release & initiate progressive release per protocol  7.  Identify and document criteria for restraints  Outcome: Not Progressing   The patient is Watcher - Medium risk of patient condition declining or worsening    Shift Goals  Clinical Goals: Maintain airway and stable RASS  Patient Goals: MANUEL  Family Goals: MANUEL    Progress made toward(s) clinical / shift goals:  pain management per MAR, fall precautions in place, pain management per MAR, maintain airway    Patient is not progressing towards the following goals:      Problem: Safety - Medical Restraint  Goal: Free from restraint(s) (Restraint for Interference with Medical Device)  Description: INTERVENTIONS:  1.  ONCE/SHIFT or MINIMUM Q12H: Assess and document the continuing need for restraints  2.  Q24H: Continued use of restraint requires LIP to perform face to face examination and written order  3.  Identify and implement measures to help patient regain control  4.  Educate patient/family on discontinuation criteria   5.  Assess patient's understanding and retention of education provided  6.  Assess readiness for release & initiate progressive release per protocol  7.  Identify and document criteria for restraints  Outcome: Not Progressing

## 2024-03-10 NOTE — CARE PLAN
The patient is Stable - Low risk of patient condition declining or worsening    Shift Goals  Clinical Goals: Airway maintenance, pain control  Patient Goals: Rest  Family Goals: Updates      Problem: Knowledge Deficit - Standard  Goal: Patient and family/care givers will demonstrate understanding of plan of care, disease process/condition, diagnostic tests and medications  3/10/2024 1323 by Jacek Galarza R.N.  Outcome: Progressing     Problem: Pain - Standard  Goal: Alleviation of pain or a reduction in pain to the patient’s comfort goal  3/10/2024 1323 by ADRIEL Alfredo.N.  Outcome: Progressing    Problem: Skin Integrity  Goal: Skin integrity is maintained or improved  3/10/2024 1323 by NELIDA AlfredoN.  Outcome: Progressing     Problem: Fall Risk  Goal: Patient will remain free from falls  3/10/2024 1323 by Jacek Galarza R.N.  Outcome: Progressing    Problem: Respiratory  Goal: Patient will achieve/maintain optimum respiratory ventilation and gas exchange  Outcome: Progressing

## 2024-03-10 NOTE — ASSESSMENT & PLAN NOTE
Talking with daughter sounds like a pretty significant hx of LIDYA  They will bring in her machine to understand her setting

## 2024-03-10 NOTE — CARE PLAN
Problem: Ventilation  Goal: Ability to achieve and maintain unassisted ventilation or tolerate decreased levels of ventilator support  Description: Target End Date:  4 days     Document on Vent flowsheet    1.  Support and monitor invasive and noninvasive mechanical ventilation  2.  Monitor ventilator weaning response  3.  Perform ventilator associated pneumonia prevention interventions  4.  Manage ventilation therapy by monitoring diagnostic test results  Outcome: Progressing     Ventilator Daily Summary    Vent Day # 2  Airway: 6.5@26    Ventilator settings: APVcmv/20/380/+8/40%  Weaning trials: none  Respiratory Procedures: none    Plan: Continue current ventilator settings and wean mechanical ventilation as tolerated per physician orders.

## 2024-03-10 NOTE — PROGRESS NOTES
Critical Care Progress Note    Date of admission  3/9/2024    Chief Complaint  63 y.o. female admitted 3/9/2024 with past medical history of thyroid disease, MI, HLD and HTN here with acute appendicitis.  She was admitted to ICU due to difficult intubation in the OR requiring several attempts and two anesthesiologists.  She was eventually intubated with fiberoptics and a 6.5 tube. Per report her airway was very reactive with immediate swelling after first attempt at intubation.  There appeared to also be subglottic tissue/swelling which caused the fiberoptic scope to bounce out of the airway during attempts at bronchoscopic intubation.  The patient was UNABLE to be bag mask ventilated during these attempts.  Her neck is short with significant redundant tissue making an emergent cricothyrotomy likely to be very difficult.     Hospital Course  3/9 transferred from [facility] to [facility] for difficult airway and no backup    Interval Problem Update  Reviewed last 24 hour events:  Neuro: intact following commands  HR: 40-50's  SBP: 110-120's  Tmax: afebrile  GI: NPO  UOP: 590  Lines: peripheral IV, croft  Resp: just extubated   Vte: lovenox  PPI/H2:n/a  Antibx: none  -769ml  Low urine output consider IVF  LR bolus and LR at 83ml/hr  Check home medication and restart after clarification from surgery  Transfer to floor    Review of Systems  Review of Systems   Unable to perform ROS: Intubated        Vital Signs for last 24 hours   Temp:  [36.2 °C (97.1 °F)-37 °C (98.6 °F)] 36.2 °C (97.1 °F)  Pulse:  [45-90] 54  Resp:  [19-37] 20  BP: ()/(40-73) 126/58  SpO2:  [89 %-98 %] 89 %    Hemodynamic parameters for last 24 hours       Respiratory Information for the last 24 hours  Vent Mode: APVCMV  Rate (breaths/min): 20  Vt Target (mL): 380  PEEP/CPAP: 8  MAP: 12  Control VTE (exp VT): 380    Physical Exam   Physical Exam  Vitals and nursing note reviewed.   Constitutional:       General: She is not in acute distress.     Appearance:  She is obese. She is not ill-appearing.      Comments: Female on ventilator   HENT:      Mouth/Throat:      Comments: ET in place  Eyes:      Pupils: Pupils are equal, round, and reactive to light.   Neck:      Comments: Short neck no adenopathy  Cardiovascular:      Rate and Rhythm: Normal rate.      Heart sounds: No murmur heard.     No friction rub.   Pulmonary:      Effort: No respiratory distress.      Breath sounds: No stridor. No wheezing or rhonchi.      Comments: Mechanical breath sound bilateral.   Abdominal:      General: There is no distension.      Palpations: There is no mass.      Tenderness: There is no abdominal tenderness. There is no guarding or rebound.      Hernia: No hernia is present.      Comments: Surgical site look good, soft abdomen   Musculoskeletal:         General: No swelling or tenderness.   Skin:     Coloration: Skin is not jaundiced or pale.   Neurological:      Comments: She is sedated on ventilator   Psychiatric:      Comments: Unable to determine         Medications  Current Facility-Administered Medications   Medication Dose Route Frequency Provider Last Rate Last Admin    LR (Bolus) infusion 1,000 mL  1,000 mL Intravenous Once Anirudh Siddiqui M.D.        lactated ringers infusion   Intravenous Continuous Anirudh Siddiqui M.D. 83 mL/hr at 03/10/24 0631 New Bag at 03/10/24 0631    enoxaparin (Lovenox) inj 40 mg  40 mg Subcutaneous DAILY AT 1800 Jaida Monsivais M.D.   40 mg at 03/09/24 1716    FLUoxetine (PROzac) capsule 10 mg  10 mg Enteral Tube DAILY Jaida Monsivais M.D.        levothyroxine (Synthroid) tablet 200 mcg  200 mcg Enteral Tube AM ES Jaida Monsivais M.D. MD Alert...ICU Electrolyte Replacement per Pharmacy   Other PHARMACY TO DOSE Jaida Monsivais M.D.        lidocaine (Xylocaine) 1 % injection 2 mL  2 mL Tracheal Tube Q30 MIN PRN Jaida Monsivais M.D.        Respiratory Therapy Consult   Nebulization Continuous RT Jaida Monsivais M.D.         acetaminophen (Tylenol) tablet 650 mg  650 mg Enteral Tube Q6HRS PRN Jaida Monsivais M.D.        ondansetron (Zofran ODT) dispertab 4 mg  4 mg Enteral Tube Q4HRS PRN Jaida Monsivais M.D.        ondansetron (Zofran) syringe/vial injection 4 mg  4 mg Intravenous Q4HRS PRN Jaida Monsivais M.D.   4 mg at 03/09/24 1641    prochlorperazine (Compazine) injection 5-10 mg  5-10 mg Intravenous Q4HRS PRMIKEY Monsivais M.D.   10 mg at 03/09/24 1649    promethazine (Phenergan) suppository 12.5-25 mg  12.5-25 mg Rectal Q4HRS PRN Jaida Monsivais M.D.        promethazine (Phenergan) tablet 12.5-25 mg  12.5-25 mg Enteral Tube Q4HRS PRMIEKY Monsivais M.D.        fentaNYL (Sublimaze) injection 25 mcg  25 mcg Intravenous Q HOUR PRN Anirudh Siddiqui M.D.   25 mcg at 03/10/24 0327       Fluids    Intake/Output Summary (Last 24 hours) at 3/10/2024 0702  Last data filed at 3/10/2024 0600  Gross per 24 hour   Intake 93.32 ml   Output 1013 ml   Net -919.68 ml       Laboratory  Recent Labs     03/07/24  2120 03/08/24  0454 03/08/24  1353 03/09/24  0448   ISTATAPH 7.341* 7.386* 7.376* 7.419   ISTATAPCO2 47.0* 39.2* 41.2* 36.6   ISTATAPO2 69 128* 84 83   ISTATATCO2 27 25 25 25   YSQGFJT7MTS 92* 99 96 96   ISTATARTHCO3 25.4* 23.6 24.1 23.7   ISTATARTBE -1 -1 -1 -1   ISTATTEMP 97.0 F 97.7 F see below 98.4 F   ISTATFIO2 100 100 90 50   ISTATSPEC Arterial Arterial Arterial Arterial   ISTATAPHTC 7.353* 7.394*  --  7.420   DZOONXUA9YK 65 125*  --  83         Recent Labs     03/08/24 0342 03/09/24  0402 03/10/24  0354   SODIUM 138 137 140   POTASSIUM 3.8 3.8 4.3   CHLORIDE 104 104 104   CO2 25 24 23   BUN 16 27* 33*   CREATININE 1.14 1.10 0.88   MAGNESIUM 1.9 2.6* 2.4   PHOSPHORUS 4.3 3.4 2.9   CALCIUM 8.7 8.8 8.6     Recent Labs     03/07/24  1452 03/08/24 0342 03/09/24  0402 03/10/24  0354   ALTSGPT 21  --   --   --    ASTSGOT 19  --   --   --    ALKPHOSPHAT 159*  --   --   --    TBILIRUBIN 0.6  --   --   --    LIPASE 41  --   --    --    GLUCOSE 97 186* 169* 153*     Recent Labs     03/07/24  1452 03/08/24  0342 03/09/24  0402 03/10/24  0354   WBC 21.7* 15.2* 17.3* 15.8*   NEUTSPOLYS 69.80 88.80* 84.70* 82.00*   LYMPHOCYTES 21.30* 8.40* 9.20* 11.00*   MONOCYTES 7.70 2.00 5.30 6.50   EOSINOPHILS 0.10 0.00 0.10 0.00   BASOPHILS 0.50 0.30 0.10 0.10   ASTSGOT 19  --   --   --    ALTSGPT 21  --   --   --    ALKPHOSPHAT 159*  --   --   --    TBILIRUBIN 0.6  --   --   --      Recent Labs     03/08/24  0342 03/09/24  0402 03/10/24  0354   RBC 4.11* 3.99* 4.06*   HEMOGLOBIN 12.9 12.2 12.1   HEMATOCRIT 37.9 36.8* 37.5   PLATELETCT 380 391 428       Imaging  X-Ray:  I have personally reviewed the images and compared with prior images.  Echo:   Reviewed    Assessment/Plan  * Acute appendicitis- (present on admission)  Assessment & Plan  S/p Laparoscopic cecectomy with mass like on appendix base and torsion of cecum  By Dr Zamudio 3/7  General surgery will continue follow    Difficult airway for intubation  Assessment & Plan  Patient was a difficult airway by anesthesia requiring multiple attempts and fiberoptically intubated with 6.5 ET  CT neck reviewed  + Cuff leak with 6.5 ET   S/p steroids  I have evaluated her airway with DL  this afternoon 3/9 I have a grade I view with a mac 3 blade, no significant edema noted.   Plan to extubate with difficult airway equipment at bedside     3/10 extubated without difficult or stridor    Respiratory failure with hypoxia (HCC)  Assessment & Plan  Extubated without difficult on 3/10  IS mobilize  Swallow eval once cleared by surgery      LIDYA (obstructive sleep apnea)- (present on admission)  Assessment & Plan  Talking with daughter sounds like a pretty significant hx of LIDYA  They will bring in her machine to understand her setting    Coronary artery disease due to calcified coronary lesion: History of small diagonal occlusion without other significant disease.- (present on admission)  Assessment & Plan  Hx of  continue statin, aspirin  Echo with normal EF 55%    History of total thyroidectomy- (present on admission)  Assessment & Plan  Hx of    GERD (gastroesophageal reflux disease)- (present on admission)  Assessment & Plan  Continue home medication    Hypothyroid- (present on admission)  Assessment & Plan  Hx of continue synthyroid in a couple days post IV dose  Check TSH and free T4    Benign essential HTN- (present on admission)  Assessment & Plan  Hx of         VTE:  Lovenox  Ulcer: H2 Antagonist  Lines: Alvares Catheter  Ongoing indication addressed    I have performed a physical exam and reviewed and updated ROS and Plan today (3/10/2024). In review of yesterday's note (3/9/2024), there are no changes except as documented above.     Discussed patient condition and risk of morbidity and/or mortality with RN, RT, Pharmacy, Charge nurse / hot rounds, and Patient    The patient remains critically ill on ventilator with a difficult airway with titration of sedation and ventilation.  Critical care time = 37 minutes in directly providing and coordinating critical care and extensive data review.  No time overlap and excludes procedures.

## 2024-03-10 NOTE — ASSESSMENT & PLAN NOTE
S/p lap appy  ?mass at base of appendix; pathology pending  Tolerating diet  No BM but has had flatus  Mobilize  Watch for signs of return of bowel function

## 2024-03-10 NOTE — PROGRESS NOTES
Dr Siddiqui at the bedside discussing plan of care with pt's daughters at the bedside. Qs and concerns addressed.

## 2024-03-10 NOTE — PROGRESS NOTES
Patient extubated around 0646 and placed on mask by RT. Patient oriented to plan of care and provided call light- nodded understanding on how to use if needing assistance by staff.

## 2024-03-10 NOTE — CARE PLAN
The patient is Watcher - Medium risk of patient condition declining or worsening    Shift Goals  Clinical Goals: Stable/safe airway, possible extubation, Stable vitals, RASS 0  Patient Goals: MANUEL  Family Goals: comfort, extubate safely     Progress made toward(s) clinical / shift goals:      Pt was unable to be extubated today and was restarted on propofol as needed for resp distress and anxiety off sedation, pain controlled.   HR SB other VS are stable.  Family at bedside and comforting pt as needed.       Problem: Pain - Standard  Goal: Alleviation of pain or a reduction in pain to the patient’s comfort goal  Outcome: Progressing     Problem: Fall Risk  Goal: Patient will remain free from falls  Outcome: Progressing     Problem: Safety - Medical Restraint  Goal: Remains free of injury from restraints (Restraint for Interference with Medical Device)  Outcome: Progressing  Goal: Free from restraint(s) (Restraint for Interference with Medical Device)  Outcome: Progressing         Patient is not progressing towards the following goals:      Problem: Knowledge Deficit - Standard  Goal: Patient and family/care givers will demonstrate understanding of plan of care, disease process/condition, diagnostic tests and medications  Outcome: Not Met     Problem: Skin Integrity  Goal: Skin integrity is maintained or improved  Outcome: Not Met

## 2024-03-10 NOTE — PROGRESS NOTES
DATE: 3/10/2024    Post Operative Day  3  laparoscopic cecectomy .    INTERVAL EVENTS:  Extubated and doing well on nasal cannula.    PHYSICAL EXAMINATION:  Vital Signs: BP (!) 88/47   Pulse 74   Temp 36.1 °C (96.9 °F) (Temporal)   Resp (!) 42   Wt 109 kg (240 lb 4.8 oz)   SpO2 95%     Gen: No apparent distress, alert and oriented.  Abd: Soft, appropriately tender to palpation, incisions clean/dry/intact with Dermabond in place.    LABORATORY VALUES:  Recent Labs     03/08/24 0342 03/09/24  0402 03/10/24  0354   WBC 15.2* 17.3* 15.8*   RBC 4.11* 3.99* 4.06*   HEMOGLOBIN 12.9 12.2 12.1   HEMATOCRIT 37.9 36.8* 37.5   MCV 92.2 92.2 92.4   MCH 31.4 30.6 29.8   MCHC 34.0 33.2 32.3   RDW 42.5 42.3 43.5   PLATELETCT 380 391 428   MPV 9.8 10.1 10.3     Recent Labs     03/08/24 0342 03/09/24  0402 03/10/24  0354   SODIUM 138 137 140   POTASSIUM 3.8 3.8 4.3   CHLORIDE 104 104 104   CO2 25 24 23   GLUCOSE 186* 169* 153*   BUN 16 27* 33*   CREATININE 1.14 1.10 0.88   CALCIUM 8.7 8.8 8.6     Recent Labs     03/07/24  1452   ASTSGOT 19   ALTSGPT 21   TBILIRUBIN 0.6   ALKPHOSPHAT 159*   GLOBULIN 3.2            IMAGING:  DX-CHEST-PORTABLE (1 VIEW)   Final Result      Cardiomegaly.      Linear bibasilar atelectasis.      DX-CHEST-PORTABLE (1 VIEW)   Final Result      Intubation. Lungs appear clear.          ASSESSMENT AND PLAN:  63 year-old woman status-post laparoscopic cecectomy, POD#3.  Transferred intubated from [facility] for airway/concerns with extubation.  No extubated and doing well.  Clear liquid diet when able, can advance as tolerated to regular diet from surgical standpoint.  Follow-up in clinic with Dr Zamudio in 2 weeks for wound check and discussion of pathology.       ____________________________________     Gio Rene M.D.    DD: 3/10/2024  2:18 PM

## 2024-03-10 NOTE — CARE PLAN
Problem: Ventilation  Goal: Ability to achieve and maintain unassisted ventilation or tolerate decreased levels of ventilator support  Description: Target End Date:  4 days     Document on Vent flowsheet    1.  Support and monitor invasive and noninvasive mechanical ventilation  2.  Monitor ventilator weaning response  3.  Perform ventilator associated pneumonia prevention interventions  4.  Manage ventilation therapy by monitoring diagnostic test results  Outcome: Progressing     Ventilator Daily Summary    Vent Day #1  Airway: 6.5@26    Ventilator settings: 20/380/+8/40%  Weaning trials: No  Respiratory Procedures: No    Plan: Continue current ventilator settings and wean mechanical ventilation as tolerated per physician orders.

## 2024-03-10 NOTE — CARE PLAN
The patient is Watcher - Medium risk of patient condition declining or worsening    Shift Goals  Clinical Goals: Airway maintenance, hemodynamic stability  Patient Goals: MANUEL  Family Goals: MANUEL    Progress made toward(s) clinical / shift goals:      Problem: Skin Integrity  Goal: Skin integrity is maintained or improved  Outcome: Progressing     Problem: Fall Risk  Goal: Patient will remain free from falls  Outcome: Progressing     Problem: Safety - Medical Restraint  Goal: Remains free of injury from restraints (Restraint for Interference with Medical Device)  Outcome: Progressing

## 2024-03-10 NOTE — ASSESSMENT & PLAN NOTE
Succesfully extubated and doing well: normal resp exam, no stridor, OP benign  Pt without symptoms  DW pt and family at length to make suer she warns any future surgeions/anethesiologists

## 2024-03-11 VITALS
BODY MASS INDEX: 36.54 KG/M2 | OXYGEN SATURATION: 93 % | HEART RATE: 50 BPM | RESPIRATION RATE: 18 BRPM | WEIGHT: 240.3 LBS | SYSTOLIC BLOOD PRESSURE: 116 MMHG | DIASTOLIC BLOOD PRESSURE: 58 MMHG | TEMPERATURE: 97.9 F

## 2024-03-11 PROBLEM — Z90.89 HISTORY OF TOTAL THYROIDECTOMY: Status: RESOLVED | Noted: 2019-05-15 | Resolved: 2024-03-11

## 2024-03-11 PROBLEM — E89.0 HISTORY OF TOTAL THYROIDECTOMY: Status: RESOLVED | Noted: 2019-05-15 | Resolved: 2024-03-11

## 2024-03-11 PROBLEM — J96.91 RESPIRATORY FAILURE WITH HYPOXIA (HCC): Status: RESOLVED | Noted: 2024-03-09 | Resolved: 2024-03-11

## 2024-03-11 PROBLEM — I25.84 CORONARY ARTERY DISEASE DUE TO CALCIFIED CORONARY LESION: Status: RESOLVED | Noted: 2019-07-31 | Resolved: 2024-03-11

## 2024-03-11 PROBLEM — T88.4XXA DIFFICULT AIRWAY FOR INTUBATION: Status: RESOLVED | Noted: 2024-03-09 | Resolved: 2024-03-11

## 2024-03-11 PROBLEM — K35.80 ACUTE APPENDICITIS: Status: RESOLVED | Noted: 2024-03-07 | Resolved: 2024-03-11

## 2024-03-11 PROBLEM — Z98.890 HISTORY OF TOTAL THYROIDECTOMY: Status: RESOLVED | Noted: 2019-05-15 | Resolved: 2024-03-11

## 2024-03-11 PROBLEM — I25.10 CORONARY ARTERY DISEASE DUE TO CALCIFIED CORONARY LESION: Status: RESOLVED | Noted: 2019-07-31 | Resolved: 2024-03-11

## 2024-03-11 PROCEDURE — 700102 HCHG RX REV CODE 250 W/ 637 OVERRIDE(OP): Performed by: INTERNAL MEDICINE

## 2024-03-11 PROCEDURE — 97165 OT EVAL LOW COMPLEX 30 MIN: CPT

## 2024-03-11 PROCEDURE — 700102 HCHG RX REV CODE 250 W/ 637 OVERRIDE(OP): Performed by: HOSPITALIST

## 2024-03-11 PROCEDURE — A9270 NON-COVERED ITEM OR SERVICE: HCPCS | Performed by: HOSPITALIST

## 2024-03-11 PROCEDURE — A9270 NON-COVERED ITEM OR SERVICE: HCPCS | Performed by: INTERNAL MEDICINE

## 2024-03-11 RX ADMIN — ASPIRIN 81 MG 81 MG: 81 TABLET ORAL at 05:02

## 2024-03-11 RX ADMIN — FLUOXETINE 10 MG: 10 CAPSULE ORAL at 05:02

## 2024-03-11 ASSESSMENT — COGNITIVE AND FUNCTIONAL STATUS - GENERAL
DAILY ACTIVITIY SCORE: 24
SUGGESTED CMS G CODE MODIFIER DAILY ACTIVITY: CH

## 2024-03-11 ASSESSMENT — ACTIVITIES OF DAILY LIVING (ADL): TOILETING: INDEPENDENT

## 2024-03-11 ASSESSMENT — PAIN DESCRIPTION - PAIN TYPE
TYPE: ACUTE PAIN
TYPE: ACUTE PAIN;SURGICAL PAIN

## 2024-03-11 NOTE — CARE PLAN
The patient is Stable - Low risk of patient condition declining or worsening    Shift Goals  Clinical Goals: monitor pain/vitals  Patient Goals: D/C tomorrow, rest  Family Goals: n/a    Progress made toward(s) clinical / shift goals:  Pt medicated per Mar, resting. Pt transferred from TICU, skin check completed and pt oriented to floor. Monitoring vitals, BP soft in 's systolic. Pt updated on POC, care ongoing.    Patient is not progressing towards the following goals:

## 2024-03-11 NOTE — THERAPY
"Occupational Therapy   Initial Evaluation     Patient Name: Priscilla Childress  Age:  63 y.o., Sex:  female  Medical Record #: [id number]  Today's Date: 3/11/2024          Assessment  63 y.o. female who presented 3/9/2024 with history of coronary artery disease, dyslipidemia, hypertension, LIDYA, hypothyroidism, BMI 36. Pt s/p appendectomy complicated by extubation, transferred to [facility].     Pt seen for OT eval. Pt presents at baseline level of functioning. Supervised for all functional mobility and ADL performance. Discussed log roll technique and pain mgmt strategeies. Pt with no further inpt OT indicated, safe to dc home.     Plan    Occupational Therapy Initial Treatment Plan   Duration: (P) Evaluation only    DC Equipment Recommendations: (P) None  Discharge Recommendations: (P) Anticipate that the patient will have no further occupational therapy needs after discharge from the hospital     Subjective    \"Im feeling  great\"      Objective       03/11/24 1201   Prior Living Situation   Prior Services None;Home-Independent   Housing / Facility 1 Story Apartment / Condo   Bathroom Set up Walk In Shower;Bathtub / Shower Combination   Equipment Owned None   Lives with - Patient's Self Care Capacity Significant Other   Prior Level of ADL Function   Self Feeding Independent   Grooming / Hygiene Independent   Bathing Independent   Dressing Independent   Toileting Independent   Prior Level of IADL Function   Medication Management Independent   Laundry Independent   Kitchen Mobility Independent   Finances Independent   Home Management Independent   Shopping Independent   Prior Level Of Mobility Independent Without Device in Community   Driving / Transportation Driving Independent   History of Falls   History of Falls No   Vitals   O2 Delivery Device None - Room Air   Pain   Intervention Rest   Pain 0 - 10 Group   Location Abdomen   Location Orientation Mid   Pain Rating Scale (NPRS) 1   Therapist Pain Assessment " Post Activity;Post Activity Pain Same as Prior to Activity;Nurse Notified;1   Cognition    Cognition / Consciousness WDL   Level of Consciousness Alert   Passive ROM Upper Body   Passive ROM Upper Body WDL   Active ROM Upper Body   Active ROM Upper Body  WDL   Dominant Hand Right   Strength Upper Body   Upper Body Strength  WDL   Sensation Upper Body   Upper Extremity Sensation  WDL   Upper Body Muscle Tone   Upper Body Muscle Tone  WDL   Neurological Concerns   Neurological Concerns No   Coordination Upper Body   Coordination WDL   Balance Assessment   Sitting Balance (Static) Normal   Sitting Balance (Dynamic) Normal   Standing Balance (Static) Good   Standing Balance (Dynamic) Good   Weight Shift Sitting Good   Weight Shift Standing Good   Bed Mobility    Supine to Sit Supervised   Sit to Supine Supervised   Scooting Supervised   Rolling Supervised   Comments pain with rolling   ADL Assessment   Eating Independent   Grooming Supervision;Standing   Bathing Supervision   Upper Body Dressing Supervision   Lower Body Dressing Supervision   Toileting Supervision   How much help from another person does the patient currently need...   Putting on and taking off regular lower body clothing? 4   Bathing (including washing, rinsing, and drying)? 4   Toileting, which includes using a toilet, bedpan, or urinal? 4   Putting on and taking off regular upper body clothing? 4   Taking care of personal grooming such as brushing teeth? 4   Eating meals? 4   6 Clicks Daily Activity Score 24   Functional Mobility   Sit to Stand Supervised   Bed, Chair, Wheelchair Transfer Supervised   Toilet Transfers Supervised   Mobility in rm> bathroom> bed mobility   Visual Perception   Visual Perception  WDL   Edema / Skin Assessment   Edema / Skin  WDL   Activity Tolerance   Sitting in Chair post session   Standing 5 min   Education Group   Education Provided Role of Occupational Therapist;Activities of Daily Living   Role of Occupational  Therapist Patient Response Patient;Acceptance;Demonstration;Verbal Demonstration;Action Demonstration   ADL Patient Response Patient;Eager;Acceptance;Explanation;Demonstration;Verbal Demonstration;Action Demonstration   Occupational Therapy Initial Treatment Plan    Duration Evaluation only   Problem List   Problem List None   Anticipated Discharge Equipment and Recommendations   DC Equipment Recommendations None   Discharge Recommendations Anticipate that the patient will have no further occupational therapy needs after discharge from the hospital   Interdisciplinary Plan of Care Collaboration   IDT Collaboration with  Nursing   Patient Position at End of Therapy Seated;Edge of Bed   Collaboration Comments RN updated   Session Information   Date / Session Number  3/11. OT eval only.

## 2024-03-11 NOTE — DISCHARGE PLANNING
Case Management Discharge Planning    Admission Date: 3/9/2024  GMLOS: 4.4  ALOS: 2    6-Clicks ADL Score: 24  6-Clicks Mobility Score: 24      Anticipated Discharge Dispo: Discharge Disposition: Discharged to home/self care (01)    DME Needed: No    Action(s) Taken: DC Assessment Complete (See below)    Escalations Completed: None    Medically Clear: No    Next Steps: CM to continue to follow up with IDT regarding discharge planning needs/barriers.     Barriers to Discharge: Medical clearance      Care Transition Team Assessment    Patient's significant other: Mele Brown  Patient's daughter: Dulce Zuñiga [phone]    Case management role discussed with patient; patient verbalized understanding of case management role  Demographics verified  Patient is a 63 year old female admitted to [facility] from [facility] for difficult extubation  Patient states she lives in an apartment with her significant other on the first level  Patient's preferred pharmacy is the [facility] in [place], [place]   Prior to admission, patient states she was independent with all IADLS/ADLS; denies baseline DME use; denies home O2 use (states uses CPAP at [facility])  Patient states she has a ride back home; no transportation needs identified  No CM needs identified during assessment    Information Source  Orientation Level: Oriented X4  Information Given By: Patient  Informant's Name: Magda  Who is responsible for making decisions for patient? : Patient    Readmission Evaluation  Is this a readmission?: No    Elopement Risk  Legal Hold: No  Ambulatory or Self Mobile in Wheelchair: Yes  Disoriented: No  Psychiatric Symptoms: None  History of Wandering: No  Elopement this Admit: No  Vocalizing Wanting to Leave: No  Displays Behaviors, Body Language Wanting to Leave: No-Not at Risk for Elopement  Elopement Risk: Not at Risk for Elopement    Interdisciplinary Discharge Planning  Does Admitting Nurse Feel This  Could be a Complex Discharge?: No  Primary Care Physician: Jagruti Stewart  Lives with - Patient's Self Care Capacity: Significant Other  Patient or legal guardian wants to designate a caregiver: No  Caregiver name: Cece terrazas  Caregiver contact info: [phone]  ([facility]) Authorization for Release of Health Information has been completed: Yes  Support Systems: Children, Family Member(s), Friends / Neighbors, Spouse / Significant Other  Housing / Facility: [address]  Do You Take your Prescribed Medications Regularly: Yes  Able to Return to Previous ADL's: Yes  Mobility Issues: No  Prior Services: None, Home-Independent  Patient Prefers to be Discharged to:: Home  Assistance Needed: No  Durable Medical Equipment: Not Applicable    Discharge Preparedness  What is your plan after discharge?: Home with help  What are your discharge supports?: Child, Spouse  Prior Functional Level: Ambulatory, Drives Self, Independent with Activities of Daily Living, Independent with Medication Management  Difficulity with ADLs: None  Difficulity with IADLs: None    Functional Assesment  Prior Functional Level: Ambulatory, Drives Self, Independent with Activities of Daily Living, Independent with Medication Management    Finances  Financial Barriers to Discharge: No  Prescription Coverage: Yes    Vision / Hearing Impairment  Vision Impairment : No  Hearing Impairment : No    Values / Beliefs / Concerns  Values / Beliefs Concerns : No    Advance Directive  Advance Directive?: None  Advance Directive offered?: AD Booklet given    Domestic Abuse  Have you ever been the victim of abuse or violence?: No  Physical Abuse or Sexual Abuse: No  Verbal Abuse or Emotional Abuse: No  Possible Abuse/Neglect Reported to:: Not Applicable    Psychological Assessment  History of Substance Abuse: None  History of Psychiatric Problems: No  Non-compliant with Treatment: No  Newly Diagnosed Illness: Yes    Discharge Risks or Barriers  Discharge risks  or barriers?: Complex medical needs  Patient risk factors: Cognitive / sensory / physical deficit, Complex medical needs, Vulnerable adult    Anticipated Discharge Information  Discharge Disposition: Discharged to home/self care (01)

## 2024-03-11 NOTE — PROGRESS NOTES
Pt admitted to room T434 via transport in bed from [facility] at 2050.  Pt pain reported at 1 on a scale of 0-10. Oriented to room call light and smoking policy.  Reviewed plan of care (equipment, incentive spirometer, sequential compression devices, medications, activity, diet, fall precautions, skin care, and pain) with patient and family. Welcome packet given and reviewed with pt , all questions answered. Education provided on oral hygiene program.     BP 92/47   Pulse 77   Temp 36.3 °C (97.3 °F) (Temporal)   Resp 20   Wt 109 kg (240 lb 4.8 oz)   SpO2 90%   BMI 36.54 kg/m²      Educated patient regarding pharmacologic and non pharmacologic modalities for pain management. Oriented to room call light and smoking policy.  Reviewed plan of care (equipment, incentive spirometer, sequential compression devices, medications, activity, diet, fall precautions, skin care, and pain) with patient and family. Welcome packet given and reviewed with patient, all questions answered. Education provided on oral hygiene program.    AA&Ox4. Denies CP/SOB.  See 2 RN skin note  Tolerating regular diet. Denies N/V.  + void. - BM. Last BM PTA  Pt ambulates SBA. SCDs on  All needs met at this time. Call light within reach. Pt calls appropriately. Bed low and locked, non skid socks in place. Hourly rounding in place.

## 2024-03-11 NOTE — PROGRESS NOTES
4 Eyes Skin Assessment Completed by WATSON Covarrubias and WATSON Zhao.    Head WDL  Ears WDL  Nose WDL  Mouth WDL  Neck Redness  Breast/Chest Redness  Shoulder Blades WDL  Spine WDL  (R) Arm/Elbow/Hand eczema to elbow  (L) Arm/Elbow/Hand eczema to elbow and knuckles, abrasion  Abdomen x3 lap sites with dermabond  Groin WDL  Scrotum/Coccyx/Buttocks WDL  (R) Leg WDL  (L) Leg WDL  (R) Heel/Foot/Toe WDL  (L) Heel/Foot/Toe WDL          Devices In Places Blood Pressure Cuff, Pulse Ox, and SCD's      Interventions In Place Pillows and Low Air Loss Mattress    Possible Skin Injury No    Pictures Uploaded Into Epic N/A  Wound Consult Placed N/A  RN Wound Prevention Protocol Ordered No

## 2024-03-11 NOTE — PROGRESS NOTES
Discharge instructions reviewed and signed, all questions answered, PIVs removed, no new medications.

## 2024-03-11 NOTE — PROGRESS NOTES
Assumed care of patient at 0645. Bedside report received. Assessment complete.    AA&Ox4. Denies CP/SOB. 0.5L O2 NC, unable to wean at this time. Motivated on IS 1500 demonstrated correct use      Reporting pain controlled at this time Declined intervention at this time. Educated patient regarding pharmacologic and non pharmacologic modalities for pain management.    Skin per flow sheets. 3 lap sites to abdomen, dermabonded and MAR.     Tolerating diet. Denies N/V.  + void. Last BM pta  Pt ambulates self    Fall prevention measures in place per flowsheets.  Educated on SCD use, patient declined at this time, ambulating frequently, Pharmacologic VTE prophylaxis in use.    Plan of care discussed, all questions answered.  Educated regarding importance of oral care. Oral care kit at bedside. Call light is within reach, treaded slipper socks on, bed in lowest/ locked position, hourly rounding in place, all needs met at this time.

## 2024-03-21 ENCOUNTER — OFFICE VISIT (OUTPATIENT)
Dept: MEDICAL GROUP | Age: 64
End: 2024-03-21
Payer: COMMERCIAL

## 2024-03-21 VITALS
SYSTOLIC BLOOD PRESSURE: 120 MMHG | HEIGHT: 68 IN | BODY MASS INDEX: 37.13 KG/M2 | RESPIRATION RATE: 16 BRPM | TEMPERATURE: 97 F | HEART RATE: 86 BPM | OXYGEN SATURATION: 98 % | WEIGHT: 245 LBS | DIASTOLIC BLOOD PRESSURE: 76 MMHG

## 2024-03-21 DIAGNOSIS — E03.9 HYPOTHYROIDISM, UNSPECIFIED TYPE: ICD-10-CM

## 2024-03-21 DIAGNOSIS — Z76.89 ESTABLISHING CARE WITH NEW DOCTOR, ENCOUNTER FOR: ICD-10-CM

## 2024-03-21 DIAGNOSIS — I25.42 SPONTANEOUS DISSECTION OF CORONARY ARTERY: ICD-10-CM

## 2024-03-21 DIAGNOSIS — R73.01 ELEVATED FASTING GLUCOSE: ICD-10-CM

## 2024-03-21 DIAGNOSIS — E55.9 VITAMIN D DEFICIENCY: ICD-10-CM

## 2024-03-21 DIAGNOSIS — I10 BENIGN ESSENTIAL HTN: ICD-10-CM

## 2024-03-21 DIAGNOSIS — Z90.49 S/P APPENDECTOMY: ICD-10-CM

## 2024-03-21 DIAGNOSIS — R49.9 CHANGE IN VOICE: ICD-10-CM

## 2024-03-21 DIAGNOSIS — T88.8XXA: ICD-10-CM

## 2024-03-21 DIAGNOSIS — Z11.4 SCREENING FOR HIV (HUMAN IMMUNODEFICIENCY VIRUS): ICD-10-CM

## 2024-03-21 DIAGNOSIS — E66.9 OBESITY (BMI 30-39.9): ICD-10-CM

## 2024-03-21 PROBLEM — E89.0 POSTOPERATIVE HYPOTHYROIDISM: Status: ACTIVE | Noted: 2019-05-15

## 2024-03-21 PROBLEM — K37 APPENDICITIS: Status: RESOLVED | Noted: 2024-03-10 | Resolved: 2024-03-21

## 2024-03-21 PROBLEM — G62.9 NEUROPATHY: Status: ACTIVE | Noted: 2024-02-28

## 2024-03-21 PROBLEM — G62.9 NEUROPATHY: Status: RESOLVED | Noted: 2024-02-28 | Resolved: 2024-03-21

## 2024-03-21 PROBLEM — E66.811 OBESITY (BMI 30.0-34.9): Status: RESOLVED | Noted: 2020-11-29 | Resolved: 2024-03-21

## 2024-03-21 PROBLEM — I25.2 MYOCARDIAL INFARCT, OLD: Chronic | Status: ACTIVE | Noted: 2022-05-17

## 2024-03-21 PROBLEM — H81.11 BENIGN PAROXYSMAL POSITIONAL VERTIGO OF RIGHT EAR: Status: RESOLVED | Noted: 2019-07-31 | Resolved: 2024-03-21

## 2024-03-21 PROBLEM — J30.2 SEASONAL ALLERGIES: Status: ACTIVE | Noted: 2023-06-16

## 2024-03-21 PROBLEM — E78.5 DYSLIPIDEMIA: Status: ACTIVE | Noted: 2019-05-15

## 2024-03-21 PROBLEM — M77.01 MEDIAL EPICONDYLITIS OF ELBOW, RIGHT: Status: RESOLVED | Noted: 2020-08-27 | Resolved: 2024-03-21

## 2024-03-21 PROBLEM — M19.041 OSTEOARTHRITIS OF BOTH HANDS: Status: ACTIVE | Noted: 2022-05-17

## 2024-03-21 PROBLEM — M19.042 OSTEOARTHRITIS OF BOTH HANDS: Status: ACTIVE | Noted: 2022-05-17

## 2024-03-21 PROBLEM — I25.2 MYOCARDIAL INFARCT, OLD: Chronic | Status: RESOLVED | Noted: 2022-05-17 | Resolved: 2024-03-21

## 2024-03-21 PROCEDURE — 3074F SYST BP LT 130 MM HG: CPT | Performed by: PHYSICIAN ASSISTANT

## 2024-03-21 PROCEDURE — 3078F DIAST BP <80 MM HG: CPT | Performed by: PHYSICIAN ASSISTANT

## 2024-03-21 PROCEDURE — 99215 OFFICE O/P EST HI 40 MIN: CPT | Performed by: PHYSICIAN ASSISTANT

## 2024-03-21 ASSESSMENT — FIBROSIS 4 INDEX: FIB4 SCORE: 0.6

## 2024-03-21 NOTE — PROGRESS NOTES
cc: [facility] care, referrals, status post appendectomy    Subjective:     HPI  Priscilla Childress is a 63 y.o. female presenting to [facility] care.  She moved to the area a few weeks ago.  Is been only few months since she has been seen by primary care provider.  She is up-to-date on mammogram.  Due for Pap.    A day after moving to the area she ended up having appendicitis.  During intubation she was a difficult intubation, required multiple times, eventually was able to intubate with the pediatric tube.  Also having some issues with extubation as well, thus she was transferred to [facility].  She did follow-up with general surgery yesterday.  They are advising her to follow-up with ENT due to difficult intubation.  She also notes that since her thyroidectomy, which is about 10 years ago, her voice has never returned to normal, is hoarse.  She used to be able to sing and she can no longer sing.  In regards to appendectomy overall she is feeling much better.  Wounds are healing well.  Denies fever or chills.  She also has history of 5    MIs, never had any stents.  She did establish with a cardiologist in [place]-who revealed all of her history, met with the medical board, came up with the diagnosis of SCAD, she has not had an MI for 10 years.  She is in a much less stressful situation.  She is on 6.25 mg of carvedilol, taking aspirin daily, on statin.  Blood pressure is well-controlled on 5 mg of amlodipine.    She does have hypothyroidism status post thyroidectomy.  TSH level slightly low on labs completed in ER.  She is asymptomatic.  Currently taking 200 mcg of levothyroxine.  Has been stable at that dose.    Fasting blood sugars have been mildly elevated.  Most recent A1c-6.3 done 3 weeks ago.  Since moving to the area she is walking more frequently, also eating a much healthier diet.    She is currently on 10 mg of Prozac, initially was on this for depression, but she started go through menopause, was  "helping to control her hot flashes.  Which is the main reason she is currently on it.  No depressive symptoms.    She also had PE tubes placed just before moving to the area.  Will need to follow-up with ENT.    Review of systems:  See above.       Current Outpatient Medications:     amLODIPine (NORVASC) 5 MG Tab, Take 5 mg by mouth every day., Disp: , Rfl:     atorvastatin (LIPITOR) 80 MG tablet, Take 80 mg by mouth every day., Disp: , Rfl:     Multiple Vitamins-Minerals (HAIR SKIN & NAILS) Tab, Take 1 Tablet by mouth every day., Disp: , Rfl:     SYNTHROID 200 MCG Tab, TAKE 1 TABLET EVERY MORNINGON AN EMPTY STOMACH (Patient taking differently: Take 200 mcg by mouth every morning on an empty stomach.), Disp: 90 Tablet, Rfl: 3    carvedilol (COREG) 6.25 MG Tab, Take 1 Tablet by mouth 2 times a day with meals., Disp: 180 Tablet, Rfl: 3    aspirin (ASA) 81 MG Chew Tab chewable tablet, Chew 81 mg every day., Disp: , Rfl:     Allergies, past medical history, past surgical history, family history, social history reviewed and updated    Objective:     Vitals: /76 (BP Location: Left arm, Patient Position: Sitting, BP Cuff Size: Large adult)   Pulse 86   Temp 36.1 °C (97 °F) (Temporal)   Resp 16   Ht 1.727 m (5' 8\")   Wt 111 kg (245 lb)   SpO2 98%   BMI 37.25 kg/m²   General: Alert, pleasant, NAD  HEENT: Normocephalic. Neck supple.  TMs gray bilaterally-PE tubes in place.  No infection.  No thyromegaly or masses palpated. No cervical or supraclavicular lymphadenopathy. No carotid bruits   Heart: Regular rate and rhythm.  S1 and S2 normal.  No murmurs appreciated.  Respiratory: Normal respiratory effort.  Clear to auscultation bilaterally.  Abdomen: Slight scabbing around incision sites of the umbilicus.  No signs of infection.  Healing well  Skin: Warm, dry, no rashes.  Psych:  Affect/mood is normal, judgement is good, memory is intact, grooming is appropriate.    Assessment/Plan:     Magda was seen today for " establish care.    Diagnoses and all orders for this visit:    S/P appendectomy  -Overall doing well.  Followed by general surgery, no follow-up recommended.  Incision sites are healing well.  -     CBC WITH DIFFERENTIAL; Future  -     Comp Metabolic Panel; Future    Hypothyroidism, unspecified type  -TSH level slightly low when completed in the ER.  Will repeat TSH level.  Continue on 200 mcg's of levothyroxine.  Will adjust medication if needed pending results  -     TSH WITH REFLEX TO FT4; Future    Obesity (BMI 30-39.9)  -Working on lifestyle modifications.  -     Comp Metabolic Panel; Future  -     TSH WITH REFLEX TO FT4; Future  -     Patient identified as having weight management issue.  Appropriate orders and counseling given.    Elevated fasting glucose  A1c just completed, borderline.  Stressed the importance of lifestyle modifications.  Monitor repeat labs again in 6 months    Difficult extubation  -Per general surgery needing to be seen by ENT, she has also had change in her voice, which warrants ENT referral as well and has PE tubes placed.  Referral placed to ENT for further evaluation  -     Referral to ENT    Change in voice  -See above  -     Referral to ENT    Vitamin D deficiency  Prior history.  Will check vitamin D lab.  Further treatment if needed pending results    Spontaneous dissection of coronary artery  -Cardiologist [place] recently came up with her diagnosis of scad.  Has never had any stents placed.  She needs new cardiologist here locally.  Referral placed.  Continue aspirin, Lipitor, carvedilol.  Blood pressure is well-controlled  -     REFERRAL TO CARDIOLOGY    Benign essential HTN  Stable.  Continue 5 mg of amlodipine    Screening for HIV (human immunodeficiency virus)  -     HIV AG/AB COMBO ASSAY SCREENING; Future    Establishing care with new doctor, encounter for  Previous records reviewed and requested    My total time spent caring for the patient on the day of the encounter  was 40 minutes.   This does not include time spent on separately billable procedures/tests.      Return in about 6 weeks (around 5/2/2024) for Annual PX, Lab Review.

## 2024-03-28 ENCOUNTER — HOSPITAL ENCOUNTER (OUTPATIENT)
Facility: MEDICAL CENTER | Age: 64
End: 2024-03-28
Attending: PHYSICIAN ASSISTANT
Payer: COMMERCIAL

## 2024-03-28 ENCOUNTER — OFFICE VISIT (OUTPATIENT)
Dept: MEDICAL GROUP | Age: 64
End: 2024-03-28
Payer: COMMERCIAL

## 2024-03-28 ENCOUNTER — HOSPITAL ENCOUNTER (OUTPATIENT)
Dept: RADIOLOGY | Facility: MEDICAL CENTER | Age: 64
End: 2024-03-28
Attending: PHYSICIAN ASSISTANT
Payer: COMMERCIAL

## 2024-03-28 ENCOUNTER — HOSPITAL ENCOUNTER (OUTPATIENT)
Dept: LAB | Facility: MEDICAL CENTER | Age: 64
End: 2024-03-28
Attending: PHYSICIAN ASSISTANT
Payer: COMMERCIAL

## 2024-03-28 VITALS
HEART RATE: 101 BPM | OXYGEN SATURATION: 98 % | TEMPERATURE: 97.3 F | SYSTOLIC BLOOD PRESSURE: 116 MMHG | HEIGHT: 68 IN | DIASTOLIC BLOOD PRESSURE: 78 MMHG | WEIGHT: 236 LBS | BODY MASS INDEX: 35.77 KG/M2

## 2024-03-28 DIAGNOSIS — R19.7 DIARRHEA, UNSPECIFIED TYPE: ICD-10-CM

## 2024-03-28 DIAGNOSIS — Z90.49 S/P APPENDECTOMY: ICD-10-CM

## 2024-03-28 DIAGNOSIS — R10.30 LOWER ABDOMINAL PAIN: ICD-10-CM

## 2024-03-28 DIAGNOSIS — T81.30XA WOUND DEHISCENCE: ICD-10-CM

## 2024-03-28 LAB
BASOPHILS # BLD AUTO: 1.4 % (ref 0–1.8)
BASOPHILS # BLD: 0.09 K/UL (ref 0–0.12)
C DIFF DNA SPEC QL NAA+PROBE: NEGATIVE
C DIFF TOX GENS STL QL NAA+PROBE: NEGATIVE
EOSINOPHIL # BLD AUTO: 0.51 K/UL (ref 0–0.51)
EOSINOPHIL NFR BLD: 7.7 % (ref 0–6.9)
ERYTHROCYTE [DISTWIDTH] IN BLOOD BY AUTOMATED COUNT: 43.4 FL (ref 35.9–50)
HCT VFR BLD AUTO: 42.9 % (ref 37–47)
HGB BLD-MCNC: 13.9 G/DL (ref 12–16)
IMM GRANULOCYTES # BLD AUTO: 0.02 K/UL (ref 0–0.11)
IMM GRANULOCYTES NFR BLD AUTO: 0.3 % (ref 0–0.9)
LYMPHOCYTES # BLD AUTO: 2.28 K/UL (ref 1–4.8)
LYMPHOCYTES NFR BLD: 34.5 % (ref 22–41)
MCH RBC QN AUTO: 30.5 PG (ref 27–33)
MCHC RBC AUTO-ENTMCNC: 32.4 G/DL (ref 32.2–35.5)
MCV RBC AUTO: 94.1 FL (ref 81.4–97.8)
MONOCYTES # BLD AUTO: 0.56 K/UL (ref 0–0.85)
MONOCYTES NFR BLD AUTO: 8.5 % (ref 0–13.4)
NEUTROPHILS # BLD AUTO: 3.15 K/UL (ref 1.82–7.42)
NEUTROPHILS NFR BLD: 47.6 % (ref 44–72)
NRBC # BLD AUTO: 0 K/UL
NRBC BLD-RTO: 0 /100 WBC (ref 0–0.2)
PLATELET # BLD AUTO: 426 K/UL (ref 164–446)
PMV BLD AUTO: 10.3 FL (ref 9–12.9)
RBC # BLD AUTO: 4.56 M/UL (ref 4.2–5.4)
WBC # BLD AUTO: 6.6 K/UL (ref 4.8–10.8)

## 2024-03-28 PROCEDURE — 85025 COMPLETE CBC W/AUTO DIFF WBC: CPT

## 2024-03-28 PROCEDURE — 74177 CT ABD & PELVIS W/CONTRAST: CPT

## 2024-03-28 PROCEDURE — 99214 OFFICE O/P EST MOD 30 MIN: CPT | Performed by: PHYSICIAN ASSISTANT

## 2024-03-28 PROCEDURE — 87077 CULTURE AEROBIC IDENTIFY: CPT

## 2024-03-28 PROCEDURE — 87186 SC STD MICRODIL/AGAR DIL: CPT

## 2024-03-28 PROCEDURE — 36415 COLL VENOUS BLD VENIPUNCTURE: CPT

## 2024-03-28 PROCEDURE — 700117 HCHG RX CONTRAST REV CODE 255: Performed by: PHYSICIAN ASSISTANT

## 2024-03-28 PROCEDURE — 87205 SMEAR GRAM STAIN: CPT

## 2024-03-28 PROCEDURE — 87493 C DIFF AMPLIFIED PROBE: CPT

## 2024-03-28 PROCEDURE — 87070 CULTURE OTHR SPECIMN AEROBIC: CPT

## 2024-03-28 PROCEDURE — 3078F DIAST BP <80 MM HG: CPT | Performed by: PHYSICIAN ASSISTANT

## 2024-03-28 PROCEDURE — 3074F SYST BP LT 130 MM HG: CPT | Performed by: PHYSICIAN ASSISTANT

## 2024-03-28 RX ORDER — SULFAMETHOXAZOLE AND TRIMETHOPRIM 800; 160 MG/1; MG/1
1 TABLET ORAL 2 TIMES DAILY
Qty: 14 TABLET | Refills: 0 | Status: SHIPPED | OUTPATIENT
Start: 2024-03-28 | End: 2024-04-04

## 2024-03-28 RX ADMIN — IOHEXOL 100 ML: 350 INJECTION, SOLUTION INTRAVENOUS at 13:20

## 2024-03-28 ASSESSMENT — FIBROSIS 4 INDEX: FIB4 SCORE: 0.6

## 2024-03-28 NOTE — PROGRESS NOTES
cc: Delayed incision site healing, lower abdominal pain, diarrhea    Subjective:     HPI  Priscilla Childress is a 63 y.o. female presenting with concerns of nonhealing incision site.  Appendicitis, was admitted to the hospital 3/7.  Appendectomy performed that day.  She did not need outpatient follow-up with general surgery.  She noticed about a week ago some drainage coming out of the umbilical incision site.  She does note some tenderness in that area.  She is also experiencing some lower abdominal pain, feels a nodule just inferior to the umbilicus.  She also notes that she has been having diarrhea since being discharged from the hospital.  Bowel movements are occurring twice daily, not more frequent.  They are loose, not solid.  Has not tried to take anything over-the-counter.  Denies fever, chills, sweats, streaking erythema, edema, warmth, melena, hematochezia, nausea, vomiting        Review of systems:  See above.       Current Outpatient Medications:     sulfamethoxazole-trimethoprim (BACTRIM DS) 800-160 MG tablet, Take 1 Tablet by mouth 2 times a day for 7 days., Disp: 14 Tablet, Rfl: 0    amLODIPine (NORVASC) 5 MG Tab, Take 5 mg by mouth every day., Disp: , Rfl:     atorvastatin (LIPITOR) 80 MG tablet, Take 80 mg by mouth every day., Disp: , Rfl:     Multiple Vitamins-Minerals (HAIR SKIN & NAILS) Tab, Take 1 Tablet by mouth every day., Disp: , Rfl:     SYNTHROID 200 MCG Tab, TAKE 1 TABLET EVERY MORNINGON AN EMPTY STOMACH (Patient taking differently: Take 200 mcg by mouth every morning on an empty stomach.), Disp: 90 Tablet, Rfl: 3    carvedilol (COREG) 6.25 MG Tab, Take 1 Tablet by mouth 2 times a day with meals., Disp: 180 Tablet, Rfl: 3    aspirin (ASA) 81 MG Chew Tab chewable tablet, Chew 81 mg every day., Disp: , Rfl:     Allergies, past medical history, past surgical history, family history, social history reviewed and updated    Objective:     Vitals: /78 (BP Location: Left arm, Patient  "Position: Sitting, BP Cuff Size: Large adult)   Pulse (!) 101   Temp 36.3 °C (97.3 °F) (Temporal)   Ht 1.727 m (5' 8\")   Wt 107 kg (236 lb)   SpO2 98%   BMI 35.88 kg/m²   General: Alert, pleasant, NAD  HEENT: Normocephalic. Neck supple  Heart: Regular rate and rhythm.  S1 and S2 normal.  No murmurs appreciated.  Respiratory: Normal respiratory effort.  Clear to auscultation bilaterally.  Abdomen:  Normoactive bowel sounds.  Non-distended, soft, minimal/mild tenderness in the right lower quadrant area, does feel little bit more hard in that area, no specific mass-more towards the umbilicus.  No guarding/rebound.  There is wound dehiscence on the superior portion of the umbilicus, approximately 2 cm, granulated tissue, no drainage.  Culture taken.  No surrounding redness or warmth.  Skin: Warm, dry, no rashes.  Psych:  Affect/mood is normal, judgement is good, memory is intact, grooming is appropriate.    Assessment/Plan:     Magda was seen today for follow-up.    Diagnoses and all orders for this visit:    Wound dehiscence  -Her incision has dehisced.  No obvious drainage or cellulitis on exam, however as she is 3 weeks postop will cover with Bactrim for cellulitis.  Check CBC.  Culture taken.  Will adjust medication if needed pending results.  As she was recently hospitalized, will cover for MRSA with Bactrim.  Stressed if at any point she starts to experience redness around the area, significant drainage, fever she needs to go to the ER immediately.  She is understanding of this.  -     CBC WITH DIFFERENTIAL; Future  -     CULTURE WOUND W/ GRAM STAIN; Future  -     Referral to Wound Clinic  -     sulfamethoxazole-trimethoprim (BACTRIM DS) 800-160 MG tablet; Take 1 Tablet by mouth 2 times a day for 7 days.    Lower abdominal pain  -She has had continued pain, since surgery, moderately tender on exam, questionable palpable mass.  Will go ahead and obtain stat CT scan.  Further treatment as needed pending " results  -     Cancel: CT-ABDOMEN-PELVIS WITH; Future  -     CBC WITH DIFFERENTIAL; Future  -     CT-ABDOMEN-PELVIS WITH; Future    S/P appendectomy  -   See above  -     CT-ABDOMEN-PELVIS WITH; Future    Diarrhea, unspecified type  -Will check C. difficile as she was recently hospitalized.  Advised to increase fiber, can try Imodium if C. difficile is negative.  -     C Diff by PCR rflx Toxin; Future        Return in about 2 months (around 5/28/2024) for Annual PX.

## 2024-03-29 LAB
GRAM STN SPEC: NORMAL
SIGNIFICANT IND 70042: NORMAL
SITE SITE: NORMAL
SOURCE SOURCE: NORMAL

## 2024-03-30 LAB
BACTERIA WND AEROBE CULT: ABNORMAL
BACTERIA WND AEROBE CULT: ABNORMAL
GRAM STN SPEC: ABNORMAL
SIGNIFICANT IND 70042: ABNORMAL
SITE SITE: ABNORMAL
SOURCE SOURCE: ABNORMAL

## 2024-04-02 ENCOUNTER — NON-PROVIDER VISIT (OUTPATIENT)
Dept: WOUND CARE | Facility: MEDICAL CENTER | Age: 64
End: 2024-04-02
Attending: PHYSICIAN ASSISTANT
Payer: COMMERCIAL

## 2024-04-02 PROCEDURE — 99239 HOSP IP/OBS DSCHRG MGMT >30: CPT | Performed by: HOSPITALIST

## 2024-04-02 PROCEDURE — 97597 DBRDMT OPN WND 1ST 20 CM/<: CPT

## 2024-04-02 PROCEDURE — 99211 OFF/OP EST MAY X REQ PHY/QHP: CPT

## 2024-04-02 NOTE — PATIENT INSTRUCTIONS
-Keep your wound dressing clean, dry, and intact.     -Change your dressing every other day or if it becomes soiled, soaked, or falls off.    -Should you experience any significant changes in your wound(s), such as infection (redness, swelling, localized heat, increased pain, fever > 101 F, chills) or have any questions regarding your home care instructions, please contact the wound center at [phone]. If after hours, contact your primary care physician or go to the hospital emergency room.

## 2024-04-02 NOTE — CERTIFICATION
Non Provider Encounter- Full Thickness wound    HISTORY OF PRESENT ILLNESS  Wound History:    START OF CARE IN CLINIC: 4/2/24    REFERRING PROVIDER: Jess Ferreira P.A.-C.   WOUND- Full Thickness Wound   LOCATION: midline abdomen, superior to umbillicus   HISTORY: Pt referred by PCP. Was admitted to the hospital 3/7 for appendicitis. Laparoscopic appendectomy performed same day. A week before presenting to PCP on 3/28/24, she noticed some drainage coming out of the umbilical incision site with some tenderness. Was also experiencing some lower abdominal pain, felt a nodule just inferior to the umbilicus. She also was having diarrhea since being discharged, however has improved in the last few days. Cdiff ordered by PCP was negative. Stat CT ordered by PCP negative for fluid collection or abscess. Pt reports erythema and pain improved after taking Bactrim prescribed by PCP. Wound culture positive for staph aureus. Pt works from home at [employer].     Pertinent Labs and Diagnostics:    Labs:     A1c:   Lab Results   Component Value Date/Time    HBA1C 6.3 (H) 03/01/2024 09:41 AM    HBA1C 5.8 (H) 07/26/2021 09:15 AM          IMAGING: CT-ABDOMEN-PELVIS WITH, 3/28/24    IMPRESSION:     1.  Interval appendectomy since prior. No acute abnormality detected. No fluid collection or abscess detected.    LAST  WOUND CULTURE:  DATE : 3/28/24      POS Positive (POS)    Source WND   Site UMBILICUS   Culture Result - Abnormal    Gram Stain Result Few Gram positive cocci.   Culture Result  Abnormal   Staphylococcus aureus  Heavy growth    Resulting Agency M        Susceptibility     Staphylococcus aureus     LIV     Ampicillin/sulbactam <=8/4 mcg/mL Sensitive     Cefazolin <=8 mcg/mL Sensitive     Cefepime <=4 mcg/mL Sensitive     Clindamycin 0.5 mcg/mL Sensitive     Daptomycin 1 mcg/mL Sensitive     Erythromycin >4 mcg/mL Resistant     Linezolid 2 mcg/mL Sensitive     Oxacillin 1 mcg/mL Sensitive     Tetracycline <=4 mcg/mL Sensitive      Trimeth/Sulfa <=0.5/9.5 m... Sensitive     Vancomycin 1 mcg/mL Sensitive                                  Patient allergies and medications reviewed via Epic.     Wound Assessment:    Procedures:    -2% viscous lidocaine applied topically to wound bed for approximately 5 minutes prior to debridement  -Curette used to debride wound bed and periwound callus. Entire surface of wound, <20cm2 debrided.    -Refer to flowsheet for wound care details.     Post-debridement Photo            Wound 03/07/24 Incision Abdomen Midline (Active)   Wound Image   04/02/24 0730   Site Assessment Yellow 04/02/24 0730   Periwound Assessment Scar tissue;Dry 04/02/24 0730   Margins Unattached edges 04/02/24 0730   Drainage Amount MANUEL 04/02/24 0730   Treatments Cleansed;Topical Lidocaine;CSWD - Conservative Sharp Wound Debridement 04/02/24 0730   Wound Cleansing Hypochlorus Acid 04/02/24 0730   Periwound Protectant Barrier Paste;Skin Moisturizer 04/02/24 0730   Dressing Cleansing/Solutions Not Applicable 04/02/24 0730   Dressing Options Honey Gel;Nonadhesive Foam;Hypafix Tape 04/02/24 0730   Dressing Change/Treatment Frequency Every 48 hrs, and As Needed 04/02/24 0730   Wound Team Following Weekly 04/02/24 0730   Non-staged Wound Description Full thickness 04/02/24 0730   Wound Length (cm) 0.2 cm 04/02/24 0730   Wound Width (cm) 1.3 cm 04/02/24 0730   Wound Depth (cm) 0.4 cm 04/02/24 0730   Wound Surface Area (cm^2) 0.26 cm^2 04/02/24 0730   Wound Volume (cm^3) 0.104 cm^3 04/02/24 0730   Post-Procedure Length (cm) 0.2 cm 04/02/24 0730   Post-Procedure Width (cm) 1.03 cm 04/02/24 0730   Post-Procedure Depth (cm) 0.4 cm 04/02/24 0730   Post-Procedure Surface Area (cm^2) 0.206 cm^2 04/02/24 0730   Post-Procedure Volume (cm^3) 0.0824 cm^3 04/02/24 0730   Tunneling (cm) 0 cm 04/02/24 0730   Undermining (cm) 0 cm 04/02/24 0730   Wound Odor None 04/02/24 0730   Exposed Structures Other (Adipose vs slough) 04/02/24 0730       PATIENT  EDUCATION  -Advised to go to ER for any increased redness, swelling, drainage or odor, or if patient develops fever, chills, nausea or vomiting.  -Importance of adequate nutrition for wound healing  -Increase protein intake (unless contraindicated by renal status)  DRESSING CARE:  -Ideally change dressing every 3 days, however may change every other day for showers.  -May shower with dressing off. Must irrigate wound with normal saline after shower/prior to any dressing change.   -Apply zinc barrier paste to immediate periwound.   -Apply honey gel to wound bed.  -Cover with nonadhesive foam and secure with hypafix tape (extra dressing supplies provided to patient).

## 2024-04-03 NOTE — DISCHARGE SUMMARY
Discharge Summary    CHIEF COMPLAINT ON ADMISSION  No chief complaint on file.      Reason for Admission  Acute appendicitis     Admission Date  3/9/2024    CODE STATUS  Prior    HPI & HOSPITAL COURSE  This is a 63 y.o. female here with previous medical history that includes coronary artery disease, dyslipidemia, hypertension, obstructive sleep apnea, hypothyroidism, BMI 36.  Patient originally presented to [facility] on March 7 for evaluation of abdominal pain.  There she had CT imaging which indicated acute appendicitis.  She was taken to the OR for an anticipated laparoscopic appendectomy.  The intubation however was difficult and required multiple attempts from several providers before she was successfully intubated.  She then underwent laparoscopic appendectomy the procedure itself was uncomplicated.  Because of the difficulty with intubation, she was transferred to [facility] while still on a ventilator for ENT evaluation due to concern of difficulty with extubation.    At our facility she was evaluated by the attending intensivist, who documented a cuff leak, and looked fiberoptically and document normal anatomy.  She was therefore extubated, without difficulty.  She was observed for further 24 hours, and on the day of discharge he is tolerating a p.o. diet, on room air, does not have any complaints of sore throat difficulty swallowing or any other airway complaints.  She will therefore be discharged home to follow-up with surgery and primary care physician.    I have had a long discussion with she and her family regarding the difficulty of intubation.  I have asked them to make sure that if she has any future surgical interventions that they know there was difficulty with her intubation so that they are prepared.  I have also advised her to carry a medical alarm bracelet which documents this should she be unable to speak to any future providers in an emergent situation.  No notes on  file    Therefore, she is discharged in good and stable condition to home with close outpatient follow-up.    The patient met 2-midnight criteria for an inpatient stay at the time of discharge.    Discharge Date  3/11/2024    FOLLOW UP ITEMS POST DISCHARGE  With surgery    DISCHARGE DIAGNOSES  Principal Problem (Resolved):    Acute appendicitis (POA: Yes)  Active Problems:    Benign essential HTN (POA: Yes)    GERD (gastroesophageal reflux disease) (POA: Yes)    LIDYA (obstructive sleep apnea) (POA: Yes)  Resolved Problems:    History of total thyroidectomy (POA: Yes)    Coronary artery disease due to calcified coronary lesion: History of small diagonal occlusion without other significant disease. (POA: Yes)    Respiratory failure with hypoxia (HCC) (POA: Unknown)    Difficult airway for intubation (POA: Unknown)      FOLLOW UP  Future Appointments   Date Time Provider Department Center   4/9/2024  7:30 AM Mirela Springer R.N. [facility] [address] [place] [address]   4/16/2024  8:00 AM NAVARRO Holm ND [address] [place] [address]   4/29/2024  8:30 AM aSmantha Glaser R.N. [facility] [address] [place] [address]   5/3/2024 12:40 PM Jasper Merritt M.D. [facility] None   5/16/2024  8:00 AM Jess Ferreira P.A.-C. 25M Kaycee Zamudio M.D.  [address]  [facility] [id number] [phone]    Schedule an appointment as soon as possible for a visit in 2 week(s)      Jagruti Stewart D.O.  [address]  [facility] [id number] [phone]            MEDICATIONS ON DISCHARGE     Medication List        CHANGE how you take these medications        Instructions   Synthroid 200 MCG Tabs  What changed: how much to take  Generic drug: levothyroxine   TAKE 1 TABLET EVERY MORNINGON AN EMPTY STOMACH            CONTINUE taking these medications        Instructions   amLODIPine 5 MG Tabs  Commonly known as: Norvasc   Take 5 mg by mouth every day.  Dose: 5 mg     aspirin 81 MG Chew chewable tablet  Commonly known as: Asa   Chew 81 mg every  day.  Dose: 81 mg     atorvastatin 80 MG tablet  Commonly known as: Lipitor   Take 80 mg by mouth every day.  Dose: 80 mg     carvedilol 6.25 MG Tabs  Commonly known as: Coreg   Take 1 Tablet by mouth 2 times a day with meals.  Dose: 6.25 mg     Hair Skin & Nails Tabs   Take 1 Tablet by mouth every day.  Dose: 1 Tablet              Allergies  Allergies   Allergen Reactions    Inapsine [Droperidol] Anaphylaxis    Apple Swelling    Apricot Flavor Swelling       DIET  No orders of the defined types were placed in this encounter.      ACTIVITY  As tolerated.  Weight bearing as tolerated    CONSULTATIONS  General surgery    PROCEDURES  Laparoscopic appendectomy    LABORATORY  Lab Results   Component Value Date    SODIUM 140 03/10/2024    POTASSIUM 4.3 03/10/2024    CHLORIDE 104 03/10/2024    CO2 23 03/10/2024    GLUCOSE 153 (H) 03/10/2024    BUN 33 (H) 03/10/2024    CREATININE 0.88 03/10/2024        Lab Results   Component Value Date    WBC 6.6 03/28/2024    HEMOGLOBIN 13.9 03/28/2024    HEMATOCRIT 42.9 03/28/2024    PLATELETCT 426 03/28/2024        Total time of the discharge process exceeds 45 minutes.  I spent most of that time with the patient reviewing discharge planning instructions her family were present.  We spoke at length particularly regarding her airway and difficulty with intubation and what those implications are for the future.

## 2024-04-09 ENCOUNTER — NON-PROVIDER VISIT (OUTPATIENT)
Dept: WOUND CARE | Facility: MEDICAL CENTER | Age: 64
End: 2024-04-09
Attending: PHYSICIAN ASSISTANT
Payer: COMMERCIAL

## 2024-04-09 PROCEDURE — 97597 DBRDMT OPN WND 1ST 20 CM/<: CPT

## 2024-04-09 NOTE — PROGRESS NOTES
2% topical lidocaine applied with a 5 minute dwell time.  CSWD using curette to remove  nonviable tissue from  wound bed and wound edges.  See flowsheet for wound details.

## 2024-04-09 NOTE — PATIENT INSTRUCTIONS
After Visit Summary Wound Care Instructions    Nutrition - Patient instructed increased protein diet unless contraindicated in renal failure (meat, eggs, fish, yogurt, cottage cheese, beans), use of multivitamin with minerals and Arginaid supplementation (check if ok with Primary Care Provider first).    Infection -  instructed signs and symptoms of infection, increased redness and swelling, localized heat over wound and surrounding area/fever/chills/nausea and vomiting, when to call doctor or go to Emergency Room.     Dressing Changes - Instructed patient rationale for wound care products. Instructed to keep dressings clean and dry, shower on clinic days right before coming in. Change your dressing if it becomes soiled, soaked, or falls off.      Questions - should you have any questions regarding your home care instructions, please contact the wound center at [phone]. If after hours, contact your primary care physician or go to the hospital emergency room.

## 2024-04-16 ENCOUNTER — OFFICE VISIT (OUTPATIENT)
Dept: WOUND CARE | Facility: MEDICAL CENTER | Age: 64
End: 2024-04-16
Attending: PHYSICIAN ASSISTANT
Payer: COMMERCIAL

## 2024-04-16 VITALS
DIASTOLIC BLOOD PRESSURE: 68 MMHG | RESPIRATION RATE: 17 BRPM | OXYGEN SATURATION: 91 % | TEMPERATURE: 96.7 F | HEART RATE: 77 BPM | SYSTOLIC BLOOD PRESSURE: 122 MMHG

## 2024-04-16 DIAGNOSIS — Z90.49 S/P LAPAROSCOPIC APPENDECTOMY: ICD-10-CM

## 2024-04-16 DIAGNOSIS — R23.2 HOT FLASHES: ICD-10-CM

## 2024-04-16 DIAGNOSIS — E78.5 DYSLIPIDEMIA: ICD-10-CM

## 2024-04-16 DIAGNOSIS — T81.31XA: ICD-10-CM

## 2024-04-16 DIAGNOSIS — K21.9 GASTROESOPHAGEAL REFLUX DISEASE WITHOUT ESOPHAGITIS: ICD-10-CM

## 2024-04-16 PROCEDURE — 99214 OFFICE O/P EST MOD 30 MIN: CPT

## 2024-04-16 PROCEDURE — 3078F DIAST BP <80 MM HG: CPT | Performed by: NURSE PRACTITIONER

## 2024-04-16 PROCEDURE — 3074F SYST BP LT 130 MM HG: CPT | Performed by: NURSE PRACTITIONER

## 2024-04-16 PROCEDURE — 11042 DBRDMT SUBQ TIS 1ST 20SQCM/<: CPT | Performed by: NURSE PRACTITIONER

## 2024-04-16 PROCEDURE — 11042 DBRDMT SUBQ TIS 1ST 20SQCM/<: CPT

## 2024-04-16 RX ORDER — FLUOXETINE 10 MG/1
10 CAPSULE ORAL DAILY
Qty: 90 CAPSULE | Refills: 3 | Status: SHIPPED | OUTPATIENT
Start: 2024-04-16

## 2024-04-16 RX ORDER — OMEPRAZOLE 40 MG/1
40 CAPSULE, DELAYED RELEASE ORAL DAILY
Qty: 90 CAPSULE | Refills: 3 | Status: SHIPPED | OUTPATIENT
Start: 2024-04-16

## 2024-04-16 RX ORDER — ATORVASTATIN CALCIUM 80 MG/1
80 TABLET, FILM COATED ORAL DAILY
Qty: 90 TABLET | Refills: 3 | Status: SHIPPED | OUTPATIENT
Start: 2024-04-16

## 2024-04-16 ASSESSMENT — ENCOUNTER SYMPTOMS
EYES NEGATIVE: 1
COUGH: 0
PALPITATIONS: 0
WEAKNESS: 0
CHILLS: 0
WHEEZING: 0
SHORTNESS OF BREATH: 0
NERVOUS/ANXIOUS: 0
CLAUDICATION: 0
FALLS: 0
FEVER: 0
BACK PAIN: 0
DEPRESSION: 0
NAUSEA: 0
VOMITING: 0
DIAPHORESIS: 0

## 2024-04-16 NOTE — PATIENT INSTRUCTIONS
-Keep your wound dressing clean, dry, and intact. Only change dressing if it's over saturated, soiled or falls off.     -Change your dressing if it becomes soiled, soaked, or falls off.    -Should you experience any significant changes in your wound(s), such as infection (redness, swelling, localized heat, increased pain, fever > 101 F, chills) or have any questions regarding your home care instructions, please contact the wound center at [phone]. If after hours, contact your primary care physician or go to the hospital emergency room.

## 2024-04-16 NOTE — PROGRESS NOTES
Provider Encounter- Full Thickness wound    HISTORY OF PRESENT ILLNESS  Wound History:    START OF CARE IN CLINIC: 4/2/24    REFERRING PROVIDER: Jess Ferreira      WOUND- Full Thickness Wound   LOCATION: Umbilicus lap site   HISTORY: Patient moved back to [place] 3/5/2024 and developed mid abdominal pain 3/6/2024 that migrated to RLQ on 3/7/2024.  She followed up at [facility] and underwent laparoscopic appendectomy with Dr. Zamudio on 3/7/2024.  During surgery they had difficulty intubating patient and extubating.  She was transferred to [facility] where ET tube was eventually extubated.  She discharged.  Umbilicus lap site dehisced approximately 1 week postop.  She followed up with Dr. Lizz miguel who recommended further follow-up with PCP.  She obtained PCP and patient had increasing induration and pain.  She underwent CT of abdomen which was negative for abscess, negative for cellulitis.  She was referred to [facility] wound care clinic for further treatment.      Pertinent Medical History: CAD-MI x 5 , HTN, obesity, LIDYA    TOBACCO USE:  no    Patient's problem list, allergies, and current medications reviewed and updated in Epic    Interval History:  4/16/2024: Initial provider Clinic visit with Kriss ARCHULETA, CESARP-BC, CWON, CFCN.  Pt denies fevers, chills, nausea, vomiting.  Going out of town for the next 1.5wk.  Followed up with Dr. Lizz miguel who reports no further follow-up and to follow-up with PCP.  Currently not on antibiotics.        REVIEW OF SYSTEMS:   Review of Systems   Constitutional:  Negative for chills, diaphoresis and fever.   HENT:          Sore throat, difficult swallowing at times, ear tubes.  Has follow-up with ENT May 2024   Eyes: Negative.    Respiratory:  Negative for cough, shortness of breath and wheezing.    Cardiovascular:  Negative for chest pain, palpitations and claudication.   Gastrointestinal:  Negative for nausea and vomiting.   Musculoskeletal:  Negative  for back pain, falls and joint pain.   Skin:  Negative for itching and rash.        Umbilicus wound   Neurological:  Negative for weakness.   Psychiatric/Behavioral:  Negative for depression. The patient is not nervous/anxious.        PHYSICAL EXAMINATION:   /68   Pulse 77   Temp 35.9 °C (96.7 °F) (Temporal)   Resp 17   SpO2 91%     Physical Exam  Vitals reviewed.   Cardiovascular:      Rate and Rhythm: Normal rate.   Pulmonary:      Effort: Pulmonary effort is normal.   Abdominal:      Palpations: Abdomen is soft.      Tenderness: There is no abdominal tenderness.   Skin:     Comments: Umbilicus lap site dehisced: full thickness, decreased in area, 1 suture vicryl, senescent red tissue, no evidence of infection, no induration, nontender, discolored epithelium at umbilicus, no warmth, no erythema    abdominal lap Sites to inferior abdomen and LLQ : Remaining 2 lap sites approximated   Neurological:      Mental Status: She is alert.   Psychiatric:         Mood and Affect: Mood normal.         Behavior: Behavior normal.       WOUND ASSESSMENT  Wound 03/07/24 Incision Abdomen Midline (Active)   Wound Image   04/16/24 0800   Site Assessment Red;[place] 04/16/24 0800   Periwound Assessment Scar tissue;Dry 04/16/24 0800   Margins Epibole (rolled edges) 04/16/24 0800   Drainage Amount Scant 04/16/24 0800   Drainage Description Serosanguineous 04/16/24 0800   Treatments Cleansed;Topical Lidocaine;Provider debridement;Site care 04/16/24 0800   Wound Cleansing Hypochlorus Acid 04/16/24 0800   Periwound Protectant Skin Protectant Wipes to Periwound 04/16/24 0800   Dressing Cleansing/Solutions Not Applicable 04/16/24 0800   Dressing Options Hydrofera Blue Ready;Silicone Adhesive Foam 04/16/24 0800   Dressing Change/Treatment Frequency Every 72 hrs, and As Needed 04/16/24 0800   Wound Team Following Weekly 04/16/24 0800   Non-staged Wound Description Full thickness 04/16/24 0800   Wound Length (cm) 0.1 cm 04/16/24 0800    Wound Width (cm) 0.2 cm 24   Wound Depth (cm) 0.1 cm 24   Wound Surface Area (cm^2) 0.02 cm^2 24   Wound Volume (cm^3) 0.002 cm^3 24   Post-Procedure Length (cm) 0.2 cm 24   Post-Procedure Width (cm) 0.2 cm 24   Post-Procedure Depth (cm) 0.2 cm 24   Post-Procedure Surface Area (cm^2) 0.04 cm^2 24   Post-Procedure Volume (cm^3) 0.008 cm^3 24   Wound Healing % 98 24   Tunneling (cm) 0 cm 24   Undermining (cm) 0 cm 24   Wound Odor None 24   Exposed Structures None 24   Number of days: 40       PROCEDURE:   -2% viscous lidocaine applied topically to wound bed for approximately 5 minutes prior to debridement  -Curette used to debride wound bed.  Excisional debridement was performed to remove devitalized tissue until healthy, bleeding tissue was visualized.   Entire surface of wound, 0.04cm2 debrided.  Tissue debrided into the subcutaneous layer.    -Bleeding controlled with manual pressure.    -Wound care completed by wound RN, refer to flowsheet  -Patient tolerated the procedure well, without c/o pain or discomfort.       Pertinent Labs and Diagnostics:    Labs:     A1c:   Lab Results   Component Value Date/Time    HBA1C 6.3 (H) 2024 09:41 AM    HBA1C 5.8 (H) 2021 09:15 AM          IMAGIN. Interval appendectomy since prior. No acute abnormality detected. No fluid collection or abscess detected.     VASCULAR STUDIES: n/a    LAST  WOUND CULTURE:  DATE :   Lab Results   Component Value Date/Time    CULTRSULT - (A) 2024 08:47 PM    CULTRSULT Staphylococcus aureus  Heavy growth   (A) 2024 08:47 PM         ASSESSMENT AND PLAN:     1. Dehiscence of appendectomy wound  2. S/P laparoscopic appendectomy  S/p laparoscopic cecectomy 3/7/2024 by Dr. Zamudio with 3 lap sites    2024: Ulcer has decreased in area, there is a Vicryl suture.  Tissue  with senescent red tissue.  - Excisional debridement performed today.  Medically necessary to promote wound healing.  -Patient to change dressings 2-3 times per week.  - Okay to shower without dressing on day of dressing changes.  May use waterproof bandages that patient has already purchased on non dressing change days to shower.  Do not recommend any soaking, swimming, hot tub use until ulcer has resolved.  - No evidence of infection noted today  - Patient to return to wound care clinic when she returns from vacation    Wound care: Hydrofera Blue, adhesive foam            PATIENT EDUCATION  - Importance of adequate nutrition for wound healing  -Advised to go to ER for any increased redness, swelling, drainage, or odor, or if patient develops fever, chills, nausea or vomiting.     My total time spent caring for the patient on the day of the encounter was 30 minutes.   This does not include time spent on separately billable procedures/tests.       Please note that this note may have been created using voice recognition software. I have worked with technical experts from [facility] to optimize the interface.  I have made every reasonable attempt to correct obvious errors, but there may be errors of grammar and possibly content that I did not discover before finalizing the note.    N

## 2024-04-16 NOTE — TELEPHONE ENCOUNTER
Received request via: Patient    Was the patient seen in the last year in this department? Yes    Does the patient have an active prescription (recently filled or refills available) for medication(s) requested? No    Pharmacy Name: [facility]    Does the patient have long-term Plus and need 100 day supply (blood pressure, diabetes and cholesterol meds only)? Patient does not have SCP

## 2024-04-29 ENCOUNTER — NON-PROVIDER VISIT (OUTPATIENT)
Dept: WOUND CARE | Facility: MEDICAL CENTER | Age: 64
End: 2024-04-29
Attending: PHYSICIAN ASSISTANT
Payer: COMMERCIAL

## 2024-04-29 PROCEDURE — 99211 OFF/OP EST MAY X REQ PHY/QHP: CPT

## 2024-04-29 NOTE — CERTIFICATION
Advanced Wound Care   [facility]   [address]   [address]   [place] [address]   [phone] Fax: [phone]    Discharge Note      Referring Physician: Jess Ferreira   Wound Etiology & Wound location: Umbilicus lap site     Date of Discharge: 4/29/24    Assessment:  Discharge patient at this time secondary to wound resolution.  Thank you for the referral and the opportunity to treat your patient.      Clinician Signature: _____________________________ Date:_______________

## 2024-04-29 NOTE — PATIENT INSTRUCTIONS
Should you experience any significant changes in your wound site such as infection (redness, swelling, localized heat, increased pain, fever >101 F, chills) or site reopening, contact your primary care physician or go the hospital emergency room.  Apply lotion to area.

## 2024-05-02 ENCOUNTER — HOSPITAL ENCOUNTER (OUTPATIENT)
Dept: LAB | Facility: MEDICAL CENTER | Age: 64
End: 2024-05-02
Attending: PHYSICIAN ASSISTANT
Payer: COMMERCIAL

## 2024-05-02 DIAGNOSIS — E66.9 OBESITY (BMI 30-39.9): ICD-10-CM

## 2024-05-02 DIAGNOSIS — E55.9 VITAMIN D DEFICIENCY: ICD-10-CM

## 2024-05-02 DIAGNOSIS — E03.9 HYPOTHYROIDISM, UNSPECIFIED TYPE: ICD-10-CM

## 2024-05-02 DIAGNOSIS — Z11.4 SCREENING FOR HIV (HUMAN IMMUNODEFICIENCY VIRUS): ICD-10-CM

## 2024-05-02 DIAGNOSIS — Z90.49 S/P APPENDECTOMY: ICD-10-CM

## 2024-05-02 LAB
25(OH)D3 SERPL-MCNC: 34 NG/ML (ref 30–100)
ALBUMIN SERPL BCP-MCNC: 4.3 G/DL (ref 3.2–4.9)
ALBUMIN/GLOB SERPL: 1.5 G/DL
ALP SERPL-CCNC: 147 U/L (ref 30–99)
ALT SERPL-CCNC: 23 U/L (ref 2–50)
ANION GAP SERPL CALC-SCNC: 10 MMOL/L (ref 7–16)
AST SERPL-CCNC: 16 U/L (ref 12–45)
BASOPHILS # BLD AUTO: 1.1 % (ref 0–1.8)
BASOPHILS # BLD: 0.09 K/UL (ref 0–0.12)
BILIRUB SERPL-MCNC: 0.4 MG/DL (ref 0.1–1.5)
BUN SERPL-MCNC: 11 MG/DL (ref 8–22)
CALCIUM ALBUM COR SERPL-MCNC: 9.2 MG/DL (ref 8.5–10.5)
CALCIUM SERPL-MCNC: 9.4 MG/DL (ref 8.5–10.5)
CHLORIDE SERPL-SCNC: 106 MMOL/L (ref 96–112)
CO2 SERPL-SCNC: 26 MMOL/L (ref 20–33)
CREAT SERPL-MCNC: 0.94 MG/DL (ref 0.5–1.4)
EOSINOPHIL # BLD AUTO: 0.52 K/UL (ref 0–0.51)
EOSINOPHIL NFR BLD: 6.1 % (ref 0–6.9)
ERYTHROCYTE [DISTWIDTH] IN BLOOD BY AUTOMATED COUNT: 41.3 FL (ref 35.9–50)
GFR SERPLBLD CREATININE-BSD FMLA CKD-EPI: 68 ML/MIN/1.73 M 2
GLOBULIN SER CALC-MCNC: 2.8 G/DL (ref 1.9–3.5)
GLUCOSE SERPL-MCNC: 107 MG/DL (ref 65–99)
HCT VFR BLD AUTO: 44.1 % (ref 37–47)
HGB BLD-MCNC: 14.4 G/DL (ref 12–16)
HIV 1+2 AB+HIV1 P24 AG SERPL QL IA: NORMAL
IMM GRANULOCYTES # BLD AUTO: 0.01 K/UL (ref 0–0.11)
IMM GRANULOCYTES NFR BLD AUTO: 0.1 % (ref 0–0.9)
LYMPHOCYTES # BLD AUTO: 3.49 K/UL (ref 1–4.8)
LYMPHOCYTES NFR BLD: 40.9 % (ref 22–41)
MCH RBC QN AUTO: 30.2 PG (ref 27–33)
MCHC RBC AUTO-ENTMCNC: 32.7 G/DL (ref 32.2–35.5)
MCV RBC AUTO: 92.5 FL (ref 81.4–97.8)
MONOCYTES # BLD AUTO: 0.62 K/UL (ref 0–0.85)
MONOCYTES NFR BLD AUTO: 7.3 % (ref 0–13.4)
NEUTROPHILS # BLD AUTO: 3.8 K/UL (ref 1.82–7.42)
NEUTROPHILS NFR BLD: 44.5 % (ref 44–72)
NRBC # BLD AUTO: 0 K/UL
NRBC BLD-RTO: 0 /100 WBC (ref 0–0.2)
PLATELET # BLD AUTO: 432 K/UL (ref 164–446)
PMV BLD AUTO: 10 FL (ref 9–12.9)
POTASSIUM SERPL-SCNC: 4.5 MMOL/L (ref 3.6–5.5)
PROT SERPL-MCNC: 7.1 G/DL (ref 6–8.2)
RBC # BLD AUTO: 4.77 M/UL (ref 4.2–5.4)
SODIUM SERPL-SCNC: 142 MMOL/L (ref 135–145)
TSH SERPL DL<=0.005 MIU/L-ACNC: 2.58 UIU/ML (ref 0.38–5.33)
WBC # BLD AUTO: 8.5 K/UL (ref 4.8–10.8)

## 2024-05-03 ENCOUNTER — OFFICE VISIT (OUTPATIENT)
Dept: CARDIOLOGY | Facility: MEDICAL CENTER | Age: 64
End: 2024-05-03
Attending: PHYSICIAN ASSISTANT
Payer: COMMERCIAL

## 2024-05-03 VITALS
HEIGHT: 68 IN | HEART RATE: 81 BPM | DIASTOLIC BLOOD PRESSURE: 70 MMHG | BODY MASS INDEX: 35.16 KG/M2 | OXYGEN SATURATION: 93 % | WEIGHT: 232 LBS | SYSTOLIC BLOOD PRESSURE: 114 MMHG | RESPIRATION RATE: 16 BRPM

## 2024-05-03 DIAGNOSIS — I25.3 VENTRICULAR ANEURYSM AS COMPLICATION OF ACUTE MYOCARDIAL INFARCTION (HCC): ICD-10-CM

## 2024-05-03 DIAGNOSIS — E78.2 MIXED HYPERLIPIDEMIA: ICD-10-CM

## 2024-05-03 DIAGNOSIS — I23.8 VENTRICULAR ANEURYSM AS COMPLICATION OF ACUTE MYOCARDIAL INFARCTION (HCC): ICD-10-CM

## 2024-05-03 DIAGNOSIS — I25.42 SPONTANEOUS DISSECTION OF CORONARY ARTERY: ICD-10-CM

## 2024-05-03 DIAGNOSIS — I25.2 HISTORY OF MYOCARDIAL INFARCTION: Primary | ICD-10-CM

## 2024-05-03 DIAGNOSIS — Z76.89 ENCOUNTER TO ESTABLISH CARE: ICD-10-CM

## 2024-05-03 DIAGNOSIS — R73.03 PREDIABETES: ICD-10-CM

## 2024-05-03 DIAGNOSIS — E66.09 CLASS 2 OBESITY DUE TO EXCESS CALORIES WITHOUT SERIOUS COMORBIDITY WITH BODY MASS INDEX (BMI) OF 35.0 TO 35.9 IN ADULT: ICD-10-CM

## 2024-05-03 PROBLEM — E66.812 CLASS 2 OBESITY DUE TO EXCESS CALORIES WITHOUT SERIOUS COMORBIDITY WITH BODY MASS INDEX (BMI) OF 35.0 TO 35.9 IN ADULT: Status: ACTIVE | Noted: 2024-05-03

## 2024-05-03 LAB — EKG IMPRESSION: NORMAL

## 2024-05-03 PROCEDURE — 93010 ELECTROCARDIOGRAM REPORT: CPT | Performed by: INTERNAL MEDICINE

## 2024-05-03 PROCEDURE — 99204 OFFICE O/P NEW MOD 45 MIN: CPT | Performed by: INTERNAL MEDICINE

## 2024-05-03 PROCEDURE — G2211 COMPLEX E/M VISIT ADD ON: HCPCS | Performed by: INTERNAL MEDICINE

## 2024-05-03 PROCEDURE — 3078F DIAST BP <80 MM HG: CPT | Performed by: INTERNAL MEDICINE

## 2024-05-03 PROCEDURE — 3074F SYST BP LT 130 MM HG: CPT | Performed by: INTERNAL MEDICINE

## 2024-05-03 RX ORDER — CARVEDILOL 6.25 MG/1
6.25 TABLET ORAL 2 TIMES DAILY WITH MEALS
Qty: 180 TABLET | Refills: 3 | Status: SHIPPED | OUTPATIENT
Start: 2024-05-03

## 2024-05-03 ASSESSMENT — FIBROSIS 4 INDEX: FIB4 SCORE: 0.48

## 2024-05-03 NOTE — PATIENT INSTRUCTIONS
--Take vitamin D 5,000 Units daily  --Semaglutide 0.5mg weekly and update me every 4 weeks  --Check blood pressure mid day 3 days a week, let me know with persistent number higher than 120/80mmHg    Mediterranean diet.  Pritikin - [facility] at [facility], probably one of the best for anti-inflammatory  DASH DIET - American Heart Association (mostly for hypertension)  Ornish Diet   Vegan diet (proven to reverse vascular disease)     Resources to learn more:  American Heart Association   Www.Cardiosmart.org, sponsored by the American College of cardiology.     A great diet is a healthy one that is sustainable with the ability to commit to it long term. Happy to see your will to make a change.     Intermittent fasting is also an additional option: limit your intake of food / drinks to 8 hours in a 24 hour time frame. It can be tough to adapt in the beginning and you can try it 1-2 days a week as a start and eventually will become a lifestyle.

## 2024-05-03 NOTE — PROGRESS NOTES
CARDIOLOGY NEW PATIENT:    PCP: Jess Ferreira P.A.-C.    1. History of myocardial infarction: On 5 occasions and with a segmental wall motion abnormality but possibly secondary to vasospasm    2. Encounter to establish care    3. Class 2 obesity due to excess calories without serious comorbidity with body mass index (BMI) of 35.0 to 35.9 in adult    4. Spontaneous dissection of coronary artery    5. Mixed hyperlipidemia    6. Prediabetes    7. 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 female with history of hypertension, possible history of SCAD on carvedilol and aspirin, hypothyroidism is here to establish care.    At age 40, had MI and per report clean cath ? Blockage towards end of vessel at [place]. She was going divorce at that time    At age 46 had similar symptoms, repeat cath no blockages needed stents.    Then at age 49, had MI symptoms again with ? Clean cath.    Saw a cardiologist in [place] and was told about SCAD about a year ago. On Coreg and atorvastatin    Takes aspirin 81 mg daily with no bleeding concerns    Overall feels good but gaining weight. No exercise routine. Gets SOB with fast walking. Denies CP, dizziness, LH, syncope. Occasional KANDIS and palpitations / chest butterflies sensation.    Saw Dr. Salazar in 7/2020: was on diltiazem 180mg, does not recall when she stopped it.    Moved back to [facility] 3/2024 from [place]  Single, 3 children  Non smoker  Works from home , [employer].   Social alcohol  Ice tea in am  Single    TSH 2.58 5/2024    TTE 3/2024: personally reviewed (I was unable to open the images of the prior study in 2019 but per report it commented on mildly reduced LVEF and apical wall motion abnormalities as well)  Normal  "LVEF  Mild aneurysmal apex    A1C 6.3% 3/2024    Lipid 7/2021:  LDL 97, TG 91    CAC score 7/2022:  Calcium score of 0 places the patient at very low risk for coronary artery disease.         PE:  /70 (BP Location: Left arm, Patient Position: Sitting, BP Cuff Size: Adult)   Pulse 81   Resp 16   Ht 1.727 m (5' 8\")   Wt 105 kg (232 lb)   SpO2 93%   BMI 35.28 kg/m²     Gen: well  HEENT: Symmetric face.  NECK: No JVD.   CARDIAC: Regular, Normal S1, S2, No murmur  RESP: Clear to auscultation bilaterally   EXT: No edema  SKIN: Warm and dry  NEURO: No gross deficits  PSYCH: Appropriate affect, participates in conversation    The ASCVD Risk score (Jaime DK, et al., 2019) failed to calculate.    Past Medical History:   Diagnosis Date    Benign paroxysmal positional vertigo of right ear     History of myocardial infarction     x5; cardiac caths only, no stents placed    Hyperlipidemia     Hypertension     Thyroid disease      Past Surgical History:   Procedure Laterality Date    MA LAP,APPENDECTOMY N/A 03/07/2024    Procedure: APPENDECTOMY, LAPAROSCOPIC;  Surgeon: Manfred Zamudio M.D.;  Location: SURGERY [facility];  Service: Gastroenterology    CARPAL TUNNEL RELEASE Right     CHOLECYSTECTOMY      THYROIDECTOMY TOTAL       Allergies   Allergen Reactions    Inapsine [Droperidol] Anaphylaxis    Apple Swelling    Apricot Flavor Swelling     Outpatient Encounter Medications as of 5/3/2024   Medication Sig Dispense Refill    Semaglutide,0.25 or 0.5MG/DOS, 2 MG/3ML Solution Pen-injector Inject 0.5 mg under the skin every 7 days. 3 mL 11    carvedilol (COREG) 6.25 MG Tab Take 1 Tablet by mouth 2 times a day with meals. 180 Tablet 3    atorvastatin (LIPITOR) 80 MG tablet Take 1 Tablet by mouth every day. 90 Tablet 3    omeprazole (PRILOSEC) 40 MG delayed-release capsule Take 1 Capsule by mouth every day. 90 Capsule 3    FLUoxetine (PROZAC) 10 MG Cap Take 1 Capsule by mouth every day. 90 Capsule 3    Multiple " Vitamins-Minerals (HAIR SKIN & NAILS) Tab Take 1 Tablet by mouth every day.      SYNTHROID 200 MCG Tab TAKE 1 TABLET EVERY MORNINGON AN EMPTY STOMACH (Patient taking differently: Take 200 mcg by mouth every morning on an empty stomach.) 90 Tablet 3    aspirin (ASA) 81 MG Chew Tab chewable tablet Chew 81 mg every day.      [DISCONTINUED] amLODIPine (NORVASC) 5 MG Tab Take 5 mg by mouth every day.      [DISCONTINUED] carvedilol (COREG) 6.25 MG Tab Take 1 Tablet by mouth 2 times a day with meals. 180 Tablet 3     No facility-administered encounter medications on file as of 5/3/2024.     Social History     Socioeconomic History    Marital status:      Spouse name: Not on file    Number of children: Not on file    Years of education: Not on file    Highest education level: Not on file   Occupational History    Not on file   Tobacco Use    Smoking status: Never    Smokeless tobacco: Never   Vaping Use    Vaping Use: Never used   Substance and Sexual Activity    Alcohol use: Yes     Comment: 1-2 drinks per month    Drug use: No    Sexual activity: Not on file   Other Topics Concern    Not on file   Social History Narrative    Not on file     Social Determinants of Health     Financial Resource Strain: Not on file   Food Insecurity: Not on file   Transportation Needs: Not on file   Physical Activity: Not on file   Stress: Not on file   Social Connections: Not on file   Intimate Partner Violence: Not on file   Housing Stability: Not on file     Family History   Problem Relation Age of Onset    Alzheimer's Disease Mother     Heart Disease Father 70        CAD    Lupus Maternal Grandmother     Heart Disease Maternal Grandmother     Lung Cancer Maternal Grandfather     No Known Problems Daughter     No Known Problems Daughter     No Known Problems Son          Studies    Lab Results   Component Value Date/Time    TSHULTRASEN 2.580 05/02/2024 0910        Lab Results   Component Value Date/Time    FREET4 2.79 (H)  2024 1853      Lab Results   Component Value Date/Time    HBA1C 6.3 (H) 2024 09:41 AM    HBA1C 5.8 (H) 2021 09:15 AM     Lab Results   Component Value Date/Time    CHOLSTRLTOT 167 2021 09:15 AM    LDL 97 2021 09:15 AM    HDL 52 2021 09:15 AM    TRIGLYCERIDE 121 2024 04:02 AM       Lab Results   Component Value Date/Time    SODIUM 142 2024 09:10 AM    POTASSIUM 4.5 2024 09:10 AM    CHLORIDE 106 2024 09:10 AM    CO2 26 2024 09:10 AM    GLUCOSE 107 (H) 2024 09:10 AM    BUN 11 2024 09:10 AM    CREATININE 0.94 2024 09:10 AM     Lab Results   Component Value Date/Time    ALKPHOSPHAT 147 (H) 2024 09:10 AM    ASTSGOT 16 2024 09:10 AM    ALTSGPT 23 2024 09:10 AM    TBILIRUBIN 0.4 2024 09:10 AM        Echocardiogram:  No results found for this or any previous visit.    EC/3/24 personally reviewed and interpreted  Sinus rhythm   IVCD   Inferior infarct, old   poor R wave progression   Compared to ECG 2024 18:18:08   No significant changes   Electronically Signed On 2024 13:11:07 PDT by Jasper Merritt MD     Assessment and Recommendations:    Problem List Items Addressed This Visit          Cardiology Medicine Problems    Mixed hyperlipidemia    Relevant Medications    Semaglutide,0.25 or 0.5MG/DOS, 2 MG/3ML Solution Pen-injector    carvedilol (COREG) 6.25 MG Tab    Other Relevant Orders    Lipid Profile       Other    History of myocardial infarction: On 5 occasions and with a segmental wall motion abnormality but possibly secondary to vasospasm - Primary    Relevant Medications    carvedilol (COREG) 6.25 MG Tab    Spontaneous dissection of coronary artery    Relevant Medications    Semaglutide,0.25 or 0.5MG/DOS, 2 MG/3ML Solution Pen-injector    carvedilol (COREG) 6.25 MG Tab    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)    Relevant Medications    Semaglutide,0.25 or  0.5MG/DOS, 2 MG/3ML Solution Pen-injector    carvedilol (COREG) 6.25 MG Tab    Prediabetes    Relevant Medications    Semaglutide,0.25 or 0.5MG/DOS, 2 MG/3ML Solution Pen-injector    Class 2 obesity due to excess calories without serious comorbidity with body mass index (BMI) of 35.0 to 35.9 in adult    Relevant Medications    Semaglutide,0.25 or 0.5MG/DOS, 2 MG/3ML Solution Pen-injector     Other Visit Diagnoses       Encounter to establish care        Relevant Orders    EKG (Completed)          She most likely had SCAD in her 30's probably either in the PDA or distal / apical LAD based on her EKG , 0 calcium score in 7/2022 and echocardiogram findings.  This likely vasospasm.    We agreed on ongoing: ASCVD prevention moving forward:  -Take vitamin D 5000 units daily  -Start semaglutide 0.5 mg weekly with monthly update before further increasing the dose to assist with weight loss endeavor  -Advised her to keep track of her blood pressure 3 days a day with persistent numbers higher than 120/80  -Reinforced importance of eating heart healthy diet, details in AVS, along with with exercise routine at least 150 minutes a week    Blood pressure normal and at goal    Continue aspirin, carvedilol    Ordered repeat fasting lipid panel in 6 months, given for some time for lifestyle medications and hopefully few months of semaglutide use  /weight loss.  Continue atorvastatin 80 mg for now.  Will address further based on repeat lipid panel.  Calcium score of 0 7/2022.    Thank you for the opportunity to be involved in Priscilla Childress 's cardiovascular care; and please reach out with any questions or concerns.     () Today's E/M visit is associated with medical care services that serve as the continuing focal point for all needed health care services and/or with medical care services that  are part of ongoing cardiac care related to a patient's single, serious condition, or a complex condition: This includes   furnishing services to patients on an ongoing basis that result in care that is personalized  to the patient. The services result in a comprehensive, longitudinal, and continuous  relationship with the patient and involve delivery of team-based care that is accessible, coordinated with other practitioners and providers, and integrated with the broader health  care landscape.     Return in about 6 months (around 11/3/2024).    Jasper Merritt MD, MPH [facility]  Interventional Cardiologist  [facility]   of Clinical Internal Medicine - [facility]    ~ Portions of this note were completed using voice recognition software (Dragon Naturally speaking software) . Occasional transcription errors may have escaped proof reading. I have made every reasonable attempt to correct obvious errors, but I expect that there are errors of grammar and possibly content that I did not discover before finalizing the note. ~

## 2024-05-06 ENCOUNTER — TELEPHONE (OUTPATIENT)
Dept: CARDIOLOGY | Facility: MEDICAL CENTER | Age: 64
End: 2024-05-06
Payer: COMMERCIAL

## 2024-05-06 DIAGNOSIS — R73.03 PREDIABETES: ICD-10-CM

## 2024-05-06 DIAGNOSIS — E66.09 CLASS 2 OBESITY DUE TO EXCESS CALORIES WITHOUT SERIOUS COMORBIDITY WITH BODY MASS INDEX (BMI) OF 35.0 TO 35.9 IN ADULT: ICD-10-CM

## 2024-05-06 DIAGNOSIS — I25.3 VENTRICULAR ANEURYSM AS COMPLICATION OF ACUTE MYOCARDIAL INFARCTION (HCC): ICD-10-CM

## 2024-05-06 DIAGNOSIS — I25.42 SPONTANEOUS DISSECTION OF CORONARY ARTERY: ICD-10-CM

## 2024-05-06 DIAGNOSIS — I23.8 VENTRICULAR ANEURYSM AS COMPLICATION OF ACUTE MYOCARDIAL INFARCTION (HCC): ICD-10-CM

## 2024-05-06 DIAGNOSIS — E78.2 MIXED HYPERLIPIDEMIA: ICD-10-CM

## 2024-05-06 NOTE — TELEPHONE ENCOUNTER
Noted:  Jasper Merritt M.D.  You; Fabiola Pimentel, PhT41 minutes ago (12:33 PM)     STEPHANIE Hicks: how about mounjaro or wegovy or victoza ?

## 2024-05-06 NOTE — TELEPHONE ENCOUNTER
Cody DE LEON was denied for Ozempic. [employer] will only pay for this medication if the patient has type 2 diabetes. How would MD like to proceed?     Thank you,  Fabiola Pimentel   RX Coordinator   [phone]

## 2024-05-06 NOTE — TELEPHONE ENCOUNTER
Prior Authorization for Ozempic (0.25 or 0.5 MG/DOSE) 2MG/3ML pen-injectors (Quantity: 3, Days: 28) has been submitted via [id number]    Insurance: [employer]     Will follow up in 24-48 business hours.

## 2024-05-06 NOTE — TELEPHONE ENCOUNTER
Prior Authorization for   Ozempic (0.25 or 0.5 MG/DOSE) 2MG/3ML pen-injectors      has been denied for a quantity of 3 , day supply 28    Prior authorization was denied per the following: pt has to have type 2 diabetes    Prior Authorization denial reference number: [id number]  Insurance: [employer]       Next Steps:Proceed with appeal process. Generate proposed appeal for provider approval & signature.

## 2024-05-06 NOTE — TELEPHONE ENCOUNTER
Received Renewal PA request via MSOT  for Ozempic (0.25 or 0.5 MG/DOSE) 2MG/3ML pen-injectors. (Quantity:3, Day Supply:28)     Insurance: [employer]  Member ID:  [id number]  BIN: [id number]  PCN: [id number]  Group: [id number]     Ran Test claim via [place] & medication Rejects stating prior authorization is required.

## 2024-05-07 ENCOUNTER — TELEPHONE (OUTPATIENT)
Dept: CARDIOLOGY | Facility: MEDICAL CENTER | Age: 64
End: 2024-05-07
Payer: COMMERCIAL

## 2024-05-07 NOTE — TELEPHONE ENCOUNTER
Noted:    Fabiola Pimentel, PhT  You; Jasper Merritt M.D.7 minutes ago (12:44 PM)     LP  I called the Insurance @[phone] and they said Wegovy can be approved. It will still need a PA. I would need a new RX for me to do the PA. Thank you.

## 2024-05-07 NOTE — TELEPHONE ENCOUNTER
Prior Authorization for Wegovy 0.25MG/0.5ML auto-injectors (Quantity: 2, Days: 28) has been submitted via [id number]    Insurance: [employer]     Will follow up in 24-48 business hours.

## 2024-05-07 NOTE — TELEPHONE ENCOUNTER
Received New Start PA request via MSOT  for Wegovy 0.25MG/0.5ML auto-injectors. (Quantity:2, Day Supply:28)     Insurance: [employer]  Member ID:  [id number]  BIN: [id number]  PCN: [id number]  Group: [id number]     Ran Test claim via [place] & medication Rejects stating prior authorization is required.

## 2024-05-08 NOTE — TELEPHONE ENCOUNTER
Noted:    Yosef Keenan  You2 hours ago (9:16 AM)     LP  Per the patients plan Wegovy is a plan exclusion for weight loss.I uploaded the PA thi . I'm sorry.     To SANAM BENNETT - it appears GLP-1s are not covered by pt's insurance for weight loss. Thank you!

## 2024-05-10 ENCOUNTER — HOSPITAL ENCOUNTER (OUTPATIENT)
Dept: RADIOLOGY | Facility: MEDICAL CENTER | Age: 64
End: 2024-05-10
Attending: FAMILY MEDICINE
Payer: COMMERCIAL

## 2024-05-10 ENCOUNTER — OFFICE VISIT (OUTPATIENT)
Dept: MEDICAL GROUP | Age: 64
End: 2024-05-10
Payer: COMMERCIAL

## 2024-05-10 VITALS
OXYGEN SATURATION: 98 % | BODY MASS INDEX: 35.61 KG/M2 | SYSTOLIC BLOOD PRESSURE: 130 MMHG | HEART RATE: 85 BPM | WEIGHT: 235 LBS | TEMPERATURE: 97.9 F | DIASTOLIC BLOOD PRESSURE: 76 MMHG | HEIGHT: 68 IN

## 2024-05-10 DIAGNOSIS — M25.512 ACUTE PAIN OF LEFT SHOULDER: ICD-10-CM

## 2024-05-10 PROCEDURE — 3078F DIAST BP <80 MM HG: CPT | Performed by: FAMILY MEDICINE

## 2024-05-10 PROCEDURE — 99214 OFFICE O/P EST MOD 30 MIN: CPT | Performed by: FAMILY MEDICINE

## 2024-05-10 PROCEDURE — 3075F SYST BP GE 130 - 139MM HG: CPT | Performed by: FAMILY MEDICINE

## 2024-05-10 RX ORDER — MELOXICAM 15 MG/1
15 TABLET ORAL DAILY
Qty: 30 TABLET | Refills: 2 | Status: SHIPPED | OUTPATIENT
Start: 2024-05-10

## 2024-05-10 ASSESSMENT — FIBROSIS 4 INDEX: FIB4 SCORE: 0.48

## 2024-05-10 NOTE — PROGRESS NOTES
This medical record contains text that has been entered with the assistance of computer voice recognition and dictation software.  Therefore, it may contain unintended errors in text, spelling, punctuation, or grammar      Chief Complaint   Patient presents with    Fall     PATIENT FELL ON APRIL 22, 2024 ON LEFT ARM AND SHOULDER. HAS BEEN HAVING PAIN AND DISCOMFORT MORE IN THE JOINT         Priscilla Childress is a 63 y.o. female here evaluation and management of: shoulder pain      HPI:           1. Acute pain of left shoulder  History of Present Illness  The patient is a 63-year-old female who presents for evaluation of left shoulder injury after a fall.    The patient experienced a fall on 04/22/2024 while on vacation. She lost her balance while attempting to rise from the bathtub, but managed to catch her arm and knee. Over the past week, she has experienced persistent soreness and difficulty in lifting her left arm. Initially, she suspected a dislocation, however, the condition has not improved over the past three weeks. Despite the discomfort, she has not sought medical attention. She reports being able to move and lift objects, however, she experiences numbness in her left arm while at rest.        Current medicines (including changes today)  Current Outpatient Medications   Medication Sig Dispense Refill    meloxicam (MOBIC) 15 MG tablet Take 1 Tablet by mouth every day. 30 Tablet 2    carvedilol (COREG) 6.25 MG Tab Take 1 Tablet by mouth 2 times a day with meals. 180 Tablet 3    atorvastatin (LIPITOR) 80 MG tablet Take 1 Tablet by mouth every day. 90 Tablet 3    omeprazole (PRILOSEC) 40 MG delayed-release capsule Take 1 Capsule by mouth every day. 90 Capsule 3    FLUoxetine (PROZAC) 10 MG Cap Take 1 Capsule by mouth every day. 90 Capsule 3    Multiple Vitamins-Minerals (HAIR SKIN & NAILS) Tab Take 1 Tablet by mouth every day.      SYNTHROID 200 MCG Tab TAKE 1 TABLET EVERY MORNINGON AN EMPTY STOMACH (Patient  "taking differently: Take 200 mcg by mouth every morning on an empty stomach.) 90 Tablet 3    aspirin (ASA) 81 MG Chew Tab chewable tablet Chew 81 mg every day.      Semaglutide (WEGOVY) 0.25 MG/0.5ML Solution Auto-injector Pen-injector Inject 0.5 mL under the skin every 7 days. (Patient not taking: Reported on 5/10/2024) 4 Each 1     No current facility-administered medications for this visit.     She  has a past medical history of Benign paroxysmal positional vertigo of right ear, History of myocardial infarction, Hyperlipidemia, Hypertension, and Thyroid disease.  She  has a past surgical history that includes thyroidectomy total; cholecystectomy; pr lap,appendectomy (N/A, 2024); and carpal tunnel release (Right).  Social History     Tobacco Use    Smoking status: Never    Smokeless tobacco: Never   Vaping Use    Vaping Use: Never used   Substance Use Topics    Alcohol use: Yes     Comment: 1-2 drinks per month    Drug use: No     Social History     Social History Narrative    Not on file     Family History   Problem Relation Age of Onset    Alzheimer's Disease Mother     Heart Disease Father 70        CAD    Lupus Maternal Grandmother     Heart Disease Maternal Grandmother     Lung Cancer Maternal Grandfather     No Known Problems Daughter     No Known Problems Daughter     No Known Problems Son      Family Status   Relation Name Status    Mo      Fa  Alive    MGMo      MGFa      PGMo      PGFa      Keven  Alive    Keven  Alive    Son  Alive         ROS    The pertinent  ROS findings can be seen in the HPI above.     All other systems reviewed and are negative     Objective:     /76 (BP Location: Right arm, Patient Position: Sitting, BP Cuff Size: Large adult)   Pulse 85   Temp 36.6 °C (97.9 °F) (Temporal)   Ht 1.727 m (5' 8\")   Wt 107 kg (235 lb)   SpO2 98%  Body mass index is 35.73 kg/m².      Physical Exam:    Constitutional: Alert, no distress.  Skin: No " suspicious lesions  Eye: Equal, round and reactive, conjunctiva clear, lids normal.  ENMT: Lips without lesions, good dentition, oropharynx clear.  Neck: Trachea midline, no masses, no thyromegaly. No cervical or supraclavicular lymphadenopathy.  Respiratory: Unlabored respiratory effort, lungs clear to auscultation, no wheezes, no ronchi.  Cardiovascular: Normal S1, S2, no murmur, no edema  Abdomen: Soft, non-tender, no masses, no hepatosplenomegaly.    Shoulder--LEFT AND RIGHT--    Perform NFL touchdown sign, significant weakness noted on external rotation some weakness on internal rotation as well.  Pain limited to the complete exam          Assessment and Plan:   The following treatment plan was discussed    All recent labs and provider notes reviewed    1. Acute pain of left shoulder    Physical exam consistent with rotator cuff injury.  Will obtain a plain film and refer to physical therapy    - DX-SHOULDER 2+ LEFT; Future  - Referral to Physical Therapy  - meloxicam (MOBIC) 15 MG tablet; Take 1 Tablet by mouth every day.  Dispense: 30 Tablet; Refill: 2             Instructed to Follow up in clinic or ER for worsening symptoms, difficulty breathing, lack of expected recovery, or should new symptoms or problems arise.    Followup: Return in about 6 months (around 11/10/2024) for Reevaluation, labs.

## 2024-05-13 NOTE — TELEPHONE ENCOUNTER
AL    Caller: Priscilla Childress     Topic/issue: pt is asking for a follow up in regards to this medication. Please reach out to her with an update.     Callback Number: [phone]     Thank you  Jaja ROPER

## 2024-05-13 NOTE — TELEPHONE ENCOUNTER
Please see tele encounter dated 5/6.     Phone Number Called: [phone]     Call outcome: Spoke to patient regarding message below.    Message: Called to inform patient that GLP-1 are only approved by her ins for Type 2 diabetes.

## 2024-05-15 ENCOUNTER — PHYSICAL THERAPY (OUTPATIENT)
Dept: PHYSICAL THERAPY | Facility: MEDICAL CENTER | Age: 64
End: 2024-05-15
Attending: FAMILY MEDICINE
Payer: COMMERCIAL

## 2024-05-15 DIAGNOSIS — M25.512 ACUTE PAIN OF LEFT SHOULDER: ICD-10-CM

## 2024-05-15 ASSESSMENT — ENCOUNTER SYMPTOMS
QUALITY: ACHING
PAIN SCALE AT LOWEST: 2
PAIN SCALE AT HIGHEST: 8
PAIN SCALE: 5

## 2024-05-15 NOTE — OP THERAPY EVALUATION
"  Outpatient Physical Therapy  INITIAL EVALUATION    [facility]  [address]  [place] [address]  Phone:  [phone]  Fax:  [phone]    Date of Evaluation: 05/15/2024    Patient: Magda Childress  YOB: 1960  MRN: [id number]     Referring Provider: [place] [address]   Referring Diagnosis Acute pain of left shoulder [M25.512]     Time Calculation  Start time: 0946  Stop time: 1032 Time Calculation (min): 46 minutes         Chief Complaint: Shoulder Pain    Visit Diagnoses     ICD-10-CM   1. Acute pain of left shoulder  M25.512       Date of onset of impairment: No data found    Subjective:   History of Present Illness:     Mechanism of injury:  Patient is a 63 year old female with a PMH including: MI, LIDYA, spontaneous dissection of coronary artery, obesity, ventricular aneurysm, class II obesity, carpal tunnel release R (Aug 2023). Pt has hx of frequent dislocations to the L shoulder.     Per MD note on 5/10/24, \"The patient experienced a fall on 04/22/2024 while on vacation (getting out of the tub). She lost her balance while attempting to rise from the bathtub, but managed to catch her arm and knee. Over the past week, she has experienced persistent soreness and difficulty in lifting her left arm. Initially, she suspected a dislocation, however, the condition has not improved over the past three weeks. Despite the discomfort, she has not sought medical attention. She reports being able to move and lift objects, however, she experiences numbness in her left arm while at rest.\" Pt additionally reporting numbness from elbow extending into all the fingers. No head impact. Pt describing FOOSH incident.     Pt currently denies: Dizziness, diploplia, dysphasia, dysarthria, drop attacks, nausea, nystagmus, vision changes.    Prior level of function:  Indep with ADL's and self care, no prior L " "shoulder issues  Sleep disturbance:  Interrupted sleep (side sleeper; 2x/night)  Pain:     Current pain ratin    At best pain ratin    At worst pain ratin    Location:  L shoulder deep anterior    Quality:  Aching    Progression:  Stable    Pain Comments::  Aggravating: opening door, picking up grandchildren, styling and washing hair    Relieving: topical creams, heat, meloxicam  Hand dominance:  Right  Diagnostic Tests:     Diagnostic Tests Comments:  5/10/24:  FINDINGS:     BONE MINERALIZATION: Normal.  JOINTS: Preserved. No erosions.  FRACTURE: None.  DISLOCATION: None.  SOFT TISSUES: No mass.     IMPRESSION:     No fracture or dislocation.    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    Has 3 grandchildren; one on the way.    Patient Goals:     Patient goals for therapy:  Decreased pain    Other patient goals:  Improve self care, lift grandchildren, improve ROM, \"get full use\"      Past Medical History:   Diagnosis Date    Benign paroxysmal positional vertigo of right ear     History of myocardial infarction     x5; cardiac caths only, no stents placed    Hyperlipidemia     Hypertension     Thyroid disease      Past Surgical History:   Procedure Laterality Date    CT LAP,APPENDECTOMY N/A 2024    Procedure: APPENDECTOMY, LAPAROSCOPIC;  Surgeon: Manfred Zamudio M.D.;  Location: SURGERY [facility];  Service: Gastroenterology    CARPAL TUNNEL RELEASE Right     CHOLECYSTECTOMY      THYROIDECTOMY TOTAL       Social History     Tobacco Use    Smoking status: Never    Smokeless tobacco: Never   Substance Use Topics    Alcohol use: Yes     Comment: 1-2 drinks per month     Family and Occupational History     Socioeconomic History    Marital " "status:      Spouse name: Not on file    Number of children: Not on file    Years of education: Not on file    Highest education level: Not on file   Occupational History    Not on file       Objective     Postural Observations  Seated posture: fair    Additional Postural Observation Details  Forward head and rounded shoulders    Cervical Screen    Cervical range of motion within normal limits with the following exceptions: Flexion WFL  Extension: 62 deg   SB: 14 to R; 22 deg to L  Rotation: 32 to L; 36 to R \"pulling in neck\"   Retraction: decreased    Palpation     Additional Palpation Details  TTP L supraspinatus, teres lorena and minor, and infraspinatus    Active Range of Motion   Left Shoulder   Flexion: 100 degrees with pain  Abduction: 85 degrees with pain  External rotation 0°: 55 degrees with pain  Internal rotation BTB: Active internal rotation behind the back: L4.     Right Shoulder   Flexion: 146 degrees   Abduction: 147 degrees   External rotation 0°: 70 degrees   Internal rotation BTB: Active internal rotation behind the back: L4.     Additional Active Range of Motion Details  ERP     Strength:      Additional Strength Details  GH isometrics: ( set up: 0 deg GH abd and elbow 90 deg)    R GH: all WFL    L GH:  Flexion: Strong   Extension: Strong  Abd: Strong   IR: Strong   ER: Mod and painful        Tests     Left Shoulder   Positive apprehension, horn blower, painful arc and relocation.   Negative drop arm.     Additional Tests Details  Pain with apprehension test -decreased with relocation LUE    *Of note, spurling's test not completed as pt already presenting with s/s today        Therapeutic Exercises (CPT 56825):     1. pt education, re: PT exam findings and upcoming POC    2. new hep as below      Therapeutic Exercise Summary: Access Code: [email or web address]  URL: [email or web address]  Date: 05/15/2024  Prepared by: Sean Recinos    Exercises  - Correct Seated Posture  - 7 x weekly  - " Supine Shoulder Flexion with Dowel  - 2 x daily - 7 x weekly - 1 sets - 10 reps  - Standing Shoulder Abduction AAROM with Dowel  - 2 x daily - 7 x weekly - 1 sets - 10 reps  - Seated Shoulder External Rotation AAROM with Dowel  - 2 x daily - 7 x weekly - 1 sets - 10 reps    Pt performed these exercises with instruction and SPV.  Provided handout with these exercises for daily HEP.          Time-based treatments/modalities:    Physical Therapy Timed Treatment Charges  Therapeutic exercise minutes (CPT 89362): 16 minutes      Assessment, Response and Plan:   Impairments: abnormal ADL function, abnormal or restricted ROM, activity intolerance, difficulty performing job, impaired physical strength, lacks appropriate home exercise program and pain with function    Assessment details:  Patient is a pleasant and cooperative 63 year old female who presents to therapy with c/o L sided shoulder pain following FOOSH injury on 4/22/24 while attempting to step on a towel. No head impact. X ray unremarkable for dislocations or fractures. Of note, pt does have hx of few dislocations occurring at L shoulder. Exam findings suggestive of RC pathology. Pt currently demonstrating mod strength with hornblower and ER with arm at side and neg drop arm test. Pt may benefit from a trial of skilled physical therapy in order to address above impairments in order to improve QOL and return to reported ADL's; if no additional progress recommending MRI for further assessment.    Prognosis comment:  Good/fair  Goals:   Short Term Goals:   1. Pt will be independent with written HEP.  2. Pt will have formal  assessment completed.  Short term goal time span:  2-4 weeks      Long Term Goals:    1. Pt will be independent with written HEP.  2. Pt will have a sig improvement in Quickdash score (eval: 50)  3. Pt will be able to demo shoulder elevation within 10 deg of uninvolved shoulder in order to reach into cabinets and complete her self care.  4.  Pt will be able to lift grandchildren 75% of the time or greater with no increase in s/s.      Long term goal time span:  2-4 months    Plan:   Therapy options:  Physical therapy treatment to continue  Planned therapy interventions:  E Stim Unattended (CPT 59686), Therapeutic Exercise (CPT 87586), Mechanical Traction (CPT 43485), Neuromuscular Re-education (CPT 51745) and Manual Therapy (CPT 26867)  Frequency: 1-2x/wk.  Duration in weeks:  12  Discussed with:  Patient  Plan details:  UPOC: 8/9/24    *Will decrease frequency of visits as pt progresses with her hep        Functional Assessment Used  Quickdash General Total Score: 50     Referring provider co-signature:  I have reviewed this plan of care and my co-signature certifies the need for services.    Certification Period: 05/15/2024 to  8/9/24    Physician Signature: ________________________________ Date: ______________

## 2024-05-16 ENCOUNTER — OFFICE VISIT (OUTPATIENT)
Dept: MEDICAL GROUP | Age: 64
End: 2024-05-16
Payer: COMMERCIAL

## 2024-05-16 VITALS
HEART RATE: 76 BPM | HEIGHT: 68 IN | OXYGEN SATURATION: 96 % | TEMPERATURE: 97 F | WEIGHT: 238 LBS | RESPIRATION RATE: 16 BRPM | SYSTOLIC BLOOD PRESSURE: 120 MMHG | DIASTOLIC BLOOD PRESSURE: 76 MMHG | BODY MASS INDEX: 36.07 KG/M2

## 2024-05-16 DIAGNOSIS — G47.33 OSA (OBSTRUCTIVE SLEEP APNEA): ICD-10-CM

## 2024-05-16 DIAGNOSIS — I25.42 SPONTANEOUS DISSECTION OF CORONARY ARTERY: ICD-10-CM

## 2024-05-16 DIAGNOSIS — E89.0 POSTOPERATIVE HYPOTHYROIDISM: ICD-10-CM

## 2024-05-16 DIAGNOSIS — I23.8 VENTRICULAR ANEURYSM AS COMPLICATION OF ACUTE MYOCARDIAL INFARCTION (HCC): ICD-10-CM

## 2024-05-16 DIAGNOSIS — E66.9 OBESITY (BMI 30-39.9): ICD-10-CM

## 2024-05-16 DIAGNOSIS — R23.2 HOT FLASHES: ICD-10-CM

## 2024-05-16 DIAGNOSIS — R73.01 ELEVATED FASTING GLUCOSE: ICD-10-CM

## 2024-05-16 DIAGNOSIS — I25.2 HISTORY OF MYOCARDIAL INFARCTION: ICD-10-CM

## 2024-05-16 DIAGNOSIS — E78.2 MIXED HYPERLIPIDEMIA: ICD-10-CM

## 2024-05-16 DIAGNOSIS — I10 BENIGN ESSENTIAL HTN: ICD-10-CM

## 2024-05-16 DIAGNOSIS — Z00.00 WELL ADULT EXAM: ICD-10-CM

## 2024-05-16 DIAGNOSIS — Z23 NEED FOR VACCINATION: ICD-10-CM

## 2024-05-16 DIAGNOSIS — R74.8 ELEVATED ALKALINE PHOSPHATASE LEVEL: ICD-10-CM

## 2024-05-16 DIAGNOSIS — I25.3 VENTRICULAR ANEURYSM AS COMPLICATION OF ACUTE MYOCARDIAL INFARCTION (HCC): ICD-10-CM

## 2024-05-16 PROBLEM — M25.512 ACUTE PAIN OF LEFT SHOULDER: Status: RESOLVED | Noted: 2024-05-10 | Resolved: 2024-05-16

## 2024-05-16 PROBLEM — E66.812 CLASS 2 OBESITY DUE TO EXCESS CALORIES WITHOUT SERIOUS COMORBIDITY WITH BODY MASS INDEX (BMI) OF 35.0 TO 35.9 IN ADULT: Status: RESOLVED | Noted: 2024-05-03 | Resolved: 2024-05-16

## 2024-05-16 PROBLEM — R73.03 PREDIABETES: Status: RESOLVED | Noted: 2024-05-03 | Resolved: 2024-05-16

## 2024-05-16 PROBLEM — E66.09 CLASS 2 OBESITY DUE TO EXCESS CALORIES WITHOUT SERIOUS COMORBIDITY WITH BODY MASS INDEX (BMI) OF 35.0 TO 35.9 IN ADULT: Status: RESOLVED | Noted: 2024-05-03 | Resolved: 2024-05-16

## 2024-05-16 PROCEDURE — 90471 IMMUNIZATION ADMIN: CPT | Performed by: PHYSICIAN ASSISTANT

## 2024-05-16 PROCEDURE — 99396 PREV VISIT EST AGE 40-64: CPT | Mod: 25 | Performed by: PHYSICIAN ASSISTANT

## 2024-05-16 PROCEDURE — 3078F DIAST BP <80 MM HG: CPT | Performed by: PHYSICIAN ASSISTANT

## 2024-05-16 PROCEDURE — 90715 TDAP VACCINE 7 YRS/> IM: CPT | Performed by: PHYSICIAN ASSISTANT

## 2024-05-16 PROCEDURE — 3074F SYST BP LT 130 MM HG: CPT | Performed by: PHYSICIAN ASSISTANT

## 2024-05-16 RX ORDER — CETIRIZINE HYDROCHLORIDE 10 MG/1
TABLET ORAL
COMMUNITY
Start: 2024-02-15

## 2024-05-16 ASSESSMENT — FIBROSIS 4 INDEX: FIB4 SCORE: 0.48

## 2024-05-16 NOTE — PROGRESS NOTES
Subjective:     CC:   Chief Complaint   Patient presents with    Annual Exam       HPI:   Priscilla Childress is a 63 y.o. female who presents for annual exam    Patient has GYN provider: No   Last Pap Smear:   H/O Abnormal Pap: No  Last Mammogram: 2024  Last Colorectal Cancer Screenin-10 year recall   Last Tdap: 10/2013  Received HPV series: Aged out    Exercise: walking 3 days a week  Diet: Improving      No LMP recorded. Patient is perimenopausal.  She has not utilized hormone replacement therapy.  Reports hot flashes  No significant bloating/fluid retention, pelvic pain, or dyspareunia. No abnormal vaginal discharge.   No breast tenderness, mass, nipple discharge or changes in size or contour.   Latest Reference Range & Units 24 09:10   WBC 4.8 - 10.8 K/uL 8.5   RBC 4.20 - 5.40 M/uL 4.77   Hemoglobin 12.0 - 16.0 g/dL 14.4   Hematocrit 37.0 - 47.0 % 44.1   MCV 81.4 - 97.8 fL 92.5   MCH 27.0 - 33.0 pg 30.2   MCHC 32.2 - 35.5 g/dL 32.7   RDW 35.9 - 50.0 fL 41.3   Platelet Count 164 - 446 K/uL 432   MPV 9.0 - 12.9 fL 10.0   Neutrophils-Polys 44.00 - 72.00 % 44.50   Neutrophils (Absolute) 1.82 - 7.42 K/uL 3.80   Lymphocytes 22.00 - 41.00 % 40.90   Lymphs (Absolute) 1.00 - 4.80 K/uL 3.49   Monocytes 0.00 - 13.40 % 7.30   Monos (Absolute) 0.00 - 0.85 K/uL 0.62   Eosinophils 0.00 - 6.90 % 6.10   Eos (Absolute) 0.00 - 0.51 K/uL 0.52 (H)   Basophils 0.00 - 1.80 % 1.10   Baso (Absolute) 0.00 - 0.12 K/uL 0.09   Immature Granulocytes 0.00 - 0.90 % 0.10   Immature Granulocytes (abs) 0.00 - 0.11 K/uL 0.01   Nucleated RBC 0.00 - 0.20 /100 WBC 0.00   NRBC (Absolute) K/uL 0.00   Sodium 135 - 145 mmol/L 142   Potassium 3.6 - 5.5 mmol/L 4.5   Chloride 96 - 112 mmol/L 106   Co2 20 - 33 mmol/L 26   Anion Gap 7.0 - 16.0  10.0   Glucose 65 - 99 mg/dL 107 (H)   Bun 8 - 22 mg/dL 11   Creatinine 0.50 - 1.40 mg/dL 0.94   GFR (CKD-EPI) >60 mL/min/1.73 m 2 68   Calcium 8.5 - 10.5 mg/dL 9.4   Correct Calcium 8.5 - 10.5 mg/dL  9.2   AST(SGOT) 12 - 45 U/L 16   ALT(SGPT) 2 - 50 U/L 23   Alkaline Phosphatase 30 - 99 U/L 147 (H)   Total Bilirubin 0.1 - 1.5 mg/dL 0.4   Albumin 3.2 - 4.9 g/dL 4.3   Total Protein 6.0 - 8.2 g/dL 7.1   Globulin 1.9 - 3.5 g/dL 2.8   A-G Ratio g/dL 1.5   25-Hydroxy   Vitamin D 25 30 - 100 ng/mL 34   TSH 0.380 - 5.330 uIU/mL 2.580   HIV Ag/Ab Combo Assay Non Reactive  Non-Reactive   (H): Data is abnormally high  OB History   No obstetric history on file.      She  has no history on file for sexual activity.    She  has a past medical history of Benign paroxysmal positional vertigo of right ear, History of myocardial infarction, Hyperlipidemia, Hypertension, and Thyroid disease.  She  has a past surgical history that includes thyroidectomy total; cholecystectomy; pr lap,appendectomy (N/A, 03/07/2024); and carpal tunnel release (Right).    Family History   Problem Relation Age of Onset    Alzheimer's Disease Mother     Heart Disease Father 70        CAD    Lupus Maternal Grandmother     Heart Disease Maternal Grandmother     Lung Cancer Maternal Grandfather     No Known Problems Daughter     No Known Problems Daughter     No Known Problems Son      Social History     Tobacco Use    Smoking status: Never    Smokeless tobacco: Never   Vaping Use    Vaping status: Never Used   Substance Use Topics    Alcohol use: Yes     Comment: 1-2 drinks per month    Drug use: No       Patient Active Problem List    Diagnosis Date Noted    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) 05/03/2024    Mixed hyperlipidemia 05/03/2024    Obesity (BMI 30-39.9) 03/21/2024    Seasonal allergies 06/16/2023    Spontaneous dissection of coronary artery 08/25/2022    Osteoarthritis of both hands 05/17/2022    Fatty liver disease, nonalcoholic 10/20/2020    Chronic rhinosinusitis 08/27/2020    LIDYA (obstructive sleep apnea) 09/25/2019    Elevated alkaline phosphatase level 06/18/2019    Elevated fasting glucose 06/18/2019     Benign essential HTN 05/15/2019    Vitamin D deficiency 05/15/2019    Dyslipidemia 05/15/2019    Postoperative hypothyroidism 05/15/2019    GERD (gastroesophageal reflux disease) 05/15/2019    History of myocardial infarction: On 5 occasions and with a segmental wall motion abnormality but possibly secondary to vasospasm 05/15/2019    Psoriasis 05/15/2019    Hot flashes 05/15/2019     Current Outpatient Medications   Medication Sig Dispense Refill    cetirizine (KLS ALLER-GUY) 10 MG Tab       meloxicam (MOBIC) 15 MG tablet Take 1 Tablet by mouth every day. 30 Tablet 2    carvedilol (COREG) 6.25 MG Tab Take 1 Tablet by mouth 2 times a day with meals. 180 Tablet 3    atorvastatin (LIPITOR) 80 MG tablet Take 1 Tablet by mouth every day. 90 Tablet 3    omeprazole (PRILOSEC) 40 MG delayed-release capsule Take 1 Capsule by mouth every day. 90 Capsule 3    FLUoxetine (PROZAC) 10 MG Cap Take 1 Capsule by mouth every day. 90 Capsule 3    Multiple Vitamins-Minerals (HAIR SKIN & NAILS) Tab Take 1 Tablet by mouth every day.      SYNTHROID 200 MCG Tab TAKE 1 TABLET EVERY MORNINGON AN EMPTY STOMACH (Patient taking differently: Take 200 mcg by mouth every morning on an empty stomach.) 90 Tablet 3    aspirin (ASA) 81 MG Chew Tab chewable tablet Chew 81 mg every day.       No current facility-administered medications for this visit.     Allergies   Allergen Reactions    Inapsine [Droperidol] Anaphylaxis    Apple Swelling    Apricot Flavor Swelling       Review of Systems   Constitutional: Negative for fever, chills and malaise/fatigue.   HENT: Negative for congestion.    Eyes: Negative for pain.   Respiratory: Negative for cough and shortness of breath.    Cardiovascular: Negative for chest pain and leg swelling.   Gastrointestinal: Negative for nausea, vomiting, abdominal pain and diarrhea.   Genitourinary: Negative for dysuria and hematuria.   Skin: Negative for rash.   Neurological: Negative for dizziness, focal weakness and  "headaches.   Endo/Heme/Allergies: Does not bruise/bleed easily.   Psychiatric/Behavioral: Negative for depression.  The patient is not nervous/anxious.      Objective:   /76 (BP Location: Left arm, Patient Position: Sitting, BP Cuff Size: Large adult)   Pulse 76   Temp 36.1 °C (97 °F) (Temporal)   Resp 16   Ht 1.727 m (5' 8\")   Wt 108 kg (238 lb)   SpO2 96%   BMI 36.19 kg/m²     Wt Readings from Last 4 Encounters:   05/16/24 108 kg (238 lb)   05/10/24 107 kg (235 lb)   05/03/24 105 kg (232 lb)   03/28/24 107 kg (236 lb)         Physical Exam:  Constitutional: Well-developed and well-nourished. Not diaphoretic. No distress.   Skin: Skin is warm and dry. No rash noted.  Head: Atraumatic without lesions.  Eyes: Conjunctivae and extraocular motions are normal. Pupils are equal, round, and reactive to light. No scleral icterus.   Ears:  External ears unremarkable. Tympanic membranes clear and intact.  Mouth/Throat: Tongue normal. Oropharynx is clear and moist. Posterior pharynx without erythema or exudates.  Neck: Supple, trachea midline. Normal range of motion. No thyromegaly present. No lymphadenopathy--cervical or supraclavicular.  Cardiovascular: Regular rate and rhythm, S1 and S2 without murmur, rubs, or gallops.    Respiratory: Effort normal. Clear to auscultation throughout. No adventitious sounds.   Abdomen: Soft, non tender, and without distention. Active bowel sounds in all four quadrants. No rebound, guarding, masses or HSM.  Extremities: No cyanosis, clubbing, erythema, nor edema. Distal pulses intact and symmetric.   Musculoskeletal: All major joints AROM full in all directions without pain, except for left shoulder, she is currently going to physical therapy for this  Neurological: Alert and oriented x 3. Grossly non-focal. Strength and sensation grossly intact. DTRs 2+/3 and symmetric.   Psychiatric:  Behavior, mood, and affect are appropriate.    Assessment and Plan:     1. Well adult " exam  Advised increase physical activity as tolerated.  Reviewed diet control.  She plans to call and schedule an appointment with gynecology, overdue for Pap    2. 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)  Stable.  Currently followed by cardiology blood pressure is well-controlled.  On statin and aspirin    3. Spontaneous dissection of coronary artery  Stable.  Currently followed by cardiology.  Continue 6.25 mg of Coreg twice daily, 81 mg of aspirin    4. Postoperative hypothyroidism  Controlled.  Continue 200 mcg of levothyroxine    5. Mixed hyperlipidemia  Controlled.  Continue 80 mg of Lipitor    6. History of myocardial infarction: On 5 occasions and with a segmental wall motion abnormality but possibly secondary to vasospasm  Currently followed by cardiology.  Continue aspirin, on statin    7. Benign essential HTN  Stable.  Continue 6.25 mg of Coreg twice daily    8. Elevated alkaline phosphatase level  Incidental finding on lab, had recent surgery.  Will trend repeat lab again in 4 weeks  - ALKALINE PHOSPHATASE ISOENZYMES; Future    9. Elevated fasting glucose  Monitoring.  Working on lifestyle modifications.  Last A1c 6.3, done approximately 3 months ago.  Will monitor and repeat labs again in 3 months  - Comp Metabolic Panel; Future  - HEMOGLOBIN A1C; Future    10. Hot flashes  Stable.  Continue 10 mg of Prozac    11. LIDYA (obstructive sleep apnea)  Stable.  Continue CPAP    12.  Obesity (BMI 38-30 9.9)  Working on lifestyle modifications.  Cardiology did send in [id number], this was not covered by her insurance.  Did advise to call her insurance and see if they cover any other weight loss medications if they do she will send Tubett message and we will plan on starting    13. Need for vaccination  Immunization given in clinic today  - Tdap Vaccine =>8YO IM        Health maintenance:     Labs per order  Immunizations per orders  Patient counseled about skin care, diet,  supplements, and exercise.  Discussed  breast self exam, mammography screening, menopause, diet and exercise, colorectal cancer screening     Follow-up: Return in about 3 months (around 8/16/2024) for Lab Review.

## 2024-05-28 ENCOUNTER — PHYSICAL THERAPY (OUTPATIENT)
Dept: PHYSICAL THERAPY | Facility: MEDICAL CENTER | Age: 64
End: 2024-05-28
Attending: FAMILY MEDICINE
Payer: COMMERCIAL

## 2024-05-28 DIAGNOSIS — M25.512 ACUTE PAIN OF LEFT SHOULDER: ICD-10-CM

## 2024-05-28 NOTE — OP THERAPY DAILY TREATMENT
Outpatient Physical Therapy  DAILY TREATMENT     [facility] Out[facility] Physical Therapy  [address]  [place] [address]  Phone:  [phone]  Fax:  [phone]    Date: 05/28/2024    Patient: Magda Childress  YOB: 1960  MRN: [id number]     Time Calculation    Start time: 0821  Stop time: 0915 Time Calculation (min): 54 minutes         Chief Complaint: Shoulder Problem    Visit #: 2    SUBJECTIVE:  Pt noticing some decreased pain intensity and frequency since last session. Spent the weekend with her grandchildren.    OBJECTIVE:  Current objective measures:    assessment -measured in #: (R hand dominant)  R: 31, 22, 25  L: 23, 26, 34      Active Range of Motion -pre treatment in sitting:  Left Shoulder   Flexion: 122 deg  Abduction: 131 deg  External rotation 0°: 63 deg    Active Range of Motion -post treatment in sitting:  Left Shoulder   Flexion: 145 deg  Abduction: 143 deg ERP   External rotation 0°: 67 deg      Therapeutic Exercises (CPT 44696):     1. UBE, x5 min alternating every 60 sec, cardiovascular warm up    2. new hep as below    3. supine shoulder flexion with dowel, NT    4. shoulder abd with dowel, NT    5. seated shoulder ER with dowel, NT    6. cervical retractions seated, x10, hep    7. SL t/s rotations, x10, hep      Therapeutic Exercise Summary: Access Code: [email or web address]  URL: [email or web address]  Date: 05/28/2024  Prepared by: Sean Recinos    Exercises  - Correct Seated Posture  - 7 x weekly  - Supine Shoulder Flexion with Dowel  - 2 x daily - 7 x weekly - 1 sets - 10 reps  - Standing Shoulder Abduction AAROM with Dowel  - 2 x daily - 7 x weekly - 1 sets - 10 reps  - Seated Shoulder External Rotation AAROM with Dowel  - 2 x daily - 7 x weekly - 1 sets - 10 reps  - Seated Cervical Retraction  - 3 x daily - 7 x weekly - 1 sets - 10 reps  - Sidelying Thoracic Lumbar Rotation  - 1-2 x daily - 7 x weekly - 1 sets - 10-15  reps    Pt performed these exercises with instruction and SPV.  Provided handout with these exercises for daily HEP.        Therapeutic Treatments and Modalities:     1. Manual Therapy (CPT 60866), see below    2. Mechanical Traction (CPT 03890), [facility]/s with mhp x 15 min    Therapeutic Treatment and Modalities Summary: Manual:  STM to B c/s paraspinals and suboccipitals with slight traction        Time-based treatments/modalities:    Physical Therapy Timed Treatment Charges  Manual therapy minutes (CPT 99060): 25 minutes  Therapeutic exercise minutes (CPT 55682): 14 minutes      Pain rating (1-10) before treatment: 0/10  Pain rating (1-10) after treatment: 0/10  Pain Comments::  Aggravating: opening door, picking up grandchildren, styling and washing hair     ASSESSMENT:   Response to treatment:   Pt demonstrating sig improvement in shoulder ROM from initial eval and further increase following manual to t/s. Will continue to mobilize this region in follow ups due to good response in session.      PLAN/RECOMMENDATIONS:   Plan for treatment: therapy treatment to continue next visit.  Planned interventions for next visit: continue with current treatment.

## 2024-06-05 ENCOUNTER — PHYSICAL THERAPY (OUTPATIENT)
Dept: PHYSICAL THERAPY | Facility: MEDICAL CENTER | Age: 64
End: 2024-06-05
Attending: FAMILY MEDICINE
Payer: COMMERCIAL

## 2024-06-05 DIAGNOSIS — M25.512 ACUTE PAIN OF LEFT SHOULDER: ICD-10-CM

## 2024-06-05 PROCEDURE — 97110 THERAPEUTIC EXERCISES: CPT

## 2024-06-05 NOTE — OP THERAPY DAILY TREATMENT
Outpatient Physical Therapy  DAILY TREATMENT     [facility] Outpatient Physical Therapy  [address]  [place] [address]  Phone:  [phone]  Fax:  [phone]    Date: 06/05/2024    Patient: Magda Childress  YOB: 1960  MRN: [id number]     Time Calculation    Start time: 0946  Stop time: 1030 Time Calculation (min): 44 minutes         Chief Complaint: Shoulder Problem    Visit #: 3    SUBJECTIVE:  Pt reporting no longer achy at rest but noticing discomfort with activity; has only had two occurrences of pain since last seen. Pt reporting pain with opening jars. Sleeping is no longer an issue.     OBJECTIVE:  Current objective measures:       Active Range of Motion -pre treatment in sitting:  Left Shoulder   Flexion: 141 deg  Abduction: 147 deg  External rotation 0°: 63 deg    Shoulder extension:  43 deg on R  36 deg on L     Active Range of Motion -post treatment in sitting:  Left Shoulder   Flexion: 145 deg  Abduction: 156 deg   External rotation 0°: 69 deg      Therapeutic Exercises (CPT 91896):     1. UBE, x5 min alternating every 60 sec, cardiovascular warm up    2. new hep as below    3. supine shoulder flexion with dowel, NT    4. shoulder abd with dowel, NT    5. seated shoulder ER with dowel, NT    6. cervical retractions seated, x10, reviewed    7. SL t/s rotations, x10 ea, hypomobile B; reviewed    9. shoulder extensions at cage, x15 ea    10. shoulder extensions with dowel, x20 and x15, fatigue after 20 reps; rest break x 30 sec; hep    11. flasher, orange TB, x1 min, x1.5 min, hep      Therapeutic Exercise Summary: Access Code: [email or web address]  URL: [email or web address]  Date: 05/28/2024  Prepared by: Sean Recinos    Exercises  - Correct Seated Posture  - 7 x weekly  - Supine Shoulder Flexion with Dowel  - 2 x daily - 7 x weekly - 1 sets - 10 reps  - Standing Shoulder Abduction AAROM with Dowel  - 2 x daily - 7 x weekly - 1 sets - 10  reps  - Seated Shoulder External Rotation AAROM with Dowel  - 2 x daily - 7 x weekly - 1 sets - 10 reps  - Seated Cervical Retraction  - 3 x daily - 7 x weekly - 1 sets - 10 reps  - Sidelying Thoracic Lumbar Rotation  - 1-2 x daily - 7 x weekly - 1 sets - 10-15 reps    Pt performed these exercises with instruction and SPV.  Provided handout with these exercises for daily HEP.        Therapeutic Treatments and Modalities:     1. Manual Therapy (CPT 85952), see below    2. Mechanical Traction (CPT 33020), 16/9# [facility] traction c/s with mhp x 15 min    Therapeutic Treatment and Modalities Summary: Manual:  CPA and rotational mobs to CTJ-T10 region in prone lying  STM to B c/s paraspinals and suboccipitals with slight traction        Time-based treatments/modalities:    Physical Therapy Timed Treatment Charges  Therapeutic exercise minutes (CPT 92771): 32 minutes    Pain rating (1-10) before treatment: 0/10  Pain rating (1-10) after treatment: 0/10  Pain Comments::  Aggravating: opening door, picking up grandchildren, styling and washing hair     ASSESSMENT:   Response to treatment:   Pt has maintained increased ROM from prev session and stating pos impact on ADL's and resting symptoms. Continues to have difficulties with opening car doors and jars. Due to pos ROM gains, will emphasize periscapular strengthening in upcoming sessions. Pt may transition to 1x/wk.      PLAN/RECOMMENDATIONS:   Plan for treatment: therapy treatment to continue next visit.  Planned interventions for next visit: continue with current treatment.

## 2024-06-10 ENCOUNTER — PHYSICAL THERAPY (OUTPATIENT)
Dept: PHYSICAL THERAPY | Facility: MEDICAL CENTER | Age: 64
End: 2024-06-10
Attending: FAMILY MEDICINE
Payer: COMMERCIAL

## 2024-06-10 DIAGNOSIS — M25.512 ACUTE PAIN OF LEFT SHOULDER: ICD-10-CM

## 2024-06-10 PROCEDURE — 97140 MANUAL THERAPY 1/> REGIONS: CPT

## 2024-06-10 PROCEDURE — 97110 THERAPEUTIC EXERCISES: CPT

## 2024-06-10 NOTE — OP THERAPY DAILY TREATMENT
Outpatient Physical Therapy  DAILY TREATMENT     [facility] Outpatient Physical Therapy  [address]  [place] [address]  Phone:  [phone]  Fax:  [phone]    Date: 06/10/2024    Patient: Magda Childress  YOB: 1960  MRN: [id number]     Time Calculation    Start time: 1032  Stop time: 1111 Time Calculation (min): 39 minutes         Chief Complaint: Shoulder Problem    Visit #: 4    SUBJECTIVE:  Pt reporting she has noticed a lot of improvement in her shoulder; did notice some discomfort when trying to put a bar of soap across her body underneath her R armpit.    OBJECTIVE:  Current objective measures:       Active Range of Motion -pre treatment in sitting:  Left Shoulder   Flexion: 142 deg  Abduction: 157 deg  External rotation 0°: 65 deg    Shoulder extension:  43 deg on R  51 deg on L           Therapeutic Exercises (CPT 77709):     1. UBE, x5 min alternating every 60 sec, cardiovascular warm up    2. new hep as below    3. supine shoulder flexion with dowel, NT    4. shoulder abd with dowel, NT    5. seated shoulder ER with dowel, NT    6. cervical retractions seated, x10, reviewed    7. SL t/s rotations, x10 ea, hypomobile B; reviewed    9. shoulder extensions at cage, x15 ea    10. shoulder extensions with dowel, x20 and x15, fatigue after 20 reps; rest break x 30 sec; hep    11. flasher, orange TB, x1 min, reviewed    12. SA overhead with dowel    13. rows    14. pull downs      Therapeutic Exercise Summary: Access Code: [email or web address]  URL: [email or web address]  Date: 05/28/2024  Prepared by: Sean Recinos    Exercises  - Correct Seated Posture  - 7 x weekly  - Supine Shoulder Flexion with Dowel  - 2 x daily - 7 x weekly - 1 sets - 10 reps  - Standing Shoulder Abduction AAROM with Dowel  - 2 x daily - 7 x weekly - 1 sets - 10 reps  - Seated Shoulder External Rotation AAROM with Dowel  - 2 x daily - 7 x weekly - 1 sets - 10 reps  - Seated  Cervical Retraction  - 3 x daily - 7 x weekly - 1 sets - 10 reps  - Sidelying Thoracic Lumbar Rotation  - 1-2 x daily - 7 x weekly - 1 sets - 10-15 reps    Pt performed these exercises with instruction and SPV.  Provided handout with these exercises for daily HEP.        Therapeutic Treatments and Modalities:     1. Manual Therapy (CPT 53367), see below, x8 min    2. [facility]CPT 15469[facility] x 15 min, NT    Therapeutic Treatment and Modalities Summary: Manual:  Scapular mobilizations gr III in all directions followed by scapular release x10 in SL (pillow for modesty) //improved horizontal adduction following         Time-based treatments/modalities:    Physical Therapy Timed Treatment Charges  Manual therapy minutes (CPT 35985): 8 minutes  Therapeutic exercise minutes (CPT 98097): 31 minutes    Pain rating (1-10) before treatment: 0/10  Pain rating (1-10) after treatment: 0/10  Pain Comments::  Aggravating: opening door, picking up grandchildren, styling and washing hair     ASSESSMENT:   Response to treatment:   Improved horizontal adduction following scapular mobilizations and release. Pt continuing to maintain her ROM gains. Fatigue with strengthening with no increase in s/s. Will review and progress challenge as tolerated next visit.       PLAN/RECOMMENDATIONS:   Plan for treatment: therapy treatment to continue next visit.  Planned interventions for next visit: continue with current treatment.

## 2024-06-18 ENCOUNTER — PHYSICAL THERAPY (OUTPATIENT)
Dept: PHYSICAL THERAPY | Facility: MEDICAL CENTER | Age: 64
End: 2024-06-18
Attending: FAMILY MEDICINE
Payer: COMMERCIAL

## 2024-06-18 DIAGNOSIS — M25.512 ACUTE PAIN OF LEFT SHOULDER: ICD-10-CM

## 2024-06-18 PROCEDURE — 97140 MANUAL THERAPY 1/> REGIONS: CPT

## 2024-06-18 PROCEDURE — 97110 THERAPEUTIC EXERCISES: CPT

## 2024-06-18 NOTE — OP THERAPY DAILY TREATMENT
Outpatient Physical Therapy  DAILY TREATMENT     [facility] Outpatient Physical Therapy  [address]  [place] [address]  Phone:  [phone]  Fax:  [phone]    Date: 06/18/2024    Patient: Magda Childress  YOB: 1960  MRN: [id number]     Time Calculation    Start time: 1115  Stop time: 1200 Time Calculation (min): 45 minutes         Chief Complaint: Shoulder Problem    Visit #: 5    SUBJECTIVE:  Pt reporting she has noticed a lot of improvement in her shoulder; did notice some discomfort when trying to put a bar of soap across her body underneath her R armpit.      OBJECTIVE:  Current objective measures:   Quickdash General Total Score: 9.09     Active Range of Motion -pre treatment in sitting:  Left Shoulder   Flexion: 142 deg (157 on R)   Abduction: 152 deg (162 on R)   External rotation 0°: 67 deg ( 62 on R)     Shoulder extension:  62 deg on R  46 deg on L       Active Range of Motion -post treatment in sitting:  Left Shoulder   Flexion: 150  Abduction: 165  External rotation 0°: 73      Therapeutic Exercises (CPT 52851):     1. UBE, x5 min alternating every 60 sec, cardiovascular warm up    2. new hep as below    3. supine shoulder flexion with dowel, NT    4. shoulder abd with dowel, NT    5. seated shoulder ER with dowel, NT    6. cervical retractions seated, x10, NT    7. SL t/s rotations, x10 ea, NT    9. shoulder extensions at cage, x15 ea    10. shoulder extensions with dowel, x20 and x15, fatigue after 20 reps; rest break x 30 sec; hep    11. flasher, orange TB, x1 min, reviewed    12. SA overhead with band standing, orange band; x15, hep    14. pull downs, pink TB, x10 with 2-3 sec hold, reviewed    15. GH IR BTB, with belt; x15, increased difficulty R>L; hep      Therapeutic Exercise Summary: Access Code: [email or web address]  URL: [email or web address]  Date: 06/18/2024  Prepared by: Sean Recinos    Exercises  - Correct Seated Posture  -  7 x weekly  - Supine Shoulder Flexion with Dowel  - 2 x daily - 7 x weekly - 1 sets - 10 reps  - Standing Shoulder Abduction AAROM with Dowel  - 2 x daily - 7 x weekly - 1 sets - 10 reps  - Seated Shoulder External Rotation AAROM with Dowel  - 2 x daily - 7 x weekly - 1 sets - 10 reps  - Seated Cervical Retraction  - 3 x daily - 7 x weekly - 1 sets - 10 reps  - Sidelying Thoracic Lumbar Rotation  - 1-2 x daily - 7 x weekly - 1 sets - 10-15 reps  - Standing Shoulder Extension with Dowel  - 3-4 x weekly - 1-2 sets - 15 reps  - Shoulder External Rotation and Scapular Retraction with Resistance  - 3-4 x weekly - 1-2 sets - 1-2 min to endurance hold  - Standing Row with Resistance  - 3 x weekly - 2-3 sets - 10 reps - 3 sec hold  - Lat pull downs  - 3 x weekly - 2-3 sets - 10 reps - 3 sec hold  - Standing Shoulder Internal Rotation Stretch with Towel  - 1-2 x daily - 7 x weekly - 1-2 sets - 15 reps  - Shoulder Flexion Serratus Activation with Resistance  - 3 x weekly - 1-2 sets - 10-15 reps    Pt performed these exercises with instruction and SPV.  Provided handout with these exercises for daily HEP.        Therapeutic Treatments and Modalities:     1. Manual Therapy (CPT 91622), see below, x8 min    2. Mechanical Traction (CPT 63556), [facility] with mhp x 15 min, NT    Therapeutic Treatment and Modalities Summary: Manual:  Scapular mobilizations gr III in all directions followed by scapular release x10 in SL (pillow for modesty)        Time-based treatments/modalities:    Physical Therapy Timed Treatment Charges  Manual therapy minutes (CPT 11606): 8 minutes  Therapeutic exercise minutes (CPT 15230): 37 minutes    Pain rating (1-10) before treatment: 0/10  Pain rating (1-10) after treatment: 0/10  Pain Comments::  Aggravating: opening door, picking up grandchildren, styling and washing hair     ASSESSMENT:   Response to treatment:   Improved ROM into elevation following GH IR BTB and scapular mobilization  in session. See PN for addit details.       PLAN/RECOMMENDATIONS:   Plan for treatment: therapy treatment to continue next visit.  Planned interventions for next visit: continue with current treatment.

## 2024-06-18 NOTE — OP THERAPY PROGRESS SUMMARY
Outpatient Physical Therapy  PROGRESS SUMMARY NOTE      [facility] Outpatient Physical Therapy  [address]  [place] [address]  Phone:  [phone]  Fax:  [phone]    Date of Visit: 06/18/2024    Patient: Magda Childress  YOB: 1960  MRN: [id number]     Referring Provider: [place] [address] [address]   Referring Diagnosis Pain in left shoulder [M25.512]     Visit Diagnoses     ICD-10-CM   1. Acute pain of left shoulder  M25.512       Rehab Potential: good    Progress Report Period: 5/15/24-6/18/24    Functional Assessment Used  Quickdash General Total Score: 9.09       Objective Findings and Assessment:   Patient progression towards goals: Pt has been seen for 5 visits and demonstrating significant improvements in shoulder ROM, decrease in s/s, and improved function overall. Pt has met listed goals. She continues to demo some strength deficits at periscapular and RC musculature and may benefit from strength progression in upcoming visit. Will keep referral open 30-60 days for pt to return at her convenience; if no contact, will close referral. Pt in agreement to plan.       Objective findings and assessment details: Active Range of Motion -pre treatment in sitting:  Left Shoulder   Flexion: 142 deg (157 on R)   Abduction: 152 deg (162 on R)   External rotation 0°: 67 deg ( 62 on R)     Shoulder extension:  62 deg on R  46 deg on L       Active Range of Motion -post treatment in sitting:  Left Shoulder   Flexion: 150  Abduction: 165  External rotation 0°: 73    Strength:  Loss of GH ER with resisted OH movements    Posture:  Forward head and rounded shoulders    Mobility L:  Hypomobile at t/s throughout  Hypomobile scapular mobility mynor into abd and upward rotation     Goals:   Short Term Goals:   1. Pt will be independent with written HEP. (Met)  2. Pt will have formal  assessment completed. (Met)    Short term goal time  span:  2-4 weeks      Long Term Goals:    1. Pt will be independent with written HEP. (Met)   2. Pt will have a sig improvement in Quickdash score (eval: 50)  3. Pt will be able to demo shoulder elevation within 10 deg of uninvolved shoulder in order to reach into cabinets and complete her self care. (Met)  4. Pt will be able to lift grandchildren 75% of the time or greater with no increase in s/s. (Met)   5. Pt will be indep with strengthening routine to ensure ability to lift heavy items for ADL's and avoid injuries (NEW)   6. Pt will be able to style and wash hair consistently without s/s to improve self-care and ADL's in the home (NEW)     Long term goal time span:  6-8 weeks    Plan:   Planned therapy interventions:  Neuromuscular Re-education (CPT 06994), Therapeutic Exercise (CPT 41521), Therapeutic Activities (CPT 77579), E Stim Unattended (CPT 88864) and Manual Therapy (CPT 60475)  Frequency: 1-2x/month.  Duration in visits:  2  Plan details:  UPOC: 8/16/24      Referring provider co-signature:  I have reviewed this plan of care and my co-signature certifies the need for services.     Certification Period: 06/18/2024 to 8/16/24    Physician Signature: ________________________________ Date: ______________

## 2024-06-20 ENCOUNTER — APPOINTMENT (OUTPATIENT)
Dept: PHYSICAL THERAPY | Facility: MEDICAL CENTER | Age: 64
End: 2024-06-20
Attending: FAMILY MEDICINE
Payer: COMMERCIAL

## 2024-06-24 DIAGNOSIS — I25.2 HISTORY OF MYOCARDIAL INFARCTION: ICD-10-CM

## 2024-06-24 DIAGNOSIS — I25.42 SPONTANEOUS DISSECTION OF CORONARY ARTERY: ICD-10-CM

## 2024-06-25 ENCOUNTER — APPOINTMENT (OUTPATIENT)
Dept: PHYSICAL THERAPY | Facility: MEDICAL CENTER | Age: 64
End: 2024-06-25
Attending: FAMILY MEDICINE
Payer: COMMERCIAL

## 2024-06-25 RX ORDER — CARVEDILOL 6.25 MG/1
6.25 TABLET ORAL 2 TIMES DAILY WITH MEALS
Qty: 180 TABLET | Refills: 3 | OUTPATIENT
Start: 2024-06-25

## 2024-06-26 DIAGNOSIS — I25.2 HISTORY OF MYOCARDIAL INFARCTION: ICD-10-CM

## 2024-06-26 DIAGNOSIS — I25.42 SPONTANEOUS DISSECTION OF CORONARY ARTERY: ICD-10-CM

## 2024-06-26 RX ORDER — CARVEDILOL 6.25 MG/1
6.25 TABLET ORAL 2 TIMES DAILY WITH MEALS
Qty: 180 TABLET | Refills: 3 | Status: SHIPPED | OUTPATIENT
Start: 2024-06-26 | End: 2024-06-26

## 2024-06-26 RX ORDER — CARVEDILOL 6.25 MG/1
6.25 TABLET ORAL 2 TIMES DAILY WITH MEALS
Qty: 180 TABLET | Refills: 3 | Status: SHIPPED | OUTPATIENT
Start: 2024-06-26

## 2024-06-27 ENCOUNTER — APPOINTMENT (OUTPATIENT)
Dept: PHYSICAL THERAPY | Facility: MEDICAL CENTER | Age: 64
End: 2024-06-27
Attending: FAMILY MEDICINE
Payer: COMMERCIAL

## 2024-07-18 RX ORDER — LEVOTHYROXINE SODIUM 0.2 MG/1
200 TABLET ORAL
Qty: 90 TABLET | Refills: 3 | Status: SHIPPED | OUTPATIENT
Start: 2024-07-18

## 2024-07-26 ENCOUNTER — HOSPITAL ENCOUNTER (OUTPATIENT)
Facility: MEDICAL CENTER | Age: 64
End: 2024-07-26
Attending: OBSTETRICS & GYNECOLOGY | Admitting: OBSTETRICS & GYNECOLOGY
Payer: COMMERCIAL

## 2024-07-31 ENCOUNTER — HOSPITAL ENCOUNTER (OUTPATIENT)
Dept: LAB | Facility: MEDICAL CENTER | Age: 64
End: 2024-07-31
Attending: PHYSICIAN ASSISTANT
Payer: COMMERCIAL

## 2024-08-02 ENCOUNTER — HOSPITAL ENCOUNTER (OUTPATIENT)
Dept: LAB | Facility: MEDICAL CENTER | Age: 64
End: 2024-08-02
Attending: PHYSICIAN ASSISTANT
Payer: COMMERCIAL

## 2024-08-02 DIAGNOSIS — R74.8 ELEVATED ALKALINE PHOSPHATASE LEVEL: ICD-10-CM

## 2024-08-02 DIAGNOSIS — R73.01 ELEVATED FASTING GLUCOSE: ICD-10-CM

## 2024-08-02 LAB
ALBUMIN SERPL BCP-MCNC: 4 G/DL (ref 3.2–4.9)
ALBUMIN/GLOB SERPL: 1.4 G/DL
ALP SERPL-CCNC: 154 U/L (ref 30–99)
ALT SERPL-CCNC: 11 U/L (ref 2–50)
ANION GAP SERPL CALC-SCNC: 12 MMOL/L (ref 7–16)
AST SERPL-CCNC: 14 U/L (ref 12–45)
BILIRUB SERPL-MCNC: 0.4 MG/DL (ref 0.1–1.5)
BUN SERPL-MCNC: 14 MG/DL (ref 8–22)
CALCIUM ALBUM COR SERPL-MCNC: 9.7 MG/DL (ref 8.5–10.5)
CALCIUM SERPL-MCNC: 9.7 MG/DL (ref 8.5–10.5)
CHLORIDE SERPL-SCNC: 106 MMOL/L (ref 96–112)
CO2 SERPL-SCNC: 24 MMOL/L (ref 20–33)
CREAT SERPL-MCNC: 0.86 MG/DL (ref 0.5–1.4)
EST. AVERAGE GLUCOSE BLD GHB EST-MCNC: 114 MG/DL
GFR SERPLBLD CREATININE-BSD FMLA CKD-EPI: 76 ML/MIN/1.73 M 2
GLOBULIN SER CALC-MCNC: 2.9 G/DL (ref 1.9–3.5)
GLUCOSE SERPL-MCNC: 97 MG/DL (ref 65–99)
HBA1C MFR BLD: 5.6 % (ref 4–5.6)
POTASSIUM SERPL-SCNC: 4 MMOL/L (ref 3.6–5.5)
PROT SERPL-MCNC: 6.9 G/DL (ref 6–8.2)
SODIUM SERPL-SCNC: 142 MMOL/L (ref 135–145)

## 2024-08-02 PROCEDURE — 36415 COLL VENOUS BLD VENIPUNCTURE: CPT

## 2024-08-02 PROCEDURE — 80053 COMPREHEN METABOLIC PANEL: CPT

## 2024-08-02 PROCEDURE — 84080 ASSAY ALKALINE PHOSPHATASES: CPT

## 2024-08-02 PROCEDURE — 83036 HEMOGLOBIN GLYCOSYLATED A1C: CPT

## 2024-08-02 PROCEDURE — 84075 ASSAY ALKALINE PHOSPHATASE: CPT

## 2024-08-05 LAB
ALP BONE SERPL-CCNC: 61 U/L (ref 0–55)
ALP ISOS SERPL HS-CCNC: 0 U/L
ALP LIVER SERPL-CCNC: 95 U/L (ref 0–94)
ALP SERPL-CCNC: 156 U/L (ref 40–120)

## 2024-08-16 ENCOUNTER — OFFICE VISIT (OUTPATIENT)
Dept: MEDICAL GROUP | Age: 64
End: 2024-08-16
Payer: COMMERCIAL

## 2024-08-16 VITALS
SYSTOLIC BLOOD PRESSURE: 120 MMHG | HEART RATE: 76 BPM | DIASTOLIC BLOOD PRESSURE: 74 MMHG | TEMPERATURE: 97 F | RESPIRATION RATE: 16 BRPM | OXYGEN SATURATION: 97 % | WEIGHT: 208 LBS | HEIGHT: 68 IN | BODY MASS INDEX: 31.52 KG/M2

## 2024-08-16 DIAGNOSIS — E66.9 OBESITY (BMI 30-39.9): ICD-10-CM

## 2024-08-16 DIAGNOSIS — R74.8 ELEVATED ALKALINE PHOSPHATASE LEVEL: ICD-10-CM

## 2024-08-16 DIAGNOSIS — L40.9 PSORIASIS: ICD-10-CM

## 2024-08-16 DIAGNOSIS — R87.619 ABNORMAL CERVICAL PAPANICOLAOU SMEAR, UNSPECIFIED ABNORMAL PAP FINDING: ICD-10-CM

## 2024-08-16 PROCEDURE — 3074F SYST BP LT 130 MM HG: CPT | Performed by: PHYSICIAN ASSISTANT

## 2024-08-16 PROCEDURE — 99214 OFFICE O/P EST MOD 30 MIN: CPT | Performed by: PHYSICIAN ASSISTANT

## 2024-08-16 PROCEDURE — 3078F DIAST BP <80 MM HG: CPT | Performed by: PHYSICIAN ASSISTANT

## 2024-08-16 RX ORDER — BETAMETHASONE DIPROPIONATE 0.05 %
1 OINTMENT (GRAM) TOPICAL 2 TIMES DAILY
Qty: 45 G | Refills: 1 | Status: SHIPPED | OUTPATIENT
Start: 2024-08-16

## 2024-08-16 RX ORDER — LEVOTHYROXINE SODIUM 200 UG/1
1 TABLET ORAL DAILY
COMMUNITY
Start: 2024-07-18 | End: 2024-08-16

## 2024-08-16 ASSESSMENT — FIBROSIS 4 INDEX: FIB4 SCORE: 0.61

## 2024-08-16 NOTE — PROGRESS NOTES
cc: Lab review    Subjective:     HPI    History of Present Illness  The patient is a 63-year-old female presenting for a medication check and lab review.    She has successfully lost 30 pounds through intermittent fasting, a diet rich in protein and vegetables, and regular exercise. Recently, she has experienced a weight gain of 4 pounds but remains committed to her weight loss journey. Her cardiologist has ordered a lipid panel, which she plans to complete in November 2024.     She reports experiencing pressure in both ears, describing it as a clogged sensation. Despite frequent swimming, she does not submerge her head underwater. Ear tubes were inserted in January and February 2024.  Would like them to be looked at.  Denies drainage.    During her last Pap smear, blood was observed leaking from her cervix. An ultrasound did not reveal any abnormalities. A biopsy was attempted but was unsuccessful due to difficulty accessing the cervix. She is scheduled for a hysteroscopy on September 19, 2024.    She also reports dry, itchy skin on her elbows and hands, which she manages with an ointment. However, the ointment is not providing relief, and she is seeking a new treatment option.          Review of systems:  See above.    Latest Reference Range & Units 08/02/24 08:20   Sodium 135 - 145 mmol/L 142   Potassium 3.6 - 5.5 mmol/L 4.0   Chloride 96 - 112 mmol/L 106   Co2 20 - 33 mmol/L 24   Anion Gap 7.0 - 16.0  12.0   Glucose 65 - 99 mg/dL 97   Bun 8 - 22 mg/dL 14   Creatinine 0.50 - 1.40 mg/dL 0.86   GFR (CKD-EPI) >60 mL/min/1.73 m 2 76   Calcium 8.5 - 10.5 mg/dL 9.7   Correct Calcium 8.5 - 10.5 mg/dL 9.7   AST(SGOT) 12 - 45 U/L 14   ALT(SGPT) 2 - 50 U/L 11   Alkaline Phosphatase 30 - 99 U/L 154 (H)   Total Bilirubin 0.1 - 1.5 mg/dL 0.4   Albumin 3.2 - 4.9 g/dL 4.0   Total Protein 6.0 - 8.2 g/dL 6.9   Globulin 1.9 - 3.5 g/dL 2.9   A-G Ratio g/dL 1.4   Glycohemoglobin 4.0 - 5.6 % 5.6   Estim. Avg Glu mg/dL 114   Alkaline  "Phosphatase 40 - 120 U/L 156 (H)   Bone Fractions 0 - 55 U/L 61 (H)   Liver Fractions 0 - 94 U/L 95 (H)   Alk Phos Other Calc U/L 0   (H): Data is abnormally high    Current Outpatient Medications:     betamethasone dipropionate (DEL-BETA) 0.05 % Ointment, Apply 1 Application topically 2 times a day., Disp: 45 g, Rfl: 1    levothyroxine (SYNTHROID) 200 MCG Tab, Take 1 Tablet by mouth every morning on an empty stomach., Disp: 90 Tablet, Rfl: 3    carvedilol (COREG) 6.25 MG Tab, Take 1 Tablet by mouth 2 times a day with meals., Disp: 180 Tablet, Rfl: 3    cetirizine (KLS ALLER-GUY) 10 MG Tab, , Disp: , Rfl:     meloxicam (MOBIC) 15 MG tablet, Take 1 Tablet by mouth every day., Disp: 30 Tablet, Rfl: 2    atorvastatin (LIPITOR) 80 MG tablet, Take 1 Tablet by mouth every day., Disp: 90 Tablet, Rfl: 3    omeprazole (PRILOSEC) 40 MG delayed-release capsule, Take 1 Capsule by mouth every day., Disp: 90 Capsule, Rfl: 3    FLUoxetine (PROZAC) 10 MG Cap, Take 1 Capsule by mouth every day., Disp: 90 Capsule, Rfl: 3    Multiple Vitamins-Minerals (HAIR SKIN & NAILS) Tab, Take 1 Tablet by mouth every day., Disp: , Rfl:     aspirin (ASA) 81 MG Chew Tab chewable tablet, Chew 81 mg every day., Disp: , Rfl:     Allergies, past medical history, past surgical history, family history, social history reviewed and updated    Objective:     Vitals: /74 (BP Location: Left arm, Patient Position: Sitting, BP Cuff Size: Adult)   Pulse 76   Temp 36.1 °C (97 °F) (Temporal)   Resp 16   Ht 1.727 m (5' 8\")   Wt 94.3 kg (208 lb)   SpO2 97%   BMI 31.63 kg/m²   General: Alert, pleasant, NAD  HEENT: Normocephalic. Neck supple.  No thyromegaly or masses palpated. No cervical or supraclavicular lymphadenopathy. No carotid bruits   Heart: Regular rate and rhythm.  S1 and S2 normal.  No murmurs appreciated.  Respiratory: Normal respiratory effort.  Clear to auscultation bilaterally.  Skin: Warm, dry.  Psoriasis of knuckles and " elbows  Extremities: No leg edema.    Psych:  Affect/mood is normal, judgement is good, memory is intact, grooming is appropriate.    Assessment/Plan:     Magda was seen today for lab results.    Diagnoses and all orders for this visit:    Elevated alkaline phosphatase level  -Alk phosphatase level is persistently elevated.  Fractionated GGT on the liver is just 1 point higher, higher elevation from bone.  Will obtain PTH, vitamin D also placed referral to endocrinology for further evaluation  -     PTH WITH CALCIUM; Future  -     VITAMIN D,25 HYDROXY (DEFICIENCY); Future  -     Referral to Endocrinology    Psoriasis  -She discussed some kind of topical cream, she is unsure what it is.  Will trial betamethasone ointment placed referral to dermatology if this is not effective.  -     betamethasone dipropionate (DEL-BETA) 0.05 % Ointment; Apply 1 Application topically 2 times a day.  -     Referral to Dermatology    Obesity (BMI 30-39.9)  Has successfully lost 30 pounds.  Continue with good lifestyle modifications.    Abnormal cervical Papanicolaou smear, unspecified abnormal pap finding  Scheduled for hysteroscopy with gynecology 9/19.      Return in about 6 months (around 2/16/2025) for Medication Check.

## 2024-08-20 ENCOUNTER — APPOINTMENT (OUTPATIENT)
Dept: ADMISSIONS | Facility: MEDICAL CENTER | Age: 64
End: 2024-08-20
Attending: OBSTETRICS & GYNECOLOGY
Payer: COMMERCIAL

## 2024-08-26 ENCOUNTER — PRE-ADMISSION TESTING (OUTPATIENT)
Dept: ADMISSIONS | Facility: MEDICAL CENTER | Age: 64
End: 2024-08-26
Attending: OBSTETRICS & GYNECOLOGY
Payer: COMMERCIAL

## 2024-08-26 NOTE — OR NURSING
RN tele pre admit appointment completed with patient:  Medication instructions given according to the Guideline for Pre-Operative Medication Management.  Copy of medication list given to patient at pre testing appointment on 9/5/2024.   Preparing For Your Procedure packet reviewed with patient, including medications, fasting and bathing guidelines.  Surgery date 9/19/2024.  Patient verbalized and understanding of the above instructions.

## 2024-08-27 ENCOUNTER — OFFICE VISIT (OUTPATIENT)
Dept: MEDICAL GROUP | Age: 64
End: 2024-08-27
Payer: COMMERCIAL

## 2024-08-27 VITALS
HEART RATE: 64 BPM | TEMPERATURE: 96.7 F | OXYGEN SATURATION: 98 % | BODY MASS INDEX: 31.37 KG/M2 | WEIGHT: 207 LBS | DIASTOLIC BLOOD PRESSURE: 60 MMHG | SYSTOLIC BLOOD PRESSURE: 110 MMHG | HEIGHT: 68 IN

## 2024-08-27 DIAGNOSIS — L40.9 PSORIASIS: ICD-10-CM

## 2024-08-27 DIAGNOSIS — G89.29 CHRONIC LEFT SHOULDER PAIN: ICD-10-CM

## 2024-08-27 DIAGNOSIS — M25.512 CHRONIC LEFT SHOULDER PAIN: ICD-10-CM

## 2024-08-27 PROCEDURE — 3078F DIAST BP <80 MM HG: CPT | Performed by: PHYSICIAN ASSISTANT

## 2024-08-27 PROCEDURE — 99214 OFFICE O/P EST MOD 30 MIN: CPT | Performed by: PHYSICIAN ASSISTANT

## 2024-08-27 PROCEDURE — 3074F SYST BP LT 130 MM HG: CPT | Performed by: PHYSICIAN ASSISTANT

## 2024-08-27 ASSESSMENT — FIBROSIS 4 INDEX: FIB4 SCORE: 0.61

## 2024-08-27 NOTE — PROGRESS NOTES
cc: Left shoulder pain    Subjective:     HPI    This is a 63-year-old female presenting with concerns of left shoulder pain.  She was previously going to physical therapy after a fall in May of this year.  X-ray was completed no fracture or dislocation.  No evidence of arthritis.  PT provided good relief.  However she tripped and her left arm flew up into the air causing her a aggravation of her left shoulder pain.  It is more in the anterior portion of her shoulder.  Exacerbated with abduction, lifting, and reaching out.  She would like to go back to physical therapy.  She has not been taking anything over-the-counter.  Denies redness, warmth, edema, numbness/tingling.    Of note she was prescribed betamethasone for her psoriasis.  This started no good improvement.    Review of systems:  See above.       Current Outpatient Medications:     betamethasone dipropionate (DEL-BETA) 0.05 % Ointment, Apply 1 Application topically 2 times a day. (Patient taking differently: Apply 1 Application topically 2 times a day as needed.       **MEDICATION INSTRUCTIONS FOR SURGERY/PROCEDURE SCHEDULED FOR 9/19/2024.  DO NOT TAKE MORNING OF SURGERY.), Disp: 45 g, Rfl: 1    levothyroxine (SYNTHROID) 200 MCG Tab, Take 1 Tablet by mouth every morning on an empty stomach. (Patient taking differently: Take 200 mcg by mouth every morning on an empty stomach.      **MEDICATION INSTRUCTIONS FOR SURGERY/PROCEDURE SCHEDULED FOR 9/19/2024  OK TO CONTINUE TAKING PRIOR TO SURGERY AND DAY OF SURGERY), Disp: 90 Tablet, Rfl: 3    carvedilol (COREG) 6.25 MG Tab, Take 1 Tablet by mouth 2 times a day with meals. (Patient taking differently: Take 6.25 mg by mouth 2 times a day with meals.      **MEDICATION INSTRUCTIONS FOR SURGERY/PROCEDURE SCHEDULED FOR 9/19/2024  OK TO CONTINUE TAKING PRIOR TO SURGERY AND DAY OF SURGERY), Disp: 180 Tablet, Rfl: 3    cetirizine (KLS ALLER-GUY) 10 MG Tab, 10 mg every day.      **MEDICATION INSTRUCTIONS FOR  "SURGERY/PROCEDURE SCHEDULED FOR 9/19/2024  OK TO CONTINUE TAKING PRIOR TO SURGERY AND DAY OF SURGERY, Disp: , Rfl:     atorvastatin (LIPITOR) 80 MG tablet, Take 1 Tablet by mouth every day. (Patient taking differently: Take 80 mg by mouth every day.       **MEDICATION INSTRUCTIONS FOR SURGERY/PROCEDURE SCHEDULED FOR 9/19/2024  OK TO CONTINUE TAKING PRIOR TO SURGERY AND DAY OF SURGERY), Disp: 90 Tablet, Rfl: 3    omeprazole (PRILOSEC) 40 MG delayed-release capsule, Take 1 Capsule by mouth every day. (Patient taking differently: Take 40 mg by mouth every evening.      **MEDICATION INSTRUCTIONS FOR SURGERY/PROCEDURE SCHEDULED FOR 9/19/2024  OK TO CONTINUE TAKING PRIOR TO SURGERY AND NIGHT BEFORE SURGERY), Disp: 90 Capsule, Rfl: 3    FLUoxetine (PROZAC) 10 MG Cap, Take 1 Capsule by mouth every day. (Patient taking differently: Take 10 mg by mouth every day.      **MEDICATION INSTRUCTIONS FOR SURGERY/PROCEDURE SCHEDULED FOR 9/19/2024  OK TO CONTINUE TAKING PRIOR TO SURGERY AND DAY OF SURGERY), Disp: 90 Capsule, Rfl: 3    Multiple Vitamins-Minerals (HAIR SKIN & NAILS) Tab, Take 1 Tablet by mouth every day.       *MEDICATION INSTRUCTIONS FOR SURGERY/PROCEDURE SCHEDULED FOR 9/19/2024.  DO NOT TAKE 7 DAYS PRIOR TO SURGERY, Disp: , Rfl:     aspirin (ASA) 81 MG Chew Tab chewable tablet, Chew 81 mg every day.      **MEDICATION INSTRUCTIONS FOR SURGERY/PROCEDURE SCHEDULED FOR 9/19/2024  COORDINATE MEDICATION INSTRUCTIONS FROM PRESCRIBING PHYSICIAN, Disp: , Rfl:     Allergies, past medical history, past surgical history, family history, social history reviewed and updated    Objective:     Vitals: /60 (BP Location: Right arm, Patient Position: Sitting, BP Cuff Size: Adult)   Pulse 64   Temp 35.9 °C (96.7 °F) (Temporal)   Ht 1.727 m (5' 8\")   Wt 93.9 kg (207 lb)   SpO2 98%   BMI 31.47 kg/m²   General: Alert, pleasant, NAD  HEENT: Normocephalic. Neck supple.     Heart: Regular rate and rhythm.  S1 and S2 normal.  No " murmurs appreciated.  Respiratory: Normal respiratory effort.  Clear to auscultation bilaterally.  Skin: Warm, dry, well-demarcated lightly erythematous patches on the knuckles, no flaking  Left shoulder: Decreased range of motion with abduction to about 90 degrees, decreased range of motion with internal rotation.  Moderately tender to palpation on the anterior portion of the shoulder.  Negative empty can test.  Strength intact  Extremities: No leg edema.  Radial pulses 2+ symmetric  Psych:  Affect/mood is normal, judgement is good, memory is intact, grooming is appropriate.    Assessment/Plan:     Magda was seen today for shoulder pain.    Diagnoses and all orders for this visit:    Chronic left shoulder pain  -Previously has had good improvement with physical therapy.  Referral resubmitted to her physical therapist.  Advised to start meloxicam daily as needed.  Referral to orthopedics in the event pain persist/does not resolve, there could be possible ligamental injury.  Do not feel we need to repeat x-ray as there is no fall, x-ray completed 5/2024 did not show any arthritis  -     Referral to Physical Therapy  -     Referral to Orthopedics    Psoriasis  Noted good improvement with the betamethasone cream.  Is following up with dermatology next week.      Return in about 6 months (around 2/27/2025) for Medication Check.

## 2024-08-29 ENCOUNTER — PHYSICAL THERAPY (OUTPATIENT)
Dept: PHYSICAL THERAPY | Facility: MEDICAL CENTER | Age: 64
End: 2024-08-29
Attending: PHYSICIAN ASSISTANT
Payer: COMMERCIAL

## 2024-08-29 ENCOUNTER — TELEPHONE (OUTPATIENT)
Dept: PHYSICAL THERAPY | Facility: MEDICAL CENTER | Age: 64
End: 2024-08-29
Payer: COMMERCIAL

## 2024-08-29 DIAGNOSIS — M25.512 CHRONIC LEFT SHOULDER PAIN: ICD-10-CM

## 2024-08-29 DIAGNOSIS — G89.29 CHRONIC LEFT SHOULDER PAIN: ICD-10-CM

## 2024-08-29 PROCEDURE — 97162 PT EVAL MOD COMPLEX 30 MIN: CPT

## 2024-08-29 PROCEDURE — 97110 THERAPEUTIC EXERCISES: CPT

## 2024-08-29 PROCEDURE — 97014 ELECTRIC STIMULATION THERAPY: CPT

## 2024-08-29 ASSESSMENT — ENCOUNTER SYMPTOMS
PAIN SCALE: 5
PAIN SCALE AT HIGHEST: 9
PAIN SCALE AT LOWEST: 0
PAIN LOCATION: L LATERAL SHOULDER

## 2024-08-29 NOTE — OP THERAPY EVALUATION
"  Outpatient Physical Therapy  INITIAL EVALUATION    [facility]  [address]  [place] [address]  Phone:  [phone]  Fax:  [phone]    Date of Evaluation: 08/29/2024    Patient: Magda Childress  YOB: 1960  MRN: [id number]     Referring Provider: Sean Recinos, PT  No address on file   Referring Diagnosis Chronic left shoulder pain [M25.512, G89.29]     Time Calculation  Start time: 1504              Chief Complaint: Shoulder Problem    Visit Diagnoses     ICD-10-CM   1. Chronic left shoulder pain  M25.512    G89.29       Date of onset of impairment: No data found    Subjective:   History of Present Illness:     Date of onset:  8/16/2024    Mechanism of injury:  Pt late to check in (standard 10-15 min prior to appt); resulting in delayed start to session to allow for check-in procedures.    Patient is a 63 year old female with a PMH including: MI, LIDYA, spontaneous dissection of coronary artery, obesity, ventricular aneurysm, class II obesity, carpal tunnel release R (Aug 2023). Pt has hx of frequent dislocations to the L shoulder.     Pt is known to this author and prev seen in PT for shoulder pain secondary to fall while attempting to get out of the bath tub. Seen in PT from 5/15/24-6/18/24; DC-ed with good outcome. She returns today for L anterior shoulder pain following fall. Per PCP note on 8/27/24, \"This is a 63-year-old female presenting with concerns of left shoulder pain.  She was previously going to physical therapy after a fall in May of this year.  X-ray was completed no fracture or dislocation.  No evidence of arthritis.  PT provided good relief.  However she tripped and her left arm flew up into the air causing her a aggravation of her left shoulder pain.  It is more in the anterior portion of her shoulder.  Exacerbated with abduction, lifting, and reaching out.  She would like to go back to physical therapy.  " "She has not been taking anything over-the-counter.  Denies redness, warmth, edema, numbness/tingling.\" Pt stating she was doing well until she took a misstep and raised L arm in air suddenly, no ground level fall. Pt reporting she returned to her exercises but admits to taking a break since end of PT up until this incidence. Felt a pull during this incident, but no dislocation/subluxation, popping/tearing. Stating pain was immediate. Pain has not improved. No numbness/tingling. Has intermittent pain in the elbow but this premorbid to injury. No strength loss/dropping objects.              Prior level of function:  Prev shoulder dislocaitons and injury from fall in May 2024  Sleep disturbance:  Not disrupted  Pain:     Current pain ratin    At best pain ratin    At worst pain ratin    Location:  L lateral shoulder    Quality: achy and occas sharp.    Pain Comments::  Aggravating: opening door, picking up grandchildren, styling and washing hair, letting the arm hang, lifting the arm, walking 1.5 miles     Relieving: topical creams, heat, meloxicam, supporting shoulder to take the weight off, supine lying with pillow support   Hand dominance:  Right  Diagnostic Tests:     Diagnostic Tests Comments:  No recent imaging    :  5/10/24:  FINDINGS:     BONE MINERALIZATION: Normal.  JOINTS: Preserved. No erosions.  FRACTURE: None.  DISLOCATION: None.  SOFT TISSUES: No mass.     IMPRESSION:     No fracture or dislocation.    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   Is completing walking routine, 1-3 mile a day.    Has 3 grandchildren; one on the way. " "        Patient Goals:     Other patient goals:  Improve self-care, lift grandchildren, improve ROM, \"get full use\"      Past Medical History:   Diagnosis Date   • Anesthesia 08/26/2024    \"Woke up in respiratory ICU after appendectomy in March of 2024.  Adult intubation tube was too big they needed to use a pediatric tube which they couldn't remove until 3 days after surgery\".   • Benign paroxysmal positional vertigo of right ear    • Hard to intubate 08/26/2024    Needs pediatric ET tube.   • History of myocardial infarction     x5; cardiac caths only, no stents placed   • Hyperlipidemia    • Hypertension    • Sleep apnea 08/26/2024    Uses CPAP   • Spontaneous dissection of coronary artery     X5 heart attacks related to SCAD   • Thyroid disease      Past Surgical History:   Procedure Laterality Date   • MT LAP,APPENDECTOMY N/A 03/07/2024    Procedure: APPENDECTOMY, LAPAROSCOPIC;  Surgeon: Manfred Zamudio M.D.;  Location: SURGERY [facility];  Service: Gastroenterology   • CARPAL TUNNEL RELEASE Right    • CHOLECYSTECTOMY     • THYROIDECTOMY TOTAL       Social History     Tobacco Use   • Smoking status: Never   • Smokeless tobacco: Never   Substance Use Topics   • Alcohol use: Yes     Comment: 1-2 drinks per month     Family and Occupational History     Socioeconomic History   • Marital status: Single     Spouse name: Not on file   • Number of children: Not on file   • Years of education: Not on file   • Highest education level: Not on file   Occupational History   • Not on file       Objective     Static Posture     Comments  Postural observation:  Seated posture: fair    Additional Postural Observation Details  Forward head and rounded shoulders    Cervical Screen    Cervical range of motion within normal limits with the following exceptions: Flexion WFL  Extension: decreased   SB: 14 to R; 22 deg to L  Rotation: decreased   Retraction: decreased  *no pain reproduced in the shoulder with c/s AROM " "    Palpation     Additional Palpation Details  NO TTP L supraspinatus, teres lorena and minor, and infraspinatus, anterior and middle clavicle  TTP at anterior acromion and ACJ L     Active Range of Motion   Left Shoulder   Flexion: 81 degrees with pain  Abduction: 92 degrees with pain  External rotation 0°: 65 degrees with pain - \"burning\"  Internal rotation BTB: Active internal rotation behind the back: L4.     Right Shoulder   Flexion: 147 degrees   Abduction: 158 degrees   External rotation 0°: 58 degrees   Internal rotation BTB: Active internal rotation behind the back: L2    Additional Active Range of Motion Details  Pain throughout motion and incr at end ranges    Strength:    Additional Strength Details  GH isometrics: ( set up: 0 deg GH abd and elbow 90 deg)    R GH: all WFL    L GH:  Flexion: Mod  Extension: Mod  Abd: Mod  IR: Mod  ER: Mod   No sig increase in pain with any isometric testing        Therapeutic Exercises (CPT 50498):     1. pt education, re: PT findings and upcoming POC    2. new hep as below    20. old hep reviewed; HO printed today for pt for her records      Therapeutic Exercise Summary: Access Code: [email or web address]  URL: [email or web address]  Date: 08/29/2024  Prepared by: Sean Recinos    Exercises  - Correct Seated Posture  - 7 x weekly  - Supine Shoulder Flexion Extension AAROM with Dowel  - 1-2 x daily - 7 x weekly - 1 sets - 10 reps  - Standing Scapular Retraction  - 3 x daily - 7 x weekly - 1-2 sets - 10 reps  - Seated Shoulder External Rotation AAROM with Cane and Hand in Neutral  - 1-2 x daily - 7 x weekly - 1 sets - 10 reps  - Standing Shoulder Abduction AAROM with Dowel  - 1-2 x daily - 7 x weekly - 1 sets - 10 reps      Pt performed these exercises with instruction and SPV.  Provided handout with these exercises for daily HEP.        Therapeutic Treatments and Modalities:     1. E Stim Unattended (CPT 69325), IFC and mhp to L shoulder in sitting x 15 min, pain " management    Time-based treatments/modalities:           Assessment, Response and Plan:   Impairments: abnormal or restricted ROM, impaired physical strength, lacks appropriate home exercise program and pain with function    Assessment details:  Patient is a pleasant and cooperative 63 year old R hand dominant female who was seen prev for L shoulder pain secondary to Fall. Returns to PT s/p LOB/misstep without GLF with return of L anterolateral shoulder pain. Past X ray unremarkable for dislocations or fractures; no new imaging on file. Denies red flags. Of note, pt does have hx of few dislocations occurring at L shoulder. Exam findings suggestive of irritation at ACJ. Pt currently demonstrating impaired shoulder ROM, poor postural awareness, pain at ACJ and acromion. Pt may benefit from a trial of skilled physical therapy in order to address above impairments in order to improve QOL and return to reported ADL's as done previously. PCP did order referral to ortho in case PT fails and alternative option required.        Prognosis: good    Goals:   Short Term Goals:   1. Pt will be independent with written HEP.  2. Pt will demo correct posture without requiring cues during in-clinic ex at least 50% of the time or greater.     Short term goal time span:  2-4 weeks      Long Term Goals:    1. Pt will be independent with written HEP.  2. Pt will have a sig improvement in Quickdash score (eval: 45.45)  3. Pt will be able to demo shoulder elevation within 10 deg of uninvolved shoulder in order to reach into cabinets and complete her self care without incr in pain.  4. Pt will be able to lift grandchildren 75% of the time or greater with no increase in s/s.    Long term goal time span:  6-8 weeks    Plan:   Therapy options:  Physical therapy treatment to continue  Planned therapy interventions:  Manual Therapy (CPT 61169), Neuromuscular Re-education (CPT 85643), E Stim Unattended (CPT 68595), Therapeutic Exercise (CPT  83188) and Therapeutic Activities (CPT 12705)  Frequency: 1-2x/wk.  Duration in weeks:  8  Discussed with:  Patient  Plan details:  UPOC: 10/25/24        Functional Assessment Used  Quickdash General Total Score: 45.45     Referring provider co-signature:  I have reviewed this plan of care and my co-signature certifies the need for services.    Certification Period: 08/29/2024 to  10/25/24    Physician Signature: ________________________________ Date: ______________

## 2024-08-29 NOTE — OP THERAPY DISCHARGE SUMMARY
Outpatient Physical Therapy  DISCHARGE SUMMARY NOTE      [facility] Outpatient Physical Therapy  [address]  [place] [address]  Phone:  [phone]  Fax:  [phone]    Date of Visit: 08/29/2024    Patient: Magda Childress  YOB: 1960  MRN: [id number]     Referring Provider:   [place] [address] [address]      Referring Diagnosis Pain in left shoulder [M25.512]          Functional Assessment Used        Your patient is being discharged from Physical Therapy with the following comments:   Patient has failed to schedule or reschedule follow-up visits    Comments:  Pt seen from 5/15/24-6/18/24 due to shoulder pain following fall. She met her goals and last seen for this encounter on 6/18/24. She is scheduled to return to PT for new eval due to new fall and change in function on 8/29. Will close old case in order to eval for new PT encounter.     Limitations Remaining:  Please see PN from 6/18/24 for old encounter of PT.     Recommendations:  Pt will return to PT for new eval due to shoulder pain following new, more recent fall. Old encounter to be DC-ed.     Sean Recinos, PT    Date: 8/29/2024

## 2024-09-05 ENCOUNTER — HOSPITAL ENCOUNTER (OUTPATIENT)
Dept: RADIOLOGY | Facility: MEDICAL CENTER | Age: 64
End: 2024-09-05
Attending: OBSTETRICS & GYNECOLOGY | Admitting: OBSTETRICS & GYNECOLOGY
Payer: COMMERCIAL

## 2024-09-05 ENCOUNTER — PHYSICAL THERAPY (OUTPATIENT)
Dept: PHYSICAL THERAPY | Facility: MEDICAL CENTER | Age: 64
End: 2024-09-05
Attending: PHYSICIAN ASSISTANT
Payer: COMMERCIAL

## 2024-09-05 ENCOUNTER — PRE-ADMISSION TESTING (OUTPATIENT)
Dept: ADMISSIONS | Facility: MEDICAL CENTER | Age: 64
End: 2024-09-05
Attending: OBSTETRICS & GYNECOLOGY
Payer: COMMERCIAL

## 2024-09-05 DIAGNOSIS — Z01.810 PRE-OPERATIVE CARDIOVASCULAR EXAMINATION: ICD-10-CM

## 2024-09-05 DIAGNOSIS — Z01.812 PRE-OPERATIVE LABORATORY EXAMINATION: ICD-10-CM

## 2024-09-05 DIAGNOSIS — Z01.811 PRE-OPERATIVE RESPIRATORY EXAMINATION: ICD-10-CM

## 2024-09-05 DIAGNOSIS — G89.29 CHRONIC LEFT SHOULDER PAIN: ICD-10-CM

## 2024-09-05 DIAGNOSIS — M25.512 CHRONIC LEFT SHOULDER PAIN: ICD-10-CM

## 2024-09-05 LAB
ALBUMIN SERPL BCP-MCNC: 3.9 G/DL (ref 3.2–4.9)
ALBUMIN/GLOB SERPL: 1.3 G/DL
ALP SERPL-CCNC: 155 U/L (ref 30–99)
ALT SERPL-CCNC: 17 U/L (ref 2–50)
ANION GAP SERPL CALC-SCNC: 12 MMOL/L (ref 7–16)
AST SERPL-CCNC: 9 U/L (ref 12–45)
BASOPHILS # BLD AUTO: 1 % (ref 0–1.8)
BASOPHILS # BLD: 0.1 K/UL (ref 0–0.12)
BILIRUB SERPL-MCNC: 0.4 MG/DL (ref 0.1–1.5)
BUN SERPL-MCNC: 12 MG/DL (ref 8–22)
CALCIUM ALBUM COR SERPL-MCNC: 9.6 MG/DL (ref 8.5–10.5)
CALCIUM SERPL-MCNC: 9.5 MG/DL (ref 8.5–10.5)
CHLORIDE SERPL-SCNC: 105 MMOL/L (ref 96–112)
CO2 SERPL-SCNC: 26 MMOL/L (ref 20–33)
CREAT SERPL-MCNC: 0.88 MG/DL (ref 0.5–1.4)
EKG IMPRESSION: NORMAL
EOSINOPHIL # BLD AUTO: 0.76 K/UL (ref 0–0.51)
EOSINOPHIL NFR BLD: 7.7 % (ref 0–6.9)
ERYTHROCYTE [DISTWIDTH] IN BLOOD BY AUTOMATED COUNT: 43.5 FL (ref 35.9–50)
GFR SERPLBLD CREATININE-BSD FMLA CKD-EPI: 74 ML/MIN/1.73 M 2
GLOBULIN SER CALC-MCNC: 2.9 G/DL (ref 1.9–3.5)
GLUCOSE SERPL-MCNC: 81 MG/DL (ref 65–99)
HCT VFR BLD AUTO: 44 % (ref 37–47)
HGB BLD-MCNC: 14.3 G/DL (ref 12–16)
IMM GRANULOCYTES # BLD AUTO: 0.03 K/UL (ref 0–0.11)
IMM GRANULOCYTES NFR BLD AUTO: 0.3 % (ref 0–0.9)
LYMPHOCYTES # BLD AUTO: 3.36 K/UL (ref 1–4.8)
LYMPHOCYTES NFR BLD: 34.1 % (ref 22–41)
MCH RBC QN AUTO: 30.6 PG (ref 27–33)
MCHC RBC AUTO-ENTMCNC: 32.5 G/DL (ref 32.2–35.5)
MCV RBC AUTO: 94 FL (ref 81.4–97.8)
MONOCYTES # BLD AUTO: 0.8 K/UL (ref 0–0.85)
MONOCYTES NFR BLD AUTO: 8.1 % (ref 0–13.4)
NEUTROPHILS # BLD AUTO: 4.8 K/UL (ref 1.82–7.42)
NEUTROPHILS NFR BLD: 48.8 % (ref 44–72)
NRBC # BLD AUTO: 0 K/UL
NRBC BLD-RTO: 0 /100 WBC (ref 0–0.2)
PLATELET # BLD AUTO: 410 K/UL (ref 164–446)
PMV BLD AUTO: 9.9 FL (ref 9–12.9)
POTASSIUM SERPL-SCNC: 4 MMOL/L (ref 3.6–5.5)
PROT SERPL-MCNC: 6.8 G/DL (ref 6–8.2)
RBC # BLD AUTO: 4.68 M/UL (ref 4.2–5.4)
SODIUM SERPL-SCNC: 143 MMOL/L (ref 135–145)
T4 FREE SERPL-MCNC: 1.69 NG/DL (ref 0.93–1.7)
TSH SERPL-ACNC: 0.32 UIU/ML (ref 0.35–5.5)
WBC # BLD AUTO: 9.9 K/UL (ref 4.8–10.8)

## 2024-09-05 PROCEDURE — 84439 ASSAY OF FREE THYROXINE: CPT

## 2024-09-05 PROCEDURE — 85025 COMPLETE CBC W/AUTO DIFF WBC: CPT

## 2024-09-05 PROCEDURE — 71046 X-RAY EXAM CHEST 2 VIEWS: CPT

## 2024-09-05 PROCEDURE — 97140 MANUAL THERAPY 1/> REGIONS: CPT

## 2024-09-05 PROCEDURE — 84443 ASSAY THYROID STIM HORMONE: CPT

## 2024-09-05 PROCEDURE — 80053 COMPREHEN METABOLIC PANEL: CPT

## 2024-09-05 PROCEDURE — 93005 ELECTROCARDIOGRAM TRACING: CPT

## 2024-09-05 PROCEDURE — 97110 THERAPEUTIC EXERCISES: CPT

## 2024-09-05 PROCEDURE — 36415 COLL VENOUS BLD VENIPUNCTURE: CPT

## 2024-09-05 PROCEDURE — 93010 ELECTROCARDIOGRAM REPORT: CPT | Performed by: INTERNAL MEDICINE

## 2024-09-05 NOTE — OP THERAPY DAILY TREATMENT
Outpatient Physical Therapy  DAILY TREATMENT     [facility] Outpatient Physical Therapy  [address]  [place] [address]  Phone:  [phone]  Fax:  [phone]    Date: 09/05/2024    Patient: Magda Childress  YOB: 1960  MRN: [id number]     Time Calculation    Start time: 1546  Stop time: 1626 Time Calculation (min): 40 minutes         Chief Complaint: Shoulder Problem    Visit #: 2    SUBJECTIVE:  Pt reporting shoulder was feeling better until reaching overhead for imaging and then sleeping on it for two days; has increased the soreness.     OBJECTIVE:  Current objective measures:     From eval:  TTP at anterior acromion and ACJ L      Active Range of Motion   Left Shoulder   Flexion: 147 degrees with pain (pain briefly at about 90 deg)  Abduction: 90 degrees with pain  External rotation 0°: 60 degrees with pain  at end range  Internal rotation BTB: NT     Isometric strength: from eval:   L GH:  Flexion: Mod  Extension: Mod  Abd: Mod  IR: Mod  ER: Mod   No sig increase in pain with any isometric testing       Therapeutic Exercises (CPT 52589):     1. pt education, re: PT findings and upcoming POC    2. verbal review of hep    3. posture correction    4. shoulder flexion with dowel, verbal review    5. scapular retractions, verbal review    6. shoulder ER with cane, verbal review    7. shoulder abd AAROM with dowel, verbal review    8. SA OH with pillowcase, x10, hep    9. RC isometrics at wall 0 deg abd, 3x5 sec (shoulder flex, ext, IR and ER) ea, hep    20. old hep reviewed; HO printed today for pt for her records      Therapeutic Exercise Summary: Access Code: [email or web address]  URL: [email or web address]  Date: 08/29/2024  Prepared by: Sean Recinos    Exercises  - Correct Seated Posture  - 7 x weekly  - Supine Shoulder Flexion Extension AAROM with Dowel  - 1-2 x daily - 7 x weekly - 1 sets - 10 reps  - Standing Scapular Retraction  - 3 x daily - 7 x  weekly - 1-2 sets - 10 reps  - Seated Shoulder External Rotation AAROM with Cane and Hand in Neutral  - 1-2 x daily - 7 x weekly - 1 sets - 10 reps  - Standing Shoulder Abduction AAROM with Dowel  - 1-2 x daily - 7 x weekly - 1 sets - 10 reps      Pt performed these exercises with instruction and SPV.  Provided handout with these exercises for daily HEP.        Therapeutic Treatments and Modalities:     1. E Stim Unattended (CPT 47214), IFC and mhp to L shoulder in sitting x 15 min, NT    2. Manual Therapy (CPT 93061), see below, x30 min    Therapeutic Treatment and Modalities Summary: Manual:  CPA and rotational mobs gr III to CTJ-T10 with pt in prone lying, SL scapular mobs in all directions gr III and scapular release x5 to L shoulder (pillow barrier), gentle SCJ inf and posterior glides gr III    Time-based treatments/modalities:    Physical Therapy Timed Treatment Charges  Manual therapy minutes (CPT 72038): 30 minutes  Therapeutic exercise minutes (CPT 41795): 10 minutes    Pain rating (1-10) before treatment:  0/10 at rest   Pain rating (1-10) after treatment:  0./10 at rest        ASSESSMENT:   Response to treatment:   Pt demonstrating improving ROM but continues to display painful arc pattern suggesting subacromial syndrome. Will focus on strengthening to periscapular region in upcoming visits.       PLAN/RECOMMENDATIONS:   Plan for treatment: therapy treatment to continue next visit.  Planned interventions for next visit: continue with current treatment.  Assess response to new hep; continue strengthening at periscapular

## 2024-09-10 ENCOUNTER — PHYSICAL THERAPY (OUTPATIENT)
Dept: PHYSICAL THERAPY | Facility: MEDICAL CENTER | Age: 64
End: 2024-09-10
Attending: PHYSICIAN ASSISTANT
Payer: COMMERCIAL

## 2024-09-10 DIAGNOSIS — G89.29 CHRONIC LEFT SHOULDER PAIN: ICD-10-CM

## 2024-09-10 DIAGNOSIS — M25.512 CHRONIC LEFT SHOULDER PAIN: ICD-10-CM

## 2024-09-10 PROCEDURE — 999999 HB NO CHARGE

## 2024-09-10 NOTE — OP THERAPY DAILY TREATMENT
Outpatient Physical Therapy  DAILY TREATMENT     [facility] Outpatient Physical Therapy  [address]  [place] [address]  Phone:  [phone]  Fax:  [phone]    Date: 09/10/2024    Patient: Magda Childress  YOB: 1960  MRN: [id number]     Time Calculation                   Chief Complaint: No chief complaint on file.    Visit #: 3    SUBJECTIVE:  Pt reporting shoulder was feeling better until reaching overhead for imaging and then sleeping on it for two days; has increased the soreness.       OBJECTIVE:  Current objective measures:     From eval:  TTP at anterior acromion and ACJ L      Active Range of Motion   Left Shoulder   Flexion: 147 degrees with pain (pain briefly at about 90 deg)  Abduction: 90 degrees with pain  External rotation 0°: 60 degrees with pain  at end range  Internal rotation BTB: NT     Isometric strength: from eval:   L GH:  Flexion: Mod  Extension: Mod  Abd: Mod  IR: Mod  ER: Mod   No sig increase in pain with any isometric testing       Therapeutic Exercises (CPT 50691):     1. pt education, re: PT findings and upcoming POC    2. verbal review of hep    3. posture correction    4. shoulder flexion with dowel, verbal review    5. scapular retractions, verbal review    6. shoulder ER with cane, verbal review    7. shoulder abd AAROM with dowel, verbal review    8. SA OH with pillowcase, x10, hep    9. RC isometrics at wall 0 deg abd, 3x5 sec (shoulder flex, ext, IR and ER) ea, hep    20. old hep reviewed; HO printed today for pt for her records      Therapeutic Exercise Summary: Access Code: [email or web address]  URL: [email or web address]  Date: 08/29/2024  Prepared by: Sean Recinos    Exercises  - Correct Seated Posture  - 7 x weekly  - Supine Shoulder Flexion Extension AAROM with Dowel  - 1-2 x daily - 7 x weekly - 1 sets - 10 reps  - Standing Scapular Retraction  - 3 x daily - 7 x weekly - 1-2 sets - 10 reps  - Seated Shoulder  External Rotation AAROM with Cane and Hand in Neutral  - 1-2 x daily - 7 x weekly - 1 sets - 10 reps  - Standing Shoulder Abduction AAROM with Dowel  - 1-2 x daily - 7 x weekly - 1 sets - 10 reps      Pt performed these exercises with instruction and SPV.  Provided handout with these exercises for daily HEP.        Therapeutic Treatments and Modalities:     1. E Stim Unattended (CPT 72079), IFC and mhp to L shoulder in sitting x 15 min, NT    2. Manual Therapy (CPT 83850), see below, x30 min    Therapeutic Treatment and Modalities Summary: Manual:  CPA and rotational mobs gr III to CTJ-T10 with pt in prone lying, SL scapular mobs in all directions gr III and scapular release x5 to L shoulder (pillow barrier), gentle SCJ inf and posterior glides gr III    Time-based treatments/modalities:         Pain rating (1-10) before treatment:  0/10 at rest   Pain rating (1-10) after treatment:  0./10 at rest        ASSESSMENT:   Response to treatment:   Pt demonstrating improving ROM but continues to display painful arc pattern suggesting subacromial syndrome. Will focus on strengthening to periscapular region in upcoming visits.       PLAN/RECOMMENDATIONS:   Plan for treatment: therapy treatment to continue next visit.  Planned interventions for next visit: continue with current treatment.  Assess response to new hep; continue strengthening at periscapular

## 2024-09-12 ENCOUNTER — PATIENT MESSAGE (OUTPATIENT)
Dept: CARDIOLOGY | Facility: MEDICAL CENTER | Age: 64
End: 2024-09-12
Payer: COMMERCIAL

## 2024-09-17 ENCOUNTER — APPOINTMENT (OUTPATIENT)
Dept: PHYSICAL THERAPY | Facility: MEDICAL CENTER | Age: 64
End: 2024-09-17
Attending: PHYSICIAN ASSISTANT
Payer: COMMERCIAL

## 2024-09-24 ENCOUNTER — PHYSICAL THERAPY (OUTPATIENT)
Dept: PHYSICAL THERAPY | Facility: MEDICAL CENTER | Age: 64
End: 2024-09-24
Attending: PHYSICIAN ASSISTANT
Payer: COMMERCIAL

## 2024-09-24 DIAGNOSIS — G89.29 CHRONIC LEFT SHOULDER PAIN: ICD-10-CM

## 2024-09-24 DIAGNOSIS — M25.512 CHRONIC LEFT SHOULDER PAIN: ICD-10-CM

## 2024-09-24 PROCEDURE — 97110 THERAPEUTIC EXERCISES: CPT

## 2024-09-24 PROCEDURE — 97140 MANUAL THERAPY 1/> REGIONS: CPT

## 2024-09-24 NOTE — OP THERAPY DAILY TREATMENT
Outpatient Physical Therapy  DAILY TREATMENT     [facility] Outpatient Physical Therapy  [address]  [place] [address]  Phone:  [phone]  Fax:  [phone]    Date: 09/24/2024    Patient: Magda Childress  YOB: 1960  MRN: [id number]     Time Calculation                   Chief Complaint: No chief complaint on file.    Visit #: 3    SUBJECTIVE:  Pt reporting shoulder was feeling better until reaching overhead after getting her injection. Has improved reaching, sleeping, picking up things out of the fridge. Occasional discomfort with washing hair and styling, making the bed.       OBJECTIVE:  Current objective measures:       Active Range of Motion pre treatment:  Left Shoulder   Flexion: 147 degrees (156 deg R)   Abduction: 159 degrees (158 deg R)   External rotation 0°: 67 deg (60 deg R)   Internal rotation BTB: L3 L (L2 R)      AROM post treatment:  156 deg L flexion  163 deg L abduction       Therapeutic Exercises (CPT 64942):     1. pt education, re: PT findings and upcoming POC    2. verbal review of hep    3. posture correction    4. shoulder flexion with dowel, verbal review    5. scapular retractions, verbal review    6. shoulder ER with cane, verbal review    7. shoulder abd AAROM with dowel, verbal review    8. SA OH with pillowcase, x10, verbal review    9. RC isometrics at wall 0 deg abd, 3x5 sec (shoulder flex, ext, IR and ER) ea, verbal review    10. shoulder IR BTB, x10, hep    11. L stretch at counter, x30 sec, hep    20. old hep reviewed; HO printed today for pt for her records      Therapeutic Exercise Summary: Access Code: [email or web address]  URL: [email or web address]  Date: 08/29/2024  Prepared by: Sean Recinos    Exercises  - Correct Seated Posture  - 7 x weekly  - Supine Shoulder Flexion Extension AAROM with Dowel  - 1-2 x daily - 7 x weekly - 1 sets - 10 reps  - Standing Scapular Retraction  - 3 x daily - 7 x weekly - 1-2 sets  - 10 reps  - Seated Shoulder External Rotation AAROM with Cane and Hand in Neutral  - 1-2 x daily - 7 x weekly - 1 sets - 10 reps  - Standing Shoulder Abduction AAROM with Dowel  - 1-2 x daily - 7 x weekly - 1 sets - 10 reps      Pt performed these exercises with instruction and SPV.  Provided handout with these exercises for daily HEP.        Therapeutic Treatments and Modalities:     1. E Stim Unattended (CPT 94486), IFC and mhp to L shoulder in sitting x 15 min, NT    2. Manual Therapy (CPT 19381), see below, x11 min    Therapeutic Treatment and Modalities Summary: Manual:  CPA and rotational mobs gr III to CTJ-T10 with pt in prone lying, SL scapular mobs in all directions gr III and scapular release x5 to L shoulder (pillow barrier), gentle SCJ inf and posterior glides gr III    Time-based treatments/modalities:         Pain rating (1-10) before treatment:  0/10 at rest   Pain rating (1-10) after treatment:  0./10 at rest        ASSESSMENT:   Response to treatment:   Pt presents to therapy today s/p L shoulder injection 9/13/24. Has had significant improvement from this with noted benefits to her ADL's. Further improvement in shoulder ROM after manual in session. Due to improvements in ROM, will emphasize strengthening in follow ups.       PLAN/RECOMMENDATIONS:   Plan for treatment: therapy treatment to continue next visit.  Planned interventions for next visit: continue with current treatment.  Assess response to new hep; continue strengthening at periscapular

## 2024-10-01 ENCOUNTER — PHYSICAL THERAPY (OUTPATIENT)
Dept: PHYSICAL THERAPY | Facility: MEDICAL CENTER | Age: 64
End: 2024-10-01
Attending: PHYSICIAN ASSISTANT
Payer: COMMERCIAL

## 2024-10-01 DIAGNOSIS — M25.512 CHRONIC LEFT SHOULDER PAIN: ICD-10-CM

## 2024-10-01 DIAGNOSIS — G89.29 CHRONIC LEFT SHOULDER PAIN: ICD-10-CM

## 2024-10-01 PROCEDURE — 97140 MANUAL THERAPY 1/> REGIONS: CPT

## 2024-10-01 PROCEDURE — 97110 THERAPEUTIC EXERCISES: CPT

## 2024-10-06 DIAGNOSIS — K21.9 GASTROESOPHAGEAL REFLUX DISEASE WITHOUT ESOPHAGITIS: ICD-10-CM

## 2024-10-06 DIAGNOSIS — E78.5 DYSLIPIDEMIA: ICD-10-CM

## 2024-10-06 DIAGNOSIS — R23.2 HOT FLASHES: ICD-10-CM

## 2024-10-07 RX ORDER — FLUOXETINE 10 MG/1
10 CAPSULE ORAL DAILY
Qty: 90 CAPSULE | Refills: 3 | Status: SHIPPED | OUTPATIENT
Start: 2024-10-07

## 2024-10-07 RX ORDER — OMEPRAZOLE 40 MG/1
40 CAPSULE, DELAYED RELEASE ORAL DAILY
Qty: 90 CAPSULE | Refills: 3 | Status: SHIPPED | OUTPATIENT
Start: 2024-10-07

## 2024-10-07 RX ORDER — ATORVASTATIN CALCIUM 80 MG/1
80 TABLET, FILM COATED ORAL DAILY
Qty: 90 TABLET | Refills: 3 | Status: SHIPPED | OUTPATIENT
Start: 2024-10-07

## 2024-10-08 ENCOUNTER — PHYSICAL THERAPY (OUTPATIENT)
Dept: PHYSICAL THERAPY | Facility: MEDICAL CENTER | Age: 64
End: 2024-10-08
Attending: PHYSICIAN ASSISTANT
Payer: COMMERCIAL

## 2024-10-08 DIAGNOSIS — G89.29 CHRONIC LEFT SHOULDER PAIN: ICD-10-CM

## 2024-10-08 DIAGNOSIS — M25.512 CHRONIC LEFT SHOULDER PAIN: ICD-10-CM

## 2024-10-08 PROCEDURE — 97110 THERAPEUTIC EXERCISES: CPT

## 2024-10-09 ENCOUNTER — OFFICE VISIT (OUTPATIENT)
Dept: ENDOCRINOLOGY | Facility: MEDICAL CENTER | Age: 64
End: 2024-10-09
Payer: COMMERCIAL

## 2024-10-09 VITALS
BODY MASS INDEX: 36.54 KG/M2 | SYSTOLIC BLOOD PRESSURE: 130 MMHG | OXYGEN SATURATION: 94 % | DIASTOLIC BLOOD PRESSURE: 60 MMHG | HEART RATE: 71 BPM | HEIGHT: 64 IN | WEIGHT: 214 LBS

## 2024-10-09 DIAGNOSIS — R74.8 ELEVATED ALKALINE PHOSPHATASE LEVEL: ICD-10-CM

## 2024-10-09 DIAGNOSIS — E89.0 POSTSURGICAL HYPOTHYROIDISM: ICD-10-CM

## 2024-10-09 PROCEDURE — 99211 OFF/OP EST MAY X REQ PHY/QHP: CPT

## 2024-10-09 PROCEDURE — 3075F SYST BP GE 130 - 139MM HG: CPT

## 2024-10-09 PROCEDURE — 3078F DIAST BP <80 MM HG: CPT

## 2024-10-09 PROCEDURE — 99215 OFFICE O/P EST HI 40 MIN: CPT

## 2024-10-09 ASSESSMENT — FIBROSIS 4 INDEX: FIB4 SCORE: 0.33

## 2024-10-16 ENCOUNTER — HOSPITAL ENCOUNTER (OUTPATIENT)
Dept: LAB | Facility: MEDICAL CENTER | Age: 64
End: 2024-10-16
Attending: PHYSICIAN ASSISTANT
Payer: COMMERCIAL

## 2024-10-16 ENCOUNTER — HOSPITAL ENCOUNTER (OUTPATIENT)
Dept: LAB | Facility: MEDICAL CENTER | Age: 64
End: 2024-10-16
Payer: COMMERCIAL

## 2024-10-16 DIAGNOSIS — R74.8 ELEVATED ALKALINE PHOSPHATASE LEVEL: ICD-10-CM

## 2024-10-16 DIAGNOSIS — E89.0 POSTSURGICAL HYPOTHYROIDISM: ICD-10-CM

## 2024-10-16 PROCEDURE — 36415 COLL VENOUS BLD VENIPUNCTURE: CPT

## 2024-10-16 PROCEDURE — 86364 TISS TRNSGLTMNASE EA IG CLAS: CPT | Mod: 91

## 2024-10-16 PROCEDURE — 84080 ASSAY ALKALINE PHOSPHATASES: CPT

## 2024-10-16 PROCEDURE — 84155 ASSAY OF PROTEIN SERUM: CPT

## 2024-10-16 PROCEDURE — 82306 VITAMIN D 25 HYDROXY: CPT | Mod: 91

## 2024-10-16 PROCEDURE — 84439 ASSAY OF FREE THYROXINE: CPT

## 2024-10-16 PROCEDURE — 84165 PROTEIN E-PHORESIS SERUM: CPT

## 2024-10-16 PROCEDURE — 82977 ASSAY OF GGT: CPT

## 2024-10-16 PROCEDURE — 84443 ASSAY THYROID STIM HORMONE: CPT

## 2024-10-16 PROCEDURE — 80053 COMPREHEN METABOLIC PANEL: CPT

## 2024-10-16 PROCEDURE — 82306 VITAMIN D 25 HYDROXY: CPT

## 2024-10-16 PROCEDURE — 86258 DGP ANTIBODY EACH IG CLASS: CPT | Mod: 91

## 2024-10-16 PROCEDURE — 83970 ASSAY OF PARATHORMONE: CPT

## 2024-10-16 PROCEDURE — 84100 ASSAY OF PHOSPHORUS: CPT

## 2024-10-16 PROCEDURE — 83970 ASSAY OF PARATHORMONE: CPT | Mod: 91

## 2024-10-17 LAB
25(OH)D3 SERPL-MCNC: 53 NG/ML (ref 30–100)
25(OH)D3 SERPL-MCNC: 55 NG/ML (ref 30–100)
ALBUMIN SERPL BCP-MCNC: 4.1 G/DL (ref 3.2–4.9)
ALBUMIN/GLOB SERPL: 1.4 G/DL
ALP BONE SERPL-MCNC: 19.2 UG/L
ALP SERPL-CCNC: 150 U/L (ref 30–99)
ALT SERPL-CCNC: 18 U/L (ref 2–50)
ANION GAP SERPL CALC-SCNC: 10 MMOL/L (ref 7–16)
AST SERPL-CCNC: 17 U/L (ref 12–45)
BILIRUB SERPL-MCNC: 0.5 MG/DL (ref 0.1–1.5)
BUN SERPL-MCNC: 15 MG/DL (ref 8–22)
CALCIUM ALBUM COR SERPL-MCNC: 9.2 MG/DL (ref 8.5–10.5)
CALCIUM SERPL-MCNC: 9.3 MG/DL (ref 8.5–10.5)
CALCIUM SERPL-MCNC: 9.6 MG/DL (ref 8.5–10.5)
CHLORIDE SERPL-SCNC: 105 MMOL/L (ref 96–112)
CO2 SERPL-SCNC: 26 MMOL/L (ref 20–33)
CREAT SERPL-MCNC: 1.04 MG/DL (ref 0.5–1.4)
GFR SERPLBLD CREATININE-BSD FMLA CKD-EPI: 60 ML/MIN/1.73 M 2
GGT SERPL-CCNC: 18 U/L (ref 7–34)
GLOBULIN SER CALC-MCNC: 3 G/DL (ref 1.9–3.5)
GLUCOSE SERPL-MCNC: 82 MG/DL (ref 65–99)
PHOSPHATE SERPL-MCNC: 3.8 MG/DL (ref 2.5–4.5)
POTASSIUM SERPL-SCNC: 4.1 MMOL/L (ref 3.6–5.5)
PROT SERPL-MCNC: 7.1 G/DL (ref 6–8.2)
PTH-INTACT SERPL-MCNC: 32.6 PG/ML (ref 14–72)
PTH-INTACT SERPL-MCNC: 33.4 PG/ML (ref 14–72)
SODIUM SERPL-SCNC: 141 MMOL/L (ref 135–145)
T4 FREE SERPL-MCNC: 1.38 NG/DL (ref 0.93–1.7)
TSH SERPL-ACNC: 4.66 UIU/ML (ref 0.35–5.5)

## 2024-10-18 LAB
GLIADIN IGA SER IA-ACNC: <0.72 FLU (ref 0–4.99)
GLIADIN IGG SER IA-ACNC: <0.56 FLU (ref 0–4.99)
TTG IGA SER IA-ACNC: <1.02 FLU (ref 0–4.99)
TTG IGG SER IA-ACNC: <0.82 FLU (ref 0–4.99)

## 2024-10-21 LAB
ALBUMIN SERPL ELPH-MCNC: 3.81 G/DL (ref 3.75–5.01)
ALPHA1 GLOB SERPL ELPH-MCNC: 0.3 G/DL (ref 0.19–0.46)
ALPHA2 GLOB SERPL ELPH-MCNC: 0.7 G/DL (ref 0.48–1.05)
B-GLOBULIN SERPL ELPH-MCNC: 0.98 G/DL (ref 0.48–1.1)
GAMMA GLOB SERPL ELPH-MCNC: 0.9 G/DL (ref 0.62–1.51)
INTERPRETATION SERPL IFE-IMP: NORMAL
MONOCLON BAND OBS SERPL: NORMAL
MONOCLONAL PROTEIN NL11656: NORMAL G/DL
PATHOLOGY STUDY: NORMAL
PROT SERPL-MCNC: 6.7 G/DL (ref 6.3–8.2)

## 2024-10-28 ENCOUNTER — HOSPITAL ENCOUNTER (OUTPATIENT)
Dept: RADIOLOGY | Facility: MEDICAL CENTER | Age: 64
End: 2024-10-28
Payer: COMMERCIAL

## 2024-10-28 DIAGNOSIS — R74.8 ELEVATED ALKALINE PHOSPHATASE LEVEL: ICD-10-CM

## 2024-10-28 PROCEDURE — 77080 DXA BONE DENSITY AXIAL: CPT

## 2024-11-04 ENCOUNTER — HOSPITAL ENCOUNTER (OUTPATIENT)
Dept: LAB | Facility: MEDICAL CENTER | Age: 64
End: 2024-11-04
Attending: INTERNAL MEDICINE
Payer: COMMERCIAL

## 2024-11-04 DIAGNOSIS — E78.2 MIXED HYPERLIPIDEMIA: ICD-10-CM

## 2024-11-04 LAB
CHOLEST SERPL-MCNC: 130 MG/DL (ref 100–199)
HDLC SERPL-MCNC: 47 MG/DL
LDLC SERPL CALC-MCNC: 65 MG/DL
TRIGL SERPL-MCNC: 88 MG/DL (ref 0–149)

## 2024-11-04 PROCEDURE — 80061 LIPID PANEL: CPT

## 2024-11-04 PROCEDURE — 36415 COLL VENOUS BLD VENIPUNCTURE: CPT

## 2024-11-08 ENCOUNTER — OFFICE VISIT (OUTPATIENT)
Dept: CARDIOLOGY | Facility: MEDICAL CENTER | Age: 64
End: 2024-11-08
Attending: INTERNAL MEDICINE
Payer: COMMERCIAL

## 2024-11-08 VITALS
HEIGHT: 64 IN | WEIGHT: 214 LBS | BODY MASS INDEX: 36.54 KG/M2 | DIASTOLIC BLOOD PRESSURE: 72 MMHG | OXYGEN SATURATION: 96 % | SYSTOLIC BLOOD PRESSURE: 122 MMHG | HEART RATE: 71 BPM | RESPIRATION RATE: 16 BRPM

## 2024-11-08 DIAGNOSIS — R73.03 PREDIABETES: ICD-10-CM

## 2024-11-08 DIAGNOSIS — E78.5 HYPERLIPIDEMIA WITH TARGET LDL LESS THAN 70: ICD-10-CM

## 2024-11-08 DIAGNOSIS — I25.3 VENTRICULAR ANEURYSM AS COMPLICATION OF ACUTE MYOCARDIAL INFARCTION (HCC): ICD-10-CM

## 2024-11-08 DIAGNOSIS — I23.8 VENTRICULAR ANEURYSM AS COMPLICATION OF ACUTE MYOCARDIAL INFARCTION (HCC): ICD-10-CM

## 2024-11-08 DIAGNOSIS — E78.2 MIXED HYPERLIPIDEMIA: ICD-10-CM

## 2024-11-08 DIAGNOSIS — I25.2 HISTORY OF MYOCARDIAL INFARCTION: ICD-10-CM

## 2024-11-08 DIAGNOSIS — I25.42 SPONTANEOUS DISSECTION OF CORONARY ARTERY: Primary | ICD-10-CM

## 2024-11-08 PROCEDURE — 99212 OFFICE O/P EST SF 10 MIN: CPT | Performed by: INTERNAL MEDICINE

## 2024-11-08 PROCEDURE — 3074F SYST BP LT 130 MM HG: CPT | Performed by: INTERNAL MEDICINE

## 2024-11-08 PROCEDURE — 99213 OFFICE O/P EST LOW 20 MIN: CPT | Performed by: INTERNAL MEDICINE

## 2024-11-08 PROCEDURE — 3078F DIAST BP <80 MM HG: CPT | Performed by: INTERNAL MEDICINE

## 2024-11-08 RX ORDER — TAPINAROF 10 MG/1000MG
CREAM TOPICAL
COMMUNITY
Start: 2024-10-02

## 2024-11-08 ASSESSMENT — FIBROSIS 4 INDEX: FIB4 SCORE: 0.61

## 2024-11-08 NOTE — PROGRESS NOTES
CARDIOLOGY established PATIENT:    PCP: Jess Ferreira P.A.-C.    1. Spontaneous dissection of coronary artery    2. History of myocardial infarction: On 5 occasions and with a segmental wall motion abnormality but possibly secondary to vasospasm    3. Mixed hyperlipidemia    4. Prediabetes    5. 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 Hyperlipidemia with target LDL less than 70        Priscilla Childress is here for follow-up for history of MI    Chief Complaint   Patient presents with    Hypertension    Dyslipidemia    Hyperlipidemia       History: Priscilla Childress is a 63 y.o. female with history of hypertension, possible scad/vasospasm on carvedilol and aspirin, DL on statins, hypothyroidism is here for follow-up.    At age 40, had MI and per report clean cath ? Blockage towards end of vessel at [place]. She was going divorce at that time     At age 46 had similar symptoms, repeat cath no blockages needed stents.     Then at age 49, had MI symptoms again with ? Clean cath.     Saw a cardiologist in [place] and was told about SCAD about a year ago. On Coreg and atorvastatin    Establish care in my clinic 5/2024: Take vitamin D 5000 units daily, start semaglutide 0.5 mg weekly track of her blood pressure.  Continue aspirin and carvedilol and ordered repeat fasting lipid, continue atorvastatin 80 mg    Walks 3-4 times a week.  Stays active by helping taking care of her grandchildren and has 1 acre around the house.  Thinking about joining gym. Denies CP, syncope, KANDIS, SPENCE.  Overall reports feeling very well.    Moved back to [place] 3/2024 from [place]  Single, 3 children  4 grandchildren  Non smoker    Lipid 11/4/24:  LDL 65, TG 88    TTE 3/2024: personally reviewed (I was unable to open the images of the prior study in 2019 but per report it commented on mildly reduced LVEF and apical wall motion abnormalities as well)  Normal LVEF  Mild aneurysmal apex     A1C 6.3%  "3/2024     Lipid 7/2021:  LDL 97, TG 91     CAC score 7/2022:  Calcium score of 0 places the patient at very low risk for coronary artery disease.       PE:  /72 (BP Location: Left arm, Patient Position: Sitting, BP Cuff Size: Adult)   Pulse 71   Resp 16   Ht 1.626 m (5' 4\")   Wt 97.1 kg (214 lb)   SpO2 96%   BMI 36.73 kg/m²     Gen: well  HEENT: Symmetric face.  NECK: No JVD.   CARDIAC: Regular, Normal S1, S2, No murmur  RESP: Clear to auscultation bilaterally   EXT: No edema  SKIN: Warm and dry  NEURO: No gross deficits  PSYCH: Appropriate affect, participates in conversation    The ASCVD Risk score (Jaime DK, et al., 2019) failed to calculate.    Past Medical History:   Diagnosis Date    Anesthesia 08/26/2024    \"Woke up in respiratory ICU after appendectomy in March of 2024.  Adult intubation tube was too big they needed to use a pediatric tube which they couldn't remove until 3 days after surgery\".    Benign paroxysmal positional vertigo of right ear     Hard to intubate 08/26/2024    Needs pediatric ET tube.    History of myocardial infarction     x5; cardiac caths only, no stents placed    Hyperlipidemia     Hypertension     Sleep apnea 08/26/2024    Uses CPAP    Spontaneous dissection of coronary artery     X5 heart attacks related to SCAD    Thyroid disease      Past Surgical History:   Procedure Laterality Date    [place] LAP,APPENDECTOMY N/A 03/07/2024    Procedure: APPENDECTOMY, LAPAROSCOPIC;  Surgeon: Manfred Zamudio M.D.;  Location: SURGERY [facility];  Service: Gastroenterology    CARPAL TUNNEL RELEASE Right     CHOLECYSTECTOMY      THYROIDECTOMY TOTAL       Allergies   Allergen Reactions    Inapsine [Droperidol] Anaphylaxis    Apple Swelling     Throat swelling    Apricot Flavor Swelling     Apricots and Peaches  Throat swelling.     Outpatient Encounter Medications as of 11/8/2024   Medication Sig Dispense Refill    VTAMA 1 % Cream       atorvastatin (LIPITOR) 80 MG tablet Take 1 " Tablet by mouth every day. 90 Tablet 3    omeprazole (PRILOSEC) 40 MG delayed-release capsule Take 1 Capsule by mouth every day. 90 Capsule 3    FLUoxetine (PROZAC) 10 MG Cap Take 1 Capsule by mouth every day. 90 Capsule 3    betamethasone dipropionate (DEL-BETA) 0.05 % Ointment Apply 1 Application topically 2 times a day. (Patient taking differently: Apply 1 Application topically 2 times a day as needed.          **MEDICATION INSTRUCTIONS FOR SURGERY/PROCEDURE SCHEDULED FOR 9/19/2024.   DO NOT TAKE MORNING OF SURGERY.) 45 g 1    levothyroxine (SYNTHROID) 200 MCG Tab Take 1 Tablet by mouth every morning on an empty stomach. (Patient taking differently: Take 200 mcg by mouth every morning on an empty stomach.         **MEDICATION INSTRUCTIONS FOR SURGERY/PROCEDURE SCHEDULED FOR 9/19/2024   OK TO CONTINUE TAKING PRIOR TO SURGERY AND DAY OF SURGERY) 90 Tablet 3    carvedilol (COREG) 6.25 MG Tab Take 1 Tablet by mouth 2 times a day with meals. (Patient taking differently: Take 6.25 mg by mouth 2 times a day with meals.         **MEDICATION INSTRUCTIONS FOR SURGERY/PROCEDURE SCHEDULED FOR 9/19/2024   OK TO CONTINUE TAKING PRIOR TO SURGERY AND DAY OF SURGERY) 180 Tablet 3    cetirizine (KLS ALLER-GUY) 10 MG Tab 10 mg every day.         **MEDICATION INSTRUCTIONS FOR SURGERY/PROCEDURE SCHEDULED FOR 9/19/2024   OK TO CONTINUE TAKING PRIOR TO SURGERY AND DAY OF SURGERY      Multiple Vitamins-Minerals (HAIR SKIN & NAILS) Tab Take 1 Tablet by mouth every day.          *MEDICATION INSTRUCTIONS FOR SURGERY/PROCEDURE SCHEDULED FOR 9/19/2024.   DO NOT TAKE 7 DAYS PRIOR TO SURGERY      aspirin (ASA) 81 MG Chew Tab chewable tablet Chew 81 mg every day.        **MEDICATION INSTRUCTIONS FOR SURGERY/PROCEDURE SCHEDULED FOR 9/19/2024   COORDINATE MEDICATION INSTRUCTIONS FROM PRESCRIBING PHYSICIAN       No facility-administered encounter medications on file as of 11/8/2024.     Social History     Socioeconomic History    Marital status:  Single     Spouse name: Not on file    Number of children: Not on file    Years of education: Not on file    Highest education level: Not on file   Occupational History    Not on file   Tobacco Use    Smoking status: Never    Smokeless tobacco: Never   Vaping Use    Vaping status: Never Used   Substance and Sexual Activity    Alcohol use: Yes     Comment: 1-2 drinks per month    Drug use: No    Sexual activity: Not on file   Other Topics Concern    Not on file   Social History Narrative    Not on file     Social Drivers of Health     Financial Resource Strain: Not on file   Food Insecurity: Not on file   Transportation Needs: Not on file   Physical Activity: Not on file   Stress: Not on file   Social Connections: Not on file   Intimate Partner Violence: Not on file   Housing Stability: Not on file     Family History   Problem Relation Age of Onset    Alzheimer's Disease Mother     Heart Disease Father 70        CAD    Lupus Maternal Grandmother     Heart Disease Maternal Grandmother     Lung Cancer Maternal Grandfather     No Known Problems Daughter     No Known Problems Daughter     No Known Problems Son          Studies    Lab Results   Component Value Date/Time    TSHULTRASEN 4.660 10/16/2024 0837        Lab Results   Component Value Date/Time    FREET4 1.38 10/16/2024 0837      Lab Results   Component Value Date/Time    HBA1C 5.6 08/02/2024 08:20 AM     Lab Results   Component Value Date/Time    CHOLSTRLTOT 130 11/04/2024 08:25 AM    LDL 65 11/04/2024 08:25 AM    HDL 47 11/04/2024 08:25 AM    TRIGLYCERIDE 88 11/04/2024 08:25 AM       Lab Results   Component Value Date/Time    SODIUM 141 10/16/2024 08:37 AM    POTASSIUM 4.1 10/16/2024 08:37 AM    CHLORIDE 105 10/16/2024 08:37 AM    CO2 26 10/16/2024 08:37 AM    GLUCOSE 82 10/16/2024 08:37 AM    BUN 15 10/16/2024 08:37 AM    CREATININE 1.04 10/16/2024 08:37 AM     Lab Results   Component Value Date/Time    ALKPHOSPHAT 150 (H) 10/16/2024 08:37 AM    ASTSGOT 17  10/16/2024 08:37 AM    ALTSGPT 18 10/16/2024 08:37 AM    TBILIRUBIN 0.5 10/16/2024 08:37 AM        Echocardiogram:  No results found for this or any previous visit.      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 Sky is doing well from a cardiac standpoint.  No recurrent angina concerns    Blood pressure normal and at goal    LDL < 70 and TG < 150: Normal and at goal on atorvastatin 80 mg.    Plan to continue life long carvedilol, atorvastatin and aspirin as tolerated.    Congratulated her on trying to stay active and try to pursue heart healthy diet.  Encouraged her to incorporate more exercise into her lifestyle.    Thank you for the opportunity to be involved in Priscilla Childress 's cardiovascular care; and please reach out with any questions or concerns.  Return in about 1 year (around 11/8/2025).    Jasper Merritt MD, MPH [facility]  Interventional Cardiologist  [facility]   of Clinical Internal Medicine - [facility]    ~ Portions of this note were completed using voice recognition software (Dragon Naturally speaking software) . Occasional transcription errors may have escaped proof reading. I have made every reasonable attempt to correct obvious errors, but I expect that there are errors of grammar and possibly content that I did not discover before finalizing the note. ~

## 2024-11-13 ENCOUNTER — OFFICE VISIT (OUTPATIENT)
Dept: ENDOCRINOLOGY | Facility: MEDICAL CENTER | Age: 64
End: 2024-11-13
Payer: COMMERCIAL

## 2024-11-13 VITALS
DIASTOLIC BLOOD PRESSURE: 64 MMHG | SYSTOLIC BLOOD PRESSURE: 132 MMHG | WEIGHT: 217 LBS | HEART RATE: 84 BPM | OXYGEN SATURATION: 93 % | HEIGHT: 64 IN | BODY MASS INDEX: 37.05 KG/M2

## 2024-11-13 DIAGNOSIS — R74.8 ELEVATED ALKALINE PHOSPHATASE LEVEL: ICD-10-CM

## 2024-11-13 DIAGNOSIS — E89.0 POSTSURGICAL HYPOTHYROIDISM: ICD-10-CM

## 2024-11-13 PROCEDURE — 3075F SYST BP GE 130 - 139MM HG: CPT

## 2024-11-13 PROCEDURE — 3078F DIAST BP <80 MM HG: CPT

## 2024-11-13 PROCEDURE — 99214 OFFICE O/P EST MOD 30 MIN: CPT

## 2024-11-13 PROCEDURE — 99211 OFF/OP EST MAY X REQ PHY/QHP: CPT

## 2024-11-13 ASSESSMENT — FIBROSIS 4 INDEX: FIB4 SCORE: 0.61

## 2024-11-13 NOTE — PROGRESS NOTES
New Patient Consult Note for Endocrinology  Referred by: Jess Ferreira P.A.-C.    Reason for consult:     HPI:  Priscilla Childress is a 63 y.o. old patient who is seeing us today for care.  This is a pleasant patient and I appreciate the opportunity to participate in the care of this patient.  This is a new patient with me today.      There are no diagnoses linked to this encounter.  This is a new patient with me on 10/9/24    Elevated Alkaline phosphates  Denies: bone pain, joint pain, fractures, nausea/vomiting, abdominal pain   She has history of torn rotater cuff a month.   Denies history of CHF, kidney disease, hyperthyroidism  History of heart attack x 5. She was diagnosed with SCAD.   Reports history of postsurgical hypothyroidism   Latest Reference Range & Units 10/16/24 08:37   Bun 8 - 22 mg/dL 15   Creatinine 0.50 - 1.40 mg/dL 1.04   GFR (CKD-EPI) >60 mL/min/1.73 m 2 60      Latest Reference Range & Units 10/16/24 08:37   Phosphorus 2.5 - 4.5 mg/dL 3.8      Latest Reference Range & Units 10/16/24 08:37   Alk Phos, Bone Specific ug/L 19.2      Latest Reference Range & Units 10/16/24 08:37   Pth, Intact 14.0 - 72.0 pg/mL 32.6      Latest Reference Range & Units 10/16/24 08:37   AST(SGOT) 12 - 45 U/L 17   ALT(SGPT) 2 - 50 U/L 18      Latest Reference Range & Units 10/16/24 08:37   Calcium 8.5 - 10.5 mg/dL 9.3   Correct Calcium 8.5 - 10.5 mg/dL 9.2      Latest Reference Range & Units 10/16/24 08:37   25-Hydroxy   Vitamin D 25 30 - 100 ng/mL 53      Latest Reference Range & Units 10/16/24 08:37   Alkaline Phosphatase 30 - 99 U/L 150 (H)      Latest Reference Range & Units 10/16/24 08:37   Gamma Gt 7 - 34 U/L 18      Latest Reference Range & Units 10/16/24 08:37   t-TG IgA 0.00 - 4.99 FLU <1.02   t-TG IgG 0.00 - 4.99 FLU <0.82      Latest Reference Range & Units 10/16/24 08:37   Gliadin Antibodies Iga 0.00 - 4.99 FLU <0.72   Gliadin Antibodies Igg 0.00 - 4.99 FLU <0.56      Latest Reference Range & Units  10/16/24 08:37   PORFIRIO Reflex  Not Done   Interpretation  See Note   Albumin 3.75 - 5.01 g/dL 3.81   Gamma Globulin 0.62 - 1.51 g/dL 0.90   Alpha-1 Globulin 0.19 - 0.46 g/dL 0.30   Alpha-2 Globulin 0.48 - 1.05 g/dL 0.70   Beta Globulin 0.48 - 1.10 g/dL 0.98   EER Serum Prot. Electro. 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   Currently taking levothyroxine 200 mcg daily  This is followed by pcp   Latest Reference Range & Units 10/16/24 08:37   TSH 0.350 - 5.500 uIU/mL 4.660      Latest Reference Range & Units 10/16/24 08:37   Free T-4 0.93 - 1.70 ng/dL 1.38       10/28/2024 2:58 PM     HISTORY/REASON FOR EXAM:  Postmenopausal, family history of hip fracture in parent     TECHNIQUE/EXAM DESCRIPTION AND NUMBER OF VIEWS:  Dual x-ray bone densitometry was performed from the L1 through L4 levels and from the proximal left femur utilizing the GE Prodigy unit.     COMPARISON: None     FINDINGS:  The lumbar spine has a mean bone mineral density of 1.160 g/cm2, with a T score of -0.3 and a Z score of 0.1.     The proximal left femur has a mean bone mineral density of 0.976 g/cm2, with a T score of -0.3 and a Z score of 0.1.        IMPRESSION:     According to the World Health Organization classification, bone mineral density of this patient is normal for the lumbar spine and normal for the left femur.     10-year Probability of Fracture:  Major Osteoporotic     7.8%  Hip     0.7%  Population      [place] ()     Based on left femur neck BMD    ROS:   Constitutional: No change in weight , No fatigue, No night sweats.  HEENT: No Headache.  Eyes:  No blurred vision, No visual changes.  Cardiac: No chest pain, No palpitations.  Resp: No shortness of breath, No cough,   Gastro: No nausea or vomiting, No diarrhea.  Neuro: Denies numbness or tinging in bilateral feet or hands, and no loss of  sensation.  Endo: No heat or cold intolerance.  : No polyuria, No polydipsia, No chronic UTI's.  Lower extremities: No lower leg edema bilateral.  All other systems were reviewed and were negative.    Past Medical History:  Patient Active Problem List    Diagnosis Date Noted    Prediabetes 11/08/2024    Hyperlipidemia with target LDL less than 70 11/08/2024    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) 05/03/2024    Mixed hyperlipidemia 05/03/2024    Obesity (BMI 30-39.9) 03/21/2024    Seasonal allergies 06/16/2023    Spontaneous dissection of coronary artery 08/25/2022    Osteoarthritis of both hands 05/17/2022    Fatty liver disease, nonalcoholic 10/20/2020    Chronic rhinosinusitis 08/27/2020    LIDYA (obstructive sleep apnea) 09/25/2019    Elevated alkaline phosphatase level 06/18/2019    Elevated fasting glucose 06/18/2019    Benign essential HTN 05/15/2019    Vitamin D deficiency 05/15/2019    Dyslipidemia 05/15/2019    Postoperative hypothyroidism 05/15/2019    GERD (gastroesophageal reflux disease) 05/15/2019    History of myocardial infarction: On 5 occasions and with a segmental wall motion abnormality but possibly secondary to vasospasm 05/15/2019    Psoriasis 05/15/2019    Hot flashes 05/15/2019       Past Surgical History:  Past Surgical History:   Procedure Laterality Date    [place] LAP,APPENDECTOMY N/A 03/07/2024    Procedure: APPENDECTOMY, LAPAROSCOPIC;  Surgeon: Manfred Zamudio M.D.;  Location: SURGERY [facility];  Service: Gastroenterology    CARPAL TUNNEL RELEASE Right     CHOLECYSTECTOMY      THYROIDECTOMY TOTAL         Allergies:  Inapsine [droperidol], Apple, and Apricot flavor    Social History:  Social History     Socioeconomic History    Marital status: Single     Spouse name: Not on file    Number of children: Not on file    Years of education: Not on file    Highest education level: Not on file   Occupational History    Not on file   Tobacco Use     Smoking status: Never    Smokeless tobacco: Never   Vaping Use    Vaping status: Never Used   Substance and Sexual Activity    Alcohol use: Yes     Comment: 1-2 drinks per month    Drug use: No    Sexual activity: Not on file   Other Topics Concern    Not on file   Social History Narrative    Not on file     Social Drivers of Health     Financial Resource Strain: Not on file   Food Insecurity: Not on file   Transportation Needs: Not on file   Physical Activity: Not on file   Stress: Not on file   Social Connections: Not on file   Intimate Partner Violence: Not on file   Housing Stability: Not on file       Family History:  Family History   Problem Relation Age of Onset    Alzheimer's Disease Mother     Heart Disease Father 70        CAD    Lupus Maternal Grandmother     Heart Disease Maternal Grandmother     Lung Cancer Maternal Grandfather     No Known Problems Daughter     No Known Problems Daughter     No Known Problems Son        Medications:    Current Outpatient Medications:     VTAMA 1 % Cream, , Disp: , Rfl:     atorvastatin (LIPITOR) 80 MG tablet, Take 1 Tablet by mouth every day., Disp: 90 Tablet, Rfl: 3    omeprazole (PRILOSEC) 40 MG delayed-release capsule, Take 1 Capsule by mouth every day., Disp: 90 Capsule, Rfl: 3    FLUoxetine (PROZAC) 10 MG Cap, Take 1 Capsule by mouth every day., Disp: 90 Capsule, Rfl: 3    betamethasone dipropionate (DEL-BETA) 0.05 % Ointment, Apply 1 Application topically 2 times a day. (Patient taking differently: Apply 1 Application topically 2 times a day as needed.       **MEDICATION INSTRUCTIONS FOR SURGERY/PROCEDURE SCHEDULED FOR 9/19/2024.  DO NOT TAKE MORNING OF SURGERY.), Disp: 45 g, Rfl: 1    levothyroxine (SYNTHROID) 200 MCG Tab, Take 1 Tablet by mouth every morning on an empty stomach. (Patient taking differently: Take 200 mcg by mouth every morning on an empty stomach.      **MEDICATION INSTRUCTIONS FOR SURGERY/PROCEDURE SCHEDULED FOR 9/19/2024  OK TO CONTINUE TAKING  PRIOR TO SURGERY AND DAY OF SURGERY), Disp: 90 Tablet, Rfl: 3    carvedilol (COREG) 6.25 MG Tab, Take 1 Tablet by mouth 2 times a day with meals. (Patient taking differently: Take 6.25 mg by mouth 2 times a day with meals.      **MEDICATION INSTRUCTIONS FOR SURGERY/PROCEDURE SCHEDULED FOR 9/19/2024  OK TO CONTINUE TAKING PRIOR TO SURGERY AND DAY OF SURGERY), Disp: 180 Tablet, Rfl: 3    cetirizine (KLS ALLER-GUY) 10 MG Tab, 10 mg every day.      **MEDICATION INSTRUCTIONS FOR SURGERY/PROCEDURE SCHEDULED FOR 9/19/2024  OK TO CONTINUE TAKING PRIOR TO SURGERY AND DAY OF SURGERY, Disp: , Rfl:     Multiple Vitamins-Minerals (HAIR SKIN & NAILS) Tab, Take 1 Tablet by mouth every day.       *MEDICATION INSTRUCTIONS FOR SURGERY/PROCEDURE SCHEDULED FOR 9/19/2024.  DO NOT TAKE 7 DAYS PRIOR TO SURGERY, Disp: , Rfl:     aspirin (ASA) 81 MG Chew Tab chewable tablet, Chew 81 mg every day.      **MEDICATION INSTRUCTIONS FOR SURGERY/PROCEDURE SCHEDULED FOR 9/19/2024  COORDINATE MEDICATION INSTRUCTIONS FROM PRESCRIBING PHYSICIAN, Disp: , Rfl:       Physical Examination:   Vital signs: There were no vitals taken for this visit.  General: No distress, cooperative, well dressed and well nourished.   Eyes: No scleral icterus or discharge, No hyposphagma  ENMT: Normal on external inspection of nose, lips, No nasal drainage   Neck: No abnormal masses on inspection  Resp: Normal effort, Bilateral clear to auscultation, No wheezing, No rales  CVS: Regular rate and rhythm, S1 S2 normal, No murmur. No gallop  Extremities: No edema bilateral extremities  Neuro: Alert and oriented  Skin: No rash, No Ulcers  Psych: Normal mood and affect    Assessment and Plan:  1. Elevated alkaline phosphatase level  Unstable  Patient has been ruled out for all endocrine possible causes of elevated alkaline phosphatase levels as evident by normal blood work.   Patient is asymptomatic  I will get whole body bone scan to rule out paget disease  - NM-BONE/JOINT SCAN  WHOLE BODY; Future    2. Postsurgical hypothyroidism  Stable  Medication:  Levothyroxine 200 mcg daily - continue   Patient understands to take her medication on empty stomach prior to breakfast and wait thirty mins to eat.   This is followed by pcp    Disposition: patient will have whole body scan done and notify via [employer]. Should the results be negative she will be transferred to primary care for continuity of care.      Thank you kindly for allowing me to participate in the diabetes care plan for this patient.    Lc Davila, GEREMIAS   11/13/24    CC:   Jess Ferreira P.A.-C.

## 2024-12-03 ENCOUNTER — OFFICE VISIT (OUTPATIENT)
Dept: MEDICAL GROUP | Age: 64
End: 2024-12-03
Payer: COMMERCIAL

## 2024-12-03 VITALS
WEIGHT: 213 LBS | DIASTOLIC BLOOD PRESSURE: 74 MMHG | HEART RATE: 77 BPM | HEIGHT: 64 IN | OXYGEN SATURATION: 96 % | SYSTOLIC BLOOD PRESSURE: 120 MMHG | TEMPERATURE: 98.6 F | BODY MASS INDEX: 36.37 KG/M2

## 2024-12-03 DIAGNOSIS — E89.0 POSTSURGICAL HYPOTHYROIDISM: ICD-10-CM

## 2024-12-03 DIAGNOSIS — R23.2 HOT FLASHES: ICD-10-CM

## 2024-12-03 DIAGNOSIS — J01.00 ACUTE NON-RECURRENT MAXILLARY SINUSITIS: ICD-10-CM

## 2024-12-03 DIAGNOSIS — R73.01 ELEVATED FASTING GLUCOSE: ICD-10-CM

## 2024-12-03 PROBLEM — R73.03 PREDIABETES: Status: RESOLVED | Noted: 2024-11-08 | Resolved: 2024-12-03

## 2024-12-03 PROCEDURE — 3074F SYST BP LT 130 MM HG: CPT | Performed by: PHYSICIAN ASSISTANT

## 2024-12-03 PROCEDURE — 3078F DIAST BP <80 MM HG: CPT | Performed by: PHYSICIAN ASSISTANT

## 2024-12-03 PROCEDURE — 99214 OFFICE O/P EST MOD 30 MIN: CPT | Performed by: PHYSICIAN ASSISTANT

## 2024-12-03 ASSESSMENT — FIBROSIS 4 INDEX: FIB4 SCORE: 0.61

## 2024-12-03 NOTE — PROGRESS NOTES
cc: Nasal congestion/sinus pressure    Subjective:     HPI    History of Present Illness  The patient is a 63-year-old female presenting with concerns of continued congestion.    She has been experiencing a cough and nasal congestion for the past 3 weeks. Despite using a humidifier and CPAP, she finds it difficult to get comfortable and often starts coughing once the CPAP is set up. She reports feeling wheezy and short of breath. She had fevers of 100 and 101 degrees on two separate nights, but these resolved during the day. She experiences sinus pressure and has had tooth pain for several consecutive days. She has been taking Sudafed and Mucinex 12-hour twice daily. She uses a humidifier with lavender and eucalyptus, this has not provided much benefit.  She has not noticed any drainage from her ear tubes. She reports feeling drainage down the back of her throat. She has Flonase and albuterol at home.    She did not take a COVID-19 test when her symptoms first appeared.      She is doing okay on Prozac with the hot flashes.  Still controlling symptoms well            Review of systems:  See above.       Current Outpatient Medications:     amoxicillin-clavulanate (AUGMENTIN) 875-125 MG Tab, Take 1 Tablet by mouth 2 times a day for 7 days., Disp: 14 Tablet, Rfl: 0    VTAMA 1 % Cream, , Disp: , Rfl:     atorvastatin (LIPITOR) 80 MG tablet, Take 1 Tablet by mouth every day., Disp: 90 Tablet, Rfl: 3    omeprazole (PRILOSEC) 40 MG delayed-release capsule, Take 1 Capsule by mouth every day., Disp: 90 Capsule, Rfl: 3    FLUoxetine (PROZAC) 10 MG Cap, Take 1 Capsule by mouth every day., Disp: 90 Capsule, Rfl: 3    betamethasone dipropionate (DEL-BETA) 0.05 % Ointment, Apply 1 Application topically 2 times a day. (Patient taking differently: Apply 1 Application topically 2 times a day as needed.       **MEDICATION INSTRUCTIONS FOR SURGERY/PROCEDURE SCHEDULED FOR 9/19/2024.  DO NOT TAKE MORNING OF SURGERY.), Disp: 45 g, Rfl:  "1    levothyroxine (SYNTHROID) 200 MCG Tab, Take 1 Tablet by mouth every morning on an empty stomach. (Patient taking differently: Take 200 mcg by mouth every morning on an empty stomach.      **MEDICATION INSTRUCTIONS FOR SURGERY/PROCEDURE SCHEDULED FOR 9/19/2024  OK TO CONTINUE TAKING PRIOR TO SURGERY AND DAY OF SURGERY), Disp: 90 Tablet, Rfl: 3    carvedilol (COREG) 6.25 MG Tab, Take 1 Tablet by mouth 2 times a day with meals. (Patient taking differently: Take 6.25 mg by mouth 2 times a day with meals.      **MEDICATION INSTRUCTIONS FOR SURGERY/PROCEDURE SCHEDULED FOR 9/19/2024  OK TO CONTINUE TAKING PRIOR TO SURGERY AND DAY OF SURGERY), Disp: 180 Tablet, Rfl: 3    cetirizine (KLS ALLER-GUY) 10 MG Tab, 10 mg every day.      **MEDICATION INSTRUCTIONS FOR SURGERY/PROCEDURE SCHEDULED FOR 9/19/2024  OK TO CONTINUE TAKING PRIOR TO SURGERY AND DAY OF SURGERY, Disp: , Rfl:     Multiple Vitamins-Minerals (HAIR SKIN & NAILS) Tab, Take 1 Tablet by mouth every day.       *MEDICATION INSTRUCTIONS FOR SURGERY/PROCEDURE SCHEDULED FOR 9/19/2024.  DO NOT TAKE 7 DAYS PRIOR TO SURGERY, Disp: , Rfl:     aspirin (ASA) 81 MG Chew Tab chewable tablet, Chew 81 mg every day.      **MEDICATION INSTRUCTIONS FOR SURGERY/PROCEDURE SCHEDULED FOR 9/19/2024  COORDINATE MEDICATION INSTRUCTIONS FROM PRESCRIBING PHYSICIAN, Disp: , Rfl:     Allergies, past medical history, past surgical history, family history, social history reviewed and updated    Objective:     Vitals: /74 (BP Location: Left arm, Patient Position: Sitting, BP Cuff Size: Adult)   Pulse 77   Temp 37 °C (98.6 °F) (Temporal)   Ht 1.626 m (5' 4\")   Wt 96.6 kg (213 lb)   SpO2 96%   BMI 36.56 kg/m²   General: Alert, pleasant, NAD, nasal voice  HEENT: Normocephalic. Neck supple.  Right PE tube in place.  Left TM gray with air-fluid level.  Moderate frontal and maxillary sinus pressure tenderness.  Erythematous posterior pharynx with postnasal drip appreciated.  No tonsillar " enlargement or exudate.  Uvula midline.  No thyromegaly or masses palpated. No cervical or supraclavicular lymphadenopathy. No carotid bruits   Heart: Regular rate and rhythm.  S1 and S2 normal.  No murmurs appreciated.  Respiratory: Normal respiratory effort.  Clear to auscultation bilaterally.  Skin: Warm, dry, no rashes.  Psych:  Affect/mood is normal, judgement is good, memory is intact, grooming is appropriate.    Assessment/Plan:     Magda was seen today for nasal congestion and wheezing.    Diagnoses and all orders for this visit:    Acute non-recurrent maxillary sinusitis  Symptoms have been ongoing for over 3 weeks, does have moderate sinus pressure on exam.  Will treat for sinusitis with Augmentin.  Recommended switch to Flonase, continue Sudafed every 4-6 hours.  Liudmila pot.  If symptoms worsen or persist after antibiotics follow-up for reevaluation.  -     amoxicillin-clavulanate (AUGMENTIN) 875-125 MG Tab; Take 1 Tablet by mouth 2 times a day for 7 days.    Elevated fasting glucose  -Monitoring.  Last A1c within normal limits.  Check labs again in 6 months  -     HEMOGLOBIN A1C; Future  -     Comp Metabolic Panel; Future    Hot flashes  Stable.  Continue 10 mg of Prozac    Postsurgical hypothyroidism  Has been well-controlled on 200 mcg of levothyroxine.  Will be due for labs in 6 months.  -     TSH WITH REFLEX TO FT4; Future        Return in about 6 months (around 6/3/2025) for Annual PX, Lab Review.

## 2024-12-06 ENCOUNTER — HOSPITAL ENCOUNTER (OUTPATIENT)
Dept: LAB | Facility: MEDICAL CENTER | Age: 64
End: 2024-12-06
Attending: PHYSICIAN ASSISTANT
Payer: COMMERCIAL

## 2024-12-06 DIAGNOSIS — E89.0 POSTSURGICAL HYPOTHYROIDISM: ICD-10-CM

## 2024-12-06 DIAGNOSIS — R73.01 ELEVATED FASTING GLUCOSE: ICD-10-CM

## 2024-12-06 LAB
ALBUMIN SERPL BCP-MCNC: 4.3 G/DL (ref 3.2–4.9)
ALBUMIN/GLOB SERPL: 1.5 G/DL
ALP SERPL-CCNC: 164 U/L (ref 30–99)
ALT SERPL-CCNC: 12 U/L (ref 2–50)
ANION GAP SERPL CALC-SCNC: 12 MMOL/L (ref 7–16)
AST SERPL-CCNC: 14 U/L (ref 12–45)
BILIRUB SERPL-MCNC: 0.5 MG/DL (ref 0.1–1.5)
BUN SERPL-MCNC: 16 MG/DL (ref 8–22)
CALCIUM ALBUM COR SERPL-MCNC: 9.3 MG/DL (ref 8.5–10.5)
CALCIUM SERPL-MCNC: 9.5 MG/DL (ref 8.5–10.5)
CHLORIDE SERPL-SCNC: 104 MMOL/L (ref 96–112)
CO2 SERPL-SCNC: 25 MMOL/L (ref 20–33)
CREAT SERPL-MCNC: 0.93 MG/DL (ref 0.5–1.4)
GFR SERPLBLD CREATININE-BSD FMLA CKD-EPI: 69 ML/MIN/1.73 M 2
GLOBULIN SER CALC-MCNC: 2.9 G/DL (ref 1.9–3.5)
GLUCOSE SERPL-MCNC: 94 MG/DL (ref 65–99)
POTASSIUM SERPL-SCNC: 3.9 MMOL/L (ref 3.6–5.5)
PROT SERPL-MCNC: 7.2 G/DL (ref 6–8.2)
SODIUM SERPL-SCNC: 141 MMOL/L (ref 135–145)
TSH SERPL DL<=0.005 MIU/L-ACNC: 1.98 UIU/ML (ref 0.38–5.33)

## 2024-12-06 PROCEDURE — 84443 ASSAY THYROID STIM HORMONE: CPT

## 2024-12-06 PROCEDURE — 83036 HEMOGLOBIN GLYCOSYLATED A1C: CPT

## 2024-12-06 PROCEDURE — 80053 COMPREHEN METABOLIC PANEL: CPT

## 2024-12-06 PROCEDURE — 36415 COLL VENOUS BLD VENIPUNCTURE: CPT

## 2024-12-09 LAB
EST. AVERAGE GLUCOSE BLD GHB EST-MCNC: 134 MG/DL
HBA1C MFR BLD: 6.3 % (ref 4–5.6)

## 2024-12-12 ENCOUNTER — HOSPITAL ENCOUNTER (OUTPATIENT)
Dept: RADIOLOGY | Facility: MEDICAL CENTER | Age: 64
End: 2024-12-12
Payer: COMMERCIAL

## 2024-12-12 DIAGNOSIS — R74.8 ELEVATED ALKALINE PHOSPHATASE LEVEL: ICD-10-CM

## 2024-12-12 PROCEDURE — A9503 TC99M MEDRONATE: HCPCS

## 2024-12-16 RX ORDER — LEVOTHYROXINE SODIUM 200 UG/1
200 TABLET ORAL
Qty: 90 TABLET | Refills: 0 | Status: SHIPPED | OUTPATIENT
Start: 2024-12-16

## 2025-02-28 ENCOUNTER — HOSPITAL ENCOUNTER (OUTPATIENT)
Facility: MEDICAL CENTER | Age: 65
End: 2025-02-28
Attending: OBSTETRICS & GYNECOLOGY | Admitting: OBSTETRICS & GYNECOLOGY
Payer: COMMERCIAL

## 2025-03-03 ENCOUNTER — OFFICE VISIT (OUTPATIENT)
Dept: MEDICAL GROUP | Age: 65
End: 2025-03-03
Payer: COMMERCIAL

## 2025-03-03 VITALS
DIASTOLIC BLOOD PRESSURE: 72 MMHG | HEIGHT: 64 IN | TEMPERATURE: 97 F | OXYGEN SATURATION: 99 % | SYSTOLIC BLOOD PRESSURE: 120 MMHG | BODY MASS INDEX: 38.76 KG/M2 | HEART RATE: 84 BPM | WEIGHT: 227 LBS

## 2025-03-03 DIAGNOSIS — M79.642 PAIN IN BOTH HANDS: ICD-10-CM

## 2025-03-03 DIAGNOSIS — R73.01 ELEVATED FASTING GLUCOSE: ICD-10-CM

## 2025-03-03 DIAGNOSIS — H66.90 ACUTE OTITIS MEDIA, UNSPECIFIED OTITIS MEDIA TYPE: ICD-10-CM

## 2025-03-03 DIAGNOSIS — M79.641 PAIN IN BOTH HANDS: ICD-10-CM

## 2025-03-03 PROCEDURE — 3078F DIAST BP <80 MM HG: CPT | Performed by: PHYSICIAN ASSISTANT

## 2025-03-03 PROCEDURE — 99214 OFFICE O/P EST MOD 30 MIN: CPT | Performed by: PHYSICIAN ASSISTANT

## 2025-03-03 PROCEDURE — 3074F SYST BP LT 130 MM HG: CPT | Performed by: PHYSICIAN ASSISTANT

## 2025-03-03 ASSESSMENT — FIBROSIS 4 INDEX: FIB4 SCORE: 0.62

## 2025-03-03 ASSESSMENT — PATIENT HEALTH QUESTIONNAIRE - PHQ9: CLINICAL INTERPRETATION OF PHQ2 SCORE: 0

## 2025-03-03 NOTE — PROGRESS NOTES
cc: Ear pain and hand pain    Subjective:     HPI    History of Present Illness  The patient is a 64-year-old female presenting with concerns of ear pain and hand pain.    She has been experiencing bilateral ear pain for the past 3 weeks, which she initially attributed to her sleeping position. The pain is exacerbated by head or facial movements, such as those made during hair washing. Her daughter, using an otoscope, observed a tube and excessive cerumen in her ears. She reports no otorrhea but notes an increase in cerumen production. She has attempted to contact Dr. Marie, an otolaryngologist, but was advised to seek initial evaluation here. She has a scheduled appointment with Dr. Marie later this month. She also reports rhinorrhea, which she suspects may be allergy-related, and which started after the onset of her ear symptoms. She reports no fevers. She has been self-medicating with over-the-counter Tylenol, without relief.  Denies sinus pressure, jaw pain, fever, chills, sweats    She has been experiencing bilateral hand pain for the past 2 months. She underwent carpal tunnel surgery on right hand 1.5 years ago, which initially provided relief. However, since the beginning of the year, she has been experiencing constant pain and stiffness in her hands, which prevents her from fully closing them by the end of the day. The pain is severe enough to disrupt her sleep, causing numbness in her arms and throbbing in her elbow. She believes the symptoms are worse in her dominant hand. She has not attempted to use her wrist braces. She reports no joint swelling, heat, or redness. Her symptoms are generally improved upon waking, although she experiences mild stiffness that resolves within a few minutes of movement. However, her joints become sore within 0.5 hours of waking up. She describes her joints as throbbing and tight. She has been using an over-the-counter arthritis cream, which provides only temporary relief.  She reports no instances of her fingers becoming stuck or locked. She has attempted to reduce her typing workload.        Review of systems:  See above.       Current Outpatient Medications:     amoxicillin-clavulanate (AUGMENTIN) 875-125 MG Tab, Take 1 Tablet by mouth 2 times a day for 7 days., Disp: 14 Tablet, Rfl: 0    diclofenac sodium (VOLTAREN) 1 % Gel, Apply 2 g topically 4 times a day as needed (pain)., Disp: 50 g, Rfl: 1    levothyroxine (SYNTHROID) 200 MCG Tab, Take 1 Tablet by mouth every morning on an empty stomach., Disp: 90 Tablet, Rfl: 0    VTAMA 1 % Cream, , Disp: , Rfl:     atorvastatin (LIPITOR) 80 MG tablet, Take 1 Tablet by mouth every day., Disp: 90 Tablet, Rfl: 3    omeprazole (PRILOSEC) 40 MG delayed-release capsule, Take 1 Capsule by mouth every day., Disp: 90 Capsule, Rfl: 3    FLUoxetine (PROZAC) 10 MG Cap, Take 1 Capsule by mouth every day., Disp: 90 Capsule, Rfl: 3    betamethasone dipropionate (DEL-BETA) 0.05 % Ointment, Apply 1 Application topically 2 times a day. (Patient taking differently: Apply 1 Application topically 2 times a day as needed.       **MEDICATION INSTRUCTIONS FOR SURGERY/PROCEDURE SCHEDULED FOR 9/19/2024.  DO NOT TAKE MORNING OF SURGERY.), Disp: 45 g, Rfl: 1    carvedilol (COREG) 6.25 MG Tab, Take 1 Tablet by mouth 2 times a day with meals. (Patient taking differently: Take 6.25 mg by mouth 2 times a day with meals.      **MEDICATION INSTRUCTIONS FOR SURGERY/PROCEDURE SCHEDULED FOR 9/19/2024  OK TO CONTINUE TAKING PRIOR TO SURGERY AND DAY OF SURGERY), Disp: 180 Tablet, Rfl: 3    cetirizine (KLS ALLER-GUY) 10 MG Tab, 10 mg every day.      **MEDICATION INSTRUCTIONS FOR SURGERY/PROCEDURE SCHEDULED FOR 9/19/2024  OK TO CONTINUE TAKING PRIOR TO SURGERY AND DAY OF SURGERY, Disp: , Rfl:     Multiple Vitamins-Minerals (HAIR SKIN & NAILS) Tab, Take 1 Tablet by mouth every day.       *MEDICATION INSTRUCTIONS FOR SURGERY/PROCEDURE SCHEDULED FOR 9/19/2024.  DO NOT TAKE 7 DAYS  "PRIOR TO SURGERY, Disp: , Rfl:     aspirin (ASA) 81 MG Chew Tab chewable tablet, Chew 81 mg every day.      **MEDICATION INSTRUCTIONS FOR SURGERY/PROCEDURE SCHEDULED FOR 9/19/2024  COORDINATE MEDICATION INSTRUCTIONS FROM PRESCRIBING PHYSICIAN, Disp: , Rfl:     Allergies, past medical history, past surgical history, family history, social history reviewed and updated    Objective:     Vitals: /72 (BP Location: Left arm, Patient Position: Sitting, BP Cuff Size: Adult)   Pulse 84   Temp 36.1 °C (97 °F) (Temporal)   Ht 1.626 m (5' 4\")   Wt 103 kg (227 lb)   SpO2 99%   BMI 38.96 kg/m²   General: Alert, pleasant, NAD  HEENT: Normocephalic. Neck supple.  Portion of right TM occluded by cerumen with PE tube in place, mildly erythematous right TM that is visualized.  Pain with pulling on tragus bilaterally.  Left TM PE tube in place with cerumen impaction.  No sinus pressure tenderness no thyromegaly or masses palpated. No cervical or supraclavicular lymphadenopathy. No carotid bruits   Heart: Regular rate and rhythm.  S1 and S2 normal.  No murmurs appreciated.  Respiratory: Normal respiratory effort.  Clear to auscultation bilaterally.  Hands: Full range of motion of all fingers no trigger finger or Dupuytren's contractures.  Minimally tender on MCP joint of right third finger  Psych:  Affect/mood is normal, judgement is good, memory is intact, grooming is appropriate.    Assessment/Plan:     Magda was seen today for otalgia and hand pain.    Diagnoses and all orders for this visit:    Acute otitis media, unspecified otitis media type  Visualized portion of the right TM does appear erythematous unable to visualize left TM due to cerumen infections, PE tubes are in place.  However due to bilateral pain and erythematous right TM we will treat for otitis media.  She does have a follow-up appointment with her ENT later this month  -     amoxicillin-clavulanate (AUGMENTIN) 875-125 MG Tab; Take 1 Tablet by mouth 2 " times a day for 7 days.    Pain in both hands  -On exam she is only tender on 1 joint, potentially more arthritis, she does have psoriasis could be beginning of psoriatic arthritis however for does not experience redness or warmth within the joints.  Will obtain x-ray referral to orthopedics for further evaluation.  Given Voltaren gel.  -     DX-JOINT SURVEY-HANDS SINGLE VIEW; Future  -     diclofenac sodium (VOLTAREN) 1 % Gel; Apply 2 g topically 4 times a day as needed (pain).  -     Referral to Hand Surgery    Elevated fasting glucose  -Monitoring repeat labs again in 3 months  -     HEMOGLOBIN A1C; Future  -     Comp Metabolic Panel; Future        Return in about 3 months (around 6/3/2025) for Annual PX.

## 2025-03-06 NOTE — Clinical Note
REFERRAL APPROVAL NOTICE         Sent on March 5, 2025                   Magda Childress  [address] [facility] [address] [facility] [place] [address]                   Dear Ms. Childress,    After a careful review of the medical information and benefit coverage, [facility] has processed your referral. See below for additional details.    If applicable, you must be actively enrolled with your insurance for coverage of the authorized service. If you have any questions regarding your coverage, please contact your insurance directly.    REFERRAL INFORMATION   Referral #:  [id number]  Referred-To Department    Referred-By Provider:  Hand Surgery    Jess Ferreira P.A.-C.   [facility]      [address] [place] [address] [address] [place] [id number] [phone] [address] [place] [address]  Max TORRE [address]  [phone]    Referral Start Date:  03/03/2025  Referral End Date:   03/03/2026             SCHEDULING  If you do not already have an appointment, please call [phone] to make an appointment.     MORE INFORMATION  If you do not already have a [employer] account, sign up at: [email or web address]  You can access your medical information, make appointments, see lab results, billing information, and more.  If you have questions regarding this referral, please contact  the [facility] Referrals department at:             [phone]. Monday - Friday 8:00AM - 5:00PM.     Sincerely,    [facility]

## 2025-03-17 ENCOUNTER — APPOINTMENT (OUTPATIENT)
Dept: RADIOLOGY | Facility: MEDICAL CENTER | Age: 65
End: 2025-03-17
Attending: PHYSICIAN ASSISTANT
Payer: COMMERCIAL

## 2025-03-17 DIAGNOSIS — M79.641 PAIN IN BOTH HANDS: ICD-10-CM

## 2025-03-17 DIAGNOSIS — M79.642 PAIN IN BOTH HANDS: ICD-10-CM

## 2025-03-17 PROCEDURE — 77077 JOINT SURVEY SINGLE VIEW: CPT

## 2025-03-21 ENCOUNTER — RESULTS FOLLOW-UP (OUTPATIENT)
Dept: MEDICAL GROUP | Age: 65
End: 2025-03-21

## 2025-03-21 DIAGNOSIS — M25.541 ARTHRALGIA OF BOTH HANDS: ICD-10-CM

## 2025-03-21 DIAGNOSIS — M25.542 ARTHRALGIA OF BOTH HANDS: ICD-10-CM

## 2025-03-29 DIAGNOSIS — E89.0 POSTSURGICAL HYPOTHYROIDISM: ICD-10-CM

## 2025-03-31 RX ORDER — LEVOTHYROXINE SODIUM 200 UG/1
200 TABLET ORAL
Qty: 90 TABLET | Refills: 3 | Status: SHIPPED | OUTPATIENT
Start: 2025-03-31

## 2025-03-31 NOTE — TELEPHONE ENCOUNTER
Received request via: Patient    Was the patient seen in the last year in this department? Yes    Does the patient have an active prescription (recently filled or refills available) for medication(s) requested? No    Pharmacy Name: [facility] - [address]     Does the patient have [facility] and need 100-day supply? (This applies to ALL medications) Patient does not have SCP   Requested Prescriptions     Pending Prescriptions Disp Refills    levothyroxine (SYNTHROID) 200 MCG Tab 90 Tablet 0     Sig: Take 1 Tablet by mouth every morning on an empty stomach.

## 2025-04-04 ENCOUNTER — HOSPITAL ENCOUNTER (OUTPATIENT)
Dept: LAB | Facility: MEDICAL CENTER | Age: 65
End: 2025-04-04
Attending: PHYSICIAN ASSISTANT
Payer: COMMERCIAL

## 2025-04-04 DIAGNOSIS — M25.541 ARTHRALGIA OF BOTH HANDS: ICD-10-CM

## 2025-04-04 DIAGNOSIS — M25.542 ARTHRALGIA OF BOTH HANDS: ICD-10-CM

## 2025-04-04 LAB
25(OH)D3 SERPL-MCNC: 35 NG/ML (ref 30–100)
CK SERPL-CCNC: 167 U/L (ref 0–154)
CRP SERPL HS-MCNC: 1.1 MG/L (ref 0–3)
ERYTHROCYTE [SEDIMENTATION RATE] IN BLOOD BY WESTERGREN METHOD: 7 MM/HOUR (ref 0–25)
RHEUMATOID FACT SER IA-ACNC: <10 IU/ML (ref 0–14)
URATE SERPL-MCNC: 4.7 MG/DL (ref 1.9–8.2)

## 2025-04-04 PROCEDURE — 86225 DNA ANTIBODY NATIVE: CPT

## 2025-04-04 PROCEDURE — 82306 VITAMIN D 25 HYDROXY: CPT

## 2025-04-04 PROCEDURE — 86038 ANTINUCLEAR ANTIBODIES: CPT

## 2025-04-04 PROCEDURE — 86431 RHEUMATOID FACTOR QUANT: CPT

## 2025-04-04 PROCEDURE — 84550 ASSAY OF BLOOD/URIC ACID: CPT

## 2025-04-04 PROCEDURE — 86141 C-REACTIVE PROTEIN HS: CPT

## 2025-04-04 PROCEDURE — 85652 RBC SED RATE AUTOMATED: CPT

## 2025-04-04 PROCEDURE — 82550 ASSAY OF CK (CPK): CPT

## 2025-04-04 PROCEDURE — 86200 CCP ANTIBODY: CPT

## 2025-04-04 PROCEDURE — 36415 COLL VENOUS BLD VENIPUNCTURE: CPT

## 2025-04-06 LAB
CCP IGA+IGG SERPL IA-ACNC: 22 UNITS (ref 0–19)
DSDNA AB TITR SER CLIF: NORMAL {TITER}
NUCLEAR IGG SER QL IA: NORMAL

## 2025-04-07 ENCOUNTER — RESULTS FOLLOW-UP (OUTPATIENT)
Dept: MEDICAL GROUP | Age: 65
End: 2025-04-07

## 2025-04-07 DIAGNOSIS — M25.542 ARTHRALGIA OF BOTH HANDS: ICD-10-CM

## 2025-04-07 DIAGNOSIS — M25.541 ARTHRALGIA OF BOTH HANDS: ICD-10-CM

## 2025-04-07 DIAGNOSIS — R76.8 CYCLIC CITRULLINATED PEPTIDE (CCP) ANTIBODY POSITIVE: ICD-10-CM

## 2025-04-11 NOTE — Clinical Note
REFERRAL APPROVAL NOTICE         Sent on April 11, 2025                   Magda Childress  [address] [place] [address]                   Dear Ms. Childress,    After a careful review of the medical information and benefit coverage, [facility] has processed your referral. See below for additional details.    If applicable, you must be actively enrolled with your insurance for coverage of the authorized service. If you have any questions regarding your coverage, please contact your insurance directly.    REFERRAL INFORMATION   Referral #:  [id number]  Referred-To Department    Referred-By Provider:  Rheumatology    Jess Ferreira P.A.-C.   Rheumatology [facility]      [address] [place] [id number] [phone] [address], [address]  [place] [address] [phone]    Referral Start Date:  04/07/2025  Referral End Date:   04/07/2026           SCHEDULING  If you do not already have an appointment, please call [phone] to make an appointment.   MORE INFORMATION  As a reminder, [facility] ownership has changed, meaning this location is now owned and operated by [facility]. As such, we want to clarify that our patients should expect to receive two separate bills for the services received at [facility] - one representing the [facility] facility fees as the owner of the establishment, and the other to represent the physician's services and subsequent fees. You can speak with your insurance carrier for a pricing estimate by calling the customer service number on the back of your card and ask about charges for a hospital outpatient visit.  If you do not already have a [employer] account, sign up at: [email or web address]  You can access your medical information, make appointments, see lab results, billing information, and more.  If you have questions regarding this referral, please contact  the [facility] Referrals department at:             [phone].  Monday - Friday 7:30AM - 5:00PM.      Sincerely,  [facility]

## 2025-04-15 ENCOUNTER — OFFICE VISIT (OUTPATIENT)
Dept: MEDICAL GROUP | Age: 65
End: 2025-04-15
Payer: COMMERCIAL

## 2025-04-15 ENCOUNTER — RESULTS FOLLOW-UP (OUTPATIENT)
Dept: MEDICAL GROUP | Age: 65
End: 2025-04-15

## 2025-04-15 ENCOUNTER — HOSPITAL ENCOUNTER (OUTPATIENT)
Dept: LAB | Facility: MEDICAL CENTER | Age: 65
End: 2025-04-15
Attending: PHYSICIAN ASSISTANT
Payer: COMMERCIAL

## 2025-04-15 VITALS
HEART RATE: 78 BPM | BODY MASS INDEX: 39.61 KG/M2 | HEIGHT: 64 IN | WEIGHT: 232 LBS | SYSTOLIC BLOOD PRESSURE: 120 MMHG | TEMPERATURE: 97.7 F | DIASTOLIC BLOOD PRESSURE: 74 MMHG | OXYGEN SATURATION: 98 %

## 2025-04-15 DIAGNOSIS — Z12.31 ENCOUNTER FOR SCREENING MAMMOGRAM FOR MALIGNANT NEOPLASM OF BREAST: ICD-10-CM

## 2025-04-15 DIAGNOSIS — E66.9 OBESITY (BMI 30-39.9): ICD-10-CM

## 2025-04-15 DIAGNOSIS — M62.838 MUSCLE SPASM: ICD-10-CM

## 2025-04-15 LAB
ALBUMIN SERPL BCP-MCNC: 4 G/DL (ref 3.2–4.9)
ALBUMIN/GLOB SERPL: 1.4 G/DL
ALP SERPL-CCNC: 145 U/L (ref 30–99)
ALT SERPL-CCNC: 13 U/L (ref 2–50)
ANION GAP SERPL CALC-SCNC: 9 MMOL/L (ref 7–16)
AST SERPL-CCNC: 18 U/L (ref 12–45)
BASOPHILS # BLD AUTO: 0.8 % (ref 0–1.8)
BASOPHILS # BLD: 0.08 K/UL (ref 0–0.12)
BILIRUB SERPL-MCNC: 0.5 MG/DL (ref 0.1–1.5)
BUN SERPL-MCNC: 15 MG/DL (ref 8–22)
CALCIUM ALBUM COR SERPL-MCNC: 9.3 MG/DL (ref 8.5–10.5)
CALCIUM SERPL-MCNC: 9.3 MG/DL (ref 8.5–10.5)
CHLORIDE SERPL-SCNC: 104 MMOL/L (ref 96–112)
CO2 SERPL-SCNC: 28 MMOL/L (ref 20–33)
CREAT SERPL-MCNC: 1.15 MG/DL (ref 0.5–1.4)
EOSINOPHIL # BLD AUTO: 0.4 K/UL (ref 0–0.51)
EOSINOPHIL NFR BLD: 4.2 % (ref 0–6.9)
ERYTHROCYTE [DISTWIDTH] IN BLOOD BY AUTOMATED COUNT: 43.9 FL (ref 35.9–50)
GFR SERPLBLD CREATININE-BSD FMLA CKD-EPI: 53 ML/MIN/1.73 M 2
GLOBULIN SER CALC-MCNC: 2.9 G/DL (ref 1.9–3.5)
GLUCOSE SERPL-MCNC: 96 MG/DL (ref 65–99)
HCT VFR BLD AUTO: 45.8 % (ref 37–47)
HGB BLD-MCNC: 14.8 G/DL (ref 12–16)
IMM GRANULOCYTES # BLD AUTO: 0.05 K/UL (ref 0–0.11)
IMM GRANULOCYTES NFR BLD AUTO: 0.5 % (ref 0–0.9)
LYMPHOCYTES # BLD AUTO: 3.92 K/UL (ref 1–4.8)
LYMPHOCYTES NFR BLD: 41.1 % (ref 22–41)
MAGNESIUM SERPL-MCNC: 2.4 MG/DL (ref 1.5–2.5)
MCH RBC QN AUTO: 30.2 PG (ref 27–33)
MCHC RBC AUTO-ENTMCNC: 32.3 G/DL (ref 32.2–35.5)
MCV RBC AUTO: 93.5 FL (ref 81.4–97.8)
MONOCYTES # BLD AUTO: 0.76 K/UL (ref 0–0.85)
MONOCYTES NFR BLD AUTO: 8 % (ref 0–13.4)
NEUTROPHILS # BLD AUTO: 4.33 K/UL (ref 1.82–7.42)
NEUTROPHILS NFR BLD: 45.4 % (ref 44–72)
NRBC # BLD AUTO: 0 K/UL
NRBC BLD-RTO: 0 /100 WBC (ref 0–0.2)
PLATELET # BLD AUTO: 410 K/UL (ref 164–446)
PMV BLD AUTO: 9.6 FL (ref 9–12.9)
POTASSIUM SERPL-SCNC: 4.3 MMOL/L (ref 3.6–5.5)
PROT SERPL-MCNC: 6.9 G/DL (ref 6–8.2)
RBC # BLD AUTO: 4.9 M/UL (ref 4.2–5.4)
SODIUM SERPL-SCNC: 141 MMOL/L (ref 135–145)
WBC # BLD AUTO: 9.5 K/UL (ref 4.8–10.8)

## 2025-04-15 PROCEDURE — 83735 ASSAY OF MAGNESIUM: CPT

## 2025-04-15 PROCEDURE — 85025 COMPLETE CBC W/AUTO DIFF WBC: CPT

## 2025-04-15 PROCEDURE — 99214 OFFICE O/P EST MOD 30 MIN: CPT | Performed by: PHYSICIAN ASSISTANT

## 2025-04-15 PROCEDURE — 3074F SYST BP LT 130 MM HG: CPT | Performed by: PHYSICIAN ASSISTANT

## 2025-04-15 PROCEDURE — 80053 COMPREHEN METABOLIC PANEL: CPT

## 2025-04-15 PROCEDURE — 3078F DIAST BP <80 MM HG: CPT | Performed by: PHYSICIAN ASSISTANT

## 2025-04-15 PROCEDURE — 36415 COLL VENOUS BLD VENIPUNCTURE: CPT

## 2025-04-15 RX ORDER — CYCLOBENZAPRINE HCL 10 MG
10 TABLET ORAL 3 TIMES DAILY PRN
Qty: 30 TABLET | Refills: 0 | Status: SHIPPED | OUTPATIENT
Start: 2025-04-15

## 2025-04-15 ASSESSMENT — FIBROSIS 4 INDEX: FIB4 SCORE: 0.62

## 2025-04-15 NOTE — PROGRESS NOTES
cc: Muscle spasms    Subjective:     HPI    History of Present Illness  The patient is a 64-year-old female presenting with concerns of muscle spasms.    Muscle spasms began 3 nights ago initially improving but then worsening after lunch yesterday. The spasms occur in the neck, lower back, buttocks, and legs, and are bilateral. They are particularly severe when lying flat or on her back. Cramping in the legs occurs during fast-paced walking. The spasms were so intense that frequent stops were necessary while navigating through the airport during a recent trip to [place]. The sensation is described as if the body is being pulled in every direction. Despite consuming 2 to 3 bottles of an electrolyte solution, the spasms persist. No leg swelling or redness is reported. Daily physical activity does not exceed 2 to 3 miles of walking. A history of intermittent charley horses is noted, but full-body spasms have never been experienced simultaneously. Spasms occur at least once an hour, including one episode in the car en route to the clinic today. Initially attributed to dehydration, water intake was increased and electrolytes were added to the diet.           Review of systems:  See above.       Current Outpatient Medications:     cyclobenzaprine (FLEXERIL) 10 mg Tab, Take 1 Tablet by mouth 3 times a day as needed for Muscle Spasms., Disp: 30 Tablet, Rfl: 0    meloxicam (MOBIC) 7.5 MG Tab, Take 1 Tablet by mouth every day., Disp: 30 Tablet, Rfl: 0    levothyroxine (SYNTHROID) 200 MCG Tab, Take 1 Tablet by mouth every morning on an empty stomach., Disp: 90 Tablet, Rfl: 3    VTAMA 1 % Cream, , Disp: , Rfl:     atorvastatin (LIPITOR) 80 MG tablet, Take 1 Tablet by mouth every day., Disp: 90 Tablet, Rfl: 3    omeprazole (PRILOSEC) 40 MG delayed-release capsule, Take 1 Capsule by mouth every day., Disp: 90 Capsule, Rfl: 3    FLUoxetine (PROZAC) 10 MG Cap, Take 1 Capsule by mouth every day., Disp: 90 Capsule, Rfl: 3     "carvedilol (COREG) 6.25 MG Tab, Take 1 Tablet by mouth 2 times a day with meals. (Patient taking differently: Take 6.25 mg by mouth 2 times a day with meals.      **MEDICATION INSTRUCTIONS FOR SURGERY/PROCEDURE SCHEDULED FOR 9/19/2024  OK TO CONTINUE TAKING PRIOR TO SURGERY AND DAY OF SURGERY), Disp: 180 Tablet, Rfl: 3    Multiple Vitamins-Minerals (HAIR SKIN & NAILS) Tab, Take 1 Tablet by mouth every day.       *MEDICATION INSTRUCTIONS FOR SURGERY/PROCEDURE SCHEDULED FOR 9/19/2024.  DO NOT TAKE 7 DAYS PRIOR TO SURGERY, Disp: , Rfl:     aspirin (ASA) 81 MG Chew Tab chewable tablet, Chew 81 mg every day.      **MEDICATION INSTRUCTIONS FOR SURGERY/PROCEDURE SCHEDULED FOR 9/19/2024  COORDINATE MEDICATION INSTRUCTIONS FROM PRESCRIBING PHYSICIAN, Disp: , Rfl:     betamethasone dipropionate (DEL-BETA) 0.05 % Ointment, Apply 1 Application topically 2 times a day. (Patient taking differently: Apply 1 Application topically 2 times a day as needed.       **MEDICATION INSTRUCTIONS FOR SURGERY/PROCEDURE SCHEDULED FOR 9/19/2024.  DO NOT TAKE MORNING OF SURGERY.), Disp: 45 g, Rfl: 1    cetirizine (KLS ALLER-GUY) 10 MG Tab, 10 mg every day.      **MEDICATION INSTRUCTIONS FOR SURGERY/PROCEDURE SCHEDULED FOR 9/19/2024  OK TO CONTINUE TAKING PRIOR TO SURGERY AND DAY OF SURGERY, Disp: , Rfl:     Allergies, past medical history, past surgical history, family history, social history reviewed and updated    Objective:     Vitals: /74 (BP Location: Left arm, Patient Position: Sitting, BP Cuff Size: Adult)   Pulse 78   Temp 36.5 °C (97.7 °F) (Temporal)   Ht 1.626 m (5' 4\")   Wt 105 kg (232 lb)   SpO2 98%   BMI 39.82 kg/m²   General: Alert, pleasant, NAD  HEENT: Normocephalic. Neck supple.  No carotid bruits   Heart: Regular rate and rhythm.  S1 and S2 normal.  No murmurs appreciated.  Respiratory: Normal respiratory effort.  Clear to auscultation bilaterally.  Skin: Warm, dry, no rashes.  Extremities: No leg edema.  Radial " pulses and pedal pulses 2+ symmetric.  No lower extremity edema, redness, warmth.  Negative Homans' sign bilaterally no palpable cords in the calves.  Lower extremities are nontender to palpation.  Reflexes 2+ symmetric bilateral upper and lower extremities, no hyperreflexia, or spasm on exam.  Psych:  Affect/mood is normal, judgement is good, memory is intact, grooming is appropriate.    Assessment/Plan:     Magda was seen today for spasms.    Diagnoses and all orders for this visit:    Muscle spasm  -She is having muscle spasms for the past 3 to 4 days, fairly intense in multiple areas back buttocks lower extremities bilaterally and also neck.  She was initially hydrating with just water, subsequently has been hydrating with electrolytes and symptoms are improving.  Do have concerns for electrolyte imbalances, although no specific cramping or hyperreflexia on exam.  Labs were ordered stat.  Did consider potential for DVT or clots however travel was only an hour and a half, she was walking frequently.  Negative Homans' sign.  Further treatment as needed pending lab results.  Continue with hydration with electrolytes.  Recommended magnesium over-the-counter.  Given Flexeril to take 3 times daily.  If symptoms worsen advised she needs to go to the ER immediately.  If they do not resolve follow-up in 3 to 5 days  -     CBC WITH DIFFERENTIAL; Future  -     Comp Metabolic Panel; Future  -     MAGNESIUM; Future  -     cyclobenzaprine (FLEXERIL) 10 mg Tab; Take 1 Tablet by mouth 3 times a day as needed for Muscle Spasms.    Encounter for screening mammogram for malignant neoplasm of breast  -     MA-SCREENING MAMMO BILAT W/TOMOSYNTHESIS W/CAD; Future     Obesity (BMI 30-39.9)  - Patient identified as having weight management issue.  Appropriate orders and counseling given.      Return in about 2 months (around 6/15/2025) for Annual PX, Lab Review.

## 2025-04-29 ENCOUNTER — APPOINTMENT (OUTPATIENT)
Dept: RADIOLOGY | Facility: MEDICAL CENTER | Age: 65
End: 2025-04-29
Attending: PHYSICIAN ASSISTANT
Payer: COMMERCIAL

## 2025-04-29 DIAGNOSIS — Z12.31 ENCOUNTER FOR SCREENING MAMMOGRAM FOR MALIGNANT NEOPLASM OF BREAST: ICD-10-CM

## 2025-04-29 PROCEDURE — 77067 SCR MAMMO BI INCL CAD: CPT

## 2025-05-06 ENCOUNTER — RESULTS FOLLOW-UP (OUTPATIENT)
Dept: MEDICAL GROUP | Age: 65
End: 2025-05-06

## 2025-05-14 ENCOUNTER — HOSPITAL ENCOUNTER (OUTPATIENT)
Facility: MEDICAL CENTER | Age: 65
End: 2025-05-14
Attending: ORTHOPAEDIC SURGERY | Admitting: ORTHOPAEDIC SURGERY
Payer: COMMERCIAL

## 2025-05-14 VITALS
WEIGHT: 225 LBS | OXYGEN SATURATION: 95 % | BODY MASS INDEX: 38.41 KG/M2 | HEART RATE: 78 BPM | DIASTOLIC BLOOD PRESSURE: 83 MMHG | SYSTOLIC BLOOD PRESSURE: 129 MMHG | HEIGHT: 64 IN

## 2025-05-14 PROBLEM — G56.01 RIGHT CARPAL TUNNEL SYNDROME: Status: ACTIVE | Noted: 2025-05-14

## 2025-05-14 ASSESSMENT — FIBROSIS 4 INDEX: FIB4 SCORE: 0.77

## 2025-05-14 NOTE — LETTER
"     June 26, 2025    Patient Name: Priscilla Patricia  Surgeon Name: Dannie Santiago M.D.  Surgery Facility: [facility] ([address] [place])  Surgery Date: 7/11/2025    The time of your surgery is not final and may change up to and until the day of your surgery. You will be contacted 24-48 hours prior to your surgery date with your check-in and surgery time.    If you will not be at one of the below numbers please call the surgery scheduler at [phone]  Preferred Phone: [phone]    BEFORE YOUR SURGERY   Pre Registration and/or Lab Work must be done within and no earlier than 28 days prior to your surgery date. Your scheduled facility will contact you regarding all required preregistration and/or lab work. If you have not been contacted within 7 days of your scheduled procedure please call [facility] at [phone] for an appointment as soon as possible.    Instructions: Bring a list of all medications you are taking including the dosing and frequency.    DAY OF YOUR SURGERY    Nothing to eat or drink after midnight the night before surgery. This includes mints, gums, etc.     Refrain from smoking any substance or consuming any tobacco products after midnight prior to surgery. It may interfere with the anesthetic and frequently produces nausea during the recovery period.    Continue taking all lifesaving medications. Including the morning of your surgery with small sip of water.  If you have questions regarding what medication you can take on the day of surgery, please contact the preadmit department ([phone]).    Please do NOT take on the day of surgery:  Any diabetic medications  Diuretics: examples- Furosemide (Lasix), Spironolactone, Hydrochlorothiazide.   Ace Inhibitors: examples - Lisinopril, Ramipril, Enalapril  \"ARB's\": examples: Losartan, Olmesartan, Valsartan    Please arrive at the hospital/surgery center at the check-in time " provided.   An adult will need to bring you and take you home after your surgery.     AFTER YOUR SURGERY  Post op Appointment:   Date: 7/21   Time: 930AM   With: Tylor Santiago MD   Location: [facility] [place] [address]    - Post Surgery - You will need someone to drive you home  - You must have someone provide transportation post surgery and someone to monitor you for at least 24 hours post-surgery. If you don't have either of these your appointment will be canceled.     TIME OFF WORK  FMLA or Disability forms can be faxed directly to: [phone] or you may drop them off at [address] [place] [address]. Our office charges a $35.00 fee per form. Forms will be completed within 10 business days of drop off and payment received. For the status of your forms you may contact our disability office directly [phone].    MEDICATION INSTRUCTIONS **Please read section completely**    Please consult your prescribing physician if you are on life saving blood thinners (Plavix, Coumadin, Eliquis, Xarelto, etc.) for when to stop prior to surgery    The following medications should be stopped a minimum of 14 days prior to surgery:    Anorectics: Phentermine (Adipex-P, Lomaira and Suprenza), Phentermine-topiramate (Qsymia),   Bupropion-naltrexone (Contrave)    **If you are on Bupropion for anxiety/depression, please continue this medication up until the day of surgery.     The following should be stopped a minimum of 7 days prior to surgery:  All vitamins and supplements  Ozempic, Trulicity, Victoza    The following medications should be stopped 5 days prior to surgery:  Anti-Inflammatories: examples- Aspirin, Aspirin products, Ibuprofen/Advil, Aleve, Naproxen, Meloxicam, Celebrex, etc.    The following medications should be stopped 4 days prior to surgery:  Certain oral diabetic medications: ertugliflozin (Steglatro)    The following medications should be stopped a minimum of 3 days prior to  surgery:  Certain oral diabetic medications: canagliflozin (Invokana), dapagliflozin (Farxiga), empagliflozin (Jardiance)  Opiod Partial Agonists/Opioid Antagonists: Buprenorphine (Suboxone, Belbuca, Butrans, Probuphine Implant, Sublocade), Naltrexone (ReVia, Vivitrol), Naloxone  PDE-5 inhibitors: Sildenafil (Viagra), Tadalafil (Cialis), Vardenafil (Levitra), Avanafil (Stendra)  MAO Inhibitors: Rasagiline (Azilect), Selegiline (Eldepryl, Emsam, Selapar), Isocarboxazid (Marplan), Phenelzine (Nardil)         Thank you,     [facility]    Contact Us:  [facility]   [phone]  Web: [facility]

## 2025-05-14 NOTE — LETTER
"     May 19, 2025    Patient Name: Priscilla Childress  Surgeon Name: Dannie Santiago M.D.  Surgery Facility: [facility] ([address] [place])  Surgery Date: 6/13/2025    The time of your surgery is not final and may change up to and until the day of your surgery. You will be contacted 24-48 hours prior to your surgery date with your check-in and surgery time.    If you will not be at one of the below numbers please call the surgery scheduler at [phone]  Preferred Phone: [phone]    BEFORE YOUR SURGERY   Pre Registration and/or Lab Work must be done within and no earlier than 28 days prior to your surgery date. Your scheduled facility will contact you regarding all required preregistration and/or lab work. If you have not been contacted within 7 days of your scheduled procedure please call [facility] at [phone] for an appointment as soon as possible.    Instructions: Bring a list of all medications you are taking including the dosing and frequency.    DAY OF YOUR SURGERY    Nothing to eat or drink after midnight the night before surgery. This includes mints, gums, etc.     Refrain from smoking any substance or consuming any tobacco products after midnight prior to surgery. It may interfere with the anesthetic and frequently produces nausea during the recovery period.    Continue taking all lifesaving medications. Including the morning of your surgery with small sip of water.  If you have questions regarding what medication you can take on the day of surgery, please contact the preadmit department ([phone]).    Please do NOT take on the day of surgery:  Any diabetic medications  Diuretics: examples- Furosemide (Lasix), Spironolactone, Hydrochlorothiazide.   Ace Inhibitors: examples - Lisinopril, Ramipril, Enalapril  \"ARB's\": examples: Losartan, Olmesartan, Valsartan    Please arrive at the hospital/surgery center at the check-in time " provided.   An adult will need to bring you and take you home after your surgery.     AFTER YOUR SURGERY  Post op Appointment:   Date: 6/23   Time: 915AM   With: Federica ARCHULETA   Location: [facility] [place] [address]    - Post Surgery - You will need someone to drive you home  - You must have someone provide transportation post surgery and someone to monitor you for at least 24 hours post-surgery. If you don't have either of these your appointment will be canceled.     TIME OFF WORK  FMLA or Disability forms can be faxed directly to: [phone] or you may drop them off at [address] [place] [address]. Our office charges a $35.00 fee per form. Forms will be completed within 10 business days of drop off and payment received. For the status of your forms you may contact our disability office directly [phone].    MEDICATION INSTRUCTIONS **Please read section completely**    Please consult your prescribing physician if you are on life saving blood thinners (Plavix, Coumadin, Eliquis, Xarelto, etc.) for when to stop prior to surgery    The following medications should be stopped a minimum of 14 days prior to surgery:    Anorectics: Phentermine (Adipex-P, Lomaira and Suprenza), Phentermine-topiramate (Qsymia),   Bupropion-naltrexone (Contrave)    **If you are on Bupropion for anxiety/depression, please continue this medication up until the day of surgery.     The following should be stopped a minimum of 7 days prior to surgery:  All vitamins and supplements  Ozempic, Trulicity, Victoza    The following medications should be stopped 5 days prior to surgery:  Anti-Inflammatories: examples- Aspirin, Aspirin products, Ibuprofen/Advil, Aleve, Naproxen, Meloxicam, Celebrex, etc.    The following medications should be stopped 4 days prior to surgery:  Certain oral diabetic medications: ertugliflozin (Steglatro)    The following medications should be stopped a minimum of 3 days prior to  surgery:  Certain oral diabetic medications: canagliflozin (Invokana), dapagliflozin (Farxiga), empagliflozin (Jardiance)  Opiod Partial Agonists/Opioid Antagonists: Buprenorphine (Suboxone, Belbuca, Butrans, Probuphine Implant, Sublocade), Naltrexone (ReVia, Vivitrol), Naloxone  PDE-5 inhibitors: Sildenafil (Viagra), Tadalafil (Cialis), Vardenafil (Levitra), Avanafil (Stendra)  MAO Inhibitors: Rasagiline (Azilect), Selegiline (Eldepryl, Emsam, Selapar), Isocarboxazid (Marplan), Phenelzine (Nardil)         Thank you,     [facility]    Contact Us:  [facility]   [phone]  Web: [facility]

## 2025-05-23 ENCOUNTER — APPOINTMENT (OUTPATIENT)
Dept: ADMISSIONS | Facility: MEDICAL CENTER | Age: 65
End: 2025-05-23
Attending: ORTHOPAEDIC SURGERY
Payer: COMMERCIAL

## 2025-05-29 ENCOUNTER — PRE-ADMISSION TESTING (OUTPATIENT)
Dept: ADMISSIONS | Facility: MEDICAL CENTER | Age: 65
End: 2025-05-29
Attending: ORTHOPAEDIC SURGERY
Payer: COMMERCIAL

## 2025-06-02 ENCOUNTER — PRE-ADMISSION TESTING (OUTPATIENT)
Dept: ADMISSIONS | Facility: MEDICAL CENTER | Age: 65
End: 2025-06-02
Attending: ORTHOPAEDIC SURGERY
Payer: COMMERCIAL

## 2025-06-02 VITALS — HEIGHT: 64 IN | BODY MASS INDEX: 38.62 KG/M2

## 2025-06-02 DIAGNOSIS — Z01.812 PRE-OPERATIVE LABORATORY EXAMINATION: Primary | ICD-10-CM

## 2025-06-02 DIAGNOSIS — Z01.810 PRE-OPERATIVE CARDIOVASCULAR EXAMINATION: ICD-10-CM

## 2025-06-02 NOTE — PREADMIT AVS NOTE
Current Medications   Medication Instructions    meloxicam (MOBIC) 7.5 MG Tab Stop 5 days before surgery    levothyroxine (SYNTHROID) 200 MCG Tab Continue taking medication as prescribed, including morning of procedure     VTAMA 1 % Cream Hold medication day of procedure    atorvastatin (LIPITOR) 80 MG tablet Continue taking as prescribed.    omeprazole (PRILOSEC) 40 MG delayed-release capsule Continue taking as prescribed.    FLUoxetine (PROZAC) 10 MG Cap Continue taking medication as prescribed, including morning of procedure     carvedilol (COREG) 6.25 MG Tab Continue taking medication as prescribed, including morning of procedure     Multiple Vitamins-Minerals (HAIR SKIN & NAILS) Tab Stop 7 days before surgery    aspirin (ASA) 81 MG Chew Tab chewable tablet Follow instructions from surgeon or specialist.

## 2025-06-06 ENCOUNTER — APPOINTMENT (OUTPATIENT)
Dept: ADMISSIONS | Facility: MEDICAL CENTER | Age: 65
End: 2025-06-06
Attending: ORTHOPAEDIC SURGERY
Payer: COMMERCIAL

## 2025-06-06 DIAGNOSIS — Z01.810 PRE-OPERATIVE CARDIOVASCULAR EXAMINATION: ICD-10-CM

## 2025-06-06 DIAGNOSIS — Z01.812 PRE-OPERATIVE LABORATORY EXAMINATION: ICD-10-CM

## 2025-06-06 LAB
EKG IMPRESSION: NORMAL
EST. AVERAGE GLUCOSE BLD GHB EST-MCNC: 131 MG/DL
HBA1C MFR BLD: 6.2 % (ref 4–5.6)

## 2025-06-06 PROCEDURE — 93005 ELECTROCARDIOGRAM TRACING: CPT | Mod: TC

## 2025-06-06 PROCEDURE — 36415 COLL VENOUS BLD VENIPUNCTURE: CPT

## 2025-06-06 PROCEDURE — 83036 HEMOGLOBIN GLYCOSYLATED A1C: CPT

## 2025-06-06 PROCEDURE — 93010 ELECTROCARDIOGRAM REPORT: CPT | Performed by: INTERNAL MEDICINE

## 2025-06-09 ENCOUNTER — PATIENT MESSAGE (OUTPATIENT)
Dept: MEDICAL GROUP | Age: 65
End: 2025-06-09
Payer: COMMERCIAL

## 2025-06-09 DIAGNOSIS — K21.9 GASTROESOPHAGEAL REFLUX DISEASE WITHOUT ESOPHAGITIS: ICD-10-CM

## 2025-06-09 DIAGNOSIS — E78.5 DYSLIPIDEMIA: ICD-10-CM

## 2025-06-09 DIAGNOSIS — I25.42 SPONTANEOUS DISSECTION OF CORONARY ARTERY: ICD-10-CM

## 2025-06-09 DIAGNOSIS — E89.0 POSTSURGICAL HYPOTHYROIDISM: ICD-10-CM

## 2025-06-09 DIAGNOSIS — I25.2 HISTORY OF MYOCARDIAL INFARCTION: ICD-10-CM

## 2025-06-09 DIAGNOSIS — R23.2 HOT FLASHES: ICD-10-CM

## 2025-06-10 RX ORDER — ATORVASTATIN CALCIUM 80 MG/1
80 TABLET, FILM COATED ORAL DAILY
Qty: 10 TABLET | Refills: 0 | Status: SHIPPED | OUTPATIENT
Start: 2025-06-10

## 2025-06-10 RX ORDER — FLUOXETINE 10 MG/1
10 CAPSULE ORAL DAILY
Qty: 10 CAPSULE | Refills: 0 | Status: SHIPPED | OUTPATIENT
Start: 2025-06-10 | End: 2025-06-30 | Stop reason: SDUPTHER

## 2025-06-10 RX ORDER — OMEPRAZOLE 40 MG/1
40 CAPSULE, DELAYED RELEASE ORAL DAILY
Qty: 10 CAPSULE | Refills: 0 | Status: SHIPPED | OUTPATIENT
Start: 2025-06-10

## 2025-06-10 RX ORDER — CARVEDILOL 6.25 MG/1
6.25 TABLET ORAL 2 TIMES DAILY WITH MEALS
Qty: 20 TABLET | Refills: 0 | Status: SHIPPED | OUTPATIENT
Start: 2025-06-10

## 2025-06-10 RX ORDER — LEVOTHYROXINE SODIUM 200 UG/1
200 TABLET ORAL
Qty: 10 TABLET | Refills: 0 | Status: SHIPPED | OUTPATIENT
Start: 2025-06-10

## 2025-06-27 ENCOUNTER — APPOINTMENT (OUTPATIENT)
Dept: ADMISSIONS | Facility: MEDICAL CENTER | Age: 65
End: 2025-06-27
Attending: ORTHOPAEDIC SURGERY
Payer: COMMERCIAL

## 2025-06-27 ENCOUNTER — HOSPITAL ENCOUNTER (OUTPATIENT)
Dept: LAB | Facility: MEDICAL CENTER | Age: 65
End: 2025-06-27
Attending: PHYSICIAN ASSISTANT
Payer: COMMERCIAL

## 2025-06-27 DIAGNOSIS — R73.01 ELEVATED FASTING GLUCOSE: ICD-10-CM

## 2025-06-27 LAB
ALBUMIN SERPL BCP-MCNC: 4.1 G/DL (ref 3.2–4.9)
ALBUMIN/GLOB SERPL: 1.5 G/DL
ALP SERPL-CCNC: 137 U/L (ref 30–99)
ALT SERPL-CCNC: 27 U/L (ref 2–50)
ANION GAP SERPL CALC-SCNC: 11 MMOL/L (ref 7–16)
AST SERPL-CCNC: 26 U/L (ref 12–45)
BILIRUB SERPL-MCNC: 0.5 MG/DL (ref 0.1–1.5)
BUN SERPL-MCNC: 14 MG/DL (ref 8–22)
CALCIUM ALBUM COR SERPL-MCNC: 9.1 MG/DL (ref 8.5–10.5)
CALCIUM SERPL-MCNC: 9.2 MG/DL (ref 8.5–10.5)
CHLORIDE SERPL-SCNC: 108 MMOL/L (ref 96–112)
CO2 SERPL-SCNC: 23 MMOL/L (ref 20–33)
CREAT SERPL-MCNC: 0.97 MG/DL (ref 0.5–1.4)
EST. AVERAGE GLUCOSE BLD GHB EST-MCNC: 126 MG/DL
FASTING STATUS PATIENT QL REPORTED: NORMAL
GFR SERPLBLD CREATININE-BSD FMLA CKD-EPI: 65 ML/MIN/1.73 M 2
GLOBULIN SER CALC-MCNC: 2.7 G/DL (ref 1.9–3.5)
GLUCOSE SERPL-MCNC: 113 MG/DL (ref 65–99)
HBA1C MFR BLD: 6 % (ref 4–5.6)
POTASSIUM SERPL-SCNC: 4.1 MMOL/L (ref 3.6–5.5)
PROT SERPL-MCNC: 6.8 G/DL (ref 6–8.2)
SODIUM SERPL-SCNC: 142 MMOL/L (ref 135–145)

## 2025-06-27 PROCEDURE — 36415 COLL VENOUS BLD VENIPUNCTURE: CPT

## 2025-06-27 PROCEDURE — 80053 COMPREHEN METABOLIC PANEL: CPT

## 2025-06-27 PROCEDURE — 83036 HEMOGLOBIN GLYCOSYLATED A1C: CPT

## 2025-06-30 ENCOUNTER — OFFICE VISIT (OUTPATIENT)
Dept: MEDICAL GROUP | Age: 65
End: 2025-06-30
Payer: COMMERCIAL

## 2025-06-30 VITALS
BODY MASS INDEX: 40.8 KG/M2 | TEMPERATURE: 97 F | WEIGHT: 239 LBS | HEIGHT: 64 IN | SYSTOLIC BLOOD PRESSURE: 120 MMHG | DIASTOLIC BLOOD PRESSURE: 74 MMHG | OXYGEN SATURATION: 98 % | HEART RATE: 74 BPM

## 2025-06-30 DIAGNOSIS — I25.42 SPONTANEOUS DISSECTION OF CORONARY ARTERY: ICD-10-CM

## 2025-06-30 DIAGNOSIS — I25.3 VENTRICULAR ANEURYSM AS COMPLICATION OF ACUTE MYOCARDIAL INFARCTION (HCC): ICD-10-CM

## 2025-06-30 DIAGNOSIS — Z00.00 WELL ADULT EXAM: Primary | ICD-10-CM

## 2025-06-30 DIAGNOSIS — I25.2 HISTORY OF MYOCARDIAL INFARCTION: ICD-10-CM

## 2025-06-30 DIAGNOSIS — R73.01 ELEVATED FASTING GLUCOSE: ICD-10-CM

## 2025-06-30 DIAGNOSIS — R23.2 HOT FLASHES: ICD-10-CM

## 2025-06-30 DIAGNOSIS — Z01.411 ABNORMAL PELVIC EXAM: ICD-10-CM

## 2025-06-30 DIAGNOSIS — F41.1 GAD (GENERALIZED ANXIETY DISORDER): ICD-10-CM

## 2025-06-30 DIAGNOSIS — R74.8 ELEVATED ALKALINE PHOSPHATASE LEVEL: ICD-10-CM

## 2025-06-30 DIAGNOSIS — E89.0 POSTSURGICAL HYPOTHYROIDISM: ICD-10-CM

## 2025-06-30 DIAGNOSIS — I23.8 VENTRICULAR ANEURYSM AS COMPLICATION OF ACUTE MYOCARDIAL INFARCTION (HCC): ICD-10-CM

## 2025-06-30 DIAGNOSIS — G56.01 RIGHT CARPAL TUNNEL SYNDROME: ICD-10-CM

## 2025-06-30 DIAGNOSIS — I10 BENIGN ESSENTIAL HTN: ICD-10-CM

## 2025-06-30 DIAGNOSIS — E78.5 HYPERLIPIDEMIA WITH TARGET LDL LESS THAN 70: ICD-10-CM

## 2025-06-30 DIAGNOSIS — E66.01 MORBID OBESITY WITH BMI OF 40.0-44.9, ADULT (HCC): ICD-10-CM

## 2025-06-30 PROBLEM — E66.9 OBESITY (BMI 30-39.9): Status: RESOLVED | Noted: 2024-03-21 | Resolved: 2025-06-30

## 2025-06-30 PROBLEM — E78.2 MIXED HYPERLIPIDEMIA: Status: RESOLVED | Noted: 2024-05-03 | Resolved: 2025-06-30

## 2025-06-30 RX ORDER — PROPRANOLOL HYDROCHLORIDE 10 MG/1
10 TABLET ORAL 3 TIMES DAILY PRN
Qty: 20 TABLET | Refills: 1 | Status: SHIPPED | OUTPATIENT
Start: 2025-06-30

## 2025-06-30 SDOH — HEALTH STABILITY: PHYSICAL HEALTH: ON AVERAGE, HOW MANY DAYS PER WEEK DO YOU ENGAGE IN MODERATE TO STRENUOUS EXERCISE (LIKE A BRISK WALK)?: 2 DAYS

## 2025-06-30 SDOH — ECONOMIC STABILITY: HOUSING INSECURITY
IN THE LAST 12 MONTHS, WAS THERE A TIME WHEN YOU DID NOT HAVE A STEADY PLACE TO SLEEP OR SLEPT IN A SHELTER (INCLUDING NOW)?: NO

## 2025-06-30 SDOH — ECONOMIC STABILITY: FOOD INSECURITY: WITHIN THE PAST 12 MONTHS, YOU WORRIED THAT YOUR FOOD WOULD RUN OUT BEFORE YOU GOT MONEY TO BUY MORE.: NEVER TRUE

## 2025-06-30 SDOH — ECONOMIC STABILITY: INCOME INSECURITY: HOW HARD IS IT FOR YOU TO PAY FOR THE VERY BASICS LIKE FOOD, HOUSING, MEDICAL CARE, AND HEATING?: SOMEWHAT HARD

## 2025-06-30 SDOH — HEALTH STABILITY: MENTAL HEALTH
STRESS IS WHEN SOMEONE FEELS TENSE, NERVOUS, ANXIOUS, OR CAN'T SLEEP AT NIGHT BECAUSE THEIR MIND IS TROUBLED. HOW STRESSED ARE YOU?: ONLY A LITTLE

## 2025-06-30 SDOH — ECONOMIC STABILITY: TRANSPORTATION INSECURITY
IN THE PAST 12 MONTHS, HAS THE LACK OF TRANSPORTATION KEPT YOU FROM MEDICAL APPOINTMENTS OR FROM GETTING MEDICATIONS?: NO

## 2025-06-30 SDOH — ECONOMIC STABILITY: FOOD INSECURITY: WITHIN THE PAST 12 MONTHS, THE FOOD YOU BOUGHT JUST DIDN'T LAST AND YOU DIDN'T HAVE MONEY TO GET MORE.: NEVER TRUE

## 2025-06-30 SDOH — HEALTH STABILITY: PHYSICAL HEALTH: ON AVERAGE, HOW MANY MINUTES DO YOU ENGAGE IN EXERCISE AT THIS LEVEL?: 30 MIN

## 2025-06-30 SDOH — ECONOMIC STABILITY: INCOME INSECURITY: IN THE LAST 12 MONTHS, WAS THERE A TIME WHEN YOU WERE NOT ABLE TO PAY THE MORTGAGE OR RENT ON TIME?: NO

## 2025-06-30 SDOH — ECONOMIC STABILITY: TRANSPORTATION INSECURITY
IN THE PAST 12 MONTHS, HAS LACK OF RELIABLE TRANSPORTATION KEPT YOU FROM MEDICAL APPOINTMENTS, MEETINGS, WORK OR FROM GETTING THINGS NEEDED FOR DAILY LIVING?: NO

## 2025-06-30 SDOH — ECONOMIC STABILITY: TRANSPORTATION INSECURITY
IN THE PAST 12 MONTHS, HAS LACK OF TRANSPORTATION KEPT YOU FROM MEETINGS, WORK, OR FROM GETTING THINGS NEEDED FOR DAILY LIVING?: NO

## 2025-06-30 ASSESSMENT — SOCIAL DETERMINANTS OF HEALTH (SDOH)
HOW OFTEN DO YOU ATTENT MEETINGS OF THE CLUB OR ORGANIZATION YOU BELONG TO?: NEVER
HOW OFTEN DO YOU ATTENT MEETINGS OF THE CLUB OR ORGANIZATION YOU BELONG TO?: NEVER
HOW HARD IS IT FOR YOU TO PAY FOR THE VERY BASICS LIKE FOOD, HOUSING, MEDICAL CARE, AND HEATING?: SOMEWHAT HARD
ARE YOU MARRIED, WIDOWED, DIVORCED, SEPARATED, NEVER MARRIED, OR LIVING WITH A PARTNER?: LIVING WITH PARTNER
IN THE PAST 12 MONTHS, HAS THE ELECTRIC, GAS, OIL, OR WATER COMPANY THREATENED TO SHUT OFF SERVICE IN YOUR HOME?: NO
HOW OFTEN DO YOU HAVE A DRINK CONTAINING ALCOHOL: 2-4 TIMES A MONTH
ARE YOU MARRIED, WIDOWED, DIVORCED, SEPARATED, NEVER MARRIED, OR LIVING WITH A PARTNER?: LIVING WITH PARTNER
HOW OFTEN DO YOU ATTEND CHURCH OR RELIGIOUS SERVICES?: MORE THAN 4 TIMES PER YEAR
DO YOU BELONG TO ANY CLUBS OR ORGANIZATIONS SUCH AS CHURCH GROUPS UNIONS, FRATERNAL OR ATHLETIC GROUPS, OR SCHOOL GROUPS?: NO
HOW OFTEN DO YOU GET TOGETHER WITH FRIENDS OR RELATIVES?: MORE THAN THREE TIMES A WEEK
HOW OFTEN DO YOU GET TOGETHER WITH FRIENDS OR RELATIVES?: MORE THAN THREE TIMES A WEEK
HOW MANY DRINKS CONTAINING ALCOHOL DO YOU HAVE ON A TYPICAL DAY WHEN YOU ARE DRINKING: 1 OR 2
DO YOU BELONG TO ANY CLUBS OR ORGANIZATIONS SUCH AS CHURCH GROUPS UNIONS, FRATERNAL OR ATHLETIC GROUPS, OR SCHOOL GROUPS?: NO
HOW OFTEN DO YOU ATTEND CHURCH OR RELIGIOUS SERVICES?: MORE THAN 4 TIMES PER YEAR
HOW OFTEN DO YOU HAVE SIX OR MORE DRINKS ON ONE OCCASION: NEVER
WITHIN THE PAST 12 MONTHS, YOU WORRIED THAT YOUR FOOD WOULD RUN OUT BEFORE YOU GOT THE MONEY TO BUY MORE: NEVER TRUE

## 2025-06-30 ASSESSMENT — LIFESTYLE VARIABLES
HOW OFTEN DO YOU HAVE A DRINK CONTAINING ALCOHOL: 2-4 TIMES A MONTH
HOW OFTEN DO YOU HAVE SIX OR MORE DRINKS ON ONE OCCASION: NEVER
AUDIT-C TOTAL SCORE: 2
SKIP TO QUESTIONS 9-10: 1
HOW MANY STANDARD DRINKS CONTAINING ALCOHOL DO YOU HAVE ON A TYPICAL DAY: 1 OR 2

## 2025-06-30 ASSESSMENT — FIBROSIS 4 INDEX: FIB4 SCORE: 0.77

## 2025-06-30 NOTE — PROGRESS NOTES
Subjective:     CC:   Chief Complaint   Patient presents with    Annual Exam       HPI:   Priscilla Sky is a 64 y.o. female who presents for annual exam    She has been going through a particularly stressful time, she is helping taking care of her dad who is ill who does not live here so she is driving back and forth also is taking care of her grandchildren, has a lot of stressors going on in her life rates her anxiety level 6/10 on a day-to-day basis, does have intermittent panic attacks, maybe once a month of that, does have leftover Klonopin which has provided some relief for her.  She be interested in increasing her Prozac    Patient has GYN provider: Yes  Last Pap Smear:  however gynecology saw some bleeding they wanted to do a endometrial biopsy, this was canceled last minute she has not been able to follow-up but plans to establish with a new gynecologist   H/O Abnormal Pap: No  Last Mammogram: 2025  Last DEXA: 10/2024  Last Colorectal Cancer Screenin-10 year recall   Last Tdap: 2024  Received HPV series: Aged out    Exercise: moderate regular exercise, aerobic < 3 days a week  Diet: Improving    No LMP recorded. Patient is postmenopausal.  Hot flashes are stable with Prozac  No significant bloating/fluid retention, pelvic pain, or dyspareunia. No abnormal vaginal discharge.  No breast tenderness, mass, nipple discharge, changes in size or contour, or abnormal cyclic discomfort.     Latest Reference Range & Units 25 08:02   Sodium 135 - 145 mmol/L 142   Potassium 3.6 - 5.5 mmol/L 4.1   Chloride 96 - 112 mmol/L 108   Co2 20 - 33 mmol/L 23   Anion Gap 7.0 - 16.0  11.0   Glucose 65 - 99 mg/dL 113 (H)   Bun 8 - 22 mg/dL 14   Creatinine 0.50 - 1.40 mg/dL 0.97   GFR (CKD-EPI) >60 mL/min/1.73 m 2 65   Calcium 8.5 - 10.5 mg/dL 9.2   Correct Calcium 8.5 - 10.5 mg/dL 9.1   AST(SGOT) 12 - 45 U/L 26   ALT(SGPT) 2 - 50 U/L 27   Alkaline Phosphatase 30 - 99 U/L 137 (H)   Total Bilirubin 0.1 - 1.5  mg/dL 0.5   Albumin 3.2 - 4.9 g/dL 4.1   Total Protein 6.0 - 8.2 g/dL 6.8   Globulin 1.9 - 3.5 g/dL 2.7   A-G Ratio g/dL 1.5   Glycohemoglobin 4.0 - 5.6 % 6.0 (H)   Estim. Avg Glu mg/dL 126   Fasting Status  Non-Fasting   (H): Data is abnormally high    OB History   No obstetric history on file.      She  reports being sexually active and has had partner(s) who are male. She reports using the following methods of birth control/protection: Female Sterilization and Post-Menopausal.    She  has a past medical history of Allergy, Anesthesia (03/07/2024), Anginal syndrome (HCC), Arrhythmia, Arthritis, Benign paroxysmal positional vertigo of right ear (07/31/2019), Breath shortness, Complication of anesthesia, Diabetes (HCC), Fatty liver disease, nonalcoholic, GERD (gastroesophageal reflux disease), Hard to intubate (08/26/2024), Heart disease, History of myocardial infarction, Hyperlipidemia, Hypertension, Myocardial infarct ([facility]), Psychiatric problem, Sleep apnea (08/26/2024), Spontaneous dissection of coronary artery, and Thyroid disease.    She has no past medical history of Pain.  She  has a past surgical history that includes thyroidectomy total; cholecystectomy; pr lap,appendectomy (N/A, 03/07/2024); carpal tunnel release (Right); orif, wrist (01/2024); tubal coagulation laparoscopic bilateral; and appendectomy.    Family History   Problem Relation Age of Onset    Alzheimer's Disease Mother     Cancer Mother         Breast Cancer-double mastectomy    Heart Disease Father 70        CAD    Lupus Maternal Grandmother     Heart Disease Maternal Grandmother     Lung Cancer Maternal Grandfather     No Known Problems Daughter     No Known Problems Daughter     No Known Problems Son      Social History[1]    Patient Active Problem List    Diagnosis Date Noted    Right carpal tunnel syndrome 05/14/2025    Hyperlipidemia with target LDL less than 70 11/08/2024    Ventricular aneurysm as complication of acute myocardial  "infarction (HCC) 05/03/2024    Seasonal allergies 06/16/2023    Spontaneous dissection of coronary artery 08/25/2022    Osteoarthritis of both hands 05/17/2022    Fatty liver disease, nonalcoholic 10/20/2020    Chronic rhinosinusitis 08/27/2020    LIDYA (obstructive sleep apnea) 09/25/2019    Elevated alkaline phosphatase level 06/18/2019    Elevated fasting glucose 06/18/2019    Benign essential HTN 05/15/2019    Vitamin D deficiency 05/15/2019    Postsurgical hypothyroidism 05/15/2019    GERD (gastroesophageal reflux disease) 05/15/2019    History of myocardial infarction: On 5 occasions and with a segmental wall motion abnormality but possibly secondary to vasospasm 05/15/2019    Psoriasis 05/15/2019    Hot flashes 05/15/2019     Current Medications[2]  Allergies[3]    Review of Systems   Constitutional: Negative for fever, chills and malaise/fatigue.   HENT: Negative for congestion.    Eyes: Negative for pain.   Respiratory: Negative for cough and shortness of breath.    Cardiovascular: Negative for chest pain and leg swelling.   Gastrointestinal: Negative for nausea, vomiting, abdominal pain and diarrhea.   Genitourinary: Negative for dysuria and hematuria.   Skin: Negative for rash.   Neurological: Negative for dizziness, focal weakness and headaches.   Endo/Heme/Allergies: Does not bruise/bleed easily.   Psychiatric/Behavioral: Negative for depression.  The patient is not nervous/anxious.      Objective:   /74 (BP Location: Right arm, Patient Position: Sitting, BP Cuff Size: Large adult long)   Pulse 74   Temp 36.1 °C (97 °F) (Temporal)   Ht 1.626 m (5' 4\")   Wt 108 kg (239 lb)   SpO2 98%   BMI 41.02 kg/m²     Wt Readings from Last 4 Encounters:   06/30/25 108 kg (239 lb)   04/15/25 105 kg (232 lb)   04/07/25 103 kg (227 lb)   03/03/25 103 kg (227 lb)         Physical Exam:  Constitutional: Well-developed and well-nourished. Not diaphoretic. No distress.   Skin: Skin is warm and dry. No rash " noted.  Head: Atraumatic without lesions.  Eyes: Conjunctivae and extraocular motions are normal. Pupils are equal, round, and reactive to light. No scleral icterus.   Ears:  External ears unremarkable. Tympanic membranes clear and intact.  Nose: Nares patent.   Mouth/Throat: Tongue normal. Oropharynx is clear and moist. Posterior pharynx without erythema or exudates.  Neck: Supple, trachea midline. Normal range of motion. No thyromegaly present. No lymphadenopathy--cervical or supraclavicular.  Cardiovascular: Regular rate and rhythm, S1 and S2 without murmur, rubs, or gallops.    Respiratory: Effort normal. Clear to auscultation throughout. No adventitious sounds.   Abdomen: Soft, non tender, and without distention. Active bowel sounds in all four quadrants. No rebound, guarding, masses or HSM.  Extremities: No cyanosis, clubbing, erythema, nor edema. Distal pulses intact and symmetric.   Musculoskeletal: All major joints AROM full in all directions without pain.  Neurological: Alert and oriented x 3. Grossly non-focal. Strength and sensation grossly intact. DTRs 2+/3 and symmetric.   Psychiatric:  Behavior, mood, and affect are appropriate.    Assessment and Plan:     1. Well adult exam  Continue with regular physical activity as tolerated.  Reviewed diet control.    2. 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)  Stable.  Currently followed by cardiology.  Blood pressures well-controlled on carvedilol on aspirin    3. ELLIE (generalized anxiety disorder)  Worsening-uncontrolled.  In discussion she would be interested in increasing the Prozac to 20 mg.  New prescription sent to the pharmacy.  Also given propranolol to use on an as-needed basis for abortive therapy.  I am aware that she is on the carvedilol however this will be just abortively if she is using it more frequently would consider increasing carvedilol  - FLUoxetine (PROZAC) 20 MG Cap; Take 1 Capsule by mouth every day.   Dispense: 90 Capsule; Refill: 2  - propranolol (INDERAL) 10 MG Tab; Take 1 Tablet by mouth 3 times a day as needed (anxiety).  Dispense: 20 Tablet; Refill: 1    4. Hot flashes  Stable.  Increasing Prozac due to worsening anxiety  - FLUoxetine (PROZAC) 20 MG Cap; Take 1 Capsule by mouth every day.  Dispense: 90 Capsule; Refill: 2    5. Right carpal tunnel syndrome  Currently followed by orthopedics, scheduled for carpal tunnel release in 2 weeks    6. Postsurgical hypothyroidism  Stable.  Continue 200 mcg of levothyroxine    7. Hyperlipidemia with target LDL less than 70  Controlled.  Continue 80 mg of Lipitor    8. Benign essential HTN  Stable.  Continue 6.25 mg of carvedilol twice daily    9. Elevated alkaline phosphatase level  Went through extensive workup through endocrinology.  Nothing identified as source.  Continue to monitor    10. Elevated fasting glucose  Improving.  Continue with lifestyle modifications.  Repeat labs again in 6 months    11. History of myocardial infarction: On 5 occasions and with a segmental wall motion abnormality but possibly secondary to vasospasm  Currently followed by cardiology.  On carvedilol and aspirin and statin    12.  Morbid obesity with BMI of 40.0-44.9, adult ([facility])   Has restarted lifestyle modifications.    13. Abnormal pelvic exam  In discussion she did have an abnormal pelvic exam, gynecology did want to do an endometrial biopsy however the surgery was canceled few days prior, she has not followed up since.  Will request records.  She does plan to establish with a new gynecologist      Health maintenance:     Labs per orders  Immunizations per orders  Patient counseled about skin care, diet, supplements, and exercise.  Discussed  breast self exam, mammography screening, menopause, diet and exercise, colorectal cancer screening     Follow-up: Return in about 6 weeks (around 8/11/2025) for Medication Check.         [1]   Social History  Tobacco Use    Smoking status:  Never    Smokeless tobacco: Never   Vaping Use    Vaping status: Never Used   Substance Use Topics    Alcohol use: Yes     Alcohol/week: 0.6 oz     Types: 1 Standard drinks or equivalent per week     Comment: 1-2 drinks per month    Drug use: No   [2]   Current Outpatient Medications   Medication Sig Dispense Refill    FLUoxetine (PROZAC) 20 MG Cap Take 1 Capsule by mouth every day. 90 Capsule 2    propranolol (INDERAL) 10 MG Tab Take 1 Tablet by mouth 3 times a day as needed (anxiety). 20 Tablet 1    omeprazole (PRILOSEC) 40 MG delayed-release capsule Take 1 Capsule by mouth every day. 10 Capsule 0    atorvastatin (LIPITOR) 80 MG tablet Take 1 Tablet by mouth every day. 10 Tablet 0    levothyroxine (SYNTHROID) 200 MCG Tab Take 1 Tablet by mouth every morning on an empty stomach. 10 Tablet 0    carvedilol (COREG) 6.25 MG Tab Take 1 Tablet by mouth 2 times a day with meals. 20 Tablet 0    meloxicam (MOBIC) 7.5 MG Tab Take 1 Tablet by mouth every day. 30 Tablet 0    VTAMA 1 % Cream Apply  topically every day.      Multiple Vitamins-Minerals (HAIR SKIN & NAILS) Tab Take 1 Tablet by mouth every day.         aspirin (ASA) 81 MG Chew Tab chewable tablet Chew 81 mg every day.          No current facility-administered medications for this visit.   [3]   Allergies  Allergen Reactions    Inapsine [Droperidol] Anaphylaxis    Apple Swelling     Throat swelling    Apricot Flavor Swelling     Apricots and Peaches  Throat swelling.

## 2025-07-01 ENCOUNTER — APPOINTMENT (OUTPATIENT)
Dept: ADMISSIONS | Facility: MEDICAL CENTER | Age: 65
End: 2025-07-01
Attending: ORTHOPAEDIC SURGERY
Payer: COMMERCIAL

## 2025-07-01 RX ORDER — CARVEDILOL 6.25 MG/1
6.25 TABLET ORAL 2 TIMES DAILY WITH MEALS
Qty: 180 TABLET | Refills: 3 | Status: SHIPPED | OUTPATIENT
Start: 2025-07-01

## 2025-07-02 ENCOUNTER — PRE-ADMISSION TESTING (OUTPATIENT)
Dept: ADMISSIONS | Facility: MEDICAL CENTER | Age: 65
End: 2025-07-02
Attending: ORTHOPAEDIC SURGERY
Payer: COMMERCIAL

## 2025-07-02 VITALS — HEIGHT: 64 IN | BODY MASS INDEX: 41.02 KG/M2

## 2025-07-02 DIAGNOSIS — Z01.812 PRE-OPERATIVE LABORATORY EXAMINATION: Primary | ICD-10-CM

## 2025-07-02 NOTE — PREADMIT AVS NOTE
Current Medications   Medication Instructions    carvedilol (COREG) 6.25 MG Tab Continue taking as prescribed.    FLUoxetine (PROZAC) 20 MG Cap Continue taking as prescribed.    propranolol (INDERAL) 10 MG Tab As needed medication, may take if needed, including morning of procedure     omeprazole (PRILOSEC) 40 MG delayed-release capsule Continue taking medication as prescribed, including morning of procedure     atorvastatin (LIPITOR) 80 MG tablet Continue taking as prescribed.    levothyroxine (SYNTHROID) 200 MCG Tab Continue taking medication as prescribed, including morning of procedure     meloxicam (MOBIC) 7.5 MG Tab Stop 5 days before surgery    VTAMA 1 % Cream Follow instructions from surgeon or specialist.    Multiple Vitamins-Minerals (HAIR SKIN & NAILS) Tab Stop 7 days before surgery    aspirin (ASA) 81 MG Chew Tab chewable tablet Follow instructions from surgeon or specialist.

## 2025-07-03 ENCOUNTER — HOSPITAL ENCOUNTER (EMERGENCY)
Facility: MEDICAL CENTER | Age: 65
End: 2025-07-04
Attending: EMERGENCY MEDICINE
Payer: COMMERCIAL

## 2025-07-03 ENCOUNTER — APPOINTMENT (OUTPATIENT)
Dept: RADIOLOGY | Facility: MEDICAL CENTER | Age: 65
End: 2025-07-03
Attending: EMERGENCY MEDICINE
Payer: COMMERCIAL

## 2025-07-03 DIAGNOSIS — M62.838 MUSCLE SPASM: Primary | ICD-10-CM

## 2025-07-03 LAB
ALBUMIN SERPL BCP-MCNC: 4.3 G/DL (ref 3.2–4.9)
ALBUMIN/GLOB SERPL: 1.4 G/DL
ALP SERPL-CCNC: 153 U/L (ref 30–99)
ALT SERPL-CCNC: 35 U/L (ref 2–50)
ANION GAP SERPL CALC-SCNC: 14 MMOL/L (ref 7–16)
AST SERPL-CCNC: 31 U/L (ref 12–45)
BASOPHILS # BLD AUTO: 0.7 % (ref 0–1.8)
BASOPHILS # BLD: 0.09 K/UL (ref 0–0.12)
BILIRUB SERPL-MCNC: 0.5 MG/DL (ref 0.1–1.5)
BUN SERPL-MCNC: 17 MG/DL (ref 8–22)
CALCIUM ALBUM COR SERPL-MCNC: 9.1 MG/DL (ref 8.5–10.5)
CALCIUM SERPL-MCNC: 9.3 MG/DL (ref 8.5–10.5)
CHLORIDE SERPL-SCNC: 103 MMOL/L (ref 96–112)
CO2 SERPL-SCNC: 21 MMOL/L (ref 20–33)
CREAT SERPL-MCNC: 0.93 MG/DL (ref 0.5–1.4)
EKG IMPRESSION: NORMAL
EOSINOPHIL # BLD AUTO: 0.61 K/UL (ref 0–0.51)
EOSINOPHIL NFR BLD: 4.5 % (ref 0–6.9)
ERYTHROCYTE [DISTWIDTH] IN BLOOD BY AUTOMATED COUNT: 40.7 FL (ref 35.9–50)
GFR SERPLBLD CREATININE-BSD FMLA CKD-EPI: 69 ML/MIN/1.73 M 2
GLOBULIN SER CALC-MCNC: 3.1 G/DL (ref 1.9–3.5)
GLUCOSE SERPL-MCNC: 91 MG/DL (ref 65–99)
HCT VFR BLD AUTO: 42.8 % (ref 37–47)
HGB BLD-MCNC: 14.6 G/DL (ref 12–16)
IMM GRANULOCYTES # BLD AUTO: 0.04 K/UL (ref 0–0.11)
IMM GRANULOCYTES NFR BLD AUTO: 0.3 % (ref 0–0.9)
LYMPHOCYTES # BLD AUTO: 3.45 K/UL (ref 1–4.8)
LYMPHOCYTES NFR BLD: 25.3 % (ref 22–41)
MCH RBC QN AUTO: 31.3 PG (ref 27–33)
MCHC RBC AUTO-ENTMCNC: 34.1 G/DL (ref 32.2–35.5)
MCV RBC AUTO: 91.6 FL (ref 81.4–97.8)
MONOCYTES # BLD AUTO: 1.04 K/UL (ref 0–0.85)
MONOCYTES NFR BLD AUTO: 7.6 % (ref 0–13.4)
NEUTROPHILS # BLD AUTO: 8.43 K/UL (ref 1.82–7.42)
NEUTROPHILS NFR BLD: 61.6 % (ref 44–72)
NRBC # BLD AUTO: 0 K/UL
NRBC BLD-RTO: 0 /100 WBC (ref 0–0.2)
PLATELET # BLD AUTO: 390 K/UL (ref 164–446)
PMV BLD AUTO: 9.5 FL (ref 9–12.9)
POTASSIUM SERPL-SCNC: 4.1 MMOL/L (ref 3.6–5.5)
PROT SERPL-MCNC: 7.4 G/DL (ref 6–8.2)
RBC # BLD AUTO: 4.67 M/UL (ref 4.2–5.4)
SODIUM SERPL-SCNC: 138 MMOL/L (ref 135–145)
WBC # BLD AUTO: 13.7 K/UL (ref 4.8–10.8)

## 2025-07-03 PROCEDURE — 96374 THER/PROPH/DIAG INJ IV PUSH: CPT

## 2025-07-03 PROCEDURE — 700111 HCHG RX REV CODE 636 W/ 250 OVERRIDE (IP): Performed by: EMERGENCY MEDICINE

## 2025-07-03 PROCEDURE — 700105 HCHG RX REV CODE 258: Performed by: EMERGENCY MEDICINE

## 2025-07-03 PROCEDURE — 80053 COMPREHEN METABOLIC PANEL: CPT

## 2025-07-03 PROCEDURE — 93005 ELECTROCARDIOGRAM TRACING: CPT | Mod: TC

## 2025-07-03 PROCEDURE — 93005 ELECTROCARDIOGRAM TRACING: CPT | Mod: TC | Performed by: EMERGENCY MEDICINE

## 2025-07-03 PROCEDURE — 94760 N-INVAS EAR/PLS OXIMETRY 1: CPT

## 2025-07-03 PROCEDURE — 36415 COLL VENOUS BLD VENIPUNCTURE: CPT

## 2025-07-03 PROCEDURE — 74018 RADEX ABDOMEN 1 VIEW: CPT

## 2025-07-03 PROCEDURE — 99284 EMERGENCY DEPT VISIT MOD MDM: CPT

## 2025-07-03 PROCEDURE — 85025 COMPLETE CBC W/AUTO DIFF WBC: CPT

## 2025-07-03 RX ORDER — METHOCARBAMOL 750 MG/1
750 TABLET, FILM COATED ORAL 4 TIMES DAILY
Qty: 40 TABLET | Refills: 0 | Status: SHIPPED | OUTPATIENT
Start: 2025-07-03

## 2025-07-03 RX ORDER — METHOCARBAMOL 100 MG/ML
1000 INJECTION, SOLUTION INTRAMUSCULAR; INTRAVENOUS ONCE
Status: COMPLETED | OUTPATIENT
Start: 2025-07-03 | End: 2025-07-03

## 2025-07-03 RX ORDER — SODIUM CHLORIDE 9 MG/ML
INJECTION, SOLUTION INTRAVENOUS CONTINUOUS
Status: DISCONTINUED | OUTPATIENT
Start: 2025-07-03 | End: 2025-07-04 | Stop reason: HOSPADM

## 2025-07-03 RX ADMIN — SODIUM CHLORIDE: 9 INJECTION, SOLUTION INTRAVENOUS at 23:19

## 2025-07-03 RX ADMIN — METHOCARBAMOL 1000 MG: 100 INJECTION, SOLUTION INTRAMUSCULAR; INTRAVENOUS at 23:19

## 2025-07-03 ASSESSMENT — FIBROSIS 4 INDEX: FIB4 SCORE: 0.77

## 2025-07-04 VITALS
HEART RATE: 77 BPM | WEIGHT: 233.47 LBS | SYSTOLIC BLOOD PRESSURE: 143 MMHG | DIASTOLIC BLOOD PRESSURE: 65 MMHG | BODY MASS INDEX: 39.86 KG/M2 | HEIGHT: 64 IN | OXYGEN SATURATION: 91 % | RESPIRATION RATE: 17 BRPM | TEMPERATURE: 97.8 F

## 2025-07-04 NOTE — PROGRESS NOTES
Discharge paperwork given to pt. Pt understands follow up and discharge instructions, prescriptions sent to pharmacy of choice, ambulated to exit without assist

## 2025-07-04 NOTE — DISCHARGE INSTRUCTIONS
Take the muscle relaxers as needed  Try doing hot tub soaks in Epsom salts  Return if any problems or worsening

## 2025-07-04 NOTE — ED PROVIDER NOTES
ED Provider Note    CHIEF COMPLAINT  Chief Complaint   Patient presents with    Neck Pain     Around 3pm pt started to have muscle spasm in her neck. Pt did take cyclobenzaprine 10mg around 4pm, this helped her neck. Pt is know have muscle spasm in abdomin, legs and back. Pt was told if the spasm get bad to come to the hospital by her PCP.     Dizziness     Started around 7pm with the feeling of dizziness and sternal chest pain, pt states she was dripping sweat earlier.     Chest Pain       EXTERNAL RECORDS REVIEWED  Care everywhere no pertinent medical records    HPI/ROS  LIMITATION TO HISTORY   Select: : None  OUTSIDE HISTORIAN(S):  none    Priscilla Childress is a 64 y.o. female who presents tonight with a chief complaint of severe muscle cramps that radiate from her back around her entire abdomen and now up into her neck.  Patient states that she has had a lot of diarrhea lately nausea, vomiting.  She denies any dysuria, hematuria.  She denies blood in her stool.  She states she saw her doctor in the office who placed her on Flexeril and she took 1 around 4:00 this afternoon which did seem to help with the muscle spasms.  She was told by her doctor to come to the ER if she had worsening spasms.    PAST MEDICAL HISTORY   has a past medical history of Allergy, Anesthesia (03/07/2024), Anginal syndrome ([facility]), Anxiety, Anxiety, Arrhythmia, Arthritis, Benign paroxysmal positional vertigo of right ear (07/31/2019), Breath shortness, Complication of anesthesia, Diabetes (HCC), Fatty liver disease, nonalcoholic, GERD (gastroesophageal reflux disease), Hard to intubate (08/26/2024), Heart disease, History of myocardial infarction, Hyperlipidemia, Hypertension, Myocardial infarct ([facility]), Psychiatric problem, Sleep apnea (08/26/2024), Spontaneous dissection of coronary artery, and Thyroid disease.    SURGICAL HISTORY   has a past surgical history that includes thyroidectomy total; cholecystectomy; lap,appendectomy (N/A,  "03/07/2024); carpal tunnel release (Right); orif, wrist (01/2024); tubal coagulation laparoscopic bilateral; and appendectomy.    FAMILY HISTORY  Family History   Problem Relation Age of Onset    Alzheimer's Disease Mother     Cancer Mother         Breast Cancer-double mastectomy    Heart Disease Father 70        CAD    Lupus Maternal Grandmother     Heart Disease Maternal Grandmother     Lung Cancer Maternal Grandfather     No Known Problems Daughter     No Known Problems Daughter     No Known Problems Son        SOCIAL HISTORY  Social History     Tobacco Use    Smoking status: Never    Smokeless tobacco: Never   Vaping Use    Vaping status: Never Used   Substance and Sexual Activity    Alcohol use: Yes     Alcohol/week: 0.6 oz     Types: 1 Standard drinks or equivalent per week     Comment: 1-2 drinks per month    Drug use: No    Sexual activity: Yes     Partners: Male     Birth control/protection: Female Sterilization, Post-Menopausal       CURRENT MEDICATIONS  Home Medications       Reviewed by Eren Walter R.N. (Registered Nurse) on 07/03/25 at 2117  Med List Status: Partial     Medication Last Dose Status   aspirin (ASA) 81 MG Chew Tab chewable tablet  Active   atorvastatin (LIPITOR) 80 MG tablet  Active   carvedilol (COREG) 6.25 MG Tab  Active   FLUoxetine (PROZAC) 20 MG Cap  Active   levothyroxine (SYNTHROID) 200 MCG Tab  Active   meloxicam (MOBIC) 7.5 MG Tab  Active   Multiple Vitamins-Minerals (HAIR SKIN & NAILS) Tab  Active   omeprazole (PRILOSEC) 40 MG delayed-release capsule  Active   propranolol (INDERAL) 10 MG Tab  Active   VTAMA 1 % Cream  Active                  Audit from Redirected Encounters    **Home medications have not yet been reviewed for this encounter**         ALLERGIES  Allergies[1]    PHYSICAL EXAM  VITAL SIGNS: BP (!) 134/95   Pulse 80   Temp 36.6 °C (97.8 °F) (Temporal)   Resp 16   Ht 1.626 m (5' 4\")   Wt 106 kg (233 lb 7.5 oz)   SpO2 98%   BMI 40.07 kg/m²  "   Constitutional: Patient is a morbidly obese female in mild distress with intermittent muscle spasms.  HENT: Normocephalic,  moist oral mucosa  Eyes: PERRL, EOMI  Neck: Supple, no tenderness midline, normal range of motion.  Cardiovascular: Normal heart rate and Regular rhythm. No murmur  Thorax & Lungs: Clear and equal breath sounds with good excursion. No respiratory distress, no rhonchi, wheezing   Abdomen: Bowel sounds normal in all four quadrants. Soft, obese, nontender.  Skin: Warm, Dry   Back: No cervical, thoracic, or lumbosacral tenderness.   Extremities: Peripheral pulses 4/4   Musculoskeletal: Normal range of motion in all major joints. No tenderness to palpation or major deformities noted.   Neurologic: Alert & oriented x 3, Normal motor function, Normal sensory function  Psychiatric: Affect normal, Judgment normal, Mood normal.      EKG/LABS  Results for orders placed or performed during the hospital encounter of 25   EKG    Collection Time: 25  9:19 PM   Result Value Ref Range    Report       [facility] Emergency Dept.    Test Date:  2025  Pt Name:    EDYTA BOWEN               Department: [facility]  MRN:        [id number]                      Room:  Gender:     Female                       Technician: [id number]  :        1960                   Requested By:ER TRIAGE PROTOCOL  Order #:    [id number]                    Reading MD:    Measurements  Intervals                                Axis  Rate:       72                           P:          52  NJ:         159                          QRS:        -5  QRSD:       100                          T:          61  QT:         409  QTc:        448    Interpretive Statements  Sinus rhythm  Inferior infarct, old  Probable anterolateral infarct, old  No previous ECG available for comparison     CBC WITH DIFFERENTIAL    Collection Time: 25 11:11 PM   Result Value Ref Range    WBC 13.7 (H) 4.8 - 10.8 K/uL    RBC  4.67 4.20 - 5.40 M/uL    Hemoglobin 14.6 12.0 - 16.0 g/dL    Hematocrit 42.8 37.0 - 47.0 %    MCV 91.6 81.4 - 97.8 fL    MCH 31.3 27.0 - 33.0 pg    MCHC 34.1 32.2 - 35.5 g/dL    RDW 40.7 35.9 - 50.0 fL    Platelet Count 390 164 - 446 K/uL    MPV 9.5 9.0 - 12.9 fL    Neutrophils-Polys 61.60 44.00 - 72.00 %    Lymphocytes 25.30 22.00 - 41.00 %    Monocytes 7.60 0.00 - 13.40 %    Eosinophils 4.50 0.00 - 6.90 %    Basophils 0.70 0.00 - 1.80 %    Immature Granulocytes 0.30 0.00 - 0.90 %    Nucleated RBC 0.00 0.00 - 0.20 /100 WBC    Neutrophils (Absolute) 8.43 (H) 1.82 - 7.42 K/uL    Lymphs (Absolute) 3.45 1.00 - 4.80 K/uL    Monos (Absolute) 1.04 (H) 0.00 - 0.85 K/uL    Eos (Absolute) 0.61 (H) 0.00 - 0.51 K/uL    Baso (Absolute) 0.09 0.00 - 0.12 K/uL    Immature Granulocytes (abs) 0.04 0.00 - 0.11 K/uL    NRBC (Absolute) 0.00 K/uL   COMP METABOLIC PANEL    Collection Time: 07/03/25 11:11 PM   Result Value Ref Range    Sodium 138 135 - 145 mmol/L    Potassium 4.1 3.6 - 5.5 mmol/L    Chloride 103 96 - 112 mmol/L    Co2 21 20 - 33 mmol/L    Anion Gap 14.0 7.0 - 16.0    Glucose 91 65 - 99 mg/dL    Bun 17 8 - 22 mg/dL    Creatinine 0.93 0.50 - 1.40 mg/dL    Calcium 9.3 8.5 - 10.5 mg/dL    Correct Calcium 9.1 8.5 - 10.5 mg/dL    AST(SGOT) 31 12 - 45 U/L    ALT(SGPT) 35 2 - 50 U/L    Alkaline Phosphatase 153 (H) 30 - 99 U/L    Total Bilirubin 0.5 0.1 - 1.5 mg/dL    Albumin 4.3 3.2 - 4.9 g/dL    Total Protein 7.4 6.0 - 8.2 g/dL    Globulin 3.1 1.9 - 3.5 g/dL    A-G Ratio 1.4 g/dL   ESTIMATED GFR    Collection Time: 07/03/25 11:11 PM   Result Value Ref Range    GFR (CKD-EPI) 69 >60 mL/min/1.73 m 2      I have independently interpreted this EKG    RADIOLOGY/PROCEDURES   I have independently interpreted the diagnostic imaging associated with this visit and am waiting the final reading from the radiologist.   My preliminary interpretation is as follows: No acute abnormalities    Radiologist interpretation:  [id number] VIEW   Final  Result         1.  Nonspecific bowel gas pattern           COURSE & MEDICAL DECISION MAKING    ASSESSMENT, COURSE AND PLAN  Care Narrative: He was given an IV of normal saline, IV Robaxin 1 g IV piggyback and labs were drawn to rule out any electrolyte abnormalities.  Patient's labs were all completely unremarkable other than a slightly elevated white blood cell count with no specific source.  Her abdominal x-ray shows no increased stool or gas and after the IV fluids and Robaxin she states she feels much better.  I am not sure why she is having these muscle cramps but I will prescribe Robaxin for her to take 4 times a day as needed, she is to increase fluids, rest, follow-up with her doctor this next week for recheck and return if problems.  She stable upon discharge              DISPOSITION AND DISCUSSIONS  I have discussed management of the patient with the following physicians and ERNESTO's: None    Discussion of management with other QHP or appropriate source(s): None     Barriers to care at this time, including but not limited to: None.     Decision tools and prescription drugs considered including, but not limited to: Robaxin 750 mg UAD.    FINAL DIAGNOSIS  1. Muscle spasm         Electronically signed by: Rabia Black D.O., 7/3/2025 11:12 PM           [1]   Allergies  Allergen Reactions    Apple Swelling     Throat swelling    Apricot Flavor Swelling     Apricots and Peaches  Throat swelling.    Inapsine [Droperidol] Anaphylaxis

## 2025-07-04 NOTE — ED TRIAGE NOTES
"Chief Complaint   Patient presents with    Neck Pain     Around 3pm pt started to have muscle spasm in her neck. Pt did take cyclobenzaprine 10mg around 4pm, this helped her neck. Pt is know have muscle spasm in abdomin, legs and back. Pt was told if the spasm get bad to come to the hospital by her PCP.     Dizziness     Started around 7pm with the feeling of dizziness and sternal chest pain, pt states she was dripping sweat earlier.     Chest Pain     BP (!) 134/95   Pulse 80   Temp 36.6 °C (97.8 °F) (Temporal)   Resp 16   Ht 1.626 m (5' 4\")   Wt 106 kg (233 lb 7.5 oz)   SpO2 98%   BMI 40.07 kg/m²       "

## 2025-07-07 ENCOUNTER — APPOINTMENT (OUTPATIENT)
Dept: ADMISSIONS | Facility: MEDICAL CENTER | Age: 65
End: 2025-07-07
Attending: ORTHOPAEDIC SURGERY
Payer: COMMERCIAL

## 2025-07-07 ENCOUNTER — PATIENT MESSAGE (OUTPATIENT)
Dept: CARDIOLOGY | Facility: MEDICAL CENTER | Age: 65
End: 2025-07-07
Payer: COMMERCIAL

## 2025-07-07 DIAGNOSIS — Z01.812 PRE-OPERATIVE LABORATORY EXAMINATION: ICD-10-CM

## 2025-07-07 LAB
ERYTHROCYTE [DISTWIDTH] IN BLOOD BY AUTOMATED COUNT: 42 FL (ref 35.9–50)
HCT VFR BLD AUTO: 45.1 % (ref 37–47)
HGB BLD-MCNC: 15 G/DL (ref 12–16)
MCH RBC QN AUTO: 31.1 PG (ref 27–33)
MCHC RBC AUTO-ENTMCNC: 33.3 G/DL (ref 32.2–35.5)
MCV RBC AUTO: 93.4 FL (ref 81.4–97.8)
PLATELET # BLD AUTO: 446 K/UL (ref 164–446)
PMV BLD AUTO: 9.9 FL (ref 9–12.9)
RBC # BLD AUTO: 4.83 M/UL (ref 4.2–5.4)
WBC # BLD AUTO: 9.7 K/UL (ref 4.8–10.8)

## 2025-07-07 PROCEDURE — 36415 COLL VENOUS BLD VENIPUNCTURE: CPT

## 2025-07-07 PROCEDURE — 85027 COMPLETE CBC AUTOMATED: CPT

## 2025-07-11 ENCOUNTER — ANESTHESIA (OUTPATIENT)
Facility: MEDICAL CENTER | Age: 65
End: 2025-07-11
Payer: COMMERCIAL

## 2025-07-11 ENCOUNTER — ANESTHESIA EVENT (OUTPATIENT)
Facility: MEDICAL CENTER | Age: 65
End: 2025-07-11
Payer: COMMERCIAL

## 2025-07-11 VITALS
HEIGHT: 64 IN | OXYGEN SATURATION: 93 % | WEIGHT: 226.19 LBS | SYSTOLIC BLOOD PRESSURE: 130 MMHG | RESPIRATION RATE: 16 BRPM | BODY MASS INDEX: 38.62 KG/M2 | TEMPERATURE: 97.2 F | HEART RATE: 60 BPM | DIASTOLIC BLOOD PRESSURE: 67 MMHG

## 2025-07-11 LAB — GLUCOSE BLD STRIP.AUTO-MCNC: 87 MG/DL (ref 65–99)

## 2025-07-11 PROCEDURE — 160028 HCHG SURGERY MINUTES - 1ST 30 MINS LEVEL 3: Performed by: ORTHOPAEDIC SURGERY

## 2025-07-11 PROCEDURE — 700105 HCHG RX REV CODE 258: Performed by: ORTHOPAEDIC SURGERY

## 2025-07-11 PROCEDURE — 160015 HCHG STAT PREOP MINUTES: Performed by: ORTHOPAEDIC SURGERY

## 2025-07-11 PROCEDURE — 64910 NERVE REPAIR W/ALLOGRAFT: CPT | Performed by: ORTHOPAEDIC SURGERY

## 2025-07-11 PROCEDURE — 700101 HCHG RX REV CODE 250: Performed by: ANESTHESIOLOGY

## 2025-07-11 PROCEDURE — 700111 HCHG RX REV CODE 636 W/ 250 OVERRIDE (IP)

## 2025-07-11 PROCEDURE — 160025 RECOVERY II MINUTES (STATS): Performed by: ORTHOPAEDIC SURGERY

## 2025-07-11 PROCEDURE — 82962 GLUCOSE BLOOD TEST: CPT | Performed by: ORTHOPAEDIC SURGERY

## 2025-07-11 PROCEDURE — 160193 HCHG PACU STANDARD - 1ST 60 MINS: Performed by: ORTHOPAEDIC SURGERY

## 2025-07-11 PROCEDURE — 160046 HCHG PACU - 1ST 60 MINS PHASE II: Performed by: ORTHOPAEDIC SURGERY

## 2025-07-11 PROCEDURE — 25115 REMOVE WRIST/FOREARM LESION: CPT | Mod: RT | Performed by: ORTHOPAEDIC SURGERY

## 2025-07-11 PROCEDURE — 700111 HCHG RX REV CODE 636 W/ 250 OVERRIDE (IP): Mod: JZ | Performed by: ANESTHESIOLOGY

## 2025-07-11 PROCEDURE — 160002 HCHG RECOVERY MINUTES (STAT): Performed by: ORTHOPAEDIC SURGERY

## 2025-07-11 PROCEDURE — 160048 HCHG OR STATISTICAL LEVEL 1-5: Performed by: ORTHOPAEDIC SURGERY

## 2025-07-11 PROCEDURE — 160191 HCHG ANESTHESIA STANDARD: Performed by: ORTHOPAEDIC SURGERY

## 2025-07-11 PROCEDURE — 700111 HCHG RX REV CODE 636 W/ 250 OVERRIDE (IP): Performed by: ORTHOPAEDIC SURGERY

## 2025-07-11 RX ORDER — SODIUM CHLORIDE, SODIUM LACTATE, POTASSIUM CHLORIDE, CALCIUM CHLORIDE 600; 310; 30; 20 MG/100ML; MG/100ML; MG/100ML; MG/100ML
INJECTION, SOLUTION INTRAVENOUS CONTINUOUS
Status: DISCONTINUED | OUTPATIENT
Start: 2025-07-11 | End: 2025-07-11 | Stop reason: HOSPADM

## 2025-07-11 RX ORDER — METOCLOPRAMIDE HYDROCHLORIDE 5 MG/ML
INJECTION INTRAMUSCULAR; INTRAVENOUS PRN
Status: DISCONTINUED | OUTPATIENT
Start: 2025-07-11 | End: 2025-07-11 | Stop reason: SURG

## 2025-07-11 RX ORDER — ONDANSETRON 2 MG/ML
4 INJECTION INTRAMUSCULAR; INTRAVENOUS
Status: DISCONTINUED | OUTPATIENT
Start: 2025-07-11 | End: 2025-07-11 | Stop reason: HOSPADM

## 2025-07-11 RX ORDER — KETOROLAC TROMETHAMINE 15 MG/ML
INJECTION, SOLUTION INTRAMUSCULAR; INTRAVENOUS PRN
Status: DISCONTINUED | OUTPATIENT
Start: 2025-07-11 | End: 2025-07-11 | Stop reason: SURG

## 2025-07-11 RX ORDER — LIDOCAINE HYDROCHLORIDE 20 MG/ML
INJECTION, SOLUTION EPIDURAL; INFILTRATION; INTRACAUDAL; PERINEURAL PRN
Status: DISCONTINUED | OUTPATIENT
Start: 2025-07-11 | End: 2025-07-11 | Stop reason: SURG

## 2025-07-11 RX ORDER — LIDOCAINE HYDROCHLORIDE 10 MG/ML
INJECTION, SOLUTION INFILTRATION; PERINEURAL
Status: DISCONTINUED | OUTPATIENT
Start: 2025-07-11 | End: 2025-07-11 | Stop reason: HOSPADM

## 2025-07-11 RX ORDER — CEFAZOLIN SODIUM 1 G/3ML
2 INJECTION, POWDER, FOR SOLUTION INTRAMUSCULAR; INTRAVENOUS ONCE
Status: COMPLETED | OUTPATIENT
Start: 2025-07-11 | End: 2025-07-11

## 2025-07-11 RX ORDER — DIPHENHYDRAMINE HYDROCHLORIDE 50 MG/ML
12.5 INJECTION, SOLUTION INTRAMUSCULAR; INTRAVENOUS
Status: DISCONTINUED | OUTPATIENT
Start: 2025-07-11 | End: 2025-07-11 | Stop reason: HOSPADM

## 2025-07-11 RX ORDER — OXYCODONE HCL 5 MG/5 ML
10 SOLUTION, ORAL ORAL
Status: DISCONTINUED | OUTPATIENT
Start: 2025-07-11 | End: 2025-07-11 | Stop reason: HOSPADM

## 2025-07-11 RX ORDER — HALOPERIDOL 5 MG/ML
1 INJECTION INTRAMUSCULAR
Status: DISCONTINUED | OUTPATIENT
Start: 2025-07-11 | End: 2025-07-11 | Stop reason: HOSPADM

## 2025-07-11 RX ORDER — BUPIVACAINE HYDROCHLORIDE 5 MG/ML
INJECTION, SOLUTION EPIDURAL; INTRACAUDAL; PERINEURAL
Status: DISCONTINUED | OUTPATIENT
Start: 2025-07-11 | End: 2025-07-11 | Stop reason: HOSPADM

## 2025-07-11 RX ORDER — ONDANSETRON 2 MG/ML
INJECTION INTRAMUSCULAR; INTRAVENOUS PRN
Status: DISCONTINUED | OUTPATIENT
Start: 2025-07-11 | End: 2025-07-11 | Stop reason: SURG

## 2025-07-11 RX ORDER — OXYCODONE HCL 5 MG/5 ML
5 SOLUTION, ORAL ORAL
Status: DISCONTINUED | OUTPATIENT
Start: 2025-07-11 | End: 2025-07-11 | Stop reason: HOSPADM

## 2025-07-11 RX ADMIN — SODIUM CHLORIDE, POTASSIUM CHLORIDE, SODIUM LACTATE AND CALCIUM CHLORIDE: 600; 310; 30; 20 INJECTION, SOLUTION INTRAVENOUS at 07:19

## 2025-07-11 RX ADMIN — PROPOFOL 280 MG: 10 INJECTION, EMULSION INTRAVENOUS at 08:18

## 2025-07-11 RX ADMIN — METOCLOPRAMIDE HYDROCHLORIDE 10 MG: 5 INJECTION INTRAMUSCULAR; INTRAVENOUS at 08:25

## 2025-07-11 RX ADMIN — LIDOCAINE HYDROCHLORIDE 100 MG: 20 INJECTION, SOLUTION EPIDURAL; INFILTRATION; INTRACAUDAL; PERINEURAL at 08:18

## 2025-07-11 RX ADMIN — CEFAZOLIN 2 G: 1 INJECTION, POWDER, FOR SOLUTION INTRAMUSCULAR; INTRAVENOUS at 08:14

## 2025-07-11 RX ADMIN — ONDANSETRON 4 MG: 2 INJECTION INTRAMUSCULAR; INTRAVENOUS at 08:25

## 2025-07-11 RX ADMIN — KETOROLAC TROMETHAMINE 15 MG: 15 INJECTION, SOLUTION INTRAMUSCULAR; INTRAVENOUS at 08:25

## 2025-07-11 RX ADMIN — SODIUM CHLORIDE, POTASSIUM CHLORIDE, SODIUM LACTATE AND CALCIUM CHLORIDE: 600; 310; 30; 20 INJECTION, SOLUTION INTRAVENOUS at 08:14

## 2025-07-11 ASSESSMENT — PAIN DESCRIPTION - PAIN TYPE
TYPE: SURGICAL PAIN
TYPE: ACUTE PAIN
TYPE: SURGICAL PAIN

## 2025-07-11 ASSESSMENT — FIBROSIS 4 INDEX: FIB4 SCORE: 0.74

## 2025-07-11 NOTE — OR NURSING
0620- Pt brought back to pre- op holding. Assumed care.     0727- Patient allergies and NPO status verified, home medication reconciliation completed and belongings secures. Patient verbalizes understanding of pain scale, expected course of stay and plan of care. Surgical site verified with patient. IV access established. SCD'S in place.

## 2025-07-11 NOTE — DISCHARGE INSTR - OTHER INFO
DR. SANTIAGO'S POST-OPERATIVE INSTRUCTIONS    You have just undergone a surgery by Dr. Santiago in the operating room.  It is our wish that your postoperative recovery be as quick and comfortable as possible.  Please carefully review the following items that are important for your recovery.    After any operation, a certain degree of pain is to be expected. Take Advil (ibuprofen) and Extra Strength Tylenol as first line medications for mild to moderate pain. Taking each one every 6 hours, and staggering them so that you are taking one medicine every 3 hours, is the most effective. Refer to dosing instructions on the bottle, but in general ibuprofen dose is 600-800mg and Tylenol dose is 500-1000mg. For most small procedures, this should be enough to keep you comfortable. Take these medications until your followup visit. You may have been given a small prescription for stronger pain medicine which will help relieve more severe pain.  Pain medicine may make you drowsy so please keep this in mind.  Do not drive while taking pain medicine.      When you go home, please keep your operated arm elevated at all times (above the level of your heart).  If you do this, your swelling will resolve more quickly and your pain will be improve more quickly as well. You may also place an ice pack over your dressing or splint to help with swelling and pain.    It is also very important to keep your fingers moving after most procedures, unless you had a tendon repair or fracture repair in which case you will be in a splint. Keep all of your fingers moving through a full range of motion to prevent stiffness.    For small hand procedures such as carpal tunnel release, trigger finger release, and cyst excision, the dressing that you have on your extremity should remain on for 3-4 days. It may then be removed, you can wash the incision  gently with soap and water, and keep the incision covered with a band-aid or similar clean dressing.  For wrist ganglion cyst excision procedures, please leave the splint/dressing on for 5 days, then remove and replace with a Band-Aid.  At this point it is important to start working on wrist range of motion.  For larger procedures or if you have a splint on, these should remain on until follow up or as specifically instructed. If you feel that your dressing is too tight during the first 3 days after surgery, you may loosen it. It is normal to see minor staining on the dressing after surgery. If there is significant bleeding, you are advised to call the office during regular office hours to have this checked.  Make sure that your dressing is kept dry at all times.  You can take a shower if you cover your arm with a plastic bag. If your dressing gets wet, replace it with sterile dressing that you can obtain from your local drug store.    Follow up after surgery is typically 10-14 days, unless you were specifically instructed otherwise. This appointment is made at time of surgical scheduling. The sutures will be removed and you may be asked to see a hand therapist to optimize your functional result. Each of the hand therapists that you will be referred to have received special training in the care of the hand and upper extremity.    If you have questions regarding your surgery postop that you feel requires attention, please call the office at [phone] or [phone] during business hours, or [phone] after hours for the answering service. If you feel that you have a surgical emergency postoperatively that requires immediate attention, please call the above numbers or go to the Emergency Department and ask for the Orthopedic Surgeon on call.

## 2025-07-11 NOTE — H&P
"Referring Provider: Jess Ferreira P.A.-C.  PCP: Jess Ferreira P.A.-C.  Reason for visit:       Chief Complaint   Patient presents with    Right Hand - Pain    Left Hand - Pain               History: Magda Childress is a pleasant 64 y.o. female coming in today to discuss the results of her recent EMG, follow-up after steroid injection for bilateral middle finger trigger finger.  Doing well after steroid injections.  Also states the meloxicam that she was prescribed has been helping with the pain in her fingers.  To recap, the symptoms have been present for a long time.  Symptoms began without any specific trauma or incident, and have been progressively worsening.  She has a right carpal tunnel release about 2 years ago which she did find helpful but still complains of numbness in that hand.  Right-hand-dominant.  Rates pain today 8 out of 10.     Review of Systems:  The patient denies fevers, chills, chest pain, shortness of breath, night sweats, unexplained weight loss, nausea, vomiting or recent illnesses.        PMH:   Past Medical History        Past Medical History:   Diagnosis Date    Anesthesia 08/26/2024     \"Woke up in respiratory ICU after appendectomy in March of 2024.  Adult intubation tube was too big they needed to use a pediatric tube which they couldn't remove until 3 days after surgery\".    Arrhythmia      Arthritis      Benign paroxysmal positional vertigo of right ear 07/31/2019    Complication of anesthesia      Hard to intubate 08/26/2024     Needs pediatric ET tube.    Heart disease      History of myocardial infarction       x5; cardiac caths only, no stents placed    Hyperlipidemia      Hypertension      Sleep apnea 08/26/2024     Uses CPAP    Spontaneous dissection of coronary artery       X5 heart attacks related to SCAD    Thyroid disease              PSH:   Past Surgical History         Past Surgical History:   Procedure Laterality Date    VT LAP,APPENDECTOMY N/A 03/07/2024     " Procedure: APPENDECTOMY, LAPAROSCOPIC;  Surgeon: Manfred Zamudio M.D.;  Location: SURGERY [facility];  Service: Gastroenterology    CARPAL TUNNEL RELEASE Right      CHOLECYSTECTOMY        ORIF, WRIST   01/2024     Carpal Tunnel    THYROIDECTOMY TOTAL                Tobacco history:   Tobacco Use History   Social History          Tobacco Use   Smoking Status Never   Smokeless Tobacco Never            Medications:   Current Medications   Current Outpatient Medications:     meloxicam (MOBIC) 7.5 MG Tab, Take 1 Tablet by mouth every day., Disp: 30 Tablet, Rfl: 0    levothyroxine (SYNTHROID) 200 MCG Tab, Take 1 Tablet by mouth every morning on an empty stomach., Disp: 90 Tablet, Rfl: 3    VTAMA 1 % Cream, , Disp: , Rfl:     atorvastatin (LIPITOR) 80 MG tablet, Take 1 Tablet by mouth every day., Disp: 90 Tablet, Rfl: 3    omeprazole (PRILOSEC) 40 MG delayed-release capsule, Take 1 Capsule by mouth every day., Disp: 90 Capsule, Rfl: 3    FLUoxetine (PROZAC) 10 MG Cap, Take 1 Capsule by mouth every day., Disp: 90 Capsule, Rfl: 3    carvedilol (COREG) 6.25 MG Tab, Take 1 Tablet by mouth 2 times a day with meals. (Patient taking differently: Take 6.25 mg by mouth 2 times a day with meals.      **MEDICATION INSTRUCTIONS FOR SURGERY/PROCEDURE SCHEDULED FOR 9/19/2024  OK TO CONTINUE TAKING PRIOR TO SURGERY AND DAY OF SURGERY), Disp: 180 Tablet, Rfl: 3    Multiple Vitamins-Minerals (HAIR SKIN & NAILS) Tab, Take 1 Tablet by mouth every day.       *MEDICATION INSTRUCTIONS FOR SURGERY/PROCEDURE SCHEDULED FOR 9/19/2024.  DO NOT TAKE 7 DAYS PRIOR TO SURGERY, Disp: , Rfl:     aspirin (ASA) 81 MG Chew Tab chewable tablet, Chew 81 mg every day.      **MEDICATION INSTRUCTIONS FOR SURGERY/PROCEDURE SCHEDULED FOR 9/19/2024  COORDINATE MEDICATION INSTRUCTIONS FROM PRESCRIBING PHYSICIAN, Disp: , Rfl:         Allergies: Inapsine [droperidol], Apple, and Apricot flavor     Exam:  General: No acute distress, alert and oriented  Upper  extremity: Bilateral hands are atraumatic, skin intact throughout.  Diffuse degenerative changes apparent clinically.  Diffuse stiffness.  Compartments soft and compressible.  Good thenar muscle bulk. Good wrist and finger motion with all tendons intact.  Positive Tinel's and positive Phalen's at the wrist.  Negative Spurling's test.  Nontender at bilateral middle finger A1 pulley with no mechanical triggering but otherwise symmetrical motion to the other fingers.  Able to form composite fists.  Brisk capillary refill to all fingers.     Imaging: No new imaging today.  Other studies: Bilateral upper extremity EMG from [facility] done 4/18/2025 reveals bilateral carpal tunnel syndrome, moderate on the right and moderate to severe on the left.     Assessment/Plan: 64 y.o. female with chronic bilateral hand arthritis, bilateral middle finger trigger, bilateral carpal tunnel syndrome is here to discuss the results of her recent EMG.  For the bilateral middle finger trigger fingers, doing well with symptom relief after injection so we will continue with observation for now.  Encouraged range of motion to prevent stiffness.  For the hand arthritis, we will refill her meloxicam since that helps with her symptoms.  May also continue with heat since that helps.  For the bilateral carpal tunnel syndrome, she elected to proceed with a revision right carpal tunnel release.  Dr. Santiago notified.  Risks, benefits, expectations reviewed.  Verbalized understanding.  Sent to surgery scheduling preoperative screening after appointment today.  All questions answered.  Follow-up with Dr. Santiago for revision right carpal tunnel release.

## 2025-07-11 NOTE — ANESTHESIA POSTPROCEDURE EVALUATION
Patient: Priscilla Childress    Procedure Summary       Date: 07/11/25 Room / Location: [facility] OR 09 / SURGERY DAY SURGERY [facility]    Anesthesia Start: 0814 Anesthesia Stop: 0840    Procedure: RIGHT REVISION CARPAL TUNNEL RELEASE (Right: Wrist) Diagnosis:       Right carpal tunnel syndrome      (Right carpal tunnel syndrome [G56.01])    Surgeons: Dannie Santiago M.D. Responsible Provider: Manuel Madrigal M.D.    Anesthesia Type: general ASA Status: 2            Final Anesthesia Type: general  Last vitals  BP   Blood Pressure: 114/55    Temp   36.3 °C (97.3 °F)    Pulse   62   Resp   14    SpO2   95 %      Anesthesia Post Evaluation    Patient location during evaluation: PACU  Patient participation: complete - patient participated  Level of consciousness: awake and alert    Airway patency: patent  Anesthetic complications: no  Cardiovascular status: hemodynamically stable  Respiratory status: acceptable  Hydration status: euvolemic    PONV: none          There were no known notable events for this encounter.     Nurse Pain Score: 0 (NPRS)

## 2025-07-11 NOTE — DISCHARGE INSTRUCTIONS
If any questions arise, call your provider.      MEDICATIONS: Resume taking daily medication.  Take prescribed pain medication with food.  If no medication is prescribed, you may take non-aspirin pain medication if needed.  PAIN MEDICATION CAN BE VERY CONSTIPATING.  Take a stool softener or laxative such as senokot, pericolace, or milk of magnesia if needed.    What to Expect Post Anesthesia    Rest and take it easy for the first 24 hours.  A responsible adult is recommended to remain with you during that time.  It is normal to feel sleepy.  We encourage you to not do anything that requires balance, judgment or coordination.    FOR 24 HOURS DO NOT:  Drive, operate machinery or run household appliances.  Drink beer or alcoholic beverages.  Make important decisions or sign legal documents.    To avoid nausea, slowly advance diet as tolerated, avoiding spicy or greasy foods for the first day.  Add more substantial food to your diet according to your provider's instructions.  Babies can be fed formula or breast milk as soon as they are hungry.  INCREASE FLUIDS AND FIBER TO AVOID CONSTIPATION.    MILD FLU-LIKE SYMPTOMS ARE NORMAL.  YOU MAY EXPERIENCE GENERALIZED MUSCLE ACHES, THROAT IRRITATION, HEADACHE AND/OR SOME NAUSEA.

## 2025-07-11 NOTE — ANESTHESIA TIME REPORT
Anesthesia Start and Stop Event Times       Date Time Event    7/11/2025 0814 Ready for Procedure     0814 Anesthesia Start     0840 Anesthesia Stop          Responsible Staff  07/11/25      Name Role Begin End    Manuel Madrigal M.D. Anesth 0814 0840          Overtime Reason:  no overtime (within assigned shift)    Comments:

## 2025-07-11 NOTE — OR NURSING
0834 Pt to PACU from OR. Report from anesthesia and OR RN. On 8L O2 via mask. Respirations even and unlabored. VSS. Dressing to rt hand CDI.    0858 - pt denies pain or nausea. Pt declines water at this time.    0911 - Left message with pt boyfriend Mele.    0936 - Pt getting dressing with help of CNA.    0940 - Pt to recliner, awaiting boyfriend.    0946 - Handoff to Sarita CHAUDHARI

## 2025-07-11 NOTE — OP REPORT
OPERATIVE NOTE     DATE OF PROCEDURE: 7/11/2025            PRE-OP DIAGNOSIS: Recurrent right carpal tunnel syndrome            POST-OP DIAGNOSIS: same            PROCEDURE: Revision right carpal tunnel release, flexor tenosynovectomy            SURGEON: Dannie Santiago M.D. - Primary            ANESTHESIA: General            ESTIMATED BLOOD LOSS: minimal                   SPECIMENS: none            COMPLICATIONS: none            CONDITION: stable to PACU            OPERATIVE INDICATIONS AND DESCRIPTION OF PROCEDURE: This is a pleasant 64 y.o. female who presented to my office with recurrent right carpal tunnel syndrome.  She had a successful carpal tunnel release a few years ago with early recurrence.  Diagnosis was confirmed with an EMG.  They failed conservative treatments.  We discussed potential etiologies of this.  We discussed potential treatment options.  We discussed revision carpal tunnel release.  We discussed the operative technique, risks, benefits, alternatives, as well as expectations postoperatively.  Risks including, but not limited to, bleeding, infection, stiffness, tendon adhesions, chronic pain around the incision, recurrence of symptoms, no improvement or incomplete resolution of symptoms, expected recovery depending on preoperative EMG findings, risks of anesthesia, were discussed.  They elected to proceed.  The patient was seen in the preoperative holding area, identified, and the wrist was marked.  They were taken back to the operating room.  General anesthesia was induced by the anesthesia provider.  A tourniquet was placed on the forearm and the upper extremity was prepped and draped in usual orthopedic fashion.  A timeout was performed.  The tourniquet was inflated to 250 mmHg.  A 5 cm longitudinal incision was made in the palm utilizing her previous incision.  I bluntly dissected through the subcutaneous tissues and down to the palmar fascia.  There was quite a bit of scar tissue here.   The palmar fascia and the deeper tissues were all 1 layer.  It appeared very thick.  I began longitudinally transecting this tissue which was under a significant amount of tension.  I went distally to the fat pad, identifying the palmar arch protecting it.  I then went proximally and transected the ulnar fascia and transverse carpal ligament.  I also split the distal aspect of the antebrachial fascia.  Again, there was significant tightness of this tissue.  At this point I gently examined the nerve.  It appeared healthy without any constricting scar tissue surrounding it.  I then performed a flexor tenosynovectomy to help relieve additional pressure off of the nerve.  At this point, I felt like I had performed a thorough decompression of the nerve and no further neurolysis or nerve wrap was indicated.  The wound was then thoroughly irrigated with normal saline.  The skin was closed with 4-0 nylon suture.  Local anesthetic was infiltrated to help with postoperative pain.  A sterile dressing was placed. The tourniquet was deflated.  The patient awoke from anesthesia and was transferred to the PACU in stable condition.

## 2025-07-11 NOTE — OR NURSING
0946: Report from Leonor CHAUDHARI. Patient A/OX4. VSS on RA. RLE dressing CDI.    1015: Patient education completed, family denies further questions.     1017: DC'd to care of family post uneventful stay in PACU 2.

## 2025-07-11 NOTE — ANESTHESIA PROCEDURE NOTES
Airway    Date/Time: 7/11/2025 8:18 AM    Performed by: Manuel Madrigal M.D.  Authorized by: Manuel Madrigal M.D.    Location:  OR  Urgency:  Elective  Indications for Airway Management:  Anesthesia      Spontaneous Ventilation: absent    Sedation Level:  Deep  Preoxygenated: Yes    Final Airway Type:  Supraglottic airway  Final Supraglottic Airway:  Standard LMA    SGA Size:  3  Number of Attempts at Approach:  1

## 2025-07-11 NOTE — ANESTHESIA PREPROCEDURE EVALUATION
Case: [id number] Date/Time: 07/11/25 0805    Procedure: RIGHT REVISION CARPAL TUNNEL RELEASE (Right)    Diagnosis: Right carpal tunnel syndrome [G56.01]    Pre-op diagnosis: Right carpal tunnel syndrome [G56.01]    Location: [facility] OR 09 / SURGERY DAY SURGERY [facility]    Surgeons: Dannie Santiago M.D.            Relevant Problems   ANESTHESIA   (positive) LIDYA (obstructive sleep apnea)      CARDIAC   (positive) Benign essential HTN   (positive) Hot flashes   (positive) Spontaneous dissection of coronary artery      GI   (positive) GERD (gastroesophageal reflux disease)         (positive) Fatty liver disease, nonalcoholic      ENDO   (positive) Postsurgical hypothyroidism      Other   (positive) Osteoarthritis of both hands       Physical Exam    Airway   Mallampati: II  TM distance: >3 FB  Neck ROM: full       Cardiovascular - normal exam  Rhythm: regular  Rate: normal    (-) murmur     Dental - normal exam           Pulmonary - normal examBreath sounds clear to auscultation     Abdominal    Neurological - normal exam                   Anesthesia Plan    ASA 2       Plan - general       Airway plan will be LMA          Induction: intravenous          Informed Consent:    Anesthetic plan and risks discussed with patient.

## 2025-07-21 PROBLEM — G56.02 LEFT CARPAL TUNNEL SYNDROME: Status: ACTIVE | Noted: 2025-07-21

## (undated) DEVICE — BANDAGE ELASTIC LATEX STERILE VELCRO 4 X 5 YDS (25EA/CA)

## (undated) DEVICE — TUBE E-T HI-LO CUFF 7.0MM (10EA/PK)

## (undated) DEVICE — BAG RETRIEVAL 10ML (10EA/BX)

## (undated) DEVICE — SET LEADWIRE 5 LEAD BEDSIDE DISPOSABLE ECG (1SET OF 5/EA)

## (undated) DEVICE — CHLORAPREP 26 ML APPLICATOR - ORANGE TINT(25/CA)

## (undated) DEVICE — GOWN WARMING STANDARD FLEX - (30/CA)

## (undated) DEVICE — GLOVE BIOGEL INDICATOR SZ 8 SURGICAL PF LTX - (50/BX 4BX/CA)

## (undated) DEVICE — Device

## (undated) DEVICE — HUMID-VENT HEAT AND MOISTURE EXCHANGE- (50/BX)

## (undated) DEVICE — GLOVE BIOGEL INDICATOR SZ 7SURGICAL PF LTX - (50/BX 4BX/CA)

## (undated) DEVICE — BAG SPONGE COUNT 10.25 X 32 - BLUE (250/CA)

## (undated) DEVICE — TOWEL STOP TIMEOUT SAFETY FLAG (40EA/CA)

## (undated) DEVICE — SUTURE 4-0 ETHILON PS-2 18 (12PK/BX)"

## (undated) DEVICE — TROCAR 5X100 NON BLADED Z-TH - READ KII (6/BX)

## (undated) DEVICE — DERMABOND ADVANCED - (12EA/BX)

## (undated) DEVICE — CANISTER SUCTION RIGID RED 1500CC (40EA/CA)

## (undated) DEVICE — LACTATED RINGERS INJ 1000 ML - (14EA/CA 60CA/PF)

## (undated) DEVICE — PACK UPPER EXTREMITY SM OR - (3/CA)

## (undated) DEVICE — SUTURE 0 VICRYL PLUS UR-6 - 27 INCH (36/BX)

## (undated) DEVICE — SLEEVE, VASO, THIGH, MED

## (undated) DEVICE — SUTURE 3-0 ETHILON FS-1 - (36/BX) 30 INCH

## (undated) DEVICE — ELECTRODE DUAL RETURN W/ CORD - (50/PK)

## (undated) DEVICE — SUTURE 4-0 MONOCRYL PLUS PS-2 - 27 INCH (36/BX)

## (undated) DEVICE — SUCTION INSTRUMENT YANKAUER BULBOUS TIP W/O VENT (50EA/CA)

## (undated) DEVICE — SODIUM CHL IRRIGATION 0.9% 1000ML (12EA/CA)

## (undated) DEVICE — DRAPE IOBAN II INCISE 23X17 - (10EA/BX 4BX/CA)

## (undated) DEVICE — DRAPESURG STERI-DRAPE LONG - (10/BX 4BX/CA)

## (undated) DEVICE — NEEDLE INSFL 120MM 14GA VRRS - (20/BX)

## (undated) DEVICE — GOWN SURGEONS LARGE - (32/CA)

## (undated) DEVICE — CANNULA W/SEAL 5X100 Z-THRE - ADED KII (12/BX)

## (undated) DEVICE — TUBE CONNECTING SUCTION - CLEAR PLASTIC STERILE 72 IN (50EA/CA)

## (undated) DEVICE — SENSOR OXIMETER ADULT SPO2 RD SET (20EA/BX)

## (undated) DEVICE — GLOVE SZ 7 BIOGEL PI MICRO - PF LF (50PR/BX 4BX/CA)

## (undated) DEVICE — TUBE E-T HI-LO CUFF 6.5MM (10EA/BX)

## (undated) DEVICE — SUTURE GENERAL

## (undated) DEVICE — WATER IRRIGATION STERILE 1000ML (12EA/CA)

## (undated) DEVICE — STAPLE 45MM VASCULAR WHITE 2.5MM (12EA/BX)